# Patient Record
Sex: MALE | Race: WHITE | Employment: UNEMPLOYED | URBAN - METROPOLITAN AREA
[De-identification: names, ages, dates, MRNs, and addresses within clinical notes are randomized per-mention and may not be internally consistent; named-entity substitution may affect disease eponyms.]

---

## 2017-04-14 DIAGNOSIS — K21.9 GASTROESOPHAGEAL REFLUX DISEASE, ESOPHAGITIS PRESENCE NOT SPECIFIED: ICD-10-CM

## 2017-04-14 RX ORDER — OMEPRAZOLE 40 MG/1
CAPSULE, DELAYED RELEASE ORAL
Qty: 90 CAPSULE | Refills: 2 | Status: SHIPPED | OUTPATIENT
Start: 2017-04-14 | End: 2017-10-10

## 2017-04-14 RX ORDER — METOPROLOL SUCCINATE 25 MG/1
TABLET, EXTENDED RELEASE ORAL
Qty: 90 TABLET | Refills: 2 | Status: SHIPPED | OUTPATIENT
Start: 2017-04-14 | End: 2018-01-09 | Stop reason: SDUPTHER

## 2017-05-08 DIAGNOSIS — I25.10 CORONARY ARTERY DISEASE INVOLVING NATIVE HEART, ANGINA PRESENCE UNSPECIFIED, UNSPECIFIED VESSEL OR LESION TYPE: ICD-10-CM

## 2017-05-08 DIAGNOSIS — Z11.59 NEED FOR HEPATITIS C SCREENING TEST: ICD-10-CM

## 2017-05-08 DIAGNOSIS — K21.00 GASTROESOPHAGEAL REFLUX DISEASE WITH ESOPHAGITIS: ICD-10-CM

## 2017-05-08 DIAGNOSIS — E78.5 HYPERLIPIDEMIA, UNSPECIFIED HYPERLIPIDEMIA TYPE: ICD-10-CM

## 2017-05-08 LAB
ALBUMIN SERPL-MCNC: 4.1 G/DL (ref 3.9–4.9)
ALP BLD-CCNC: 43 U/L (ref 35–104)
ALT SERPL-CCNC: 17 U/L (ref 0–41)
ANION GAP SERPL CALCULATED.3IONS-SCNC: 11 MEQ/L (ref 7–13)
AST SERPL-CCNC: 24 U/L (ref 0–40)
BASOPHILS ABSOLUTE: 0 K/UL (ref 0–0.2)
BASOPHILS RELATIVE PERCENT: 0.9 %
BILIRUB SERPL-MCNC: 0.7 MG/DL (ref 0–1.2)
BUN BLDV-MCNC: 12 MG/DL (ref 8–23)
CALCIUM SERPL-MCNC: 8.7 MG/DL (ref 8.6–10.2)
CHLORIDE BLD-SCNC: 103 MEQ/L (ref 98–107)
CHOLESTEROL, TOTAL: 136 MG/DL (ref 0–199)
CO2: 26 MEQ/L (ref 22–29)
CREAT SERPL-MCNC: 0.91 MG/DL (ref 0.7–1.2)
EOSINOPHILS ABSOLUTE: 0.1 K/UL (ref 0–0.7)
EOSINOPHILS RELATIVE PERCENT: 2 %
FOLATE: >20 NG/ML (ref 7.3–26.1)
GFR AFRICAN AMERICAN: >60
GFR NON-AFRICAN AMERICAN: >60
GLOBULIN: 2.2 G/DL (ref 2.3–3.5)
GLUCOSE BLD-MCNC: 85 MG/DL (ref 74–109)
HCT VFR BLD CALC: 46.6 % (ref 42–52)
HDLC SERPL-MCNC: 57 MG/DL (ref 40–59)
HEMOGLOBIN: 15.9 G/DL (ref 14–18)
HEPATITIS C ANTIBODY INTERPRETATION: NORMAL
LDL CHOLESTEROL CALCULATED: 65 MG/DL (ref 0–129)
LYMPHOCYTES ABSOLUTE: 1.3 K/UL (ref 1–4.8)
LYMPHOCYTES RELATIVE PERCENT: 29 %
MAGNESIUM: 2.4 MG/DL (ref 1.7–2.3)
MCH RBC QN AUTO: 31.8 PG (ref 27–31.3)
MCHC RBC AUTO-ENTMCNC: 34 % (ref 33–37)
MCV RBC AUTO: 93.5 FL (ref 80–100)
MONOCYTES ABSOLUTE: 0.4 K/UL (ref 0.2–0.8)
MONOCYTES RELATIVE PERCENT: 7.8 %
NEUTROPHILS ABSOLUTE: 2.8 K/UL (ref 1.4–6.5)
NEUTROPHILS RELATIVE PERCENT: 60.3 %
PDW BLD-RTO: 13.4 % (ref 11.5–14.5)
PLATELET # BLD: 143 K/UL (ref 130–400)
POTASSIUM SERPL-SCNC: 4.5 MEQ/L (ref 3.5–5.1)
RBC # BLD: 4.99 M/UL (ref 4.7–6.1)
SODIUM BLD-SCNC: 140 MEQ/L (ref 132–144)
TOTAL PROTEIN: 6.3 G/DL (ref 6.4–8.1)
TRIGL SERPL-MCNC: 71 MG/DL (ref 0–200)
TSH REFLEX: 3.19 UIU/ML (ref 0.27–4.2)
VITAMIN B-12: 619 PG/ML (ref 211–946)
WBC # BLD: 4.6 K/UL (ref 4.8–10.8)

## 2017-05-09 ENCOUNTER — OFFICE VISIT (OUTPATIENT)
Dept: CARDIOLOGY | Age: 72
End: 2017-05-09

## 2017-05-09 VITALS
RESPIRATION RATE: 12 BRPM | BODY MASS INDEX: 26.63 KG/M2 | OXYGEN SATURATION: 97 % | HEART RATE: 56 BPM | SYSTOLIC BLOOD PRESSURE: 134 MMHG | WEIGHT: 186 LBS | DIASTOLIC BLOOD PRESSURE: 76 MMHG | HEIGHT: 70 IN

## 2017-05-09 DIAGNOSIS — R00.1 SINUS BRADYCARDIA ON ECG: ICD-10-CM

## 2017-05-09 DIAGNOSIS — I25.10 CORONARY ARTERY DISEASE INVOLVING NATIVE HEART, ANGINA PRESENCE UNSPECIFIED, UNSPECIFIED VESSEL OR LESION TYPE: Primary | ICD-10-CM

## 2017-05-09 PROCEDURE — G8427 DOCREV CUR MEDS BY ELIG CLIN: HCPCS | Performed by: INTERNAL MEDICINE

## 2017-05-09 PROCEDURE — G8598 ASA/ANTIPLAT THER USED: HCPCS | Performed by: INTERNAL MEDICINE

## 2017-05-09 PROCEDURE — 4040F PNEUMOC VAC/ADMIN/RCVD: CPT | Performed by: INTERNAL MEDICINE

## 2017-05-09 PROCEDURE — 1036F TOBACCO NON-USER: CPT | Performed by: INTERNAL MEDICINE

## 2017-05-09 PROCEDURE — G8420 CALC BMI NORM PARAMETERS: HCPCS | Performed by: INTERNAL MEDICINE

## 2017-05-09 PROCEDURE — 1123F ACP DISCUSS/DSCN MKR DOCD: CPT | Performed by: INTERNAL MEDICINE

## 2017-05-09 PROCEDURE — 99213 OFFICE O/P EST LOW 20 MIN: CPT | Performed by: INTERNAL MEDICINE

## 2017-05-09 PROCEDURE — 3017F COLORECTAL CA SCREEN DOC REV: CPT | Performed by: INTERNAL MEDICINE

## 2017-05-09 ASSESSMENT — ENCOUNTER SYMPTOMS
SHORTNESS OF BREATH: 0
COLOR CHANGE: 0
CHEST TIGHTNESS: 0
COUGH: 0
APNEA: 0

## 2017-05-13 DIAGNOSIS — E78.5 HYPERLIPIDEMIA, UNSPECIFIED HYPERLIPIDEMIA TYPE: ICD-10-CM

## 2017-05-13 DIAGNOSIS — I25.10 CORONARY ARTERY DISEASE INVOLVING NATIVE HEART, ANGINA PRESENCE UNSPECIFIED, UNSPECIFIED VESSEL OR LESION TYPE: ICD-10-CM

## 2017-05-15 ENCOUNTER — OFFICE VISIT (OUTPATIENT)
Dept: FAMILY MEDICINE CLINIC | Age: 72
End: 2017-05-15

## 2017-05-15 VITALS
WEIGHT: 185 LBS | HEIGHT: 70 IN | RESPIRATION RATE: 12 BRPM | OXYGEN SATURATION: 98 % | DIASTOLIC BLOOD PRESSURE: 80 MMHG | HEART RATE: 56 BPM | SYSTOLIC BLOOD PRESSURE: 130 MMHG | TEMPERATURE: 98.5 F | BODY MASS INDEX: 26.48 KG/M2

## 2017-05-15 DIAGNOSIS — K21.00 GASTROESOPHAGEAL REFLUX DISEASE WITH ESOPHAGITIS: ICD-10-CM

## 2017-05-15 DIAGNOSIS — I25.10 CORONARY ARTERY DISEASE INVOLVING NATIVE HEART, ANGINA PRESENCE UNSPECIFIED, UNSPECIFIED VESSEL OR LESION TYPE: Primary | ICD-10-CM

## 2017-05-15 DIAGNOSIS — Z12.11 COLON CANCER SCREENING: ICD-10-CM

## 2017-05-15 DIAGNOSIS — E78.5 HYPERLIPIDEMIA, UNSPECIFIED HYPERLIPIDEMIA TYPE: ICD-10-CM

## 2017-05-15 PROCEDURE — 1036F TOBACCO NON-USER: CPT | Performed by: FAMILY MEDICINE

## 2017-05-15 PROCEDURE — G8598 ASA/ANTIPLAT THER USED: HCPCS | Performed by: FAMILY MEDICINE

## 2017-05-15 PROCEDURE — 1123F ACP DISCUSS/DSCN MKR DOCD: CPT | Performed by: FAMILY MEDICINE

## 2017-05-15 PROCEDURE — G8427 DOCREV CUR MEDS BY ELIG CLIN: HCPCS | Performed by: FAMILY MEDICINE

## 2017-05-15 PROCEDURE — 99214 OFFICE O/P EST MOD 30 MIN: CPT | Performed by: FAMILY MEDICINE

## 2017-05-15 PROCEDURE — 3017F COLORECTAL CA SCREEN DOC REV: CPT | Performed by: FAMILY MEDICINE

## 2017-05-15 PROCEDURE — G8420 CALC BMI NORM PARAMETERS: HCPCS | Performed by: FAMILY MEDICINE

## 2017-05-15 PROCEDURE — 4040F PNEUMOC VAC/ADMIN/RCVD: CPT | Performed by: FAMILY MEDICINE

## 2017-05-15 RX ORDER — ROSUVASTATIN CALCIUM 10 MG/1
TABLET, COATED ORAL
Qty: 90 TABLET | Refills: 0 | Status: SHIPPED | OUTPATIENT
Start: 2017-05-15 | End: 2017-08-13 | Stop reason: SDUPTHER

## 2017-05-15 ASSESSMENT — ENCOUNTER SYMPTOMS
NAUSEA: 0
EYES NEGATIVE: 1
ALLERGIC/IMMUNOLOGIC NEGATIVE: 1
SHORTNESS OF BREATH: 0
RESPIRATORY NEGATIVE: 1
ABDOMINAL PAIN: 0
GASTROINTESTINAL NEGATIVE: 1
CHEST TIGHTNESS: 0
VOMITING: 0
BACK PAIN: 0
COUGH: 0

## 2017-08-13 DIAGNOSIS — E78.5 HYPERLIPIDEMIA, UNSPECIFIED HYPERLIPIDEMIA TYPE: ICD-10-CM

## 2017-08-13 DIAGNOSIS — I25.10 CORONARY ARTERY DISEASE INVOLVING NATIVE HEART, ANGINA PRESENCE UNSPECIFIED, UNSPECIFIED VESSEL OR LESION TYPE: ICD-10-CM

## 2017-08-14 RX ORDER — ROSUVASTATIN CALCIUM 10 MG/1
TABLET, COATED ORAL
Qty: 90 TABLET | Refills: 0 | Status: SHIPPED | OUTPATIENT
Start: 2017-08-14 | End: 2017-11-13 | Stop reason: SDUPTHER

## 2017-08-24 ENCOUNTER — OFFICE VISIT (OUTPATIENT)
Dept: UROLOGY | Age: 72
End: 2017-08-24

## 2017-08-24 VITALS
DIASTOLIC BLOOD PRESSURE: 84 MMHG | HEART RATE: 56 BPM | SYSTOLIC BLOOD PRESSURE: 130 MMHG | BODY MASS INDEX: 26.07 KG/M2 | HEIGHT: 69 IN | WEIGHT: 176 LBS

## 2017-08-24 DIAGNOSIS — N13.8 BPH WITH OBSTRUCTION/LOWER URINARY TRACT SYMPTOMS: Primary | ICD-10-CM

## 2017-08-24 DIAGNOSIS — N40.1 BPH WITH OBSTRUCTION/LOWER URINARY TRACT SYMPTOMS: Primary | ICD-10-CM

## 2017-08-24 PROCEDURE — 3017F COLORECTAL CA SCREEN DOC REV: CPT | Performed by: UROLOGY

## 2017-08-24 PROCEDURE — 51741 ELECTRO-UROFLOWMETRY FIRST: CPT | Performed by: UROLOGY

## 2017-08-24 PROCEDURE — G8598 ASA/ANTIPLAT THER USED: HCPCS | Performed by: UROLOGY

## 2017-08-24 PROCEDURE — 4040F PNEUMOC VAC/ADMIN/RCVD: CPT | Performed by: UROLOGY

## 2017-08-24 PROCEDURE — 1036F TOBACCO NON-USER: CPT | Performed by: UROLOGY

## 2017-08-24 PROCEDURE — 1123F ACP DISCUSS/DSCN MKR DOCD: CPT | Performed by: UROLOGY

## 2017-08-24 PROCEDURE — 51798 US URINE CAPACITY MEASURE: CPT | Performed by: UROLOGY

## 2017-08-24 PROCEDURE — G8419 CALC BMI OUT NRM PARAM NOF/U: HCPCS | Performed by: UROLOGY

## 2017-08-24 PROCEDURE — 99213 OFFICE O/P EST LOW 20 MIN: CPT | Performed by: UROLOGY

## 2017-08-24 PROCEDURE — G8427 DOCREV CUR MEDS BY ELIG CLIN: HCPCS | Performed by: UROLOGY

## 2017-08-24 ASSESSMENT — ENCOUNTER SYMPTOMS: SHORTNESS OF BREATH: 0

## 2017-09-15 ENCOUNTER — ANESTHESIA (OUTPATIENT)
Dept: ENDOSCOPY | Age: 72
End: 2017-09-15
Payer: MEDICARE

## 2017-09-15 ENCOUNTER — HOSPITAL ENCOUNTER (OUTPATIENT)
Age: 72
Setting detail: OUTPATIENT SURGERY
Discharge: HOME | End: 2017-09-15
Attending: SPECIALIST | Admitting: SPECIALIST
Payer: MEDICARE

## 2017-09-15 ENCOUNTER — ANESTHESIA EVENT (OUTPATIENT)
Dept: ENDOSCOPY | Age: 72
End: 2017-09-15
Payer: MEDICARE

## 2017-09-15 VITALS
BODY MASS INDEX: 26.07 KG/M2 | DIASTOLIC BLOOD PRESSURE: 74 MMHG | SYSTOLIC BLOOD PRESSURE: 120 MMHG | TEMPERATURE: 97.6 F | RESPIRATION RATE: 16 BRPM | WEIGHT: 176 LBS | HEART RATE: 50 BPM | OXYGEN SATURATION: 98 % | HEIGHT: 69 IN

## 2017-09-15 VITALS
OXYGEN SATURATION: 99 % | DIASTOLIC BLOOD PRESSURE: 51 MMHG | RESPIRATION RATE: 9 BRPM | SYSTOLIC BLOOD PRESSURE: 95 MMHG | TEMPERATURE: 97.7 F

## 2017-09-15 PROCEDURE — 2500000003 HC RX 250 WO HCPCS: Performed by: NURSE ANESTHETIST, CERTIFIED REGISTERED

## 2017-09-15 PROCEDURE — 3700000000 HC ANESTHESIA ATTENDED CARE: Performed by: SPECIALIST

## 2017-09-15 PROCEDURE — 7100000011 HC PHASE II RECOVERY - ADDTL 15 MIN: Performed by: SPECIALIST

## 2017-09-15 PROCEDURE — 7100000010 HC PHASE II RECOVERY - FIRST 15 MIN: Performed by: SPECIALIST

## 2017-09-15 PROCEDURE — 2580000003 HC RX 258: Performed by: SPECIALIST

## 2017-09-15 PROCEDURE — 2580000003 HC RX 258: Performed by: NURSE ANESTHETIST, CERTIFIED REGISTERED

## 2017-09-15 PROCEDURE — 6360000002 HC RX W HCPCS: Performed by: NURSE ANESTHETIST, CERTIFIED REGISTERED

## 2017-09-15 PROCEDURE — 88305 TISSUE EXAM BY PATHOLOGIST: CPT

## 2017-09-15 PROCEDURE — 3609027000 HC COLONOSCOPY: Performed by: SPECIALIST

## 2017-09-15 PROCEDURE — 3700000001 HC ADD 15 MINUTES (ANESTHESIA): Performed by: SPECIALIST

## 2017-09-15 RX ORDER — SODIUM CHLORIDE 0.9 % (FLUSH) 0.9 %
10 SYRINGE (ML) INJECTION EVERY 12 HOURS SCHEDULED
Status: DISCONTINUED | OUTPATIENT
Start: 2017-09-15 | End: 2017-09-15 | Stop reason: HOSPADM

## 2017-09-15 RX ORDER — SODIUM CHLORIDE 0.9 % (FLUSH) 0.9 %
10 SYRINGE (ML) INJECTION PRN
Status: DISCONTINUED | OUTPATIENT
Start: 2017-09-15 | End: 2017-09-15 | Stop reason: HOSPADM

## 2017-09-15 RX ORDER — SODIUM CHLORIDE 9 MG/ML
INJECTION, SOLUTION INTRAVENOUS CONTINUOUS PRN
Status: DISCONTINUED | OUTPATIENT
Start: 2017-09-15 | End: 2017-09-15 | Stop reason: SDUPTHER

## 2017-09-15 RX ORDER — SODIUM CHLORIDE 0.9 % (FLUSH) 0.9 %
10 SYRINGE (ML) INJECTION EVERY 12 HOURS SCHEDULED
Status: CANCELLED | OUTPATIENT
Start: 2017-09-15

## 2017-09-15 RX ORDER — SODIUM CHLORIDE 9 MG/ML
INJECTION, SOLUTION INTRAVENOUS CONTINUOUS
Status: CANCELLED | OUTPATIENT
Start: 2017-09-15

## 2017-09-15 RX ORDER — ONDANSETRON 2 MG/ML
4 INJECTION INTRAMUSCULAR; INTRAVENOUS
Status: DISCONTINUED | OUTPATIENT
Start: 2017-09-15 | End: 2017-09-15 | Stop reason: HOSPADM

## 2017-09-15 RX ORDER — SODIUM CHLORIDE 0.9 % (FLUSH) 0.9 %
10 SYRINGE (ML) INJECTION PRN
Status: CANCELLED | OUTPATIENT
Start: 2017-09-15

## 2017-09-15 RX ORDER — SODIUM CHLORIDE 9 MG/ML
INJECTION, SOLUTION INTRAVENOUS CONTINUOUS
Status: DISCONTINUED | OUTPATIENT
Start: 2017-09-15 | End: 2017-09-15 | Stop reason: HOSPADM

## 2017-09-15 RX ORDER — PROPOFOL 10 MG/ML
INJECTION, EMULSION INTRAVENOUS PRN
Status: DISCONTINUED | OUTPATIENT
Start: 2017-09-15 | End: 2017-09-15 | Stop reason: SDUPTHER

## 2017-09-15 RX ORDER — LIDOCAINE HYDROCHLORIDE 10 MG/ML
1 INJECTION, SOLUTION EPIDURAL; INFILTRATION; INTRACAUDAL; PERINEURAL
Status: DISCONTINUED | OUTPATIENT
Start: 2017-09-15 | End: 2017-09-15 | Stop reason: HOSPADM

## 2017-09-15 RX ORDER — LIDOCAINE HYDROCHLORIDE 10 MG/ML
INJECTION, SOLUTION INFILTRATION; PERINEURAL PRN
Status: DISCONTINUED | OUTPATIENT
Start: 2017-09-15 | End: 2017-09-15 | Stop reason: SDUPTHER

## 2017-09-15 RX ADMIN — SODIUM CHLORIDE: 9 INJECTION, SOLUTION INTRAVENOUS at 08:23

## 2017-09-15 RX ADMIN — SODIUM CHLORIDE: 900 INJECTION, SOLUTION INTRAVENOUS at 08:08

## 2017-09-15 RX ADMIN — PROPOFOL 60 MG: 10 INJECTION, EMULSION INTRAVENOUS at 08:46

## 2017-09-15 RX ADMIN — PROPOFOL 120 MG: 10 INJECTION, EMULSION INTRAVENOUS at 08:42

## 2017-09-15 RX ADMIN — LIDOCAINE HYDROCHLORIDE 30 MG: 10 INJECTION, SOLUTION INFILTRATION; PERINEURAL at 08:42

## 2017-09-15 RX ADMIN — PROPOFOL 40 MG: 10 INJECTION, EMULSION INTRAVENOUS at 08:55

## 2017-10-03 DIAGNOSIS — E78.5 HYPERLIPIDEMIA, UNSPECIFIED HYPERLIPIDEMIA TYPE: ICD-10-CM

## 2017-10-03 DIAGNOSIS — I25.10 CORONARY ARTERY DISEASE INVOLVING NATIVE HEART, ANGINA PRESENCE UNSPECIFIED, UNSPECIFIED VESSEL OR LESION TYPE: ICD-10-CM

## 2017-10-03 DIAGNOSIS — K21.00 GASTROESOPHAGEAL REFLUX DISEASE WITH ESOPHAGITIS: ICD-10-CM

## 2017-10-03 LAB
ALBUMIN SERPL-MCNC: 4.3 G/DL (ref 3.9–4.9)
ALP BLD-CCNC: 44 U/L (ref 35–104)
ALT SERPL-CCNC: 15 U/L (ref 0–41)
ANION GAP SERPL CALCULATED.3IONS-SCNC: 14 MEQ/L (ref 7–13)
AST SERPL-CCNC: 18 U/L (ref 0–40)
BASOPHILS ABSOLUTE: 0.1 K/UL (ref 0–0.2)
BASOPHILS RELATIVE PERCENT: 0.9 %
BILIRUB SERPL-MCNC: 0.7 MG/DL (ref 0–1.2)
BUN BLDV-MCNC: 13 MG/DL (ref 8–23)
CALCIUM SERPL-MCNC: 9.2 MG/DL (ref 8.6–10.2)
CHLORIDE BLD-SCNC: 105 MEQ/L (ref 98–107)
CHOLESTEROL, TOTAL: 134 MG/DL (ref 0–199)
CO2: 26 MEQ/L (ref 22–29)
CREAT SERPL-MCNC: 0.82 MG/DL (ref 0.7–1.2)
EOSINOPHILS ABSOLUTE: 0.1 K/UL (ref 0–0.7)
EOSINOPHILS RELATIVE PERCENT: 1.7 %
GFR AFRICAN AMERICAN: >60
GFR NON-AFRICAN AMERICAN: >60
GLOBULIN: 2.3 G/DL (ref 2.3–3.5)
GLUCOSE BLD-MCNC: 96 MG/DL (ref 74–109)
HCT VFR BLD CALC: 47 % (ref 42–52)
HDLC SERPL-MCNC: 61 MG/DL (ref 40–59)
HEMOGLOBIN: 15.8 G/DL (ref 14–18)
LDL CHOLESTEROL CALCULATED: 59 MG/DL (ref 0–129)
LYMPHOCYTES ABSOLUTE: 1.1 K/UL (ref 1–4.8)
LYMPHOCYTES RELATIVE PERCENT: 19.1 %
MCH RBC QN AUTO: 31.6 PG (ref 27–31.3)
MCHC RBC AUTO-ENTMCNC: 33.7 % (ref 33–37)
MCV RBC AUTO: 93.6 FL (ref 80–100)
MONOCYTES ABSOLUTE: 0.4 K/UL (ref 0.2–0.8)
MONOCYTES RELATIVE PERCENT: 6.8 %
NEUTROPHILS ABSOLUTE: 4.1 K/UL (ref 1.4–6.5)
NEUTROPHILS RELATIVE PERCENT: 71.5 %
PDW BLD-RTO: 13.2 % (ref 11.5–14.5)
PLATELET # BLD: 142 K/UL (ref 130–400)
POTASSIUM SERPL-SCNC: 4.8 MEQ/L (ref 3.5–5.1)
RBC # BLD: 5.02 M/UL (ref 4.7–6.1)
SODIUM BLD-SCNC: 145 MEQ/L (ref 132–144)
TOTAL PROTEIN: 6.6 G/DL (ref 6.4–8.1)
TRIGL SERPL-MCNC: 71 MG/DL (ref 0–200)
TSH REFLEX: 3.51 UIU/ML (ref 0.27–4.2)
WBC # BLD: 5.8 K/UL (ref 4.8–10.8)

## 2017-10-10 ENCOUNTER — OFFICE VISIT (OUTPATIENT)
Dept: FAMILY MEDICINE CLINIC | Age: 72
End: 2017-10-10

## 2017-10-10 VITALS
RESPIRATION RATE: 16 BRPM | OXYGEN SATURATION: 96 % | SYSTOLIC BLOOD PRESSURE: 136 MMHG | HEIGHT: 70 IN | TEMPERATURE: 97.2 F | HEART RATE: 60 BPM | BODY MASS INDEX: 26.05 KG/M2 | WEIGHT: 182 LBS | DIASTOLIC BLOOD PRESSURE: 80 MMHG

## 2017-10-10 DIAGNOSIS — L57.0 AK (ACTINIC KERATOSIS): ICD-10-CM

## 2017-10-10 DIAGNOSIS — K21.00 GASTROESOPHAGEAL REFLUX DISEASE WITH ESOPHAGITIS: ICD-10-CM

## 2017-10-10 DIAGNOSIS — K21.9 GASTROESOPHAGEAL REFLUX DISEASE, ESOPHAGITIS PRESENCE NOT SPECIFIED: ICD-10-CM

## 2017-10-10 DIAGNOSIS — I25.10 CORONARY ARTERY DISEASE INVOLVING NATIVE HEART, ANGINA PRESENCE UNSPECIFIED, UNSPECIFIED VESSEL OR LESION TYPE: Primary | ICD-10-CM

## 2017-10-10 DIAGNOSIS — E78.2 MIXED HYPERLIPIDEMIA: ICD-10-CM

## 2017-10-10 DIAGNOSIS — Z23 NEEDS FLU SHOT: ICD-10-CM

## 2017-10-10 PROCEDURE — 1123F ACP DISCUSS/DSCN MKR DOCD: CPT | Performed by: FAMILY MEDICINE

## 2017-10-10 PROCEDURE — 4040F PNEUMOC VAC/ADMIN/RCVD: CPT | Performed by: FAMILY MEDICINE

## 2017-10-10 PROCEDURE — 1036F TOBACCO NON-USER: CPT | Performed by: FAMILY MEDICINE

## 2017-10-10 PROCEDURE — G8417 CALC BMI ABV UP PARAM F/U: HCPCS | Performed by: FAMILY MEDICINE

## 2017-10-10 PROCEDURE — G8427 DOCREV CUR MEDS BY ELIG CLIN: HCPCS | Performed by: FAMILY MEDICINE

## 2017-10-10 PROCEDURE — G8484 FLU IMMUNIZE NO ADMIN: HCPCS | Performed by: FAMILY MEDICINE

## 2017-10-10 PROCEDURE — G0008 ADMIN INFLUENZA VIRUS VAC: HCPCS | Performed by: FAMILY MEDICINE

## 2017-10-10 PROCEDURE — 3017F COLORECTAL CA SCREEN DOC REV: CPT | Performed by: FAMILY MEDICINE

## 2017-10-10 PROCEDURE — 99214 OFFICE O/P EST MOD 30 MIN: CPT | Performed by: FAMILY MEDICINE

## 2017-10-10 PROCEDURE — 90662 IIV NO PRSV INCREASED AG IM: CPT | Performed by: FAMILY MEDICINE

## 2017-10-10 PROCEDURE — G8598 ASA/ANTIPLAT THER USED: HCPCS | Performed by: FAMILY MEDICINE

## 2017-10-10 RX ORDER — OMEPRAZOLE 20 MG/1
20 CAPSULE, DELAYED RELEASE ORAL DAILY
Qty: 90 CAPSULE | Refills: 3 | Status: SHIPPED | OUTPATIENT
Start: 2017-10-10 | End: 2018-09-17 | Stop reason: SDUPTHER

## 2017-10-10 ASSESSMENT — ENCOUNTER SYMPTOMS
CHEST TIGHTNESS: 0
VOMITING: 0
ALLERGIC/IMMUNOLOGIC NEGATIVE: 1
ABDOMINAL PAIN: 0
SHORTNESS OF BREATH: 0
BACK PAIN: 0
GASTROINTESTINAL NEGATIVE: 1
RESPIRATORY NEGATIVE: 1
EYES NEGATIVE: 1
NAUSEA: 0
COUGH: 0

## 2017-10-10 NOTE — PROGRESS NOTES
Subjective  Maria Doe, 80 y.o. male presents today with:  Chief Complaint   Patient presents with    6 Month Follow-Up    Hyperlipidemia    Coronary Artery Disease    Results     labs, colonoscopy        Patient comes into the office today for follow-up on hyperlipidemia, CAD, also had colonoscopy done. Overall feels well with no new complaints. Review of Systems   Constitutional: Negative. Negative for diaphoresis and fever. HENT: Negative. Eyes: Negative. Respiratory: Negative. Negative for cough, chest tightness and shortness of breath. Cardiovascular: Negative. Negative for chest pain, palpitations and leg swelling. Gastrointestinal: Negative. Negative for abdominal pain, nausea and vomiting. Endocrine: Negative. Genitourinary: Negative. Musculoskeletal: Negative. Negative for back pain. Skin: Positive for rash. Allergic/Immunologic: Negative. Neurological: Negative. Negative for dizziness, seizures, weakness, numbness and headaches. Hematological: Negative. Psychiatric/Behavioral: Negative. Past Medical History:   Diagnosis Date    Abnormal cardiovascular stress test 4/19/2016    Equivocal ST-T wave changes; Defect in the inferior wall consistent with prior infarction; LV EF 79%; TID ratio 1.1    BPH (benign prostatic hypertrophy)     CAD (coronary artery disease)     Cancer (HCC)     COLON    Chest pressure 5/3/2016    Cholelithiasis     Hyperlipidemia     Lumbar radiculopathy 10/26/2016    Osteoarthritis     Parathyroid tumor     Sinus bradycardia on ECG 4/19/2016    Done 4/5/16 with Dr. Brennon Butcher     Past Surgical History:   Procedure Laterality Date    BACK SURGERY  1993    COLECTOMY      COLONOSCOPY  8/18/10,09/11/2012    DR Reji Mares    COLONOSCOPY  09/29/14    DR. PIERRE    JOINT REPLACEMENT Left 5/28/13    total    PARATHYROIDECTOMY Bilateral 2011    2 of 4 glands removed    [place] COLON CA SCRN NOT HI RSK IND N/A 9/15/2017 Lesion (numerous SK's back and chest and a few AK's  scalp) noted. No rash noted. He is not diaphoretic. Psychiatric: He has a normal mood and affect. His speech is normal and behavior is normal. Judgment and thought content normal. Cognition and memory are normal.   Nursing note and vitals reviewed. Assessment & Plan   1. Coronary artery disease involving native heart, angina presence unspecified, unspecified vessel or lesion type  CBC Auto Differential    Comprehensive Metabolic Panel    Lipid Panel    TSH ULTRASENSITIVE, DIRECTED    Magnesium   2. Mixed hyperlipidemia  Comprehensive Metabolic Panel    Lipid Panel    TSH ULTRASENSITIVE, DIRECTED   3. Gastroesophageal reflux disease with esophagitis  CBC Auto Differential    Comprehensive Metabolic Panel    TSH ULTRASENSITIVE, DIRECTED    Magnesium   4. Needs flu shot  INFLUENZA, HIGH DOSE, 65 YRS +, IM, PF, PREFILL SYR, 0.5ML (FLUZONE HD)   5. Gastroesophageal reflux disease, esophagitis presence not specified  Comprehensive Metabolic Panel    Magnesium    omeprazole (PRILOSEC) 20 MG delayed release capsule   6.  AK (actinic keratosis)  Ambulatory referral to Dermatology     Orders Placed This Encounter   Procedures    INFLUENZA, HIGH DOSE, 65 YRS +, IM, PF, PREFILL SYR, 0.5ML (FLUZONE HD)    CBC Auto Differential     Standing Status:   Future     Standing Expiration Date:   10/10/2018    Comprehensive Metabolic Panel     Standing Status:   Future     Standing Expiration Date:   10/10/2018    Lipid Panel     Standing Status:   Future     Standing Expiration Date:   10/10/2018     Order Specific Question:   Is Patient Fasting?/# of Hours     Answer:   8    TSH ULTRASENSITIVE, DIRECTED     Standing Status:   Future     Standing Expiration Date:   10/10/2018    Magnesium     Standing Status:   Future     Standing Expiration Date:   10/10/2018    Ambulatory referral to Dermatology     Referral Priority:   Routine     Referral Type:   Consult for Advice and Opinion     Referral Reason:   Specialty Services Required     Referred to Provider:   Otoniel Daniels MD     Requested Specialty:   Family Medicine     Number of Visits Requested:   1     Orders Placed This Encounter   Medications    omeprazole (PRILOSEC) 20 MG delayed release capsule     Sig: Take 1 capsule by mouth daily     Dispense:  90 capsule     Refill:  3     Medications Discontinued During This Encounter   Medication Reason    omeprazole (PRILOSEC) 40 MG delayed release capsule     attempt to reduce omeprazole from 40 down to 20 mg daily to reduce risks of side effects. If patient is unable to tolerate 20, He can call and we can increase back up to 40 with the understanding of the increased risks    Counseling given: Yes      Return in about 6 months (around 4/10/2018).     Evan Disla, DO

## 2017-10-17 ENCOUNTER — OFFICE VISIT (OUTPATIENT)
Dept: FAMILY MEDICINE CLINIC | Age: 72
End: 2017-10-17

## 2017-10-17 VITALS
HEART RATE: 54 BPM | HEIGHT: 70 IN | OXYGEN SATURATION: 98 % | DIASTOLIC BLOOD PRESSURE: 70 MMHG | BODY MASS INDEX: 26.05 KG/M2 | SYSTOLIC BLOOD PRESSURE: 122 MMHG | WEIGHT: 182 LBS

## 2017-10-17 DIAGNOSIS — L82.0 INFLAMED SEBORRHEIC KERATOSIS: ICD-10-CM

## 2017-10-17 DIAGNOSIS — L57.0 ACTINIC KERATOSES: ICD-10-CM

## 2017-10-17 PROCEDURE — 17000 DESTRUCT PREMALG LESION: CPT | Performed by: FAMILY MEDICINE

## 2017-10-17 PROCEDURE — 17003 DESTRUCT PREMALG LES 2-14: CPT | Performed by: FAMILY MEDICINE

## 2017-10-17 PROCEDURE — 1036F TOBACCO NON-USER: CPT | Performed by: FAMILY MEDICINE

## 2017-10-17 PROCEDURE — 17110 DESTRUCTION B9 LES UP TO 14: CPT | Performed by: FAMILY MEDICINE

## 2017-10-17 ASSESSMENT — ENCOUNTER SYMPTOMS
ABDOMINAL PAIN: 0
SHORTNESS OF BREATH: 0

## 2017-10-17 NOTE — PROGRESS NOTES
Subjective  Frank Gómez, 80 y.o. male presents today with:  Chief Complaint   Patient presents with    Lesion(s)       HPI    Pt of Dr. Marquez Dietz here today for evaluation of skin. Denies personal h/o skin cancer. Review of Systems   Constitutional: Negative for fever. Respiratory: Negative for shortness of breath. Cardiovascular: Negative for chest pain. Gastrointestinal: Negative for abdominal pain. Skin: Negative for rash. Past Medical History:   Diagnosis Date    Abnormal cardiovascular stress test 4/19/2016    Equivocal ST-T wave changes; Defect in the inferior wall consistent with prior infarction; LV EF 79%; TID ratio 1.1    Actinic keratoses     BPH (benign prostatic hypertrophy)     CAD (coronary artery disease)     Cancer (HCC)     COLON    Chest pressure 5/3/2016    Cholelithiasis     Hyperlipidemia     Inflamed seborrheic keratosis     Lumbar radiculopathy 10/26/2016    Osteoarthritis     Parathyroid tumor     Sinus bradycardia on ECG 4/19/2016    Done 4/5/16 with Dr. Marquez Dietz     Past Surgical History:   Procedure Laterality Date    BACK SURGERY  1993    COLECTOMY      COLONOSCOPY  8/18/10,09/11/2012    DR Cher Carbone    COLONOSCOPY  09/29/14    DR. PIERRE    JOINT REPLACEMENT Left 5/28/13    total    PARATHYROIDECTOMY Bilateral 2011    2 of 4 glands removed    [place] COLON CA SCRN NOT HI RSK IND N/A 9/15/2017    COLONOSCOPY performed by Cheyanne Enriquez MD at [facility] ENDOSCOPY  8/11/09    DR POLK     Social History     Social History    Marital status:      Spouse name: N/A    Number of children: N/A    Years of education: N/A     Occupational History    Not on file.      Social History Main Topics    Smoking status: Never Smoker    Smokeless tobacco: Never Used    Alcohol use No    Drug use: No    Sexual activity: Not on file     Other Topics Concern    Not on file Inflamed seborrheic keratosis  69385 - [place] DESTRUCTION BENIGN LESIONS UP TO 14     LN2 times 3 seconds to affected areas times 4 lesion(s) for a total of AK's x 3 and SK x 1. Informed consent given was given. Risks including infection, bleeding, scarring discussed. No guarantee how scars will look. Post op instructions given. Best to leave blisters alone if they form. If blister(s) pop or patient pops with a sterilized needed, apply antibiotic ointment and a bandage to affected area(s). Orders Placed This Encounter   Procedures    74017 - [place] DESTRUCTION BENIGN LESIONS UP TO 14    83991 - [place] PREMALIGNANT,2-14 LESIONS     No orders of the defined types were placed in this encounter. There are no discontinued medications. Return in about 5 weeks (around 11/21/2017).     Greg Gonzalez MD

## 2017-11-13 DIAGNOSIS — I25.10 CORONARY ARTERY DISEASE INVOLVING NATIVE HEART, ANGINA PRESENCE UNSPECIFIED, UNSPECIFIED VESSEL OR LESION TYPE: ICD-10-CM

## 2017-11-13 DIAGNOSIS — E78.5 HYPERLIPIDEMIA, UNSPECIFIED HYPERLIPIDEMIA TYPE: ICD-10-CM

## 2017-11-13 RX ORDER — ROSUVASTATIN CALCIUM 10 MG/1
TABLET, COATED ORAL
Qty: 90 TABLET | Refills: 3 | Status: SHIPPED | OUTPATIENT
Start: 2017-11-13 | End: 2018-11-11 | Stop reason: SDUPTHER

## 2017-12-29 RX ORDER — TAMSULOSIN HYDROCHLORIDE 0.4 MG/1
0.4 CAPSULE ORAL DAILY
Qty: 90 CAPSULE | Refills: 3 | Status: SHIPPED | OUTPATIENT
Start: 2017-12-29 | End: 2018-12-24 | Stop reason: SDUPTHER

## 2017-12-29 RX ORDER — FINASTERIDE 5 MG/1
5 TABLET, FILM COATED ORAL DAILY
Qty: 90 TABLET | Refills: 3 | Status: SHIPPED | OUTPATIENT
Start: 2017-12-29 | End: 2018-12-24 | Stop reason: SDUPTHER

## 2018-01-09 RX ORDER — METOPROLOL SUCCINATE 25 MG/1
TABLET, EXTENDED RELEASE ORAL
Qty: 90 TABLET | Refills: 2 | Status: SHIPPED | OUTPATIENT
Start: 2018-01-09 | End: 2018-07-12 | Stop reason: SDUPTHER

## 2018-04-03 DIAGNOSIS — I25.10 CORONARY ARTERY DISEASE INVOLVING NATIVE HEART, ANGINA PRESENCE UNSPECIFIED, UNSPECIFIED VESSEL OR LESION TYPE: ICD-10-CM

## 2018-04-03 DIAGNOSIS — E78.2 MIXED HYPERLIPIDEMIA: ICD-10-CM

## 2018-04-03 DIAGNOSIS — K21.9 GASTROESOPHAGEAL REFLUX DISEASE, ESOPHAGITIS PRESENCE NOT SPECIFIED: ICD-10-CM

## 2018-04-03 DIAGNOSIS — K21.00 GASTROESOPHAGEAL REFLUX DISEASE WITH ESOPHAGITIS: ICD-10-CM

## 2018-04-03 LAB
ALBUMIN SERPL-MCNC: 4.4 G/DL (ref 3.9–4.9)
ALP BLD-CCNC: 43 U/L (ref 35–104)
ALT SERPL-CCNC: 17 U/L (ref 0–41)
ANION GAP SERPL CALCULATED.3IONS-SCNC: 14 MEQ/L (ref 7–13)
AST SERPL-CCNC: 22 U/L (ref 0–40)
BASOPHILS ABSOLUTE: 0 K/UL (ref 0–0.2)
BASOPHILS RELATIVE PERCENT: 0.7 %
BILIRUB SERPL-MCNC: 0.8 MG/DL (ref 0–1.2)
BUN BLDV-MCNC: 12 MG/DL (ref 8–23)
CALCIUM SERPL-MCNC: 9.1 MG/DL (ref 8.6–10.2)
CHLORIDE BLD-SCNC: 103 MEQ/L (ref 98–107)
CHOLESTEROL, TOTAL: 136 MG/DL (ref 0–199)
CO2: 27 MEQ/L (ref 22–29)
CREAT SERPL-MCNC: 0.86 MG/DL (ref 0.7–1.2)
EOSINOPHILS ABSOLUTE: 0.1 K/UL (ref 0–0.7)
EOSINOPHILS RELATIVE PERCENT: 1.7 %
GFR AFRICAN AMERICAN: >60
GFR NON-AFRICAN AMERICAN: >60
GLOBULIN: 2.2 G/DL (ref 2.3–3.5)
GLUCOSE BLD-MCNC: 91 MG/DL (ref 74–109)
HCT VFR BLD CALC: 48.8 % (ref 42–52)
HDLC SERPL-MCNC: 61 MG/DL (ref 40–59)
HEMOGLOBIN: 16.4 G/DL (ref 14–18)
LDL CHOLESTEROL CALCULATED: 60 MG/DL (ref 0–129)
LYMPHOCYTES ABSOLUTE: 1.3 K/UL (ref 1–4.8)
LYMPHOCYTES RELATIVE PERCENT: 24.8 %
MAGNESIUM: 2.3 MG/DL (ref 1.7–2.3)
MCH RBC QN AUTO: 32.2 PG (ref 27–31.3)
MCHC RBC AUTO-ENTMCNC: 33.6 % (ref 33–37)
MCV RBC AUTO: 95.9 FL (ref 80–100)
MONOCYTES ABSOLUTE: 0.3 K/UL (ref 0.2–0.8)
MONOCYTES RELATIVE PERCENT: 6.6 %
NEUTROPHILS ABSOLUTE: 3.4 K/UL (ref 1.4–6.5)
NEUTROPHILS RELATIVE PERCENT: 66.2 %
PDW BLD-RTO: 13.8 % (ref 11.5–14.5)
PLATELET # BLD: 134 K/UL (ref 130–400)
POTASSIUM SERPL-SCNC: 4.7 MEQ/L (ref 3.5–5.1)
RBC # BLD: 5.08 M/UL (ref 4.7–6.1)
SODIUM BLD-SCNC: 144 MEQ/L (ref 132–144)
TOTAL PROTEIN: 6.6 G/DL (ref 6.4–8.1)
TRIGL SERPL-MCNC: 75 MG/DL (ref 0–200)
TSH REFLEX: 3.42 UIU/ML (ref 0.27–4.2)
WBC # BLD: 5.2 K/UL (ref 4.8–10.8)

## 2018-04-10 ENCOUNTER — OFFICE VISIT (OUTPATIENT)
Dept: FAMILY MEDICINE CLINIC | Age: 73
End: 2018-04-10
Payer: MEDICARE

## 2018-04-10 VITALS
HEART RATE: 60 BPM | RESPIRATION RATE: 12 BRPM | HEIGHT: 70 IN | DIASTOLIC BLOOD PRESSURE: 80 MMHG | TEMPERATURE: 97.4 F | BODY MASS INDEX: 25.77 KG/M2 | WEIGHT: 180 LBS | OXYGEN SATURATION: 98 % | SYSTOLIC BLOOD PRESSURE: 130 MMHG

## 2018-04-10 DIAGNOSIS — I25.10 CORONARY ARTERY DISEASE INVOLVING NATIVE HEART WITHOUT ANGINA PECTORIS, UNSPECIFIED VESSEL OR LESION TYPE: ICD-10-CM

## 2018-04-10 DIAGNOSIS — R41.3 MEMORY LOSS: ICD-10-CM

## 2018-04-10 DIAGNOSIS — K21.00 GASTROESOPHAGEAL REFLUX DISEASE WITH ESOPHAGITIS: ICD-10-CM

## 2018-04-10 DIAGNOSIS — E78.2 MIXED HYPERLIPIDEMIA: Primary | ICD-10-CM

## 2018-04-10 DIAGNOSIS — R25.1 TREMOR: ICD-10-CM

## 2018-04-10 PROCEDURE — 99214 OFFICE O/P EST MOD 30 MIN: CPT | Performed by: FAMILY MEDICINE

## 2018-04-10 PROCEDURE — 1036F TOBACCO NON-USER: CPT | Performed by: FAMILY MEDICINE

## 2018-04-10 PROCEDURE — 1123F ACP DISCUSS/DSCN MKR DOCD: CPT | Performed by: FAMILY MEDICINE

## 2018-04-10 PROCEDURE — G8417 CALC BMI ABV UP PARAM F/U: HCPCS | Performed by: FAMILY MEDICINE

## 2018-04-10 PROCEDURE — G8598 ASA/ANTIPLAT THER USED: HCPCS | Performed by: FAMILY MEDICINE

## 2018-04-10 PROCEDURE — G8427 DOCREV CUR MEDS BY ELIG CLIN: HCPCS | Performed by: FAMILY MEDICINE

## 2018-04-10 PROCEDURE — 3017F COLORECTAL CA SCREEN DOC REV: CPT | Performed by: FAMILY MEDICINE

## 2018-04-10 PROCEDURE — 4040F PNEUMOC VAC/ADMIN/RCVD: CPT | Performed by: FAMILY MEDICINE

## 2018-04-10 ASSESSMENT — ENCOUNTER SYMPTOMS
ALLERGIC/IMMUNOLOGIC NEGATIVE: 1
EYES NEGATIVE: 1
GASTROINTESTINAL NEGATIVE: 1
RESPIRATORY NEGATIVE: 1
CHEST TIGHTNESS: 0
SHORTNESS OF BREATH: 0

## 2018-04-10 ASSESSMENT — PATIENT HEALTH QUESTIONNAIRE - PHQ9
1. LITTLE INTEREST OR PLEASURE IN DOING THINGS: 0
2. FEELING DOWN, DEPRESSED OR HOPELESS: 0
SUM OF ALL RESPONSES TO PHQ QUESTIONS 1-9: 0
SUM OF ALL RESPONSES TO PHQ9 QUESTIONS 1 & 2: 0

## 2018-04-20 ENCOUNTER — HOSPITAL ENCOUNTER (OUTPATIENT)
Dept: MRI IMAGING | Age: 73
Discharge: HOME | End: 2018-04-22
Payer: MEDICARE

## 2018-04-20 DIAGNOSIS — G46.4 CEREBELLAR STROKE SYNDROME: ICD-10-CM

## 2018-04-20 PROCEDURE — 70551 MRI BRAIN STEM W/O DYE: CPT

## 2018-04-26 ENCOUNTER — TELEPHONE (OUTPATIENT)
Dept: UROLOGY | Age: 73
End: 2018-04-26

## 2018-05-16 ENCOUNTER — OFFICE VISIT (OUTPATIENT)
Dept: CARDIOLOGY CLINIC | Age: 73
End: 2018-05-16
Payer: MEDICARE

## 2018-05-16 VITALS
DIASTOLIC BLOOD PRESSURE: 78 MMHG | HEART RATE: 62 BPM | OXYGEN SATURATION: 96 % | WEIGHT: 184.4 LBS | BODY MASS INDEX: 26.4 KG/M2 | HEIGHT: 70 IN | RESPIRATION RATE: 18 BRPM | SYSTOLIC BLOOD PRESSURE: 132 MMHG | TEMPERATURE: 96.7 F

## 2018-05-16 DIAGNOSIS — R00.1 SINUS BRADYCARDIA ON ECG: ICD-10-CM

## 2018-05-16 DIAGNOSIS — I25.10 CORONARY ARTERY DISEASE INVOLVING NATIVE HEART, ANGINA PRESENCE UNSPECIFIED, UNSPECIFIED VESSEL OR LESION TYPE: Primary | ICD-10-CM

## 2018-05-16 DIAGNOSIS — E78.2 MIXED HYPERLIPIDEMIA: ICD-10-CM

## 2018-05-16 PROCEDURE — G8427 DOCREV CUR MEDS BY ELIG CLIN: HCPCS | Performed by: INTERNAL MEDICINE

## 2018-05-16 PROCEDURE — 4040F PNEUMOC VAC/ADMIN/RCVD: CPT | Performed by: INTERNAL MEDICINE

## 2018-05-16 PROCEDURE — G8598 ASA/ANTIPLAT THER USED: HCPCS | Performed by: INTERNAL MEDICINE

## 2018-05-16 PROCEDURE — 1123F ACP DISCUSS/DSCN MKR DOCD: CPT | Performed by: INTERNAL MEDICINE

## 2018-05-16 PROCEDURE — 99213 OFFICE O/P EST LOW 20 MIN: CPT | Performed by: INTERNAL MEDICINE

## 2018-05-16 PROCEDURE — 1036F TOBACCO NON-USER: CPT | Performed by: INTERNAL MEDICINE

## 2018-05-16 PROCEDURE — 3017F COLORECTAL CA SCREEN DOC REV: CPT | Performed by: INTERNAL MEDICINE

## 2018-05-16 PROCEDURE — G8417 CALC BMI ABV UP PARAM F/U: HCPCS | Performed by: INTERNAL MEDICINE

## 2018-05-16 ASSESSMENT — ENCOUNTER SYMPTOMS
CHEST TIGHTNESS: 0
NAUSEA: 0
VOMITING: 0
APNEA: 0
SHORTNESS OF BREATH: 0

## 2018-06-19 ENCOUNTER — HOSPITAL ENCOUNTER (OUTPATIENT)
Dept: NEUROLOGY | Age: 73
Discharge: HOME | End: 2018-06-19
Payer: MEDICARE

## 2018-06-19 ENCOUNTER — HOSPITAL ENCOUNTER (OUTPATIENT)
Dept: ULTRASOUND IMAGING | Age: 73
Discharge: HOME | End: 2018-06-21
Payer: MEDICARE

## 2018-06-19 DIAGNOSIS — I65.23 BILATERAL CAROTID ARTERY STENOSIS: ICD-10-CM

## 2018-06-19 PROCEDURE — 93880 EXTRACRANIAL BILAT STUDY: CPT

## 2018-06-19 PROCEDURE — 95816 EEG AWAKE AND DROWSY: CPT

## 2018-07-12 ENCOUNTER — OFFICE VISIT (OUTPATIENT)
Dept: FAMILY MEDICINE CLINIC | Age: 73
End: 2018-07-12
Payer: MEDICARE

## 2018-07-12 VITALS
DIASTOLIC BLOOD PRESSURE: 82 MMHG | HEIGHT: 70 IN | TEMPERATURE: 96.7 F | SYSTOLIC BLOOD PRESSURE: 132 MMHG | HEART RATE: 59 BPM | BODY MASS INDEX: 26.05 KG/M2 | WEIGHT: 182 LBS | OXYGEN SATURATION: 99 % | RESPIRATION RATE: 12 BRPM

## 2018-07-12 DIAGNOSIS — K59.01 SLOW TRANSIT CONSTIPATION: ICD-10-CM

## 2018-07-12 DIAGNOSIS — I25.10 CORONARY ARTERY DISEASE INVOLVING NATIVE HEART WITHOUT ANGINA PECTORIS, UNSPECIFIED VESSEL OR LESION TYPE: ICD-10-CM

## 2018-07-12 DIAGNOSIS — B36.0 TINEA VERSICOLOR: Primary | ICD-10-CM

## 2018-07-12 PROCEDURE — 1123F ACP DISCUSS/DSCN MKR DOCD: CPT | Performed by: FAMILY MEDICINE

## 2018-07-12 PROCEDURE — G8427 DOCREV CUR MEDS BY ELIG CLIN: HCPCS | Performed by: FAMILY MEDICINE

## 2018-07-12 PROCEDURE — 1101F PT FALLS ASSESS-DOCD LE1/YR: CPT | Performed by: FAMILY MEDICINE

## 2018-07-12 PROCEDURE — 4040F PNEUMOC VAC/ADMIN/RCVD: CPT | Performed by: FAMILY MEDICINE

## 2018-07-12 PROCEDURE — 1036F TOBACCO NON-USER: CPT | Performed by: FAMILY MEDICINE

## 2018-07-12 PROCEDURE — G8417 CALC BMI ABV UP PARAM F/U: HCPCS | Performed by: FAMILY MEDICINE

## 2018-07-12 PROCEDURE — 99214 OFFICE O/P EST MOD 30 MIN: CPT | Performed by: FAMILY MEDICINE

## 2018-07-12 PROCEDURE — 3017F COLORECTAL CA SCREEN DOC REV: CPT | Performed by: FAMILY MEDICINE

## 2018-07-12 PROCEDURE — G8598 ASA/ANTIPLAT THER USED: HCPCS | Performed by: FAMILY MEDICINE

## 2018-07-12 RX ORDER — METOPROLOL SUCCINATE 25 MG/1
TABLET, EXTENDED RELEASE ORAL
Qty: 90 TABLET | Refills: 3 | Status: SHIPPED | OUTPATIENT
Start: 2018-07-12 | End: 2019-05-31 | Stop reason: SDUPTHER

## 2018-07-12 RX ORDER — FLUCONAZOLE 100 MG/1
100 TABLET ORAL DAILY
Qty: 7 TABLET | Refills: 0 | Status: SHIPPED | OUTPATIENT
Start: 2018-07-12 | End: 2018-07-19

## 2018-07-12 ASSESSMENT — ENCOUNTER SYMPTOMS
SHORTNESS OF BREATH: 0
DIARRHEA: 0
CONSTIPATION: 1
HEMATOCHEZIA: 0
COUGH: 0
RHINORRHEA: 0
NAUSEA: 0
BACK PAIN: 0
ALLERGIC/IMMUNOLOGIC NEGATIVE: 1
RESPIRATORY NEGATIVE: 1
SORE THROAT: 0
FLATUS: 0
EYE PAIN: 0
VOMITING: 0
RECTAL PAIN: 0
BLOATING: 0
ABDOMINAL PAIN: 0
NAIL CHANGES: 0

## 2018-07-12 NOTE — PROGRESS NOTES
Never Used    Alcohol use No    Drug use: No    Sexual activity: Not on file     Other Topics Concern    Not on file     Social History Narrative    No narrative on file     Family History   Problem Relation Age of Onset    Cancer Mother         OVARIAN    Heart Disease Father      Allergies   Allergen Reactions    Amoxicillin Other (See Comments)     stomatitis     Current Outpatient Prescriptions on File Prior to Visit   Medication Sig Dispense Refill    tamsulosin (FLOMAX) 0.4 MG capsule Take 1 capsule by mouth daily 90 capsule 3    finasteride (PROSCAR) 5 MG tablet Take 1 tablet by mouth daily 90 tablet 3    rosuvastatin (CRESTOR) 10 MG tablet TAKE 1 TABLET DAILY 90 tablet 3    omeprazole (PRILOSEC) 20 MG delayed release capsule Take 1 capsule by mouth daily 90 capsule 3    clindamycin (CLEOCIN) 150 MG capsule Take 4 caps 1 hour prior to appointment 12 capsule 0    Cholecalciferol (VITAMIN D3) 1000 UNITS TABS Take 1,900 Units by mouth daily       calcium carbonate 600 MG TABS tablet Take 1 tablet by mouth 2 times daily.  fish oil-omega-3 fatty acids 1000 MG capsule Take 2 g by mouth 2 times daily.  Multiple Vitamin (MULTI-VITAMIN PO) Take  by mouth daily.  Magnesium 250 MG TABS Take by mouth daily Every other day      Ascorbic Acid (VITAMIN C) 500 MG tablet Take 500 mg by mouth daily.  aspirin 81 MG tablet Take 81 mg by mouth daily. No current facility-administered medications on file prior to visit. Objective    Vitals:    07/12/18 0950   BP: 132/82   Pulse: 59   Resp: 12   Temp: 96.7 °F (35.9 °C)   TempSrc: Tympanic   SpO2: 99%   Weight: 182 lb (82.6 kg)   Height: 5' 10\" (1.778 m)     Physical Exam   Constitutional: Vital signs are normal. He appears well-developed and well-nourished. No distress. HENT:   Head: Normocephalic and atraumatic. Right Ear: Tympanic membrane, external ear and ear canal normal. Tympanic membrane is not injected.  No middle

## 2018-07-16 DIAGNOSIS — N40.1 BPH WITH OBSTRUCTION/LOWER URINARY TRACT SYMPTOMS: ICD-10-CM

## 2018-07-16 DIAGNOSIS — N13.8 BPH WITH OBSTRUCTION/LOWER URINARY TRACT SYMPTOMS: ICD-10-CM

## 2018-07-16 LAB — PROSTATE SPECIFIC ANTIGEN: 1.77 NG/ML (ref 0–6.22)

## 2018-07-24 ENCOUNTER — OFFICE VISIT (OUTPATIENT)
Dept: UROLOGY | Age: 73
End: 2018-07-24
Payer: MEDICARE

## 2018-07-24 VITALS
BODY MASS INDEX: 25.05 KG/M2 | HEART RATE: 57 BPM | WEIGHT: 175 LBS | HEIGHT: 70 IN | DIASTOLIC BLOOD PRESSURE: 76 MMHG | SYSTOLIC BLOOD PRESSURE: 136 MMHG

## 2018-07-24 DIAGNOSIS — N40.1 BPH WITH OBSTRUCTION/LOWER URINARY TRACT SYMPTOMS: Primary | ICD-10-CM

## 2018-07-24 DIAGNOSIS — N13.8 BPH WITH OBSTRUCTION/LOWER URINARY TRACT SYMPTOMS: Primary | ICD-10-CM

## 2018-07-24 PROCEDURE — 1036F TOBACCO NON-USER: CPT | Performed by: UROLOGY

## 2018-07-24 PROCEDURE — G8427 DOCREV CUR MEDS BY ELIG CLIN: HCPCS | Performed by: UROLOGY

## 2018-07-24 PROCEDURE — 4040F PNEUMOC VAC/ADMIN/RCVD: CPT | Performed by: UROLOGY

## 2018-07-24 PROCEDURE — G8598 ASA/ANTIPLAT THER USED: HCPCS | Performed by: UROLOGY

## 2018-07-24 PROCEDURE — 1123F ACP DISCUSS/DSCN MKR DOCD: CPT | Performed by: UROLOGY

## 2018-07-24 PROCEDURE — 51798 US URINE CAPACITY MEASURE: CPT | Performed by: UROLOGY

## 2018-07-24 PROCEDURE — G8417 CALC BMI ABV UP PARAM F/U: HCPCS | Performed by: UROLOGY

## 2018-07-24 PROCEDURE — 1101F PT FALLS ASSESS-DOCD LE1/YR: CPT | Performed by: UROLOGY

## 2018-07-24 PROCEDURE — 51741 ELECTRO-UROFLOWMETRY FIRST: CPT | Performed by: UROLOGY

## 2018-07-24 PROCEDURE — 3017F COLORECTAL CA SCREEN DOC REV: CPT | Performed by: UROLOGY

## 2018-07-24 PROCEDURE — 99213 OFFICE O/P EST LOW 20 MIN: CPT | Performed by: UROLOGY

## 2018-07-24 ASSESSMENT — ENCOUNTER SYMPTOMS
SHORTNESS OF BREATH: 0
ABDOMINAL DISTENTION: 0
ABDOMINAL PAIN: 0

## 2018-07-24 NOTE — PROGRESS NOTES
Social History    Marital status:      Spouse name: N/A    Number of children: N/A    Years of education: N/A     Social History Main Topics    Smoking status: Never Smoker    Smokeless tobacco: Never Used    Alcohol use No    Drug use: No    Sexual activity: Not Asked     Other Topics Concern    None     Social History Narrative    None     Family History   Problem Relation Age of Onset    Cancer Mother         OVARIAN    Heart Disease Father      Current Outpatient Prescriptions   Medication Sig Dispense Refill    metoprolol succinate (TOPROL XL) 25 MG extended release tablet TAKE 1 TABLET DAILY 90 tablet 3    psyllium (METAMUCIL SMOOTH TEXTURE) 28 % packet Take 1 packet by mouth nightly Take with full glass of h20 or juice 90 packet 3    tamsulosin (FLOMAX) 0.4 MG capsule Take 1 capsule by mouth daily 90 capsule 3    finasteride (PROSCAR) 5 MG tablet Take 1 tablet by mouth daily 90 tablet 3    rosuvastatin (CRESTOR) 10 MG tablet TAKE 1 TABLET DAILY 90 tablet 3    omeprazole (PRILOSEC) 20 MG delayed release capsule Take 1 capsule by mouth daily 90 capsule 3    clindamycin (CLEOCIN) 150 MG capsule Take 4 caps 1 hour prior to appointment 12 capsule 0    Cholecalciferol (VITAMIN D3) 1000 UNITS TABS Take 1,900 Units by mouth daily       calcium carbonate 600 MG TABS tablet Take 1 tablet by mouth 2 times daily.  fish oil-omega-3 fatty acids 1000 MG capsule Take 2 g by mouth 2 times daily.  Multiple Vitamin (MULTI-VITAMIN PO) Take  by mouth daily.  Magnesium 250 MG TABS Take by mouth daily Every other day      Ascorbic Acid (VITAMIN C) 500 MG tablet Take 500 mg by mouth daily.  aspirin 81 MG tablet Take 81 mg by mouth daily. No current facility-administered medications for this visit. Amoxicillin  reviewed      Review of Systems   Constitutional: Negative for fever and unexpected weight change. Respiratory: Negative for shortness of breath.

## 2018-09-17 DIAGNOSIS — K21.9 GASTROESOPHAGEAL REFLUX DISEASE, ESOPHAGITIS PRESENCE NOT SPECIFIED: ICD-10-CM

## 2018-09-17 RX ORDER — OMEPRAZOLE 20 MG/1
CAPSULE, DELAYED RELEASE ORAL
Qty: 90 CAPSULE | Refills: 1 | Status: SHIPPED | OUTPATIENT
Start: 2018-09-17 | End: 2019-03-16 | Stop reason: SDUPTHER

## 2018-10-09 DIAGNOSIS — K21.00 GASTROESOPHAGEAL REFLUX DISEASE WITH ESOPHAGITIS: ICD-10-CM

## 2018-10-09 DIAGNOSIS — I25.10 CORONARY ARTERY DISEASE INVOLVING NATIVE HEART WITHOUT ANGINA PECTORIS, UNSPECIFIED VESSEL OR LESION TYPE: ICD-10-CM

## 2018-10-09 DIAGNOSIS — E78.2 MIXED HYPERLIPIDEMIA: ICD-10-CM

## 2018-10-09 LAB
ALBUMIN SERPL-MCNC: 4.6 G/DL (ref 3.9–4.9)
ALP BLD-CCNC: 46 U/L (ref 35–104)
ALT SERPL-CCNC: 16 U/L (ref 0–41)
ANION GAP SERPL CALCULATED.3IONS-SCNC: 15 MEQ/L (ref 7–13)
AST SERPL-CCNC: 18 U/L (ref 0–40)
BASOPHILS ABSOLUTE: 0 K/UL (ref 0–0.2)
BASOPHILS RELATIVE PERCENT: 0.7 %
BILIRUB SERPL-MCNC: 0.7 MG/DL (ref 0–1.2)
BUN BLDV-MCNC: 12 MG/DL (ref 8–23)
CALCIUM SERPL-MCNC: 9 MG/DL (ref 8.6–10.2)
CHLORIDE BLD-SCNC: 103 MEQ/L (ref 98–107)
CHOLESTEROL, TOTAL: 134 MG/DL (ref 0–199)
CO2: 27 MEQ/L (ref 22–29)
CREAT SERPL-MCNC: 1.01 MG/DL (ref 0.7–1.2)
EOSINOPHILS ABSOLUTE: 0.1 K/UL (ref 0–0.7)
EOSINOPHILS RELATIVE PERCENT: 1.9 %
GFR AFRICAN AMERICAN: >60
GFR NON-AFRICAN AMERICAN: >60
GLOBULIN: 2.6 G/DL (ref 2.3–3.5)
GLUCOSE BLD-MCNC: 91 MG/DL (ref 74–109)
HCT VFR BLD CALC: 49.6 % (ref 42–52)
HDLC SERPL-MCNC: 64 MG/DL (ref 40–59)
HEMOGLOBIN: 16.8 G/DL (ref 14–18)
LDL CHOLESTEROL CALCULATED: 55 MG/DL (ref 0–129)
LYMPHOCYTES ABSOLUTE: 1.1 K/UL (ref 1–4.8)
LYMPHOCYTES RELATIVE PERCENT: 20.9 %
MCH RBC QN AUTO: 32.6 PG (ref 27–31.3)
MCHC RBC AUTO-ENTMCNC: 33.9 % (ref 33–37)
MCV RBC AUTO: 96.2 FL (ref 80–100)
MONOCYTES ABSOLUTE: 0.4 K/UL (ref 0.2–0.8)
MONOCYTES RELATIVE PERCENT: 7.6 %
NEUTROPHILS ABSOLUTE: 3.7 K/UL (ref 1.4–6.5)
NEUTROPHILS RELATIVE PERCENT: 68.9 %
PDW BLD-RTO: 13.4 % (ref 11.5–14.5)
PLATELET # BLD: 156 K/UL (ref 130–400)
POTASSIUM SERPL-SCNC: 4.3 MEQ/L (ref 3.5–5.1)
RBC # BLD: 5.16 M/UL (ref 4.7–6.1)
SODIUM BLD-SCNC: 145 MEQ/L (ref 132–144)
TOTAL PROTEIN: 7.2 G/DL (ref 6.4–8.1)
TRIGL SERPL-MCNC: 76 MG/DL (ref 0–200)
TSH REFLEX: 4.02 UIU/ML (ref 0.27–4.2)
WBC # BLD: 5.4 K/UL (ref 4.8–10.8)

## 2018-10-16 ENCOUNTER — OFFICE VISIT (OUTPATIENT)
Dept: FAMILY MEDICINE CLINIC | Age: 73
End: 2018-10-16
Payer: MEDICARE

## 2018-10-16 VITALS
DIASTOLIC BLOOD PRESSURE: 68 MMHG | WEIGHT: 176 LBS | OXYGEN SATURATION: 98 % | HEART RATE: 58 BPM | RESPIRATION RATE: 12 BRPM | SYSTOLIC BLOOD PRESSURE: 118 MMHG | TEMPERATURE: 97 F | HEIGHT: 70 IN | BODY MASS INDEX: 25.2 KG/M2

## 2018-10-16 DIAGNOSIS — I25.10 CORONARY ARTERY DISEASE INVOLVING NATIVE HEART WITHOUT ANGINA PECTORIS, UNSPECIFIED VESSEL OR LESION TYPE: ICD-10-CM

## 2018-10-16 DIAGNOSIS — Z23 NEED FOR INFLUENZA VACCINATION: ICD-10-CM

## 2018-10-16 DIAGNOSIS — E78.2 MIXED HYPERLIPIDEMIA: Primary | ICD-10-CM

## 2018-10-16 PROCEDURE — G8598 ASA/ANTIPLAT THER USED: HCPCS | Performed by: FAMILY MEDICINE

## 2018-10-16 PROCEDURE — 1036F TOBACCO NON-USER: CPT | Performed by: FAMILY MEDICINE

## 2018-10-16 PROCEDURE — G0008 ADMIN INFLUENZA VIRUS VAC: HCPCS | Performed by: FAMILY MEDICINE

## 2018-10-16 PROCEDURE — 1101F PT FALLS ASSESS-DOCD LE1/YR: CPT | Performed by: FAMILY MEDICINE

## 2018-10-16 PROCEDURE — 3017F COLORECTAL CA SCREEN DOC REV: CPT | Performed by: FAMILY MEDICINE

## 2018-10-16 PROCEDURE — 4040F PNEUMOC VAC/ADMIN/RCVD: CPT | Performed by: FAMILY MEDICINE

## 2018-10-16 PROCEDURE — 90662 IIV NO PRSV INCREASED AG IM: CPT | Performed by: FAMILY MEDICINE

## 2018-10-16 PROCEDURE — 1123F ACP DISCUSS/DSCN MKR DOCD: CPT | Performed by: FAMILY MEDICINE

## 2018-10-16 PROCEDURE — G8417 CALC BMI ABV UP PARAM F/U: HCPCS | Performed by: FAMILY MEDICINE

## 2018-10-16 PROCEDURE — 99214 OFFICE O/P EST MOD 30 MIN: CPT | Performed by: FAMILY MEDICINE

## 2018-10-16 PROCEDURE — G8482 FLU IMMUNIZE ORDER/ADMIN: HCPCS | Performed by: FAMILY MEDICINE

## 2018-10-16 PROCEDURE — G8427 DOCREV CUR MEDS BY ELIG CLIN: HCPCS | Performed by: FAMILY MEDICINE

## 2018-10-16 ASSESSMENT — ENCOUNTER SYMPTOMS
ALLERGIC/IMMUNOLOGIC NEGATIVE: 1
RESPIRATORY NEGATIVE: 1
GASTROINTESTINAL NEGATIVE: 1
EYES NEGATIVE: 1

## 2018-10-16 NOTE — PROGRESS NOTES
Cholecalciferol (VITAMIN D3) 1000 UNITS TABS Take 1,900 Units by mouth daily       calcium carbonate 600 MG TABS tablet Take 1 tablet by mouth 2 times daily.  fish oil-omega-3 fatty acids 1000 MG capsule Take 2 g by mouth 2 times daily.  Multiple Vitamin (MULTI-VITAMIN PO) Take  by mouth daily.  Magnesium 250 MG TABS Take by mouth daily Every other day      Ascorbic Acid (VITAMIN C) 500 MG tablet Take 500 mg by mouth daily.  aspirin 81 MG tablet Take 81 mg by mouth daily. No current facility-administered medications on file prior to visit. Objective    Vitals:    10/16/18 0841   BP: 118/68   Pulse: 58   Resp: 12   Temp: 97 °F (36.1 °C)   TempSrc: Tympanic   SpO2: 98%   Weight: 176 lb (79.8 kg)   Height: 5' 9.5\" (1.765 m)     Physical Exam   Constitutional: Vital signs are normal. He appears well-developed and well-nourished. No distress. HENT:   Head: Normocephalic and atraumatic. Right Ear: Tympanic membrane, external ear and ear canal normal. Tympanic membrane is not injected. No middle ear effusion. Left Ear: Tympanic membrane, external ear and ear canal normal. Tympanic membrane is not injected. No middle ear effusion. Nose: Nose normal. No mucosal edema or rhinorrhea. Right sinus exhibits no maxillary sinus tenderness and no frontal sinus tenderness. Left sinus exhibits no maxillary sinus tenderness and no frontal sinus tenderness. Eyes: Pupils are equal, round, and reactive to light. Conjunctivae, EOM and lids are normal.   Neck: Normal range of motion. Neck supple. No JVD present. No muscular tenderness present. No neck rigidity. No tracheal deviation present. No thyroid mass and no thyromegaly present. Cardiovascular: Normal rate, regular rhythm and intact distal pulses. Pulmonary/Chest: Effort normal. He has no decreased breath sounds. He has no wheezes. He has no rhonchi. He has no rales. He exhibits no tenderness and no deformity.    Abdominal:

## 2018-11-11 DIAGNOSIS — I25.10 CORONARY ARTERY DISEASE INVOLVING NATIVE HEART, ANGINA PRESENCE UNSPECIFIED, UNSPECIFIED VESSEL OR LESION TYPE: ICD-10-CM

## 2018-11-11 DIAGNOSIS — E78.5 HYPERLIPIDEMIA, UNSPECIFIED HYPERLIPIDEMIA TYPE: ICD-10-CM

## 2018-11-12 RX ORDER — ROSUVASTATIN CALCIUM 10 MG/1
TABLET, COATED ORAL
Qty: 90 TABLET | Refills: 1 | Status: SHIPPED | OUTPATIENT
Start: 2018-11-12 | End: 2019-05-31 | Stop reason: SDUPTHER

## 2018-11-13 ENCOUNTER — OFFICE VISIT (OUTPATIENT)
Dept: FAMILY MEDICINE CLINIC | Age: 73
End: 2018-11-13
Payer: MEDICARE

## 2018-11-13 VITALS
OXYGEN SATURATION: 98 % | HEART RATE: 56 BPM | WEIGHT: 182 LBS | SYSTOLIC BLOOD PRESSURE: 138 MMHG | BODY MASS INDEX: 26.05 KG/M2 | HEIGHT: 70 IN | DIASTOLIC BLOOD PRESSURE: 88 MMHG

## 2018-11-13 DIAGNOSIS — L57.0 ACTINIC KERATOSES: Primary | ICD-10-CM

## 2018-11-13 DIAGNOSIS — L82.0 INFLAMED SEBORRHEIC KERATOSIS: ICD-10-CM

## 2018-11-13 DIAGNOSIS — L29.9 PRURITUS OF SKIN: ICD-10-CM

## 2018-11-13 PROCEDURE — 17110 DESTRUCTION B9 LES UP TO 14: CPT | Performed by: FAMILY MEDICINE

## 2018-11-13 PROCEDURE — 17003 DESTRUCT PREMALG LES 2-14: CPT | Performed by: FAMILY MEDICINE

## 2018-11-13 PROCEDURE — 17000 DESTRUCT PREMALG LESION: CPT | Performed by: FAMILY MEDICINE

## 2018-11-13 ASSESSMENT — ENCOUNTER SYMPTOMS
ABDOMINAL PAIN: 0
SHORTNESS OF BREATH: 0

## 2018-11-13 NOTE — PROGRESS NOTES
forehead x 1, left lateral eyebrow x 1, left scalp x 1, left shoulder x 1. Assessment & Plan    Diagnosis Orders   1. Actinic keratoses  61416 - [place] DESTRUCTION BENIGN LESIONS UP TO 14    61915 - [place] PREMALIGNANT,2-14 LESIONS    Amb External Referral To Dermatology   2. Pruritus of skin  24840 - [place] DESTRUCTION BENIGN LESIONS UP TO 14    16952 - [place] PREMALIGNANT,2-14 LESIONS   3. Inflamed seborrheic keratosis       LN2 times 3 seconds to affected areas times 5 lesion(s) for a total of AK's x 4 and SK x 1. Informed consent given was given. Risks including infection, bleeding, scarring discussed. No guarantee how scars will look. Post op instructions given. Best to leave blisters alone if they form. If blister(s) pop or patient pops with a sterilized needed, apply antibiotic ointment and a bandage to affected area(s). Orders Placed This Encounter   Procedures    Amb External Referral To Dermatology     Referral Priority:   Routine     Referral Type:   Eval and Treat     Referral Reason:   Specialty Services Required     Referred to Provider:   Alyssa Bowen DO     Requested Specialty:   Dermatology     Number of Visits Requested:   1    30623 - [place] DESTRUCTION BENIGN LESIONS UP TO 14    94036 - [place] PREMALIGNANT,2-14 LESIONS     No orders of the defined types were placed in this encounter. There are no discontinued medications. Return if symptoms worsen or fail to improve.     Aneudy Lopez MD

## 2018-12-24 RX ORDER — FINASTERIDE 5 MG/1
TABLET, FILM COATED ORAL
Qty: 90 TABLET | Refills: 3 | Status: SHIPPED | OUTPATIENT
Start: 2018-12-24 | End: 2019-05-31 | Stop reason: SDUPTHER

## 2018-12-24 RX ORDER — TAMSULOSIN HYDROCHLORIDE 0.4 MG/1
CAPSULE ORAL
Qty: 90 CAPSULE | Refills: 3 | Status: SHIPPED | OUTPATIENT
Start: 2018-12-24 | End: 2019-05-31 | Stop reason: SDUPTHER

## 2019-01-04 ENCOUNTER — HOSPITAL ENCOUNTER (OUTPATIENT)
Dept: OCCUPATIONAL THERAPY | Age: 74
Setting detail: THERAPIES SERIES
Discharge: HOME | End: 2019-01-04
Payer: MEDICARE

## 2019-01-04 PROCEDURE — 97165 OT EVAL LOW COMPLEX 30 MIN: CPT

## 2019-01-04 PROCEDURE — G8988 SELF CARE GOAL STATUS: HCPCS

## 2019-01-04 PROCEDURE — G8987 SELF CARE CURRENT STATUS: HCPCS

## 2019-01-09 ENCOUNTER — HOSPITAL ENCOUNTER (OUTPATIENT)
Dept: OCCUPATIONAL THERAPY | Age: 74
Setting detail: THERAPIES SERIES
Discharge: HOME | End: 2019-01-09
Payer: MEDICARE

## 2019-01-09 PROCEDURE — 97530 THERAPEUTIC ACTIVITIES: CPT

## 2019-01-15 ENCOUNTER — HOSPITAL ENCOUNTER (OUTPATIENT)
Dept: OCCUPATIONAL THERAPY | Age: 74
Setting detail: THERAPIES SERIES
Discharge: HOME | End: 2019-01-15
Payer: MEDICARE

## 2019-01-15 PROCEDURE — 97530 THERAPEUTIC ACTIVITIES: CPT

## 2019-01-17 ENCOUNTER — HOSPITAL ENCOUNTER (OUTPATIENT)
Dept: OCCUPATIONAL THERAPY | Age: 74
Setting detail: THERAPIES SERIES
Discharge: HOME | End: 2019-01-17
Payer: MEDICARE

## 2019-01-17 PROCEDURE — 97112 NEUROMUSCULAR REEDUCATION: CPT

## 2019-01-17 PROCEDURE — 97530 THERAPEUTIC ACTIVITIES: CPT

## 2019-01-22 ENCOUNTER — HOSPITAL ENCOUNTER (OUTPATIENT)
Dept: OCCUPATIONAL THERAPY | Age: 74
Setting detail: THERAPIES SERIES
Discharge: HOME | End: 2019-01-22
Payer: MEDICARE

## 2019-01-22 PROCEDURE — 97530 THERAPEUTIC ACTIVITIES: CPT

## 2019-01-25 ENCOUNTER — HOSPITAL ENCOUNTER (OUTPATIENT)
Dept: OCCUPATIONAL THERAPY | Age: 74
Setting detail: THERAPIES SERIES
Discharge: HOME | End: 2019-01-25
Payer: MEDICARE

## 2019-01-25 PROCEDURE — 97110 THERAPEUTIC EXERCISES: CPT

## 2019-01-29 ENCOUNTER — HOSPITAL ENCOUNTER (OUTPATIENT)
Dept: OCCUPATIONAL THERAPY | Age: 74
Setting detail: THERAPIES SERIES
Discharge: HOME | End: 2019-01-29
Payer: MEDICARE

## 2019-01-29 PROCEDURE — 97112 NEUROMUSCULAR REEDUCATION: CPT

## 2019-01-29 PROCEDURE — 97110 THERAPEUTIC EXERCISES: CPT

## 2019-01-31 ENCOUNTER — HOSPITAL ENCOUNTER (OUTPATIENT)
Dept: OCCUPATIONAL THERAPY | Age: 74
Setting detail: THERAPIES SERIES
Discharge: HOME | End: 2019-01-31
Payer: MEDICARE

## 2019-01-31 PROCEDURE — 97110 THERAPEUTIC EXERCISES: CPT

## 2019-02-05 ENCOUNTER — HOSPITAL ENCOUNTER (OUTPATIENT)
Dept: OCCUPATIONAL THERAPY | Age: 74
Setting detail: THERAPIES SERIES
Discharge: HOME | End: 2019-02-05
Payer: MEDICARE

## 2019-02-05 PROCEDURE — G8989 SELF CARE D/C STATUS: HCPCS

## 2019-02-05 PROCEDURE — 97530 THERAPEUTIC ACTIVITIES: CPT

## 2019-02-05 PROCEDURE — G8987 SELF CARE CURRENT STATUS: HCPCS

## 2019-02-05 PROCEDURE — G8988 SELF CARE GOAL STATUS: HCPCS

## 2019-02-07 ENCOUNTER — APPOINTMENT (OUTPATIENT)
Dept: OCCUPATIONAL THERAPY | Age: 74
End: 2019-02-07
Payer: MEDICARE

## 2019-02-11 ENCOUNTER — TELEPHONE (OUTPATIENT)
Dept: FAMILY MEDICINE CLINIC | Age: 74
End: 2019-02-11

## 2019-03-16 DIAGNOSIS — K21.9 GASTROESOPHAGEAL REFLUX DISEASE, ESOPHAGITIS PRESENCE NOT SPECIFIED: ICD-10-CM

## 2019-03-18 RX ORDER — OMEPRAZOLE 20 MG/1
CAPSULE, DELAYED RELEASE ORAL
Qty: 90 CAPSULE | Refills: 0 | Status: SHIPPED | OUTPATIENT
Start: 2019-03-18 | End: 2019-05-31 | Stop reason: SDUPTHER

## 2019-04-29 ENCOUNTER — OFFICE VISIT (OUTPATIENT)
Dept: FAMILY MEDICINE CLINIC | Age: 74
End: 2019-04-29
Payer: MEDICARE

## 2019-04-29 VITALS
DIASTOLIC BLOOD PRESSURE: 86 MMHG | WEIGHT: 187.6 LBS | SYSTOLIC BLOOD PRESSURE: 152 MMHG | HEIGHT: 70 IN | BODY MASS INDEX: 26.86 KG/M2 | RESPIRATION RATE: 10 BRPM | TEMPERATURE: 96.9 F | HEART RATE: 54 BPM | OXYGEN SATURATION: 98 %

## 2019-04-29 DIAGNOSIS — K21.9 GASTROESOPHAGEAL REFLUX DISEASE, ESOPHAGITIS PRESENCE NOT SPECIFIED: ICD-10-CM

## 2019-04-29 DIAGNOSIS — E55.9 VITAMIN D DEFICIENCY: ICD-10-CM

## 2019-04-29 DIAGNOSIS — E78.5 HYPERLIPIDEMIA, UNSPECIFIED HYPERLIPIDEMIA TYPE: ICD-10-CM

## 2019-04-29 DIAGNOSIS — I10 ESSENTIAL HYPERTENSION: Primary | ICD-10-CM

## 2019-04-29 DIAGNOSIS — H91.93 DECREASED HEARING OF BOTH EARS: ICD-10-CM

## 2019-04-29 DIAGNOSIS — Z85.038 HISTORY OF COLON CANCER: ICD-10-CM

## 2019-04-29 PROCEDURE — 99214 OFFICE O/P EST MOD 30 MIN: CPT | Performed by: FAMILY MEDICINE

## 2019-04-29 PROCEDURE — G8598 ASA/ANTIPLAT THER USED: HCPCS | Performed by: FAMILY MEDICINE

## 2019-04-29 PROCEDURE — 4040F PNEUMOC VAC/ADMIN/RCVD: CPT | Performed by: FAMILY MEDICINE

## 2019-04-29 PROCEDURE — G8417 CALC BMI ABV UP PARAM F/U: HCPCS | Performed by: FAMILY MEDICINE

## 2019-04-29 PROCEDURE — 1123F ACP DISCUSS/DSCN MKR DOCD: CPT | Performed by: FAMILY MEDICINE

## 2019-04-29 PROCEDURE — 3017F COLORECTAL CA SCREEN DOC REV: CPT | Performed by: FAMILY MEDICINE

## 2019-04-29 PROCEDURE — G8427 DOCREV CUR MEDS BY ELIG CLIN: HCPCS | Performed by: FAMILY MEDICINE

## 2019-04-29 PROCEDURE — 1036F TOBACCO NON-USER: CPT | Performed by: FAMILY MEDICINE

## 2019-04-29 ASSESSMENT — ENCOUNTER SYMPTOMS
ABDOMINAL PAIN: 0
SHORTNESS OF BREATH: 0

## 2019-04-29 ASSESSMENT — PATIENT HEALTH QUESTIONNAIRE - PHQ9
SUM OF ALL RESPONSES TO PHQ QUESTIONS 1-9: 0
SUM OF ALL RESPONSES TO PHQ9 QUESTIONS 1 & 2: 0
1. LITTLE INTEREST OR PLEASURE IN DOING THINGS: 0
SUM OF ALL RESPONSES TO PHQ QUESTIONS 1-9: 0
2. FEELING DOWN, DEPRESSED OR HOPELESS: 0

## 2019-04-29 NOTE — PROGRESS NOTES
Substance and Sexual Activity    Alcohol use: No    Drug use: No    Sexual activity: None   Lifestyle    Physical activity:     Days per week: None     Minutes per session: None    Stress: None   Relationships    Social connections:     Talks on phone: None     Gets together: None     Attends Jain service: None     Active member of club or organization: None     Attends meetings of clubs or organizations: None     Relationship status: None    Intimate partner violence:     Fear of current or ex partner: None     Emotionally abused: None     Physically abused: None     Forced sexual activity: None   Other Topics Concern    None   Social History Narrative    None     Current Outpatient Medications   Medication Sig Dispense Refill    omeprazole (PRILOSEC) 20 MG delayed release capsule TAKE 1 CAPSULE DAILY 90 capsule 0    tamsulosin (FLOMAX) 0.4 MG capsule TAKE 1 CAPSULE DAILY 90 capsule 3    finasteride (PROSCAR) 5 MG tablet TAKE 1 TABLET DAILY 90 tablet 3    Psyllium (METAMUCIL FIBER PO) Take by mouth      rosuvastatin (CRESTOR) 10 MG tablet TAKE 1 TABLET DAILY 90 tablet 1    metoprolol succinate (TOPROL XL) 25 MG extended release tablet TAKE 1 TABLET DAILY 90 tablet 3    Cholecalciferol (VITAMIN D3) 1000 UNITS TABS Take 1,900 Units by mouth daily       calcium carbonate 600 MG TABS tablet Take 1 tablet by mouth daily       fish oil-omega-3 fatty acids 1000 MG capsule Take 2 g by mouth 2 times daily.  Multiple Vitamin (MULTI-VITAMIN PO) Take  by mouth daily.  Magnesium 250 MG TABS Take by mouth daily Every other day      Ascorbic Acid (VITAMIN C) 500 MG tablet Take 500 mg by mouth daily.  aspirin 81 MG tablet Take 81 mg by mouth daily. No current facility-administered medications for this visit.       Family History   Problem Relation Age of Onset    Cancer Mother         OVARIAN    Heart Disease Father      Past Medical History:   Diagnosis Date    Abnormal cardiovascular stress test 4/19/2016    Equivocal ST-T wave changes; Defect in the inferior wall consistent with prior infarction; LV EF 79%; TID ratio 1.1    Actinic keratoses     BPH (benign prostatic hypertrophy)     CAD (coronary artery disease)     Cancer (HCC)     COLON    Chest pressure 5/3/2016    Cholelithiasis     Hyperlipidemia     Inflamed seborrheic keratosis     Lumbar radiculopathy 10/26/2016    Osteoarthritis     Parathyroid tumor     Sinus bradycardia on ECG 4/19/2016    Done 4/5/16 with Dr. Land Distance     Objective:   BP (!) 152/82   Pulse 54   Temp 96.9 °F (36.1 °C)   Resp 10   Ht 5' 9.5\" (1.765 m)   Wt 187 lb 9.6 oz (85.1 kg)   SpO2 98%   BMI 27.31 kg/m²     Physical Exam  Neck:no carotid bruits. No masses. No adenopathy. No thyroid asymmetry. Lungs:clear and equal breath sounds. No wheezes or rales. Heart:rate reg. No murmur. No gallops   Pulses:Radials 2+ equal               Poster tib 1+ equal  Extremities:no edema in either leg  Gen: In no acute distress  Abdomen; B.S present. Soft  Non tender. No hepatosplenomegaly. No masses   Assessment:       Diagnosis Orders   1. Essential hypertension     2. Hyperlipidemia, unspecified hyperlipidemia type     3. Gastroesophageal reflux disease, esophagitis presence not specified     4. Vitamin D deficiency     5. Decreased hearing of both ears  NEENA - Libertad Goodwin MD, OtolaryngologyArpit   6.  History of colon cancer  Teresa Plunkett MD, [facility]         Plan:    increase metamucil to bid   Continue current meds for now   Orders Placed This Encounter   Procedures   Sheron Mckee MD, OtolaryngologyArpit     Referral Priority:   Routine     Referral Type:   Eval and Treat     Referral Reason:   Specialty Services Required     Referred to Provider:   Adelina Lazaro MD     Requested Specialty:   Otolaryngology     Number of Visits Requested:   Ermelinda Avery MD, Gastroenterology, [place]     Referral Priority:   Routine     Referral Type:   Eval and Treat     Referral Reason:   Specialty Services Required     Referred to Provider:   Griselda Sas, MD     Requested Specialty:   Gastroenterology     Number of Visits Requested:   1   fasting blood work soon and f/u after above-bp elevated   May need adjustment with medication if still elevated next time

## 2019-05-20 DIAGNOSIS — I10 ESSENTIAL HYPERTENSION: ICD-10-CM

## 2019-05-20 DIAGNOSIS — E78.5 HYPERLIPIDEMIA, UNSPECIFIED HYPERLIPIDEMIA TYPE: ICD-10-CM

## 2019-05-20 DIAGNOSIS — E55.9 VITAMIN D DEFICIENCY: ICD-10-CM

## 2019-05-20 LAB
ALBUMIN SERPL-MCNC: 4.1 G/DL (ref 3.5–4.6)
ALP BLD-CCNC: 44 U/L (ref 35–104)
ALT SERPL-CCNC: 15 U/L (ref 0–41)
ANION GAP SERPL CALCULATED.3IONS-SCNC: 13 MEQ/L (ref 9–15)
AST SERPL-CCNC: 19 U/L (ref 0–40)
BASOPHILS ABSOLUTE: 0 K/UL (ref 0–0.2)
BASOPHILS RELATIVE PERCENT: 0.7 %
BILIRUB SERPL-MCNC: 0.9 MG/DL (ref 0.2–0.7)
BUN BLDV-MCNC: 10 MG/DL (ref 8–23)
CALCIUM SERPL-MCNC: 8.9 MG/DL (ref 8.5–9.9)
CHLORIDE BLD-SCNC: 107 MEQ/L (ref 95–107)
CHOLESTEROL, TOTAL: 131 MG/DL (ref 0–199)
CO2: 26 MEQ/L (ref 20–31)
CREAT SERPL-MCNC: 0.92 MG/DL (ref 0.7–1.2)
EOSINOPHILS ABSOLUTE: 0.1 K/UL (ref 0–0.7)
EOSINOPHILS RELATIVE PERCENT: 1.6 %
GFR AFRICAN AMERICAN: >60
GFR NON-AFRICAN AMERICAN: >60
GLOBULIN: 2.5 G/DL (ref 2.3–3.5)
GLUCOSE BLD-MCNC: 89 MG/DL (ref 70–99)
HCT VFR BLD CALC: 48.7 % (ref 42–52)
HDLC SERPL-MCNC: 56 MG/DL (ref 40–59)
HEMOGLOBIN: 16.6 G/DL (ref 14–18)
LDL CHOLESTEROL CALCULATED: 59 MG/DL (ref 0–129)
LYMPHOCYTES ABSOLUTE: 1.4 K/UL (ref 1–4.8)
LYMPHOCYTES RELATIVE PERCENT: 26.2 %
MAGNESIUM: 2.4 MG/DL (ref 1.7–2.4)
MCH RBC QN AUTO: 32.6 PG (ref 27–31.3)
MCHC RBC AUTO-ENTMCNC: 34 % (ref 33–37)
MCV RBC AUTO: 95.8 FL (ref 80–100)
MONOCYTES ABSOLUTE: 0.4 K/UL (ref 0.2–0.8)
MONOCYTES RELATIVE PERCENT: 7.4 %
NEUTROPHILS ABSOLUTE: 3.3 K/UL (ref 1.4–6.5)
NEUTROPHILS RELATIVE PERCENT: 64.1 %
PDW BLD-RTO: 13.2 % (ref 11.5–14.5)
PLATELET # BLD: 148 K/UL (ref 130–400)
POTASSIUM SERPL-SCNC: 4.4 MEQ/L (ref 3.4–4.9)
RBC # BLD: 5.08 M/UL (ref 4.7–6.1)
SODIUM BLD-SCNC: 146 MEQ/L (ref 135–144)
TOTAL PROTEIN: 6.6 G/DL (ref 6.3–8)
TRIGL SERPL-MCNC: 79 MG/DL (ref 0–150)
VITAMIN D 25-HYDROXY: 44.1 NG/ML (ref 30–100)
WBC # BLD: 5.2 K/UL (ref 4.8–10.8)

## 2019-05-28 ENCOUNTER — OFFICE VISIT (OUTPATIENT)
Dept: CARDIOLOGY CLINIC | Age: 74
End: 2019-05-28
Payer: MEDICARE

## 2019-05-28 VITALS
WEIGHT: 182 LBS | RESPIRATION RATE: 18 BRPM | BODY MASS INDEX: 26.05 KG/M2 | DIASTOLIC BLOOD PRESSURE: 78 MMHG | HEIGHT: 70 IN | SYSTOLIC BLOOD PRESSURE: 120 MMHG | HEART RATE: 57 BPM | OXYGEN SATURATION: 96 %

## 2019-05-28 DIAGNOSIS — I25.10 CORONARY ARTERY DISEASE INVOLVING NATIVE HEART, ANGINA PRESENCE UNSPECIFIED, UNSPECIFIED VESSEL OR LESION TYPE: Primary | ICD-10-CM

## 2019-05-28 DIAGNOSIS — R00.1 SINUS BRADYCARDIA ON ECG: ICD-10-CM

## 2019-05-28 PROCEDURE — 4040F PNEUMOC VAC/ADMIN/RCVD: CPT | Performed by: INTERNAL MEDICINE

## 2019-05-28 PROCEDURE — G8598 ASA/ANTIPLAT THER USED: HCPCS | Performed by: INTERNAL MEDICINE

## 2019-05-28 PROCEDURE — 99214 OFFICE O/P EST MOD 30 MIN: CPT | Performed by: INTERNAL MEDICINE

## 2019-05-28 PROCEDURE — 1123F ACP DISCUSS/DSCN MKR DOCD: CPT | Performed by: INTERNAL MEDICINE

## 2019-05-28 PROCEDURE — 3017F COLORECTAL CA SCREEN DOC REV: CPT | Performed by: INTERNAL MEDICINE

## 2019-05-28 PROCEDURE — 1036F TOBACCO NON-USER: CPT | Performed by: INTERNAL MEDICINE

## 2019-05-28 PROCEDURE — G8417 CALC BMI ABV UP PARAM F/U: HCPCS | Performed by: INTERNAL MEDICINE

## 2019-05-28 PROCEDURE — G8428 CUR MEDS NOT DOCUMENT: HCPCS | Performed by: INTERNAL MEDICINE

## 2019-05-28 ASSESSMENT — ENCOUNTER SYMPTOMS
DIARRHEA: 0
NAUSEA: 0
APNEA: 0
BLOOD IN STOOL: 0
VOMITING: 0
CHEST TIGHTNESS: 0
COLOR CHANGE: 0
SHORTNESS OF BREATH: 0

## 2019-05-28 NOTE — PROGRESS NOTES
[facility] CARDIOLOGY OFFICE FOLLOW-UP      Patient: Piter Olson  YOB: 1945  MRN: [id number]    Chief Complaint:  Chief Complaint   Patient presents with    1 Year Follow Up    Bradycardia    Coronary Artery Disease         Subjective/HPI:  5/28/19: Patient presents today for follow-up of chest pain. Cardiac cath was negative. He was told by Dr. Ebenezer Mcdowell that MRI showed that he may have had a prior CVA. Congestive heart failure symptoms or syncope. See me in one year     5/16/18: Patient presents today for follow-up of chest pain. That has resolved. Cardiac cath was negative. His mild hypertension dyslipidemia that stable. Was diagnosed lately to have either Parkinson's disease. And supposed to have a follow-up visit with Dr. Shalonda Shipley in the near future. He'll see me in 6 months. 5/9/17: For follow-up of chest pain. Not anymore. Cath was negative. Had a false-positive nuclear scan. Lopressor 25 milligram a day and Crestor. He has noticed blood pressure running a bit high he walks regularly and bikes when the weather is right. He has dyslipidemia and BPH status stable. See me in one year.     5/3/16: For followup of CATH. No significant CAD. False positive nuke. Retired last week. Very active. Bikes. Non smoker. Stay on toprol 25 See the written copy of this report in the patient's paper medical record. These results did not interface directly into the electronic medical record and are summarized here. In 1 year. no restriction. Has HLP. On crestor.     4/19/2016:For abnormal stress test.Brandt Garcias. Was [facility] patient. C/O angina. Retrosternal.No NV. No radiation. No HA. Was on lopressor 25 for 10 years. Mainly for PVCs. Has HLN,BPH. Will need cath next tues. Bikes and walks regularly. CP aggravated with exercise. Metoprolol succinate 25.         Past Medical History:   Diagnosis Date    Abnormal cardiovascular stress test 4/19/2016    Equivocal ST-T wave changes;  Defect in the inferior wall consistent with prior infarction; LV EF 79%; TID ratio 1.1    Actinic keratoses     BPH (benign prostatic hypertrophy)     CAD (coronary artery disease)     Cancer (HCC)     COLON    Chest pressure 5/3/2016    Cholelithiasis     Hyperlipidemia     Inflamed seborrheic keratosis     Lumbar radiculopathy 10/26/2016    Osteoarthritis     Parathyroid tumor     Sinus bradycardia on ECG 4/19/2016    Done 4/5/16 with Dr. Kim Mosquera       Past Surgical History:   Procedure Laterality Date    BACK SURGERY  1993    COLECTOMY      COLONOSCOPY  8/18/10,09/11/2012    DR Suzette Márquez    COLONOSCOPY  09/29/14    DR. PIERRE    JOINT REPLACEMENT Left 5/28/13    total    PARATHYROIDECTOMY Bilateral 2011    2 of 4 glands removed    LA COLON CA SCRN NOT HI RSK IND N/A 9/15/2017    COLONOSCOPY performed by Nanette Meadows MD at [address] [place] [address],[address] ENDOSCOPY  8/11/09    DR POLK       Family History   Problem Relation Age of Onset    Cancer Mother         OVARIAN    Heart Disease Father        Social History     Socioeconomic History    Marital status:      Spouse name: None    Number of children: None    Years of education: None    Highest education level: None   Occupational History    None   Social Needs    Financial resource strain: None    Food insecurity:     Worry: None     Inability: None    Transportation needs:     Medical: None     Non-medical: None   Tobacco Use    Smoking status: Never Smoker    Smokeless tobacco: Never Used   Substance and Sexual Activity    Alcohol use: No    Drug use: No    Sexual activity: None   Lifestyle    Physical activity:     Days per week: None     Minutes per session: None    Stress: None   Relationships    Social connections:     Talks on phone: None     Gets together: None     Attends [employer] service: None     Active member of club or organization: None     Attends meetings of clubs or organizations: rash and wound. Neurological: Negative for dizziness, seizures, syncope, weakness, light-headedness, numbness and headaches. Hematological: Does not bruise/bleed easily. Psychiatric/Behavioral: Negative for agitation, behavioral problems and confusion. The patient is not nervous/anxious and is not hyperactive. All other systems reviewed and are negative. Review of System is negative except for as mentioned above. Physical Examination:    /78 (Site: Right Upper Arm, Position: Sitting, Cuff Size: Medium Adult)   Pulse 57   Resp 18   Ht 5' 10\" (1.778 m)   Wt 182 lb (82.6 kg)   SpO2 96%   BMI 26.11 kg/m²    Physical Exam   Constitutional: He appears healthy. HENT:   Nose: Nose normal.   Mouth/Throat: Dentition is normal. Oropharynx is clear. Eyes: Pupils are equal, round, and reactive to light. Neck: Normal range of motion. Cardiovascular: Normal rate, regular rhythm, S1 normal, S2 normal, normal heart sounds, intact distal pulses and normal pulses. No extrasystoles are present. Exam reveals no gallop. No murmur heard. Pulmonary/Chest: Effort normal and breath sounds normal. He has no wheezes. He has no rales. He exhibits no tenderness. Abdominal: Soft. Bowel sounds are normal. He exhibits no distension and no mass. There is no splenomegaly or hepatomegaly. There is no tenderness. Musculoskeletal: Normal range of motion. He exhibits no edema, tenderness or deformity. Neurological: He is alert and oriented to person, place, and time. He has normal motor skills and normal reflexes. Gait normal.   Skin: Skin is warm and dry.        LABS:  CBC:   Lab Results   Component Value Date    WBC 5.2 05/20/2019    RBC 5.08 05/20/2019    HGB 16.6 05/20/2019    HCT 48.7 05/20/2019    MCV 95.8 05/20/2019    MCH 32.6 05/20/2019    MCHC 34.0 05/20/2019    RDW 13.2 05/20/2019     05/20/2019    MPV 10.9 07/15/2015     Lipids:  Lab Results   Component Value Date    CHOL 131 05/20/2019 CHOL 134 10/09/2018    CHOL 136 04/03/2018     Lab Results   Component Value Date    TRIG 79 05/20/2019    TRIG 76 10/09/2018    TRIG 75 04/03/2018     Lab Results   Component Value Date    HDL 56 05/20/2019    HDL 64 (H) 10/09/2018    HDL 61 (H) 04/03/2018     Lab Results   Component Value Date    LDLCALC 59 05/20/2019    LDLCALC 55 10/09/2018    LDLCALC 60 04/03/2018     No results found for: LABVLDL, VLDL  Lab Results   Component Value Date    CHOLHDLRATIO 2.4 12/31/2012    CHOLHDLRATIO 2.3 02/29/2012     CMP:    Lab Results   Component Value Date     05/20/2019    K 4.4 05/20/2019     05/20/2019    CO2 26 05/20/2019    BUN 10 05/20/2019    CREATININE 0.92 05/20/2019    GFRAA >60.0 05/20/2019    LABGLOM >60.0 05/20/2019    GLUCOSE 89 05/20/2019    GLUCOSE 107 03/14/2012    PROT 6.6 05/20/2019    LABALBU 4.1 05/20/2019    LABALBU 4.1 03/14/2012    CALCIUM 8.9 05/20/2019    BILITOT 0.9 05/20/2019    ALKPHOS 44 05/20/2019    AST 19 05/20/2019    ALT 15 05/20/2019     BMP:    Lab Results   Component Value Date     05/20/2019    K 4.4 05/20/2019     05/20/2019    CO2 26 05/20/2019    BUN 10 05/20/2019    LABALBU 4.1 05/20/2019    LABALBU 4.1 03/14/2012    CREATININE 0.92 05/20/2019    CALCIUM 8.9 05/20/2019    GFRAA >60.0 05/20/2019    LABGLOM >60.0 05/20/2019    GLUCOSE 89 05/20/2019    GLUCOSE 107 03/14/2012     Magnesium:    Lab Results   Component Value Date    MG 2.4 05/20/2019     TSH:  Lab Results   Component Value Date    TSH 4.720 (H) 04/28/2016       Patient Active Problem List   Diagnosis    Hyperlipidemia    Osteopenia    CAD (coronary artery disease)    Benign prostatic hyperplasia    Cholelithiasis    Foot drop, left    Degenerative arthritis of lumbar spine    History of hip replacement, total    Seborrheic keratosis    History of colon cancer    Disturbance of skin sensation    Degenerative joint disease of pelvic region    History of repair of hip joint    Sinus bradycardia on ECG    Lumbar radiculopathy    Gastroesophageal reflux disease with esophagitis    Actinic keratoses    Inflamed seborrheic keratosis       There are no discontinued medications. Modified Medications    Modified Medication Previous Medication    FINASTERIDE (PROSCAR) 5 MG TABLET finasteride (PROSCAR) 5 MG tablet       TAKE 1 TABLET DAILY    TAKE 1 TABLET DAILY    METOPROLOL SUCCINATE (TOPROL XL) 25 MG EXTENDED RELEASE TABLET metoprolol succinate (TOPROL XL) 25 MG extended release tablet       TAKE 1 TABLET DAILY    TAKE 1 TABLET DAILY    OMEPRAZOLE (PRILOSEC) 20 MG DELAYED RELEASE CAPSULE omeprazole (PRILOSEC) 20 MG delayed release capsule       TAKE 1 CAPSULE DAILY    TAKE 1 CAPSULE DAILY    ROSUVASTATIN (CRESTOR) 10 MG TABLET rosuvastatin (CRESTOR) 10 MG tablet       TAKE 1 TABLET DAILY    TAKE 1 TABLET DAILY    TAMSULOSIN (FLOMAX) 0.4 MG CAPSULE tamsulosin (FLOMAX) 0.4 MG capsule       TAKE 1 CAPSULE DAILY    TAKE 1 CAPSULE DAILY       No orders of the defined types were placed in this encounter. Assessment:    1. Coronary artery disease involving native heart, angina presence unspecified, unspecified vessel or lesion type    2. Sinus bradycardia on ECG       Plan:   Stay on same medications. See me in 1 year. This note was partially generated using Dragon voice recognition system, and there may be some incorrect words, spellings, punctuation that were not noticed in checking the note before saving.         Electronically signed by Jorge Solis MD on 5/31/2019 at 9:30 AM

## 2019-05-29 ENCOUNTER — OFFICE VISIT (OUTPATIENT)
Dept: FAMILY MEDICINE CLINIC | Age: 74
End: 2019-05-29
Payer: MEDICARE

## 2019-05-29 VITALS
SYSTOLIC BLOOD PRESSURE: 130 MMHG | TEMPERATURE: 98.4 F | HEART RATE: 59 BPM | OXYGEN SATURATION: 99 % | WEIGHT: 184 LBS | DIASTOLIC BLOOD PRESSURE: 80 MMHG | HEIGHT: 70 IN | RESPIRATION RATE: 18 BRPM | BODY MASS INDEX: 26.34 KG/M2

## 2019-05-29 DIAGNOSIS — E78.5 HYPERLIPIDEMIA, UNSPECIFIED HYPERLIPIDEMIA TYPE: ICD-10-CM

## 2019-05-29 DIAGNOSIS — I25.10 CORONARY ARTERY DISEASE INVOLVING NATIVE HEART, ANGINA PRESENCE UNSPECIFIED, UNSPECIFIED VESSEL OR LESION TYPE: ICD-10-CM

## 2019-05-29 DIAGNOSIS — I10 ESSENTIAL HYPERTENSION: Primary | ICD-10-CM

## 2019-05-29 PROCEDURE — G8598 ASA/ANTIPLAT THER USED: HCPCS | Performed by: FAMILY MEDICINE

## 2019-05-29 PROCEDURE — G8417 CALC BMI ABV UP PARAM F/U: HCPCS | Performed by: FAMILY MEDICINE

## 2019-05-29 PROCEDURE — 4040F PNEUMOC VAC/ADMIN/RCVD: CPT | Performed by: FAMILY MEDICINE

## 2019-05-29 PROCEDURE — 1123F ACP DISCUSS/DSCN MKR DOCD: CPT | Performed by: FAMILY MEDICINE

## 2019-05-29 PROCEDURE — 1036F TOBACCO NON-USER: CPT | Performed by: FAMILY MEDICINE

## 2019-05-29 PROCEDURE — 99214 OFFICE O/P EST MOD 30 MIN: CPT | Performed by: FAMILY MEDICINE

## 2019-05-29 PROCEDURE — G8427 DOCREV CUR MEDS BY ELIG CLIN: HCPCS | Performed by: FAMILY MEDICINE

## 2019-05-29 PROCEDURE — 3017F COLORECTAL CA SCREEN DOC REV: CPT | Performed by: FAMILY MEDICINE

## 2019-05-29 ASSESSMENT — ENCOUNTER SYMPTOMS
GASTROINTESTINAL NEGATIVE: 1
ALLERGIC/IMMUNOLOGIC NEGATIVE: 1
SHORTNESS OF BREATH: 0
RESPIRATORY NEGATIVE: 1
EYES NEGATIVE: 1

## 2019-05-29 NOTE — PROGRESS NOTES
Patient is seen in follow up for   Chief Complaint   Patient presents with   Brennon Magaña Doctor     Patient here to establish care. Last PCP Dr. Brett Herman      Hypertension   This is a chronic problem. The current episode started more than 1 year ago. The problem is unchanged. The problem is controlled. Pertinent negatives include no chest pain, palpitations or shortness of breath. There are no associated agents to hypertension. Risk factors for coronary artery disease include male gender. Past treatments include beta blockers. here to establish feeling well has a history of cva with memory deffecit. Past Medical History:   Diagnosis Date    Abnormal cardiovascular stress test 4/19/2016    Equivocal ST-T wave changes;  Defect in the inferior wall consistent with prior infarction; LV EF 79%; TID ratio 1.1    Actinic keratoses     BPH (benign prostatic hypertrophy)     CAD (coronary artery disease)     Cancer (HCC)     COLON    Chest pressure 5/3/2016    Cholelithiasis     Hyperlipidemia     Inflamed seborrheic keratosis     Lumbar radiculopathy 10/26/2016    Osteoarthritis     Parathyroid tumor     Sinus bradycardia on ECG 4/19/2016    Done 4/5/16 with Dr. Brett Herman     Patient Active Problem List    Diagnosis Date Noted    Actinic keratoses     Inflamed seborrheic keratosis     Gastroesophageal reflux disease with esophagitis 11/15/2016    Lumbar radiculopathy 10/26/2016    Sinus bradycardia on ECG 04/19/2016    Disturbance of skin sensation 07/22/2015    Seborrheic keratosis 01/13/2014    History of colon cancer 01/13/2014    History of repair of hip joint 10/21/2013    History of hip replacement, total 07/02/2013    Degenerative joint disease of pelvic region 05/06/2013    Degenerative arthritis of lumbar spine 02/13/2013    Foot drop, left 01/14/2013    CAD (coronary artery disease)     Benign prostatic hyperplasia     Cholelithiasis     Hyperlipidemia 01/18/2012    Osteopenia 01/18/2012     Past Surgical History:   Procedure Laterality Date    BACK SURGERY  1993    COLECTOMY      COLONOSCOPY  8/18/10,09/11/2012    DR Britney James    COLONOSCOPY  09/29/14      [facility]    JOINT REPLACEMENT Left 5/28/13    total    PARATHYROIDECTOMY Bilateral 2011    2 of 4 glands removed    NE COLON CA SCRN NOT HI RSK IND N/A 9/15/2017    COLONOSCOPY performed by Aaron Monroy MD at [facility],[address] ENDOSCOPY  8/11/09    DR POLK     Family History   Problem Relation Age of Onset    Cancer Mother         OVARIAN    Heart Disease Father      Social History     Socioeconomic History    Marital status:      Spouse name: None    Number of children: None    Years of education: None    Highest education level: None   Occupational History    None   Social Needs    Financial resource strain: None    Food insecurity:     Worry: None     Inability: None    Transportation needs:     Medical: None     Non-medical: None   Tobacco Use    Smoking status: Never Smoker    Smokeless tobacco: Never Used   Substance and Sexual Activity    Alcohol use: No    Drug use: No    Sexual activity: None   Lifestyle    Physical activity:     Days per week: None     Minutes per session: None    Stress: None   Relationships    Social connections:     Talks on phone: None     Gets together: None     Attends [employer] service: None     Active member of club or organization: None     Attends meetings of clubs or organizations: None     Relationship status: None    Intimate partner violence:     Fear of current or ex partner: None     Emotionally abused: None     Physically abused: None     Forced sexual activity: None   Other Topics Concern    None   Social History Narrative    None     Current Outpatient Medications   Medication Sig Dispense Refill    omeprazole (PRILOSEC) 20 MG delayed release capsule TAKE 1 CAPSULE DAILY 90 capsule 0  tamsulosin (FLOMAX) 0.4 MG capsule TAKE 1 CAPSULE DAILY 90 capsule 3    finasteride (PROSCAR) 5 MG tablet TAKE 1 TABLET DAILY 90 tablet 3    Psyllium (METAMUCIL FIBER PO) Take by mouth      rosuvastatin (CRESTOR) 10 MG tablet TAKE 1 TABLET DAILY 90 tablet 1    metoprolol succinate (TOPROL XL) 25 MG extended release tablet TAKE 1 TABLET DAILY 90 tablet 3    Cholecalciferol (VITAMIN D3) 1000 UNITS TABS Take 1,900 Units by mouth daily       calcium carbonate 600 MG TABS tablet Take 1 tablet by mouth daily       fish oil-omega-3 fatty acids 1000 MG capsule Take 2 g by mouth 2 times daily.  Multiple Vitamin (MULTI-VITAMIN PO) Take  by mouth daily.  Magnesium 250 MG TABS Take by mouth daily Every other day      Ascorbic Acid (VITAMIN C) 500 MG tablet Take 500 mg by mouth daily.  aspirin 81 MG tablet Take 81 mg by mouth daily. No current facility-administered medications for this visit. Current Outpatient Medications on File Prior to Visit   Medication Sig Dispense Refill    omeprazole (PRILOSEC) 20 MG delayed release capsule TAKE 1 CAPSULE DAILY 90 capsule 0    tamsulosin (FLOMAX) 0.4 MG capsule TAKE 1 CAPSULE DAILY 90 capsule 3    finasteride (PROSCAR) 5 MG tablet TAKE 1 TABLET DAILY 90 tablet 3    Psyllium (METAMUCIL FIBER PO) Take by mouth      rosuvastatin (CRESTOR) 10 MG tablet TAKE 1 TABLET DAILY 90 tablet 1    metoprolol succinate (TOPROL XL) 25 MG extended release tablet TAKE 1 TABLET DAILY 90 tablet 3    Cholecalciferol (VITAMIN D3) 1000 UNITS TABS Take 1,900 Units by mouth daily       calcium carbonate 600 MG TABS tablet Take 1 tablet by mouth daily       fish oil-omega-3 fatty acids 1000 MG capsule Take 2 g by mouth 2 times daily.  Multiple Vitamin (MULTI-VITAMIN PO) Take  by mouth daily.  Magnesium 250 MG TABS Take by mouth daily Every other day      Ascorbic Acid (VITAMIN C) 500 MG tablet Take 500 mg by mouth daily.         aspirin 81 MG tablet Take 81 mg by mouth daily. No current facility-administered medications on file prior to visit. Allergies   Allergen Reactions    Amoxicillin Other (See Comments)     stomatitis     Health Maintenance   Topic Date Due    Shingles Vaccine (1 of 2) 11/27/1996    Colon cancer screen colonoscopy  09/15/2020    DTaP/Tdap/Td vaccine (2 - Td) 01/10/2024    Lipid screen  05/20/2024    Flu vaccine  Completed    Pneumococcal 65+ years Vaccine  Completed    Hepatitis C screen  Completed       Review of Systems     Review of Systems   Constitutional: Negative for activity change, appetite change, chills, fever and unexpected weight change. HENT: Negative. Eyes: Negative. Respiratory: Negative. Negative for shortness of breath. Cardiovascular: Negative. Negative for chest pain and palpitations. Gastrointestinal: Negative. Endocrine: Negative. Genitourinary: Negative. Musculoskeletal: Negative. Skin: Negative. Allergic/Immunologic: Negative. Neurological: Negative. Hematological: Negative. Psychiatric/Behavioral: Negative. Physical Exam  Vitals:    05/29/19 1604   BP: 130/80   Site: Left Upper Arm   Position: Sitting   Cuff Size: Large Adult   Pulse: 59   Resp: 18   Temp: 98.4 °F (36.9 °C)   TempSrc: Oral   SpO2: 99%   Weight: 184 lb (83.5 kg)   Height: 5' 9.5\" (1.765 m)       Physical Exam   Constitutional: He is oriented to person, place, and time. He appears well-developed and well-nourished. HENT:   Right Ear: External ear normal.   Left Ear: External ear normal.   Eyes: Pupils are equal, round, and reactive to light. Conjunctivae and EOM are normal.   Neck: Normal range of motion. Neck supple. No thyromegaly present. Cardiovascular: Normal rate, regular rhythm, normal heart sounds and intact distal pulses. Exam reveals no gallop and no friction rub. No murmur heard.   Pulmonary/Chest: Effort normal and breath sounds normal. No respiratory distress. He has no wheezes. Abdominal: Soft. Bowel sounds are normal. He exhibits no distension and no mass. There is no tenderness. There is no rebound and no guarding. No hernia. Genitourinary: Penis normal.   Musculoskeletal: Normal range of motion. He exhibits no edema or tenderness. Lymphadenopathy:     He has no cervical adenopathy. Neurological: He is alert and oriented to person, place, and time. No cranial nerve deficit. Coordination normal.   Skin: Skin is warm and dry. Psychiatric: He has a normal mood and affect. Assessment   Diagnosis Orders   1. Essential hypertension     2. Hyperlipidemia, unspecified hyperlipidemia type     3. Coronary artery disease involving native heart, angina presence unspecified, unspecified vessel or lesion type       Problem List     Hyperlipidemia    Relevant Medications    aspirin 81 MG tablet    metoprolol succinate (TOPROL XL) 25 MG extended release tablet    rosuvastatin (CRESTOR) 10 MG tablet    CAD (coronary artery disease)    Relevant Medications    aspirin 81 MG tablet    metoprolol succinate (TOPROL XL) 25 MG extended release tablet    rosuvastatin (CRESTOR) 10 MG tablet          Plan  No orders of the defined types were placed in this encounter. No orders of the defined types were placed in this encounter. No follow-ups on file.   Heather Heredia MD

## 2019-05-30 ENCOUNTER — PATIENT MESSAGE (OUTPATIENT)
Dept: FAMILY MEDICINE CLINIC | Age: 74
End: 2019-05-30

## 2019-05-30 DIAGNOSIS — I25.10 CORONARY ARTERY DISEASE INVOLVING NATIVE HEART, ANGINA PRESENCE UNSPECIFIED, UNSPECIFIED VESSEL OR LESION TYPE: ICD-10-CM

## 2019-05-30 DIAGNOSIS — E78.5 HYPERLIPIDEMIA, UNSPECIFIED HYPERLIPIDEMIA TYPE: ICD-10-CM

## 2019-05-30 DIAGNOSIS — I25.10 CORONARY ARTERY DISEASE INVOLVING NATIVE HEART WITHOUT ANGINA PECTORIS, UNSPECIFIED VESSEL OR LESION TYPE: ICD-10-CM

## 2019-05-30 DIAGNOSIS — K21.9 GASTROESOPHAGEAL REFLUX DISEASE, ESOPHAGITIS PRESENCE NOT SPECIFIED: ICD-10-CM

## 2019-05-31 RX ORDER — METOPROLOL SUCCINATE 25 MG/1
TABLET, EXTENDED RELEASE ORAL
Qty: 90 TABLET | Refills: 3 | Status: SHIPPED | OUTPATIENT
Start: 2019-05-31 | End: 2021-01-01

## 2019-05-31 RX ORDER — FINASTERIDE 5 MG/1
TABLET, FILM COATED ORAL
Qty: 90 TABLET | Refills: 3 | Status: ON HOLD | OUTPATIENT
Start: 2019-05-31 | End: 2020-05-26

## 2019-05-31 RX ORDER — TAMSULOSIN HYDROCHLORIDE 0.4 MG/1
CAPSULE ORAL
Qty: 90 CAPSULE | Refills: 3 | Status: ON HOLD | OUTPATIENT
Start: 2019-05-31 | End: 2020-05-26

## 2019-05-31 RX ORDER — OMEPRAZOLE 20 MG/1
CAPSULE, DELAYED RELEASE ORAL
Qty: 90 CAPSULE | Refills: 3 | Status: ON HOLD | OUTPATIENT
Start: 2019-05-31 | End: 2020-05-26

## 2019-05-31 RX ORDER — ROSUVASTATIN CALCIUM 10 MG/1
TABLET, COATED ORAL
Qty: 90 TABLET | Refills: 3 | Status: SHIPPED | OUTPATIENT
Start: 2019-05-31 | End: 2020-07-08

## 2019-06-10 ENCOUNTER — OFFICE VISIT (OUTPATIENT)
Dept: GASTROENTEROLOGY | Age: 74
End: 2019-06-10
Payer: MEDICARE

## 2019-06-10 VITALS
BODY MASS INDEX: 26.69 KG/M2 | WEIGHT: 186.4 LBS | OXYGEN SATURATION: 97 % | HEIGHT: 70 IN | RESPIRATION RATE: 20 BRPM | HEART RATE: 56 BPM

## 2019-06-10 DIAGNOSIS — Z85.038 H/O COLON CANCER, STAGE I: Primary | ICD-10-CM

## 2019-06-10 PROCEDURE — 4040F PNEUMOC VAC/ADMIN/RCVD: CPT | Performed by: SPECIALIST

## 2019-06-10 PROCEDURE — G8417 CALC BMI ABV UP PARAM F/U: HCPCS | Performed by: SPECIALIST

## 2019-06-10 PROCEDURE — 1036F TOBACCO NON-USER: CPT | Performed by: SPECIALIST

## 2019-06-10 PROCEDURE — G8598 ASA/ANTIPLAT THER USED: HCPCS | Performed by: SPECIALIST

## 2019-06-10 PROCEDURE — 3017F COLORECTAL CA SCREEN DOC REV: CPT | Performed by: SPECIALIST

## 2019-06-10 PROCEDURE — 99202 OFFICE O/P NEW SF 15 MIN: CPT | Performed by: SPECIALIST

## 2019-06-10 PROCEDURE — G8427 DOCREV CUR MEDS BY ELIG CLIN: HCPCS | Performed by: SPECIALIST

## 2019-06-10 PROCEDURE — 1123F ACP DISCUSS/DSCN MKR DOCD: CPT | Performed by: SPECIALIST

## 2019-06-10 ASSESSMENT — ENCOUNTER SYMPTOMS: CONSTIPATION: 1

## 2019-06-10 NOTE — PROGRESS NOTES
Physically abused: None     Forced sexual activity: None   Other Topics Concern    None   Social History Narrative    None       Pulse 56, resp. rate 20, height 5' 9.5\" (1.765 m), weight 186 lb 6.4 oz (84.6 kg), SpO2 97 %. Physical Exam   Constitutional: He appears well-developed and well-nourished. HENT:   Head: Normocephalic. Mouth/Throat: Oropharynx is clear and moist.   Eyes: Pupils are equal, round, and reactive to light. Cardiovascular: Normal rate, regular rhythm, normal heart sounds and intact distal pulses. Pulmonary/Chest: Effort normal and breath sounds normal.   Abdominal: Soft. Bowel sounds are normal.   Neurological: He is alert. Skin: Skin is warm and dry. Psychiatric: He has a normal mood and affect. Laboratory, Pathology, Radiology reviewed indetail with relevant important investigations summarized below:  Lab Results   Component Value Date    WBC 5.2 05/20/2019    HGB 16.6 05/20/2019    HCT 48.7 05/20/2019    MCV 95.8 05/20/2019     05/20/2019     Lab Results   Component Value Date    ALT 15 05/20/2019    AST 19 05/20/2019    ALKPHOS 44 05/20/2019    BILITOT 0.9 (H) 05/20/2019       No results found. Endoscopic investigations:     Assessmentand Plan:  68 y.o. male with h/smooth colon cancer status post resection. Last colonoscopy was in 2017, colon cancer was diagnosed in 2009. Probably patient is not due for colonoscopy until 2020, they will check with Dr. Chang Barrett and would get back to us. Diagnosis Orders   1. H/O colon cancer, stage I       Return if symptoms worsen or fail to improve. Zunilda Funez MD   Staff Gastroenterologist  [facility]    Please note this report has been partially produced using speech recognition software and may cause contain errors related to thatsystem including grammar, punctuation and spelling as well as words and phrases that may seem inappropriate.  If there are questions or concerns please feel free to contact me to clarify.

## 2019-06-18 ENCOUNTER — HOSPITAL ENCOUNTER (OUTPATIENT)
Dept: SLEEP CENTER | Age: 74
Discharge: HOME | End: 2019-06-20
Payer: MEDICARE

## 2019-06-18 PROCEDURE — 95810 POLYSOM 6/> YRS 4/> PARAM: CPT

## 2019-06-18 PROCEDURE — 95810 POLYSOM 6/> YRS 4/> PARAM: CPT | Performed by: INTERNAL MEDICINE

## 2019-07-02 ENCOUNTER — TELEPHONE (OUTPATIENT)
Dept: FAMILY MEDICINE CLINIC | Age: 74
End: 2019-07-02

## 2019-07-17 DIAGNOSIS — N13.8 BPH WITH OBSTRUCTION/LOWER URINARY TRACT SYMPTOMS: ICD-10-CM

## 2019-07-17 DIAGNOSIS — N40.1 BPH WITH OBSTRUCTION/LOWER URINARY TRACT SYMPTOMS: ICD-10-CM

## 2019-07-17 LAB — PROSTATE SPECIFIC ANTIGEN: 1.72 NG/ML (ref 0–6.22)

## 2019-07-24 ENCOUNTER — OFFICE VISIT (OUTPATIENT)
Dept: UROLOGY | Age: 74
End: 2019-07-24
Payer: MEDICARE

## 2019-07-24 VITALS
WEIGHT: 180 LBS | HEIGHT: 69 IN | HEART RATE: 55 BPM | SYSTOLIC BLOOD PRESSURE: 138 MMHG | DIASTOLIC BLOOD PRESSURE: 82 MMHG | BODY MASS INDEX: 26.66 KG/M2

## 2019-07-24 DIAGNOSIS — N13.8 BPH WITH OBSTRUCTION/LOWER URINARY TRACT SYMPTOMS: ICD-10-CM

## 2019-07-24 DIAGNOSIS — Z87.898 HISTORY OF ELEVATED PSA: Primary | ICD-10-CM

## 2019-07-24 DIAGNOSIS — N40.1 BPH WITH OBSTRUCTION/LOWER URINARY TRACT SYMPTOMS: ICD-10-CM

## 2019-07-24 PROCEDURE — 1123F ACP DISCUSS/DSCN MKR DOCD: CPT | Performed by: UROLOGY

## 2019-07-24 PROCEDURE — G8427 DOCREV CUR MEDS BY ELIG CLIN: HCPCS | Performed by: UROLOGY

## 2019-07-24 PROCEDURE — G8417 CALC BMI ABV UP PARAM F/U: HCPCS | Performed by: UROLOGY

## 2019-07-24 PROCEDURE — 4040F PNEUMOC VAC/ADMIN/RCVD: CPT | Performed by: UROLOGY

## 2019-07-24 PROCEDURE — G8598 ASA/ANTIPLAT THER USED: HCPCS | Performed by: UROLOGY

## 2019-07-24 PROCEDURE — 51741 ELECTRO-UROFLOWMETRY FIRST: CPT | Performed by: UROLOGY

## 2019-07-24 PROCEDURE — 51798 US URINE CAPACITY MEASURE: CPT | Performed by: UROLOGY

## 2019-07-24 PROCEDURE — 3017F COLORECTAL CA SCREEN DOC REV: CPT | Performed by: UROLOGY

## 2019-07-24 PROCEDURE — 99213 OFFICE O/P EST LOW 20 MIN: CPT | Performed by: UROLOGY

## 2019-07-24 PROCEDURE — 1036F TOBACCO NON-USER: CPT | Performed by: UROLOGY

## 2019-07-24 ASSESSMENT — ENCOUNTER SYMPTOMS: ABDOMINAL PAIN: 0

## 2019-07-24 NOTE — PROGRESS NOTES
Subjective:      Patient ID: Hodges Lesch is a 68 y.o. male. HPI  this is a 71 yo white male with h/o CAD, OA, HTN, Colon cancer, BPH with LUTs on Flomax and Proscar and elevated PSA's back in follow-up. Since last seen on 7/24/18, he has been diagnosed with CVA x 2 and has memory loss. He also gets Parkinson's like symptoms from this CVA. He reports a good fos and no urgency or UI. However, he has had intermittent episodes of frequency that resolve and uses Flomax BID prn for this problem. He has no hematuria or dysuria or pain. He has no abd or flank pain. He has a prior h/o post-op retention. He has no new surgical problems.  I reviewed his interval PSA result today. He has no SE from his BPH medications reported.       Prostate biopsies in 4/2008 and 11/2010: neg, PV 57.1cc  PCA3 1/2013: neg (2)      Past Medical History:   Diagnosis Date    Abnormal cardiovascular stress test 4/19/2016    Equivocal ST-T wave changes; Defect in the inferior wall consistent with prior infarction; LV EF 79%; TID ratio 1.1    Actinic keratoses     BPH (benign prostatic hypertrophy)     CAD (coronary artery disease)     Cancer (HCC)     COLON    Chest pressure 5/3/2016    Cholelithiasis     Hyperlipidemia     Inflamed seborrheic keratosis     Lumbar radiculopathy 10/26/2016    Osteoarthritis     Parathyroid tumor     Sinus bradycardia on ECG 4/19/2016    Done 4/5/16 with Dr. Christopher Wisdom     Past Surgical History:   Procedure Laterality Date    BACK SURGERY  1993    COLECTOMY      COLONOSCOPY  8/18/10,09/11/2012    DR Laura Savage    COLONOSCOPY  09/29/14    DR. PIERRE    JOINT REPLACEMENT Left 5/28/13    total    PARATHYROIDECTOMY Bilateral 2011    2 of 4 glands removed    FL COLON CA SCRN NOT HI RSK IND N/A 9/15/2017    COLONOSCOPY performed by Silvio Baugh MD at [address] [facility],[address] ENDOSCOPY  8/11/09    DR Laura Savage     Social History

## 2019-07-29 ENCOUNTER — TELEPHONE (OUTPATIENT)
Dept: FAMILY MEDICINE CLINIC | Age: 74
End: 2019-07-29

## 2019-07-29 DIAGNOSIS — H91.90 HEARING LOSS, UNSPECIFIED HEARING LOSS TYPE, UNSPECIFIED LATERALITY: Primary | ICD-10-CM

## 2019-08-01 ENCOUNTER — APPOINTMENT (OUTPATIENT)
Dept: CT IMAGING | Age: 74
End: 2019-08-01
Payer: MEDICARE

## 2019-08-01 ENCOUNTER — APPOINTMENT (OUTPATIENT)
Dept: ULTRASOUND IMAGING | Age: 74
End: 2019-08-01
Payer: MEDICARE

## 2019-08-01 ENCOUNTER — APPOINTMENT (OUTPATIENT)
Dept: MRI IMAGING | Age: 74
End: 2019-08-01
Payer: MEDICARE

## 2019-08-01 ENCOUNTER — APPOINTMENT (OUTPATIENT)
Dept: GENERAL RADIOLOGY | Age: 74
End: 2019-08-01
Payer: MEDICARE

## 2019-08-01 ENCOUNTER — HOSPITAL ENCOUNTER (OUTPATIENT)
Age: 74
Setting detail: OBSERVATION
Discharge: HOME | End: 2019-08-02
Attending: EMERGENCY MEDICINE | Admitting: INTERNAL MEDICINE
Payer: MEDICARE

## 2019-08-01 DIAGNOSIS — G45.9 TIA (TRANSIENT ISCHEMIC ATTACK): Primary | ICD-10-CM

## 2019-08-01 LAB
ALBUMIN SERPL-MCNC: 4.4 G/DL (ref 3.5–4.6)
ALP BLD-CCNC: 52 U/L (ref 35–104)
ALT SERPL-CCNC: 21 U/L (ref 0–41)
ANION GAP SERPL CALCULATED.3IONS-SCNC: 12 MEQ/L (ref 9–15)
AST SERPL-CCNC: 25 U/L (ref 0–40)
BASOPHILS ABSOLUTE: 0 K/UL (ref 0–0.2)
BASOPHILS RELATIVE PERCENT: 0.5 %
BILIRUB SERPL-MCNC: 0.8 MG/DL (ref 0.2–0.7)
BUN BLDV-MCNC: 12 MG/DL (ref 8–23)
CALCIUM SERPL-MCNC: 9.3 MG/DL (ref 8.5–9.9)
CHLORIDE BLD-SCNC: 106 MEQ/L (ref 95–107)
CHP ED QC CHECK: YES
CO2: 25 MEQ/L (ref 20–31)
CREAT SERPL-MCNC: 0.82 MG/DL (ref 0.7–1.2)
EKG ATRIAL RATE: 56 BPM
EKG P AXIS: -12 DEGREES
EKG P-R INTERVAL: 120 MS
EKG Q-T INTERVAL: 460 MS
EKG QRS DURATION: 98 MS
EKG QTC CALCULATION (BAZETT): 443 MS
EKG R AXIS: 6 DEGREES
EKG T AXIS: -12 DEGREES
EKG VENTRICULAR RATE: 56 BPM
EOSINOPHILS ABSOLUTE: 0.1 K/UL (ref 0–0.7)
EOSINOPHILS RELATIVE PERCENT: 0.8 %
GFR AFRICAN AMERICAN: >60
GFR NON-AFRICAN AMERICAN: >60
GLOBULIN: 2.8 G/DL (ref 2.3–3.5)
GLUCOSE BLD-MCNC: 101 MG/DL (ref 70–99)
GLUCOSE BLD-MCNC: 104 MG/DL
HCT VFR BLD CALC: 48.3 % (ref 42–52)
HEMOGLOBIN: 17 G/DL (ref 14–18)
LV EF: 60 %
LVEF MODALITY: NORMAL
LYMPHOCYTES ABSOLUTE: 1.1 K/UL (ref 1–4.8)
LYMPHOCYTES RELATIVE PERCENT: 16.6 %
MAGNESIUM: 2.2 MG/DL (ref 1.7–2.4)
MCH RBC QN AUTO: 32.5 PG (ref 27–31.3)
MCHC RBC AUTO-ENTMCNC: 35.1 % (ref 33–37)
MCV RBC AUTO: 92.5 FL (ref 80–100)
MONOCYTES ABSOLUTE: 0.4 K/UL (ref 0.2–0.8)
MONOCYTES RELATIVE PERCENT: 6.1 %
NEUTROPHILS ABSOLUTE: 5.2 K/UL (ref 1.4–6.5)
NEUTROPHILS RELATIVE PERCENT: 76 %
PDW BLD-RTO: 13.2 % (ref 11.5–14.5)
PLATELET # BLD: 137 K/UL (ref 130–400)
POTASSIUM SERPL-SCNC: 4 MEQ/L (ref 3.4–4.9)
RBC # BLD: 5.22 M/UL (ref 4.7–6.1)
SODIUM BLD-SCNC: 143 MEQ/L (ref 135–144)
TOTAL PROTEIN: 7.2 G/DL (ref 6.3–8)
TROPONIN: <0.01 NG/ML (ref 0–0.01)
WBC # BLD: 6.8 K/UL (ref 4.8–10.8)

## 2019-08-01 PROCEDURE — G0378 HOSPITAL OBSERVATION PER HR: HCPCS

## 2019-08-01 PROCEDURE — 2580000003 HC RX 258: Performed by: PHYSICIAN ASSISTANT

## 2019-08-01 PROCEDURE — 6370000000 HC RX 637 (ALT 250 FOR IP): Performed by: INTERNAL MEDICINE

## 2019-08-01 PROCEDURE — 93005 ELECTROCARDIOGRAM TRACING: CPT | Performed by: EMERGENCY MEDICINE

## 2019-08-01 PROCEDURE — 83735 ASSAY OF MAGNESIUM: CPT

## 2019-08-01 PROCEDURE — 6360000002 HC RX W HCPCS: Performed by: EMERGENCY MEDICINE

## 2019-08-01 PROCEDURE — 2580000003 HC RX 258: Performed by: EMERGENCY MEDICINE

## 2019-08-01 PROCEDURE — 6360000002 HC RX W HCPCS: Performed by: PHYSICIAN ASSISTANT

## 2019-08-01 PROCEDURE — A9579 GAD-BASE MR CONTRAST NOS,1ML: HCPCS | Performed by: PHYSICIAN ASSISTANT

## 2019-08-01 PROCEDURE — 96375 TX/PRO/DX INJ NEW DRUG ADDON: CPT

## 2019-08-01 PROCEDURE — 70544 MR ANGIOGRAPHY HEAD W/O DYE: CPT

## 2019-08-01 PROCEDURE — 6360000004 HC RX CONTRAST MEDICATION: Performed by: PHYSICIAN ASSISTANT

## 2019-08-01 PROCEDURE — 70553 MRI BRAIN STEM W/O & W/DYE: CPT

## 2019-08-01 PROCEDURE — 71046 X-RAY EXAM CHEST 2 VIEWS: CPT

## 2019-08-01 PROCEDURE — 6370000000 HC RX 637 (ALT 250 FOR IP): Performed by: EMERGENCY MEDICINE

## 2019-08-01 PROCEDURE — 85025 COMPLETE CBC W/AUTO DIFF WBC: CPT

## 2019-08-01 PROCEDURE — 93306 TTE W/DOPPLER COMPLETE: CPT

## 2019-08-01 PROCEDURE — 84484 ASSAY OF TROPONIN QUANT: CPT

## 2019-08-01 PROCEDURE — 70450 CT HEAD/BRAIN W/O DYE: CPT

## 2019-08-01 PROCEDURE — 80053 COMPREHEN METABOLIC PANEL: CPT

## 2019-08-01 PROCEDURE — 93880 EXTRACRANIAL BILAT STUDY: CPT

## 2019-08-01 PROCEDURE — 99285 EMERGENCY DEPT VISIT HI MDM: CPT

## 2019-08-01 PROCEDURE — 96372 THER/PROPH/DIAG INJ SC/IM: CPT

## 2019-08-01 PROCEDURE — 96374 THER/PROPH/DIAG INJ IV PUSH: CPT

## 2019-08-01 RX ORDER — TAMSULOSIN HYDROCHLORIDE 0.4 MG/1
0.4 CAPSULE ORAL DAILY
Status: DISCONTINUED | OUTPATIENT
Start: 2019-08-01 | End: 2019-08-02 | Stop reason: HOSPADM

## 2019-08-01 RX ORDER — METOPROLOL SUCCINATE 25 MG/1
25 TABLET, EXTENDED RELEASE ORAL DAILY
Status: DISCONTINUED | OUTPATIENT
Start: 2019-08-01 | End: 2019-08-02 | Stop reason: HOSPADM

## 2019-08-01 RX ORDER — CHLORAL HYDRATE 500 MG
2000 CAPSULE ORAL 2 TIMES DAILY
Status: DISCONTINUED | OUTPATIENT
Start: 2019-08-01 | End: 2019-08-02 | Stop reason: HOSPADM

## 2019-08-01 RX ORDER — CLOPIDOGREL BISULFATE 75 MG/1
150 TABLET ORAL ONCE
Status: COMPLETED | OUTPATIENT
Start: 2019-08-01 | End: 2019-08-01

## 2019-08-01 RX ORDER — ROSUVASTATIN CALCIUM 10 MG/1
10 TABLET, COATED ORAL NIGHTLY
Status: DISCONTINUED | OUTPATIENT
Start: 2019-08-01 | End: 2019-08-02 | Stop reason: HOSPADM

## 2019-08-01 RX ORDER — FINASTERIDE 5 MG/1
5 TABLET, FILM COATED ORAL DAILY
Status: DISCONTINUED | OUTPATIENT
Start: 2019-08-01 | End: 2019-08-02 | Stop reason: HOSPADM

## 2019-08-01 RX ORDER — ONDANSETRON 2 MG/ML
4 INJECTION INTRAMUSCULAR; INTRAVENOUS EVERY 6 HOURS PRN
Status: DISCONTINUED | OUTPATIENT
Start: 2019-08-01 | End: 2019-08-02 | Stop reason: HOSPADM

## 2019-08-01 RX ORDER — METOCLOPRAMIDE HYDROCHLORIDE 5 MG/ML
10 INJECTION INTRAMUSCULAR; INTRAVENOUS ONCE
Status: COMPLETED | OUTPATIENT
Start: 2019-08-01 | End: 2019-08-01

## 2019-08-01 RX ORDER — ASCORBIC ACID 500 MG
500 TABLET ORAL DAILY
Status: DISCONTINUED | OUTPATIENT
Start: 2019-08-01 | End: 2019-08-02 | Stop reason: HOSPADM

## 2019-08-01 RX ORDER — SODIUM CHLORIDE 9 MG/ML
INJECTION, SOLUTION INTRAVENOUS CONTINUOUS
Status: DISCONTINUED | OUTPATIENT
Start: 2019-08-01 | End: 2019-08-02

## 2019-08-01 RX ORDER — SODIUM CHLORIDE 0.9 % (FLUSH) 0.9 %
10 SYRINGE (ML) INJECTION 2 TIMES DAILY
Status: DISCONTINUED | OUTPATIENT
Start: 2019-08-01 | End: 2019-08-02 | Stop reason: HOSPADM

## 2019-08-01 RX ORDER — ASPIRIN 81 MG/1
324 TABLET, CHEWABLE ORAL ONCE
Status: COMPLETED | OUTPATIENT
Start: 2019-08-01 | End: 2019-08-01

## 2019-08-01 RX ORDER — PANTOPRAZOLE SODIUM 40 MG/1
40 TABLET, DELAYED RELEASE ORAL
Status: DISCONTINUED | OUTPATIENT
Start: 2019-08-02 | End: 2019-08-02 | Stop reason: HOSPADM

## 2019-08-01 RX ORDER — SODIUM CHLORIDE 0.9 % (FLUSH) 0.9 %
10 SYRINGE (ML) INJECTION EVERY 12 HOURS SCHEDULED
Status: DISCONTINUED | OUTPATIENT
Start: 2019-08-01 | End: 2019-08-02 | Stop reason: HOSPADM

## 2019-08-01 RX ORDER — CLOPIDOGREL BISULFATE 75 MG/1
75 TABLET ORAL DAILY
Status: DISCONTINUED | OUTPATIENT
Start: 2019-08-01 | End: 2019-08-02 | Stop reason: HOSPADM

## 2019-08-01 RX ORDER — SODIUM CHLORIDE 0.9 % (FLUSH) 0.9 %
10 SYRINGE (ML) INJECTION PRN
Status: DISCONTINUED | OUTPATIENT
Start: 2019-08-01 | End: 2019-08-02 | Stop reason: HOSPADM

## 2019-08-01 RX ORDER — DIPHENHYDRAMINE HYDROCHLORIDE 50 MG/ML
25 INJECTION INTRAMUSCULAR; INTRAVENOUS ONCE
Status: COMPLETED | OUTPATIENT
Start: 2019-08-01 | End: 2019-08-01

## 2019-08-01 RX ORDER — ACETAMINOPHEN 325 MG/1
650 TABLET ORAL EVERY 4 HOURS PRN
Status: DISCONTINUED | OUTPATIENT
Start: 2019-08-01 | End: 2019-08-02 | Stop reason: HOSPADM

## 2019-08-01 RX ORDER — 0.9 % SODIUM CHLORIDE 0.9 %
1000 INTRAVENOUS SOLUTION INTRAVENOUS ONCE
Status: COMPLETED | OUTPATIENT
Start: 2019-08-01 | End: 2019-08-01

## 2019-08-01 RX ADMIN — ENOXAPARIN SODIUM 40 MG: 40 INJECTION SUBCUTANEOUS at 13:01

## 2019-08-01 RX ADMIN — ACETAMINOPHEN 650 MG: 325 TABLET ORAL at 13:01

## 2019-08-01 RX ADMIN — GADOTERIDOL 15 ML: 279.3 INJECTION, SOLUTION INTRAVENOUS at 14:48

## 2019-08-01 RX ADMIN — SODIUM CHLORIDE 1000 ML: 9 INJECTION, SOLUTION INTRAVENOUS at 10:02

## 2019-08-01 RX ADMIN — DIPHENHYDRAMINE HYDROCHLORIDE 25 MG: 50 INJECTION, SOLUTION INTRAMUSCULAR; INTRAVENOUS at 10:02

## 2019-08-01 RX ADMIN — METOCLOPRAMIDE 10 MG: 5 INJECTION, SOLUTION INTRAMUSCULAR; INTRAVENOUS at 10:02

## 2019-08-01 RX ADMIN — CLOPIDOGREL BISULFATE 150 MG: 75 TABLET ORAL at 11:10

## 2019-08-01 RX ADMIN — SODIUM CHLORIDE: 9 INJECTION, SOLUTION INTRAVENOUS at 13:01

## 2019-08-01 RX ADMIN — ASPIRIN 81 MG 324 MG: 81 TABLET ORAL at 11:11

## 2019-08-01 RX ADMIN — Medication 10 ML: at 13:01

## 2019-08-01 ASSESSMENT — ENCOUNTER SYMPTOMS
DIARRHEA: 0
SORE THROAT: 0
BACK PAIN: 0
VOMITING: 1
COUGH: 0
ABDOMINAL PAIN: 0
NAUSEA: 1
SHORTNESS OF BREATH: 1

## 2019-08-01 ASSESSMENT — PAIN DESCRIPTION - DESCRIPTORS: DESCRIPTORS: PRESSURE

## 2019-08-01 ASSESSMENT — PAIN SCALES - GENERAL
PAINLEVEL_OUTOF10: 8
PAINLEVEL_OUTOF10: 3

## 2019-08-01 ASSESSMENT — PAIN DESCRIPTION - LOCATION: LOCATION: EYE

## 2019-08-01 ASSESSMENT — PAIN DESCRIPTION - ORIENTATION: ORIENTATION: RIGHT;LEFT

## 2019-08-01 ASSESSMENT — PAIN DESCRIPTION - FREQUENCY: FREQUENCY: CONTINUOUS

## 2019-08-01 NOTE — PROGRESS NOTES
[facility] Occupational Therapy      Date: 2019  Patient Name: Ulices Hernandez        MRN: [id number]  Account: [de-identified]   : 1945  (68 y.o.)  Room: [place] [address]    Chart reviewed, attempted OT at [phone] for evaluation. Patient not seen 2° to:    [] Hold per nsg request    [] Pt declined     [x] Pt. off floor for test/procedure. Spoke to Maryjane Dior RN aware. Will attempt again when able.     Electronically signed by LB Pulliam on 1666 at 3:47 PM

## 2019-08-01 NOTE — ED PROVIDER NOTES
[facility] ED  eMERGENCYdEPARTMENT eNCOUnter      Pt Name: Hodges Lesch  MRN: [id number]  [place] 1945  Date of evaluation: 8/1/2019  Santhosh Burns MD    CHIEF COMPLAINT           HPI  Hodges Lesch is a 68 y.o. male per chart review has a h/o CAD, colon cancer s/p resection, and HPL presents for headache and dysarthria. He states he woke up at 5am with intermittent pressure behind his eyes that was 8/10. He notes associated shaking, spasms, diaphoresis, nausea, and SOB. His wife noted at 7am he woke up again and was saying words that didn't make sense together. Pt then went back to bed and woke up around 8:30 with resolution of dysarthria. Pt notes continued headache. Of note, on MRI 2 years ago for dementia workup an incidental stroke was seen. Denies chest pain. ROS  Review of Systems   Constitutional: Negative for activity change, chills and fever. HENT: Negative for ear pain and sore throat. Eyes: Negative for visual disturbance. Respiratory: Positive for shortness of breath. Negative for cough. Cardiovascular: Negative for chest pain, palpitations and leg swelling. Gastrointestinal: Positive for nausea and vomiting. Negative for abdominal pain and diarrhea. Genitourinary: Negative for dysuria. Musculoskeletal: Negative for back pain. Skin: Negative for rash. Neurological: Positive for speech difficulty. Negative for dizziness and weakness. Except as noted above the remainder of the review of systems was reviewed and negative. PAST MEDICAL HISTORY     Past Medical History:   Diagnosis Date    Abnormal cardiovascular stress test 4/19/2016    Equivocal ST-T wave changes;  Defect in the inferior wall consistent with prior infarction; LV EF 79%; TID ratio 1.1    Actinic keratoses     BPH (benign prostatic hypertrophy)     CAD (coronary artery disease)     Cancer (HCC)     COLON    Chest pressure 5/3/2016    Cholelithiasis    

## 2019-08-02 VITALS
RESPIRATION RATE: 18 BRPM | TEMPERATURE: 98.2 F | HEART RATE: 61 BPM | BODY MASS INDEX: 26.84 KG/M2 | WEIGHT: 181.2 LBS | OXYGEN SATURATION: 97 % | DIASTOLIC BLOOD PRESSURE: 72 MMHG | HEIGHT: 69 IN | SYSTOLIC BLOOD PRESSURE: 168 MMHG

## 2019-08-02 LAB
ANION GAP SERPL CALCULATED.3IONS-SCNC: 11 MEQ/L (ref 9–15)
BASOPHILS ABSOLUTE: 0 K/UL (ref 0–0.2)
BASOPHILS RELATIVE PERCENT: 0.7 %
BUN BLDV-MCNC: 9 MG/DL (ref 8–23)
CALCIUM SERPL-MCNC: 8.2 MG/DL (ref 8.5–9.9)
CHLORIDE BLD-SCNC: 109 MEQ/L (ref 95–107)
CO2: 23 MEQ/L (ref 20–31)
CREAT SERPL-MCNC: 0.79 MG/DL (ref 0.7–1.2)
EOSINOPHILS ABSOLUTE: 0.1 K/UL (ref 0–0.7)
EOSINOPHILS RELATIVE PERCENT: 1.6 %
GFR AFRICAN AMERICAN: >60
GFR NON-AFRICAN AMERICAN: >60
GLUCOSE BLD-MCNC: 94 MG/DL (ref 70–99)
HCT VFR BLD CALC: 43.4 % (ref 42–52)
HEMOGLOBIN: 15.2 G/DL (ref 14–18)
LYMPHOCYTES ABSOLUTE: 1.5 K/UL (ref 1–4.8)
LYMPHOCYTES RELATIVE PERCENT: 30.8 %
MCH RBC QN AUTO: 32.7 PG (ref 27–31.3)
MCHC RBC AUTO-ENTMCNC: 34.9 % (ref 33–37)
MCV RBC AUTO: 93.6 FL (ref 80–100)
MONOCYTES ABSOLUTE: 0.4 K/UL (ref 0.2–0.8)
MONOCYTES RELATIVE PERCENT: 7.9 %
NEUTROPHILS ABSOLUTE: 2.9 K/UL (ref 1.4–6.5)
NEUTROPHILS RELATIVE PERCENT: 59 %
PDW BLD-RTO: 13.3 % (ref 11.5–14.5)
PLATELET # BLD: 128 K/UL (ref 130–400)
POTASSIUM REFLEX MAGNESIUM: 3.9 MEQ/L (ref 3.4–4.9)
RBC # BLD: 4.64 M/UL (ref 4.7–6.1)
SODIUM BLD-SCNC: 143 MEQ/L (ref 135–144)
WBC # BLD: 4.8 K/UL (ref 4.8–10.8)

## 2019-08-02 PROCEDURE — 85025 COMPLETE CBC W/AUTO DIFF WBC: CPT

## 2019-08-02 PROCEDURE — 93010 ELECTROCARDIOGRAM REPORT: CPT | Performed by: INTERNAL MEDICINE

## 2019-08-02 PROCEDURE — G0378 HOSPITAL OBSERVATION PER HR: HCPCS

## 2019-08-02 PROCEDURE — 36415 COLL VENOUS BLD VENIPUNCTURE: CPT

## 2019-08-02 PROCEDURE — 97162 PT EVAL MOD COMPLEX 30 MIN: CPT

## 2019-08-02 PROCEDURE — 2580000003 HC RX 258: Performed by: PHYSICIAN ASSISTANT

## 2019-08-02 PROCEDURE — 6360000002 HC RX W HCPCS: Performed by: PHYSICIAN ASSISTANT

## 2019-08-02 PROCEDURE — 6370000000 HC RX 637 (ALT 250 FOR IP): Performed by: INTERNAL MEDICINE

## 2019-08-02 PROCEDURE — 80048 BASIC METABOLIC PNL TOTAL CA: CPT

## 2019-08-02 PROCEDURE — 96372 THER/PROPH/DIAG INJ SC/IM: CPT

## 2019-08-02 PROCEDURE — 97165 OT EVAL LOW COMPLEX 30 MIN: CPT

## 2019-08-02 RX ORDER — CLOPIDOGREL BISULFATE 75 MG/1
75 TABLET ORAL DAILY
Qty: 30 TABLET | Refills: 3 | Status: SHIPPED | OUTPATIENT
Start: 2019-08-03 | End: 2020-08-12 | Stop reason: SDUPTHER

## 2019-08-02 RX ADMIN — SODIUM CHLORIDE: 9 INJECTION, SOLUTION INTRAVENOUS at 03:42

## 2019-08-02 RX ADMIN — CLOPIDOGREL BISULFATE 75 MG: 75 TABLET ORAL at 09:40

## 2019-08-02 RX ADMIN — ENOXAPARIN SODIUM 40 MG: 40 INJECTION SUBCUTANEOUS at 09:41

## 2019-08-02 RX ADMIN — Medication 10 ML: at 11:28

## 2019-08-02 RX ADMIN — Medication 10 ML: at 11:30

## 2019-08-02 ASSESSMENT — ENCOUNTER SYMPTOMS
SHORTNESS OF BREATH: 0
NAUSEA: 0
ABDOMINAL DISTENTION: 0
CONSTIPATION: 0
DIARRHEA: 0
WHEEZING: 0
COUGH: 0
COLOR CHANGE: 0
VOMITING: 0
CHEST TIGHTNESS: 0
TROUBLE SWALLOWING: 0
ABDOMINAL PAIN: 0

## 2019-08-02 ASSESSMENT — PAIN SCALES - GENERAL
PAINLEVEL_OUTOF10: 0
PAINLEVEL_OUTOF10: 0

## 2019-08-02 NOTE — DISCHARGE SUMMARY
velocity within the right internal and mid common carotid arteries are 65 and 113 cm/s, with an ICA/CCA ratio of approximately 0.6 , which indicates less than 50% by velocity criteria. Maximum systolic velocity within the left internal and mid common carotid arteries are 69 and 124 cm/s, with an ICA/CCA ratio of approximately 0.5, which indicates less than 50% by velocity criteria. Maximum velocities within the right and left external carotid arteries are 91 and 97 cm/sec respectively. There is antegrade flow in both vertebral arteries. MILD ATHEROSCLEROTIC PLAQUING OF THE CAROTID BULBS WITHOUT EVIDENCE OF A FLOW-LIMITING STENOSIS. Discharge Medications:       Yuliana Gould \"Reverend\"   Home Medication Instructions DHA:[id number]    Printed on:08/02/19 1210   Medication Information                      Ascorbic Acid (VITAMIN C) 500 MG tablet  Take 500 mg by mouth daily. aspirin 81 MG tablet  Take 81 mg by mouth daily. calcium carbonate 600 MG TABS tablet  Take 1 tablet by mouth daily              Cholecalciferol (VITAMIN D3) 1000 UNITS TABS  Take 1,900 Units by mouth daily              clopidogrel (PLAVIX) 75 MG tablet  Take 1 tablet by mouth daily             finasteride (PROSCAR) 5 MG tablet  TAKE 1 TABLET DAILY             fish oil-omega-3 fatty acids 1000 MG capsule  Take 2 g by mouth 2 times daily. Magnesium 250 MG TABS  Take by mouth daily Every other day             metoprolol succinate (TOPROL XL) 25 MG extended release tablet  TAKE 1 TABLET DAILY             Multiple Vitamin (MULTI-VITAMIN PO)  Take  by mouth daily. omeprazole (PRILOSEC) 20 MG delayed release capsule  TAKE 1 CAPSULE DAILY             Psyllium (METAMUCIL FIBER PO)  Take by mouth             rosuvastatin (CRESTOR) 10 MG tablet  TAKE 1 TABLET DAILY             tamsulosin (FLOMAX) 0.4 MG capsule  TAKE 1 CAPSULE DAILY                 Disposition:   Discharged to Home.

## 2019-08-02 NOTE — PROGRESS NOTES
109*   CO2 25 23   BUN 12 9   CREATININE 0.82 0.79   CALCIUM 9.3 8.2*     Recent Labs     08/01/19  1010   AST 25   ALT 21   BILITOT 0.8*   ALKPHOS 52     No results for input(s): INR in the last 72 hours. Recent Labs     08/01/19  1010   TROPONINI <0.010       Urinalysis:      Lab Results   Component Value Date    NITRU Negative 05/31/2013    WBCUA 3-5 05/31/2013    BACTERIA Moderate 05/31/2013    RBCUA None seen 05/31/2013    BLOODU Negative 05/31/2013    SPECGRAV 1.031 05/31/2013    GLUCOSEU Negative 05/31/2013       Radiology:  US CAROTID ARTERY BILATERAL   Final Result      MILD ATHEROSCLEROTIC PLAQUING OF THE CAROTID BULBS WITHOUT EVIDENCE OF A FLOW-LIMITING STENOSIS. MRA HEAD WO CONTRAST   Final Result   FINAL IMPRESSION:      NO ACUTE INTRACRANIAL PROCESS IDENTIFIED. SMALL OLD INFARCTS OF THE RIGHT CEREBELLAR HEMISPHERE AND LEFT OCCIPITAL LOBE, UNCHANGED. NEGATIVE HEAD MRA. MRI brain with and without contrast   Final Result   FINAL IMPRESSION:      NO ACUTE INTRACRANIAL PROCESS IDENTIFIED. SMALL OLD INFARCTS OF THE RIGHT CEREBELLAR HEMISPHERE AND LEFT OCCIPITAL LOBE, UNCHANGED. NEGATIVE HEAD MRA. XR CHEST STANDARD (2 VW)   Final Result   NO ACUTE ACTIVE CARDIOPULMONARY PROCESS      CT Head WO Contrast   Final Result      No acute intracranial process. All CT scans at this facility use dose modulation, iterative reconstruction, and/or weight based dosing when appropriate to reduce radiation dose to as low as reasonably achievable.               Assessment/Plan:    TIA  - on Plavix and statin therapy  - MRI was negative for acute findings  - discharge home if OK with neurology     MCI  - follows with neurology as outpatient    HTN  - continue home meds     Colon cancer s/p resection  - follows with GI    BPH  - continue home regimen        Electronically signed by Garfield Llanes MD on 8/2/2019 at 11:55 AM

## 2019-08-02 NOTE — PROGRESS NOTES
equal, round, and reactive to light. EOM are normal.   Neck: Neck supple. No JVD present. Cardiovascular: Normal rate and regular rhythm. Pulmonary/Chest: Effort normal and breath sounds normal. No respiratory distress. Abdominal: Soft. Bowel sounds are normal. He exhibits no distension. There is no tenderness. Musculoskeletal: He exhibits no edema. Neurological: He is alert and oriented to person, place, and time. See neuro exam   Skin: Skin is warm and dry. He is not diaphoretic. Psychiatric: He has a normal mood and affect. Nursing note and vitals reviewed.     Mental Status Exam:             Level of Alertness:   awake            Orientation:   person, place, time            Memory:   normal            Attention/Concentration:  normal            Language:  normal    Cranial Nerves         Cranial nerve III           Pupils:  equal, round, reactive to light      Cranial nerves III, IV, VI           Extraocular Movements: intact      Cranial nerve V           Facial sensation:  intact      Cranial nerve VII           Facial strength: intact      Cranial nerve VIII           Hearing:  intact      Cranial nerve IX           Palate:  intact      Cranial nerve XI         Shoulder shrug:  intact      Cranial nerve XII          Tongue movement:  normal    Motor:    Drift:  absent  Motor exam is symmetrical 5 out of 5 all extremities bilaterally  Tone:  normal  Abnormal Movements:  absent                      Medications:  Reviewed    Infusion Medications:   Scheduled Medications:    sodium chloride flush  10 mL Intravenous 2 times per day    enoxaparin  40 mg Subcutaneous Daily    clopidogrel  75 mg Oral Daily    sodium chloride flush  10 mL Intravenous BID    vitamin C  500 mg Oral Daily    vitamin D  2,000 Units Oral Daily    finasteride  5 mg Oral Daily    fish oil  2,000 mg Oral BID    magnesium oxide  200 mg Oral Daily    metoprolol succinate  25 mg Oral Daily    pantoprazole  40 mg Oral hydrocephalus, or abnormal enhancement. Mild generalized cerebral volume loss and mild supratentorial white matter changes appear unchanged. HEAD MRA FINDINGS:    There is no significant stenosis, branch occlusion, aneurysm, or developmental vascular variations of concern identified. 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 SMALL OLD INFARCTS OF THE RIGHT CEREBELLAR HEMISPHERE AND LEFT OCCIPITAL LOBE, UNCHANGED. NEGATIVE HEAD MRA. Results for orders placed during the hospital encounter of 04/20/18   MRI BRAIN WO CONTRAST    Narrative EXAMINATION:  MRI BRAIN WITHOUT IV CONTRAST    CLINICAL HISTORY:  COGNITIVE IMPAIRMENT WITH LOSS OF MEMORY AND TREMORS X1 YEAR, POSSIBLE PARKINSON'S DISEASE OR ALZHEIMER'S DISEASE, HISTORY OF COLON CANCER, POSSIBLE CVA    COMPARISON:  None available    TECHNIQUE:  Multisequence and multiplane MRI brain is obtained without IV gadolinium. FINDINGS:  There is a mild degree of cerebral atrophy which is appropriate for the patient's age. There are a few prominent \"perivascular spaces\" and a small septum pellucidum which are of no clinical significance. There are several punctate and patchy   white matter T2 hyperintense foci which are most likely a sequela of small vessel disease. Small focus of encephalomalacia in the right cerebellum is most likely a remote cerebellar infarction. There is no restricted diffusion or MRI evidence of an acute   or subacute cerebral infarction. There is no intracranial mass, hemorrhage, \"mass effect\" or midline shift. Paranasal sinuses and mastoids appear clear. Impression 1. MILD DEGREE OF CEREBRAL ATROPHY WHICH IS APPROPRIATE FOR THIS PATIENT'S AGE. 2. SEVERAL WHITE MATTER T2 HYPERINTENSE FOCI ARE LIKELY A SEQUELA OF SMALL VESSEL DISEASE.   3. SMALL FOCUS OF ENCEPHALOMALACIA IN RT identified. Maximum systolic velocity within the right internal and mid common carotid arteries are 65 and 113 cm/s, with an ICA/CCA ratio of approximately 0.6 , which indicates less than 50% by velocity criteria. Maximum systolic velocity within the left internal and mid common carotid arteries are 69 and 124 cm/s, with an ICA/CCA ratio of approximately 0.5, which indicates less than 50% by velocity criteria. Maximum velocities within the right and left external carotid arteries are 91 and 97 cm/sec respectively. There is antegrade flow in both vertebral arteries. Impression MILD ATHEROSCLEROTIC PLAQUING OF THE CAROTID BULBS WITHOUT EVIDENCE OF A FLOW-LIMITING STENOSIS. Echo No results found for this or any previous visit. Assessment/Plan:  cerebral TIA  Or complicated migraine (no known hist )  Mild cognitive impairment  Aphasia, resolved  MRI brain Old cerebellar CVA  Carotid <50% bilat  MRA head neg  DAPT, statin  Ok to DC from neuro standpoint  Follow up 4-6 weeks    Mika Meadows MD, Radha Hu, American Board of Psychiatry & Neurology  Board Certified in Vascular Neurology  Board Certified in Neuromuscular Medicine  Certified in Neurorehabilitation         Collaborating physicians: Dr Jany Meadows, Dr KERRIE Lala, Dr Tonny Crandall    Electronically signed by Thedora Bloch, APRN - CNP on 8/2/2019 at 3:24 PM

## 2019-08-02 NOTE — FLOWSHEET NOTE
Discharge instructions given to the patient and his wife. All questions answered. Patient left via wheelchair for discharge home with his wife.

## 2019-08-02 NOTE — PROGRESS NOTES
Walk-in shower  Bathroom Equipment: Grab bars in shower, Shower chair  Home Equipment: [employer]  ADL Assistance: Independent  Homemaking Assistance: (participates in homemaking tasks)  Ambulation Assistance: Independent  Transfer Assistance: Independent  Active : No    OBJECTIVE:     Orientation Status:  Orientation  Overall Orientation Status: Within Functional Limits    Observation:  Observation/Palpation  Posture: Good  Observation: Pt with small base of support noted when standing    Cognition Status:  Cognition  Cognition Comment: follows one step command consistently. Pt exhibits word finding difficulty and decreased memory. Pt requires increased processing time. Perception Status:  Perception  Overall Perceptual Status: WFL    Sensation Status:  Sensation  Overall Sensation Status: WFL    Vision and Hearing Status:  Vision  Vision: Impaired  Vision Exceptions: Wears glasses at all times  Hearing  Hearing: Within functional limits     ROM:   LUE AROM (degrees)  LUE AROM : WFL  Left Hand AROM (degrees)  Left Hand AROM: WFL  RUE AROM (degrees)  RUE AROM : WFL  Right Hand AROM (degrees)  Right Hand AROM: WFL    Strength:  LUE Strength  Gross LUE Strength: WFL  L Hand General: 4+/5  RUE Strength  Gross RUE Strength: WFL  R Hand General: 4+/5    Coordination, Tone, Quality of Movement:    Tone RUE  RUE Tone: Normotonic  Tone LUE  LUE Tone: Normotonic  Coordination  Movements Are Fluid And Coordinated: No  Coordination and Movement description: Decreased speed, Left UE, Right UE    Hand Dominance:  Hand Dominance  Hand Dominance: Right    ADL Status:  ADL  Feeding: Setup  Grooming: Setup  UE Bathing: Setup  LE Bathing: Setup  UE Dressing: Setup  LE Dressing: Setup  Toileting: Supervision          Functional Mobility:Supervision          Bed Mobility  Bed mobility  Supine to Sit: Supervision    Seated and Standing Balance:  Balance  Sitting Balance: Independent  Standing Balance: Supervision    Functional Endurance:  Activity Tolerance  Activity Tolerance: Patient Tolerated treatment well  Activity Tolerance: fair    D/C Recommendations:Home    Equipment Recommendations:      OT Follow Up:  OT D/C RECOMMENDATIONS  REQUIRES OT FOLLOW UP: No       Assessment/Discharge Disposition:     Prognosis: Good  Discharge Recommendations: Continue to assess pending progress  Decision Making: Low Complexity  History: 8 deficits  Exam: Pt exhibitng baseline functional performance  Assistance / Modification: supervision/set up    Six Click Score    How much help for putting on and taking off regular lower body clothing?: None  How much help for Bathing?: None  How much help for Toileting?: None  How much help for putting on and taking off regular upper body clothing?: None  How much help for taking care of personal grooming?: None  How much help for eating meals?: None  AM-[facility] Inpatient Daily Activity Raw Score: 24  AM-[facility] Inpatient ADL T-Scale Score : 57.54  ADL Inpatient CMS 0-100% Score: 0    Plan:  Plan  Times per week: N/A    Goals:   Patient will:      Patient Goal: Patient goals : To return to home with spouse.       Discussed and agreed upon: Yes Comments:     Therapy Time:   OT Individual Minutes  Time In: 0840  Time Out: 2491  Minutes: 15    Electronically signed by:    LB Miller  5/1/1193, 9:29 AM Electronically signed by LB Miller on 5/0/20 at 9:29 AM

## 2019-08-02 NOTE — CONSULTS
Plavix. PHYSICAL EXAMINATION:  GENERAL:  He is in no acute distress. General examination reveals no  skin lesions or skin marks. There is no pedal edema. There is no  cyanosis or clubbing. NECK:  Supple. He is somewhat shaky and some occasional word finding  difficulty. CARDIOVASCULAR SYSTEM:  S1 and S2 are normal.  No murmurs appreciated. No carotid bruits. RESPIRATORY SYSTEM:  Clear to auscultation. CNS EXAMINATION:  He is awake, alert, and oriented to self, place,  month, and year. Pupils are equal and reactive. Eye movements are  conjugate. Speech is normal.  Tongue is midline. Palate moves  symmetrically. EXTREMITIES:  Examination of extremities reveals no pronator drift. Fine finger movements are symmetrical.  Strength is 5/5. Reflexes are  2+. Gait and stance are normal.    LABORATORY EXAMINATION:  WBC count of 6.8, hemoglobin 17.0, hematocrit  48.3, and platelets 372,773. Sodium 143, potassium 4.0, BUN of 12, and  creatinine 0.83. MRI of the brain shows old cerebellar infarcts which are remote, no new  diffusion-weighted images are notable. His carotid ultrasounds are  normal.  There is no hemodynamic instability. MRA does not reveal  aneurysm. IMPRESSION:  Cerebellar TIA or a complicated migraine, this cannot be  ruled out. The patient has risk of cerebrovascular disease and  therefore we have started him on Plavix in combination with aspirin for  now. We will continue with his medication. He is still somewhat shaky  and having some word finding difficulty. An underlying inflammatory  response such as temporal arteritis must be ruled out. Recommended CRP  and sed rate. We will watch him overnight due to his symptoms which are ongoing. We  will follow him with you. Thank you, Dr. Dayanna Hawkins for asking us to assist in the care of the  patient.         Bernadette Judd MD    D: 08/01/2019 17:04:34       T: 08/01/2019 21:45:44     [email or web address]  Job#: [id number]     Doc#:

## 2019-08-02 NOTE — PROGRESS NOTES
Physical Therapy Med Surg Initial Assessment  Facility/Department: [facility] [place] [facility]  Room: O476/D099-94       NAME: Shirley Leroy  : 1945 (89 y.o.)  MRN: [id number]  CODE STATUS: Full Code    Date of Service: 2019    Patient Diagnosis(es): TIA (transient ischemic attack) [G45.9]   Chief Complaint   Patient presents with    Aphasia     Patient Active Problem List    Diagnosis Date Noted    TIA (transient ischemic attack) 2019    PETRA (obstructive sleep apnea)     Actinic keratoses     Inflamed seborrheic keratosis     Gastroesophageal reflux disease with esophagitis 11/15/2016    Lumbar radiculopathy 10/26/2016    Sinus bradycardia on ECG 2016    Disturbance of skin sensation 2015    Seborrheic keratosis 2014    History of colon cancer 2014    History of repair of hip joint 10/21/2013    History of hip replacement, total 2013    Degenerative joint disease of pelvic region 2013    Degenerative arthritis of lumbar spine 2013    Foot drop, left 2013    CAD (coronary artery disease)     Benign prostatic hyperplasia     Cholelithiasis     Hyperlipidemia 2012    Osteopenia 2012        Past Medical History:   Diagnosis Date    Abnormal cardiovascular stress test 2016    Equivocal ST-T wave changes;  Defect in the inferior wall consistent with prior infarction; LV EF 79%; TID ratio 1.1    Actinic keratoses     BPH (benign prostatic hypertrophy)     CAD (coronary artery disease)     Cancer (HCC)     COLON    Chest pressure 5/3/2016    Cholelithiasis     Hyperlipidemia     Inflamed seborrheic keratosis     Lumbar radiculopathy 10/26/2016    Osteoarthritis     Parathyroid tumor     Sinus bradycardia on ECG 2016    Done 16 with Dr. Skyler Scott     Past Surgical History:   Procedure Laterality Date    BACK SURGERY  1993    COLECTOMY      COLONOSCOPY  8/18/10,2012    DR Luisa Cushing    COLONOSCOPY

## 2019-08-03 ENCOUNTER — CARE COORDINATION (OUTPATIENT)
Dept: CASE MANAGEMENT | Age: 74
End: 2019-08-03

## 2019-08-03 NOTE — CARE COORDINATION
[facility] Transitions Initial Weekend Follow Up Call    Call within 2 business days of discharge: Yes    Patient: Gabriela Oates Patient : 1945   MRN: [id number]  Reason for Admission: -2019 [id number] Obs TIA  Discharge Date: 19 RARS: No data recorded  [facility]  PCP: Jian Aguilar MD    Last Discharge [facility]       Complaint Diagnosis Description Type Department Provider    19 Aphasia TIA (transient ischemic attack) ED to Hosp-Admission (Discharged) (ADMITTED) MARK Tripathi MD; LEE Oliveira. CTN left M to Kimi Shah, reason for call, my contact info, and appt reminders. TCM 19 9:15, Need 3 wk neurology FU w/ Dr Alma Rosa Read 19 11:30. START taking:  clopidogrel (PLAVIX)  Start taking on: 8/3/2019    Non-face-to-face services provided:  Care transitions has made intial call attempt for DC review.     Follow Up  Future Appointments   Date Time Provider Tamia [place]spana   2019  9:15 AM Giovana Bañuelos MD [facility] [place]   2019 11:30 AM Santosh Hickman MD  [address]   2019  8:30 AM SCHEDULE, LAB [facility] [place] PCP [facility] [place]   2019  4:15 PM Giovana Bañuelos MD [facility]   2020  9:30 AM Rubio Willams MD [address]   2020  9:00 AM Antonio Boyle  [facility], [address] [place] [address]

## 2019-08-04 ENCOUNTER — CARE COORDINATION (OUTPATIENT)
Dept: CASE MANAGEMENT | Age: 74
End: 2019-08-04

## 2019-08-11 ENCOUNTER — HOSPITAL ENCOUNTER (EMERGENCY)
Age: 74
Discharge: HOME | End: 2019-08-11
Payer: MEDICARE

## 2019-08-11 ENCOUNTER — APPOINTMENT (OUTPATIENT)
Dept: GENERAL RADIOLOGY | Age: 74
End: 2019-08-11
Payer: MEDICARE

## 2019-08-11 ENCOUNTER — APPOINTMENT (OUTPATIENT)
Dept: CT IMAGING | Age: 74
End: 2019-08-11
Payer: MEDICARE

## 2019-08-11 VITALS
SYSTOLIC BLOOD PRESSURE: 151 MMHG | DIASTOLIC BLOOD PRESSURE: 79 MMHG | HEART RATE: 61 BPM | RESPIRATION RATE: 18 BRPM | HEIGHT: 70 IN | WEIGHT: 180 LBS | TEMPERATURE: 98.4 F | OXYGEN SATURATION: 98 % | BODY MASS INDEX: 25.77 KG/M2

## 2019-08-11 DIAGNOSIS — R51.9 ACUTE NONINTRACTABLE HEADACHE, UNSPECIFIED HEADACHE TYPE: Primary | ICD-10-CM

## 2019-08-11 LAB
ABO/RH: NORMAL
ALBUMIN SERPL-MCNC: 4.6 G/DL (ref 3.5–4.6)
ALP BLD-CCNC: 50 U/L (ref 35–104)
ALT SERPL-CCNC: 18 U/L (ref 0–41)
ANION GAP SERPL CALCULATED.3IONS-SCNC: 10 MEQ/L (ref 9–15)
ANTIBODY SCREEN: NORMAL
APTT: 28 SEC (ref 24.4–36.8)
AST SERPL-CCNC: 18 U/L (ref 0–40)
BASOPHILS ABSOLUTE: 0 K/UL (ref 0–0.2)
BASOPHILS RELATIVE PERCENT: 0.7 %
BILIRUB SERPL-MCNC: 0.4 MG/DL (ref 0.2–0.7)
BILIRUBIN URINE: NEGATIVE
BLOOD, URINE: NEGATIVE
BUN BLDV-MCNC: 11 MG/DL (ref 8–23)
C-REACTIVE PROTEIN: 0.7 MG/L (ref 0–5)
CALCIUM SERPL-MCNC: 9.6 MG/DL (ref 8.5–9.9)
CHLORIDE BLD-SCNC: 101 MEQ/L (ref 95–107)
CHP ED QC CHECK: YES
CLARITY: CLEAR
CO2: 29 MEQ/L (ref 20–31)
COLOR: YELLOW
CREAT SERPL-MCNC: 0.86 MG/DL (ref 0.7–1.2)
EKG ATRIAL RATE: 56 BPM
EKG P AXIS: 67 DEGREES
EKG P-R INTERVAL: 126 MS
EKG Q-T INTERVAL: 438 MS
EKG QRS DURATION: 84 MS
EKG QTC CALCULATION (BAZETT): 422 MS
EKG R AXIS: 41 DEGREES
EKG T AXIS: 55 DEGREES
EKG VENTRICULAR RATE: 56 BPM
EOSINOPHILS ABSOLUTE: 0.1 K/UL (ref 0–0.7)
EOSINOPHILS RELATIVE PERCENT: 1.6 %
GFR AFRICAN AMERICAN: >60
GFR NON-AFRICAN AMERICAN: >60
GLOBULIN: 2.7 G/DL (ref 2.3–3.5)
GLUCOSE BLD-MCNC: 88 MG/DL (ref 70–99)
GLUCOSE BLD-MCNC: 93 MG/DL
GLUCOSE BLD-MCNC: 93 MG/DL (ref 60–115)
GLUCOSE URINE: NEGATIVE MG/DL
HCT VFR BLD CALC: 49 % (ref 42–52)
HEMOGLOBIN: 17 G/DL (ref 14–18)
INR BLD: 1
KETONES, URINE: NEGATIVE MG/DL
LEUKOCYTE ESTERASE, URINE: NEGATIVE
LYMPHOCYTES ABSOLUTE: 1.3 K/UL (ref 1–4.8)
LYMPHOCYTES RELATIVE PERCENT: 23 %
MAGNESIUM: 2.3 MG/DL (ref 1.7–2.4)
MCH RBC QN AUTO: 33.1 PG (ref 27–31.3)
MCHC RBC AUTO-ENTMCNC: 34.8 % (ref 33–37)
MCV RBC AUTO: 95.1 FL (ref 80–100)
MONOCYTES ABSOLUTE: 0.5 K/UL (ref 0.2–0.8)
MONOCYTES RELATIVE PERCENT: 9.1 %
NEUTROPHILS ABSOLUTE: 3.7 K/UL (ref 1.4–6.5)
NEUTROPHILS RELATIVE PERCENT: 65.6 %
NITRITE, URINE: NEGATIVE
PDW BLD-RTO: 13.3 % (ref 11.5–14.5)
PERFORMED ON: NORMAL
PH UA: 7 (ref 5–9)
PLATELET # BLD: 164 K/UL (ref 130–400)
POTASSIUM SERPL-SCNC: 4.3 MEQ/L (ref 3.4–4.9)
PRO-BNP: 78 PG/ML
PROTEIN UA: NEGATIVE MG/DL
PROTHROMBIN TIME: 13.2 SEC (ref 12.3–14.9)
RBC # BLD: 5.16 M/UL (ref 4.7–6.1)
SODIUM BLD-SCNC: 140 MEQ/L (ref 135–144)
SPECIFIC GRAVITY UA: 1 (ref 1–1.03)
TOTAL CK: 117 U/L (ref 0–190)
TOTAL PROTEIN: 7.3 G/DL (ref 6.3–8)
TROPONIN: <0.01 NG/ML (ref 0–0.01)
URINE REFLEX TO CULTURE: NORMAL
UROBILINOGEN, URINE: 0.2 E.U./DL
WBC # BLD: 5.7 K/UL (ref 4.8–10.8)

## 2019-08-11 PROCEDURE — 86850 RBC ANTIBODY SCREEN: CPT

## 2019-08-11 PROCEDURE — 36415 COLL VENOUS BLD VENIPUNCTURE: CPT

## 2019-08-11 PROCEDURE — 99285 EMERGENCY DEPT VISIT HI MDM: CPT

## 2019-08-11 PROCEDURE — 71045 X-RAY EXAM CHEST 1 VIEW: CPT

## 2019-08-11 PROCEDURE — 82550 ASSAY OF CK (CPK): CPT

## 2019-08-11 PROCEDURE — 96374 THER/PROPH/DIAG INJ IV PUSH: CPT

## 2019-08-11 PROCEDURE — 93005 ELECTROCARDIOGRAM TRACING: CPT | Performed by: NURSE PRACTITIONER

## 2019-08-11 PROCEDURE — 86900 BLOOD TYPING SEROLOGIC ABO: CPT

## 2019-08-11 PROCEDURE — 83735 ASSAY OF MAGNESIUM: CPT

## 2019-08-11 PROCEDURE — 86140 C-REACTIVE PROTEIN: CPT

## 2019-08-11 PROCEDURE — 80053 COMPREHEN METABOLIC PANEL: CPT

## 2019-08-11 PROCEDURE — 6370000000 HC RX 637 (ALT 250 FOR IP): Performed by: NURSE PRACTITIONER

## 2019-08-11 PROCEDURE — 86901 BLOOD TYPING SEROLOGIC RH(D): CPT

## 2019-08-11 PROCEDURE — 83880 ASSAY OF NATRIURETIC PEPTIDE: CPT

## 2019-08-11 PROCEDURE — 85610 PROTHROMBIN TIME: CPT

## 2019-08-11 PROCEDURE — 84484 ASSAY OF TROPONIN QUANT: CPT

## 2019-08-11 PROCEDURE — 85730 THROMBOPLASTIN TIME PARTIAL: CPT

## 2019-08-11 PROCEDURE — 6360000002 HC RX W HCPCS: Performed by: NURSE PRACTITIONER

## 2019-08-11 PROCEDURE — 81003 URINALYSIS AUTO W/O SCOPE: CPT

## 2019-08-11 PROCEDURE — 70450 CT HEAD/BRAIN W/O DYE: CPT

## 2019-08-11 PROCEDURE — 85025 COMPLETE CBC W/AUTO DIFF WBC: CPT

## 2019-08-11 RX ORDER — ONDANSETRON 2 MG/ML
4 INJECTION INTRAMUSCULAR; INTRAVENOUS ONCE
Status: COMPLETED | OUTPATIENT
Start: 2019-08-11 | End: 2019-08-11

## 2019-08-11 RX ORDER — ACETAMINOPHEN 325 MG/1
650 TABLET ORAL ONCE
Status: COMPLETED | OUTPATIENT
Start: 2019-08-11 | End: 2019-08-11

## 2019-08-11 RX ADMIN — ONDANSETRON 4 MG: 2 INJECTION INTRAMUSCULAR; INTRAVENOUS at 19:14

## 2019-08-11 RX ADMIN — ACETAMINOPHEN 650 MG: 325 TABLET ORAL at 21:03

## 2019-08-11 ASSESSMENT — ENCOUNTER SYMPTOMS
COUGH: 0
TROUBLE SWALLOWING: 0
VOMITING: 0
BACK PAIN: 0
ABDOMINAL PAIN: 0
DIARRHEA: 0
SHORTNESS OF BREATH: 0
VOICE CHANGE: 0
NAUSEA: 1
CONSTIPATION: 0
COLOR CHANGE: 0
SORE THROAT: 0

## 2019-08-11 ASSESSMENT — PAIN DESCRIPTION - PAIN TYPE: TYPE: ACUTE PAIN

## 2019-08-11 ASSESSMENT — PAIN SCALES - GENERAL: PAINLEVEL_OUTOF10: 8

## 2019-08-11 ASSESSMENT — PAIN DESCRIPTION - LOCATION: LOCATION: HEAD

## 2019-08-11 NOTE — ED NOTES
OT [address] [facility] Steffen Alatorre RN  08/11/19 [id number]

## 2019-08-12 NOTE — ED PROVIDER NOTES
capsule TAKE 1 CAPSULE DAILY, Disp-90 capsule, R-3Normal      tamsulosin (FLOMAX) 0.4 MG capsule TAKE 1 CAPSULE DAILY, Disp-90 capsule, R-3Normal      finasteride (PROSCAR) 5 MG tablet TAKE 1 TABLET DAILY, Disp-90 tablet, R-3Normal      rosuvastatin (CRESTOR) 10 MG tablet TAKE 1 TABLET DAILY, Disp-90 tablet, R-3Normal      metoprolol succinate (TOPROL XL) 25 MG extended release tablet TAKE 1 TABLET DAILY, Disp-90 tablet, R-3Normal      Psyllium (METAMUCIL FIBER PO) Take by mouthHistorical Med      Cholecalciferol (VITAMIN D3) 1000 UNITS TABS Take 1,900 Units by mouth daily Historical Med      calcium carbonate 600 MG TABS tablet Take 1 tablet by mouth daily Historical Med      fish oil-omega-3 fatty acids 1000 MG capsule Take 2 g by mouth 2 times daily. Multiple Vitamin (MULTI-VITAMIN PO) Take  by mouth daily. Magnesium 250 MG TABS Take by mouth daily Every other dayHistorical Med      Ascorbic Acid (VITAMIN C) 500 MG tablet Take 500 mg by mouth daily. aspirin 81 MG tablet Take 81 mg by mouth daily.                ALLERGIES     Amoxicillin    FAMILY HISTORY       Family History   Problem Relation Age of Onset    Cancer Mother         OVARIAN    Heart Disease Father           SOCIAL HISTORY       Social History     Socioeconomic History    Marital status:      Spouse name: None    Number of children: None    Years of education: None    Highest education level: None   Occupational History    None   Social Needs    Financial resource strain: None    Food insecurity:     Worry: None     Inability: None    Transportation needs:     Medical: None     Non-medical: None   Tobacco Use    Smoking status: Never Smoker    Smokeless tobacco: Never Used   Substance and Sexual Activity    Alcohol use: No    Drug use: No    Sexual activity: None   Lifestyle    Physical activity:     Days per week: None     Minutes per session: None    Stress: None   Relationships    Social connections:

## 2019-08-13 PROCEDURE — 93010 ELECTROCARDIOGRAM REPORT: CPT | Performed by: INTERNAL MEDICINE

## 2019-09-12 ENCOUNTER — OFFICE VISIT (OUTPATIENT)
Dept: PULMONOLOGY | Age: 74
End: 2019-09-12
Payer: MEDICARE

## 2019-09-12 VITALS
RESPIRATION RATE: 16 BRPM | BODY MASS INDEX: 26.84 KG/M2 | HEIGHT: 69 IN | HEART RATE: 51 BPM | OXYGEN SATURATION: 98 % | DIASTOLIC BLOOD PRESSURE: 70 MMHG | SYSTOLIC BLOOD PRESSURE: 112 MMHG | WEIGHT: 181.2 LBS | TEMPERATURE: 97.8 F

## 2019-09-12 DIAGNOSIS — G47.33 OBSTRUCTIVE SLEEP APNEA: Primary | ICD-10-CM

## 2019-09-12 PROCEDURE — 4040F PNEUMOC VAC/ADMIN/RCVD: CPT | Performed by: INTERNAL MEDICINE

## 2019-09-12 PROCEDURE — 1036F TOBACCO NON-USER: CPT | Performed by: INTERNAL MEDICINE

## 2019-09-12 PROCEDURE — 99203 OFFICE O/P NEW LOW 30 MIN: CPT | Performed by: INTERNAL MEDICINE

## 2019-09-12 PROCEDURE — 3017F COLORECTAL CA SCREEN DOC REV: CPT | Performed by: INTERNAL MEDICINE

## 2019-09-12 PROCEDURE — 1123F ACP DISCUSS/DSCN MKR DOCD: CPT | Performed by: INTERNAL MEDICINE

## 2019-09-12 PROCEDURE — G8598 ASA/ANTIPLAT THER USED: HCPCS | Performed by: INTERNAL MEDICINE

## 2019-09-12 PROCEDURE — G8417 CALC BMI ABV UP PARAM F/U: HCPCS | Performed by: INTERNAL MEDICINE

## 2019-09-12 PROCEDURE — G8427 DOCREV CUR MEDS BY ELIG CLIN: HCPCS | Performed by: INTERNAL MEDICINE

## 2019-09-12 RX ORDER — SUMATRIPTAN 100 MG/1
TABLET, FILM COATED ORAL
Status: ON HOLD | COMMUNITY
Start: 2019-08-12 | End: 2021-01-01 | Stop reason: ALTCHOICE

## 2019-09-12 ASSESSMENT — ENCOUNTER SYMPTOMS
RHINORRHEA: 0
CHEST TIGHTNESS: 0
COUGH: 0
ABDOMINAL PAIN: 0
NAUSEA: 0
SHORTNESS OF BREATH: 0
SINUS PRESSURE: 0
VOMITING: 0
WHEEZING: 0
DIARRHEA: 0
SORE THROAT: 0

## 2019-10-02 ENCOUNTER — TELEPHONE (OUTPATIENT)
Dept: PULMONOLOGY | Age: 74
End: 2019-10-02

## 2019-10-04 ENCOUNTER — OFFICE VISIT (OUTPATIENT)
Dept: FAMILY MEDICINE CLINIC | Age: 74
End: 2019-10-04
Payer: MEDICARE

## 2019-10-04 VITALS
BODY MASS INDEX: 25.77 KG/M2 | SYSTOLIC BLOOD PRESSURE: 128 MMHG | HEIGHT: 70 IN | DIASTOLIC BLOOD PRESSURE: 80 MMHG | WEIGHT: 180 LBS | HEART RATE: 60 BPM | RESPIRATION RATE: 18 BRPM | OXYGEN SATURATION: 98 % | TEMPERATURE: 97.7 F

## 2019-10-04 DIAGNOSIS — I25.10 CORONARY ARTERY DISEASE INVOLVING NATIVE HEART WITHOUT ANGINA PECTORIS, UNSPECIFIED VESSEL OR LESION TYPE: ICD-10-CM

## 2019-10-04 DIAGNOSIS — E78.2 MIXED HYPERLIPIDEMIA: ICD-10-CM

## 2019-10-04 DIAGNOSIS — G45.9 TIA (TRANSIENT ISCHEMIC ATTACK): Primary | ICD-10-CM

## 2019-10-04 PROCEDURE — G8427 DOCREV CUR MEDS BY ELIG CLIN: HCPCS | Performed by: FAMILY MEDICINE

## 2019-10-04 PROCEDURE — G8484 FLU IMMUNIZE NO ADMIN: HCPCS | Performed by: FAMILY MEDICINE

## 2019-10-04 PROCEDURE — 99213 OFFICE O/P EST LOW 20 MIN: CPT | Performed by: FAMILY MEDICINE

## 2019-10-04 PROCEDURE — 1123F ACP DISCUSS/DSCN MKR DOCD: CPT | Performed by: FAMILY MEDICINE

## 2019-10-04 PROCEDURE — 1036F TOBACCO NON-USER: CPT | Performed by: FAMILY MEDICINE

## 2019-10-04 PROCEDURE — 4040F PNEUMOC VAC/ADMIN/RCVD: CPT | Performed by: FAMILY MEDICINE

## 2019-10-04 PROCEDURE — G8417 CALC BMI ABV UP PARAM F/U: HCPCS | Performed by: FAMILY MEDICINE

## 2019-10-04 PROCEDURE — 3017F COLORECTAL CA SCREEN DOC REV: CPT | Performed by: FAMILY MEDICINE

## 2019-10-04 PROCEDURE — G8598 ASA/ANTIPLAT THER USED: HCPCS | Performed by: FAMILY MEDICINE

## 2019-10-04 ASSESSMENT — ENCOUNTER SYMPTOMS
ALLERGIC/IMMUNOLOGIC NEGATIVE: 1
EYES NEGATIVE: 1
GASTROINTESTINAL NEGATIVE: 1
SHORTNESS OF BREATH: 0
RESPIRATORY NEGATIVE: 1

## 2019-11-18 ENCOUNTER — TELEPHONE (OUTPATIENT)
Dept: UROLOGY | Age: 74
End: 2019-11-18

## 2019-11-18 ENCOUNTER — TELEPHONE (OUTPATIENT)
Dept: FAMILY MEDICINE CLINIC | Age: 74
End: 2019-11-18

## 2019-11-18 DIAGNOSIS — Z85.46 H/O PROSTATE CANCER: Primary | ICD-10-CM

## 2019-11-18 DIAGNOSIS — Z87.898 HISTORY OF ELEVATED PSA: ICD-10-CM

## 2019-11-26 ENCOUNTER — OFFICE VISIT (OUTPATIENT)
Dept: FAMILY MEDICINE CLINIC | Age: 74
End: 2019-11-26
Payer: MEDICARE

## 2019-11-26 VITALS
DIASTOLIC BLOOD PRESSURE: 76 MMHG | HEART RATE: 58 BPM | WEIGHT: 182 LBS | RESPIRATION RATE: 18 BRPM | SYSTOLIC BLOOD PRESSURE: 130 MMHG | BODY MASS INDEX: 26.05 KG/M2 | OXYGEN SATURATION: 98 % | TEMPERATURE: 97.8 F | HEIGHT: 70 IN

## 2019-11-26 DIAGNOSIS — E78.5 HYPERLIPIDEMIA, UNSPECIFIED HYPERLIPIDEMIA TYPE: ICD-10-CM

## 2019-11-26 DIAGNOSIS — I10 ESSENTIAL HYPERTENSION: ICD-10-CM

## 2019-11-26 DIAGNOSIS — G45.9 TIA (TRANSIENT ISCHEMIC ATTACK): Primary | ICD-10-CM

## 2019-11-26 DIAGNOSIS — I25.10 CORONARY ARTERY DISEASE INVOLVING NATIVE HEART WITHOUT ANGINA PECTORIS, UNSPECIFIED VESSEL OR LESION TYPE: ICD-10-CM

## 2019-11-26 PROCEDURE — 4040F PNEUMOC VAC/ADMIN/RCVD: CPT | Performed by: FAMILY MEDICINE

## 2019-11-26 PROCEDURE — G8417 CALC BMI ABV UP PARAM F/U: HCPCS | Performed by: FAMILY MEDICINE

## 2019-11-26 PROCEDURE — 1111F DSCHRG MED/CURRENT MED MERGE: CPT | Performed by: FAMILY MEDICINE

## 2019-11-26 PROCEDURE — G8427 DOCREV CUR MEDS BY ELIG CLIN: HCPCS | Performed by: FAMILY MEDICINE

## 2019-11-26 PROCEDURE — 99213 OFFICE O/P EST LOW 20 MIN: CPT | Performed by: FAMILY MEDICINE

## 2019-11-26 PROCEDURE — 1036F TOBACCO NON-USER: CPT | Performed by: FAMILY MEDICINE

## 2019-11-26 PROCEDURE — 3017F COLORECTAL CA SCREEN DOC REV: CPT | Performed by: FAMILY MEDICINE

## 2019-11-26 PROCEDURE — G0008 ADMIN INFLUENZA VIRUS VAC: HCPCS | Performed by: FAMILY MEDICINE

## 2019-11-26 PROCEDURE — 1123F ACP DISCUSS/DSCN MKR DOCD: CPT | Performed by: FAMILY MEDICINE

## 2019-11-26 PROCEDURE — 90653 IIV ADJUVANT VACCINE IM: CPT | Performed by: FAMILY MEDICINE

## 2019-11-26 PROCEDURE — G8482 FLU IMMUNIZE ORDER/ADMIN: HCPCS | Performed by: FAMILY MEDICINE

## 2019-11-26 PROCEDURE — G8598 ASA/ANTIPLAT THER USED: HCPCS | Performed by: FAMILY MEDICINE

## 2019-11-26 ASSESSMENT — ENCOUNTER SYMPTOMS
SHORTNESS OF BREATH: 0
RESPIRATORY NEGATIVE: 1
ALLERGIC/IMMUNOLOGIC NEGATIVE: 1
EYES NEGATIVE: 1
GASTROINTESTINAL NEGATIVE: 1

## 2019-12-16 ENCOUNTER — OFFICE VISIT (OUTPATIENT)
Dept: NEUROLOGY | Age: 74
End: 2019-12-16
Payer: MEDICARE

## 2019-12-16 VITALS
DIASTOLIC BLOOD PRESSURE: 77 MMHG | HEIGHT: 70 IN | BODY MASS INDEX: 26.05 KG/M2 | WEIGHT: 182 LBS | SYSTOLIC BLOOD PRESSURE: 143 MMHG | HEART RATE: 57 BPM

## 2019-12-16 DIAGNOSIS — R55 SYNCOPE AND COLLAPSE: ICD-10-CM

## 2019-12-16 DIAGNOSIS — G31.84 MILD COGNITIVE IMPAIRMENT: ICD-10-CM

## 2019-12-16 DIAGNOSIS — R41.2: ICD-10-CM

## 2019-12-16 DIAGNOSIS — G20 PARKINSON DISEASE (HCC): ICD-10-CM

## 2019-12-16 PROCEDURE — 99214 OFFICE O/P EST MOD 30 MIN: CPT | Performed by: PSYCHIATRY & NEUROLOGY

## 2019-12-16 RX ORDER — MEMANTINE HYDROCHLORIDE 5 MG/1
5 TABLET ORAL 2 TIMES DAILY
Qty: 180 TABLET | Refills: 1 | Status: SHIPPED | OUTPATIENT
Start: 2019-12-16 | End: 2019-12-16 | Stop reason: SDUPTHER

## 2019-12-16 RX ORDER — MEMANTINE HYDROCHLORIDE 5 MG/1
5 TABLET ORAL 2 TIMES DAILY
Qty: 20 TABLET | Refills: 0 | Status: SHIPPED | OUTPATIENT
Start: 2019-12-16 | End: 2020-03-10

## 2019-12-16 RX ORDER — MEMANTINE HYDROCHLORIDE 5 MG/1
5 TABLET ORAL 2 TIMES DAILY
Qty: 180 TABLET | Refills: 1 | Status: ON HOLD | OUTPATIENT
Start: 2019-12-16 | End: 2020-05-01

## 2019-12-16 ASSESSMENT — ENCOUNTER SYMPTOMS
SHORTNESS OF BREATH: 0
TROUBLE SWALLOWING: 0
CHOKING: 0
VOMITING: 0
NAUSEA: 0
BACK PAIN: 0
PHOTOPHOBIA: 0

## 2020-01-01 ENCOUNTER — OFFICE VISIT (OUTPATIENT)
Dept: NEUROLOGY | Age: 75
End: 2020-01-01
Payer: MEDICARE

## 2020-01-01 ENCOUNTER — NURSE ONLY (OUTPATIENT)
Dept: FAMILY MEDICINE CLINIC | Age: 75
End: 2020-01-01
Payer: MEDICARE

## 2020-01-01 ENCOUNTER — APPOINTMENT (OUTPATIENT)
Dept: CT IMAGING | Age: 75
End: 2020-01-01
Payer: MEDICARE

## 2020-01-01 ENCOUNTER — APPOINTMENT (OUTPATIENT)
Dept: GENERAL RADIOLOGY | Age: 75
End: 2020-01-01
Payer: MEDICARE

## 2020-01-01 ENCOUNTER — OFFICE VISIT (OUTPATIENT)
Dept: FAMILY MEDICINE CLINIC | Age: 75
End: 2020-01-01
Payer: MEDICARE

## 2020-01-01 ENCOUNTER — OFFICE VISIT (OUTPATIENT)
Dept: CARDIOLOGY CLINIC | Age: 75
End: 2020-01-01
Payer: MEDICARE

## 2020-01-01 ENCOUNTER — HOSPITAL ENCOUNTER (EMERGENCY)
Age: 75
Discharge: HOME | End: 2020-11-03
Attending: EMERGENCY MEDICINE
Payer: MEDICARE

## 2020-01-01 VITALS
OXYGEN SATURATION: 98 % | HEART RATE: 65 BPM | WEIGHT: 178 LBS | HEIGHT: 70 IN | DIASTOLIC BLOOD PRESSURE: 78 MMHG | BODY MASS INDEX: 25.48 KG/M2 | SYSTOLIC BLOOD PRESSURE: 128 MMHG | TEMPERATURE: 97.8 F

## 2020-01-01 VITALS
RESPIRATION RATE: 14 BRPM | WEIGHT: 175 LBS | SYSTOLIC BLOOD PRESSURE: 120 MMHG | BODY MASS INDEX: 24.5 KG/M2 | TEMPERATURE: 97.5 F | OXYGEN SATURATION: 99 % | HEIGHT: 71 IN | DIASTOLIC BLOOD PRESSURE: 62 MMHG | HEART RATE: 63 BPM

## 2020-01-01 VITALS
HEART RATE: 70 BPM | HEIGHT: 71 IN | BODY MASS INDEX: 24.78 KG/M2 | DIASTOLIC BLOOD PRESSURE: 72 MMHG | TEMPERATURE: 97 F | WEIGHT: 177 LBS | OXYGEN SATURATION: 98 % | SYSTOLIC BLOOD PRESSURE: 120 MMHG

## 2020-01-01 VITALS
HEIGHT: 70 IN | BODY MASS INDEX: 25.77 KG/M2 | OXYGEN SATURATION: 99 % | HEART RATE: 62 BPM | DIASTOLIC BLOOD PRESSURE: 82 MMHG | SYSTOLIC BLOOD PRESSURE: 124 MMHG | RESPIRATION RATE: 22 BRPM | WEIGHT: 180 LBS

## 2020-01-01 VITALS
WEIGHT: 180 LBS | OXYGEN SATURATION: 98 % | HEIGHT: 70 IN | BODY MASS INDEX: 25.77 KG/M2 | TEMPERATURE: 98 F | DIASTOLIC BLOOD PRESSURE: 74 MMHG | HEART RATE: 57 BPM | RESPIRATION RATE: 18 BRPM | SYSTOLIC BLOOD PRESSURE: 127 MMHG

## 2020-01-01 VITALS
SYSTOLIC BLOOD PRESSURE: 134 MMHG | DIASTOLIC BLOOD PRESSURE: 73 MMHG | BODY MASS INDEX: 25.24 KG/M2 | HEART RATE: 66 BPM | WEIGHT: 175.9 LBS

## 2020-01-01 LAB
ALBUMIN SERPL-MCNC: 4.5 G/DL (ref 3.5–4.6)
ALP BLD-CCNC: 45 U/L (ref 35–104)
ALT SERPL-CCNC: 16 U/L (ref 0–41)
AMPHETAMINE SCREEN, URINE: NORMAL
ANION GAP SERPL CALCULATED.3IONS-SCNC: 11 MEQ/L (ref 9–15)
AST SERPL-CCNC: 44 U/L (ref 0–40)
BARBITURATE SCREEN URINE: NORMAL
BASOPHILS ABSOLUTE: 0 K/UL (ref 0–0.2)
BASOPHILS RELATIVE PERCENT: 0.9 %
BENZODIAZEPINE SCREEN, URINE: NORMAL
BILIRUB SERPL-MCNC: 0.3 MG/DL (ref 0.2–0.7)
BILIRUBIN URINE: NEGATIVE
BLOOD, URINE: NEGATIVE
BUN BLDV-MCNC: 14 MG/DL (ref 8–23)
CALCIUM SERPL-MCNC: 8.9 MG/DL (ref 8.5–9.9)
CANNABINOID SCREEN URINE: NORMAL
CHLORIDE BLD-SCNC: 104 MEQ/L (ref 95–107)
CLARITY: CLEAR
CO2: 25 MEQ/L (ref 20–31)
COCAINE METABOLITE SCREEN URINE: NORMAL
COLOR: ABNORMAL
CREAT SERPL-MCNC: 0.92 MG/DL (ref 0.7–1.2)
EKG ATRIAL RATE: 66 BPM
EKG P AXIS: 71 DEGREES
EKG P-R INTERVAL: 120 MS
EKG Q-T INTERVAL: 418 MS
EKG QRS DURATION: 76 MS
EKG QTC CALCULATION (BAZETT): 438 MS
EKG R AXIS: 60 DEGREES
EKG T AXIS: 65 DEGREES
EKG VENTRICULAR RATE: 66 BPM
EOSINOPHILS ABSOLUTE: 0.1 K/UL (ref 0–0.7)
EOSINOPHILS RELATIVE PERCENT: 1.2 %
GFR AFRICAN AMERICAN: >60
GFR NON-AFRICAN AMERICAN: >60
GLOBULIN: 2.9 G/DL (ref 2.3–3.5)
GLUCOSE BLD-MCNC: 89 MG/DL (ref 70–99)
GLUCOSE URINE: NEGATIVE MG/DL
HCT VFR BLD CALC: 41.6 % (ref 42–52)
HEMOGLOBIN: 13.5 G/DL (ref 14–18)
KETONES, URINE: ABNORMAL MG/DL
LEUKOCYTE ESTERASE, URINE: NEGATIVE
LYMPHOCYTES ABSOLUTE: 1.1 K/UL (ref 1–4.8)
LYMPHOCYTES RELATIVE PERCENT: 23.5 %
Lab: NORMAL
MAGNESIUM: 2.3 MG/DL (ref 1.7–2.4)
MCH RBC QN AUTO: 26.8 PG (ref 27–31.3)
MCHC RBC AUTO-ENTMCNC: 32.3 % (ref 33–37)
MCV RBC AUTO: 82.9 FL (ref 80–100)
METHADONE SCREEN, URINE: NORMAL
MONOCYTES ABSOLUTE: 0.6 K/UL (ref 0.2–0.8)
MONOCYTES RELATIVE PERCENT: 11.8 %
NEUTROPHILS ABSOLUTE: 3.1 K/UL (ref 1.4–6.5)
NEUTROPHILS RELATIVE PERCENT: 62.6 %
NITRITE, URINE: NEGATIVE
OPIATE SCREEN URINE: NORMAL
OXYCODONE URINE: NORMAL
PDW BLD-RTO: 15.7 % (ref 11.5–14.5)
PH UA: 5 (ref 5–9)
PHENCYCLIDINE SCREEN URINE: NORMAL
PLATELET # BLD: 180 K/UL (ref 130–400)
POTASSIUM SERPL-SCNC: 4.8 MEQ/L (ref 3.4–4.9)
PROPOXYPHENE SCREEN: NORMAL
PROTEIN UA: NEGATIVE MG/DL
RBC # BLD: 5.02 M/UL (ref 4.7–6.1)
SODIUM BLD-SCNC: 140 MEQ/L (ref 135–144)
SPECIFIC GRAVITY UA: 1.02 (ref 1–1.03)
TOTAL PROTEIN: 7.4 G/DL (ref 6.3–8)
TROPONIN: <0.01 NG/ML (ref 0–0.01)
URINE REFLEX TO CULTURE: ABNORMAL
UROBILINOGEN, URINE: 0.2 E.U./DL
WBC # BLD: 4.9 K/UL (ref 4.8–10.8)

## 2020-01-01 PROCEDURE — 99214 OFFICE O/P EST MOD 30 MIN: CPT | Performed by: INTERNAL MEDICINE

## 2020-01-01 PROCEDURE — 36415 COLL VENOUS BLD VENIPUNCTURE: CPT

## 2020-01-01 PROCEDURE — 4040F PNEUMOC VAC/ADMIN/RCVD: CPT | Performed by: INTERNAL MEDICINE

## 2020-01-01 PROCEDURE — 1036F TOBACCO NON-USER: CPT | Performed by: PSYCHIATRY & NEUROLOGY

## 2020-01-01 PROCEDURE — 1123F ACP DISCUSS/DSCN MKR DOCD: CPT | Performed by: FAMILY MEDICINE

## 2020-01-01 PROCEDURE — 70450 CT HEAD/BRAIN W/O DYE: CPT

## 2020-01-01 PROCEDURE — 1123F ACP DISCUSS/DSCN MKR DOCD: CPT | Performed by: INTERNAL MEDICINE

## 2020-01-01 PROCEDURE — 93005 ELECTROCARDIOGRAM TRACING: CPT | Performed by: EMERGENCY MEDICINE

## 2020-01-01 PROCEDURE — 99213 OFFICE O/P EST LOW 20 MIN: CPT | Performed by: FAMILY MEDICINE

## 2020-01-01 PROCEDURE — 4040F PNEUMOC VAC/ADMIN/RCVD: CPT | Performed by: FAMILY MEDICINE

## 2020-01-01 PROCEDURE — 80307 DRUG TEST PRSMV CHEM ANLYZR: CPT

## 2020-01-01 PROCEDURE — 93010 ELECTROCARDIOGRAM REPORT: CPT | Performed by: INTERNAL MEDICINE

## 2020-01-01 PROCEDURE — 71045 X-RAY EXAM CHEST 1 VIEW: CPT

## 2020-01-01 PROCEDURE — 99214 OFFICE O/P EST MOD 30 MIN: CPT | Performed by: PSYCHIATRY & NEUROLOGY

## 2020-01-01 PROCEDURE — G8420 CALC BMI NORM PARAMETERS: HCPCS | Performed by: INTERNAL MEDICINE

## 2020-01-01 PROCEDURE — 83735 ASSAY OF MAGNESIUM: CPT

## 2020-01-01 PROCEDURE — G8417 CALC BMI ABV UP PARAM F/U: HCPCS | Performed by: FAMILY MEDICINE

## 2020-01-01 PROCEDURE — 90694 VACC AIIV4 NO PRSRV 0.5ML IM: CPT | Performed by: FAMILY MEDICINE

## 2020-01-01 PROCEDURE — G8484 FLU IMMUNIZE NO ADMIN: HCPCS | Performed by: FAMILY MEDICINE

## 2020-01-01 PROCEDURE — 1036F TOBACCO NON-USER: CPT | Performed by: INTERNAL MEDICINE

## 2020-01-01 PROCEDURE — 1036F TOBACCO NON-USER: CPT | Performed by: FAMILY MEDICINE

## 2020-01-01 PROCEDURE — 99213 OFFICE O/P EST LOW 20 MIN: CPT | Performed by: INTERNAL MEDICINE

## 2020-01-01 PROCEDURE — 3017F COLORECTAL CA SCREEN DOC REV: CPT | Performed by: PSYCHIATRY & NEUROLOGY

## 2020-01-01 PROCEDURE — 3017F COLORECTAL CA SCREEN DOC REV: CPT | Performed by: INTERNAL MEDICINE

## 2020-01-01 PROCEDURE — 81003 URINALYSIS AUTO W/O SCOPE: CPT

## 2020-01-01 PROCEDURE — 84484 ASSAY OF TROPONIN QUANT: CPT

## 2020-01-01 PROCEDURE — 85025 COMPLETE CBC W/AUTO DIFF WBC: CPT

## 2020-01-01 PROCEDURE — G8417 CALC BMI ABV UP PARAM F/U: HCPCS | Performed by: PSYCHIATRY & NEUROLOGY

## 2020-01-01 PROCEDURE — 80053 COMPREHEN METABOLIC PANEL: CPT

## 2020-01-01 PROCEDURE — G8427 DOCREV CUR MEDS BY ELIG CLIN: HCPCS | Performed by: INTERNAL MEDICINE

## 2020-01-01 PROCEDURE — G0008 ADMIN INFLUENZA VIRUS VAC: HCPCS | Performed by: FAMILY MEDICINE

## 2020-01-01 PROCEDURE — G8427 DOCREV CUR MEDS BY ELIG CLIN: HCPCS | Performed by: FAMILY MEDICINE

## 2020-01-01 PROCEDURE — 4040F PNEUMOC VAC/ADMIN/RCVD: CPT | Performed by: PSYCHIATRY & NEUROLOGY

## 2020-01-01 PROCEDURE — 3017F COLORECTAL CA SCREEN DOC REV: CPT | Performed by: FAMILY MEDICINE

## 2020-01-01 PROCEDURE — 99284 EMERGENCY DEPT VISIT MOD MDM: CPT

## 2020-01-01 PROCEDURE — G8427 DOCREV CUR MEDS BY ELIG CLIN: HCPCS | Performed by: PSYCHIATRY & NEUROLOGY

## 2020-01-01 PROCEDURE — 1123F ACP DISCUSS/DSCN MKR DOCD: CPT | Performed by: PSYCHIATRY & NEUROLOGY

## 2020-01-01 PROCEDURE — G8417 CALC BMI ABV UP PARAM F/U: HCPCS | Performed by: INTERNAL MEDICINE

## 2020-01-01 PROCEDURE — G8484 FLU IMMUNIZE NO ADMIN: HCPCS | Performed by: INTERNAL MEDICINE

## 2020-01-01 RX ORDER — MEMANTINE HYDROCHLORIDE 10 MG/1
10 TABLET ORAL 2 TIMES DAILY
Qty: 180 TABLET | Refills: 3 | Status: ON HOLD | OUTPATIENT
Start: 2020-01-01 | End: 2021-01-01

## 2020-01-01 RX ORDER — DIPYRIDAMOLE 75 MG
75 TABLET ORAL 3 TIMES DAILY
Qty: 270 TABLET | Refills: 3 | Status: SHIPPED | OUTPATIENT
Start: 2020-01-01 | End: 2021-01-01

## 2020-01-01 ASSESSMENT — ENCOUNTER SYMPTOMS
COLOR CHANGE: 0
ABDOMINAL DISTENTION: 0
BLOOD IN STOOL: 0
NAUSEA: 0
RESPIRATORY NEGATIVE: 1
TROUBLE SWALLOWING: 0
RESPIRATORY NEGATIVE: 1
EYES NEGATIVE: 1
APNEA: 0
GASTROINTESTINAL NEGATIVE: 1
COLOR CHANGE: 0
SHORTNESS OF BREATH: 0
EYES NEGATIVE: 1
VOMITING: 0
ALLERGIC/IMMUNOLOGIC NEGATIVE: 1
SHORTNESS OF BREATH: 0
SHORTNESS OF BREATH: 0
APNEA: 0
BACK PAIN: 0
VOMITING: 0
DIARRHEA: 0
SHORTNESS OF BREATH: 0
NAUSEA: 0
CHEST TIGHTNESS: 0
CHEST TIGHTNESS: 0
COLOR CHANGE: 0
SHORTNESS OF BREATH: 0
VOMITING: 0
PHOTOPHOBIA: 0
GASTROINTESTINAL NEGATIVE: 1
DIARRHEA: 0
NAUSEA: 0
ALLERGIC/IMMUNOLOGIC NEGATIVE: 1
CHOKING: 0
BLOOD IN STOOL: 0

## 2020-01-09 ENCOUNTER — OFFICE VISIT (OUTPATIENT)
Dept: FAMILY MEDICINE CLINIC | Age: 75
End: 2020-01-09
Payer: MEDICARE

## 2020-01-09 VITALS
DIASTOLIC BLOOD PRESSURE: 62 MMHG | SYSTOLIC BLOOD PRESSURE: 108 MMHG | WEIGHT: 181.2 LBS | HEIGHT: 70 IN | TEMPERATURE: 97 F | OXYGEN SATURATION: 97 % | RESPIRATION RATE: 16 BRPM | HEART RATE: 71 BPM | BODY MASS INDEX: 25.94 KG/M2

## 2020-01-09 PROCEDURE — 4040F PNEUMOC VAC/ADMIN/RCVD: CPT | Performed by: PHYSICIAN ASSISTANT

## 2020-01-09 PROCEDURE — G8427 DOCREV CUR MEDS BY ELIG CLIN: HCPCS | Performed by: PHYSICIAN ASSISTANT

## 2020-01-09 PROCEDURE — 1123F ACP DISCUSS/DSCN MKR DOCD: CPT | Performed by: PHYSICIAN ASSISTANT

## 2020-01-09 PROCEDURE — G8482 FLU IMMUNIZE ORDER/ADMIN: HCPCS | Performed by: PHYSICIAN ASSISTANT

## 2020-01-09 PROCEDURE — 3017F COLORECTAL CA SCREEN DOC REV: CPT | Performed by: PHYSICIAN ASSISTANT

## 2020-01-09 PROCEDURE — G8417 CALC BMI ABV UP PARAM F/U: HCPCS | Performed by: PHYSICIAN ASSISTANT

## 2020-01-09 PROCEDURE — 1036F TOBACCO NON-USER: CPT | Performed by: PHYSICIAN ASSISTANT

## 2020-01-09 PROCEDURE — 99213 OFFICE O/P EST LOW 20 MIN: CPT | Performed by: PHYSICIAN ASSISTANT

## 2020-01-09 RX ORDER — GREEN TEA/HOODIA GORDONII 315-12.5MG
1 CAPSULE ORAL 2 TIMES DAILY
Qty: 60 TABLET | Refills: 0 | Status: SHIPPED | OUTPATIENT
Start: 2020-01-09 | End: 2020-02-08

## 2020-01-09 RX ORDER — ONDANSETRON 4 MG/1
4 TABLET, ORALLY DISINTEGRATING ORAL 3 TIMES DAILY PRN
Qty: 21 TABLET | Refills: 0 | Status: ON HOLD | OUTPATIENT
Start: 2020-01-09 | End: 2021-01-01 | Stop reason: ALTCHOICE

## 2020-01-09 ASSESSMENT — PATIENT HEALTH QUESTIONNAIRE - PHQ9
2. FEELING DOWN, DEPRESSED OR HOPELESS: 0
SUM OF ALL RESPONSES TO PHQ9 QUESTIONS 1 & 2: 0
SUM OF ALL RESPONSES TO PHQ QUESTIONS 1-9: 0
SUM OF ALL RESPONSES TO PHQ QUESTIONS 1-9: 0
1. LITTLE INTEREST OR PLEASURE IN DOING THINGS: 0

## 2020-01-09 ASSESSMENT — ENCOUNTER SYMPTOMS
RESPIRATORY NEGATIVE: 1
DIARRHEA: 1
NAUSEA: 1
EYES NEGATIVE: 1
ABDOMINAL PAIN: 1
ALLERGIC/IMMUNOLOGIC NEGATIVE: 1

## 2020-01-09 NOTE — PROGRESS NOTES
Subjective  Theone Kin, 69 y.o. male presents today with:  Chief Complaint   Patient presents with    Other     stomach bug x 4 days     Diarrhea     x 3 days     Nausea & Vomiting     having the felling        HPI  Is here being seen for diarrhea for the past 2 days- sxs began after eating out having associated nausea no vomiting stools today have been soft denies rectal bleeding abdominal pain fever. Review of Systems   Constitutional: Positive for fatigue. HENT: Negative. Eyes: Negative. Respiratory: Negative. Cardiovascular: Negative. Gastrointestinal: Positive for abdominal pain, diarrhea and nausea. Endocrine: Negative. Genitourinary: Negative. Musculoskeletal: Negative. Skin: Negative. Allergic/Immunologic: Negative. Neurological: Positive for headaches. Hematological: Negative. Psychiatric/Behavioral: Negative. Past Medical History:   Diagnosis Date    Abnormal cardiovascular stress test 4/19/2016    Equivocal ST-T wave changes; Defect in the inferior wall consistent with prior infarction; LV EF 79%; TID ratio 1.1    Actinic keratoses     BPH (benign prostatic hypertrophy)     CAD (coronary artery disease)     Cancer (HCC)     COLON    Chest pressure 5/3/2016    Cholelithiasis     Hyperlipidemia     Inflamed seborrheic keratosis     Lumbar radiculopathy 10/26/2016    Mild cognitive impairment     Osteoarthritis     Parathyroid tumor     Parkinson disease ([facility] [address]     RA (retrograde amnesia)     Sinus bradycardia on ECG 4/19/2016    Done 4/5/16 with Dr. Tyler Wellington Syncope and collapse      Past Surgical History:   Procedure Laterality Date    BACK SURGERY  1993    COLECTOMY      COLONOSCOPY  8/18/10,09/11/2012    DR Marino Howe    COLONOSCOPY  09/29/14    DR. PIERRE    JOINT REPLACEMENT Left 5/28/13    total    PARATHYROIDECTOMY Bilateral 2011    2 of 4 glands removed    NM COLON CA SCRN NOT HI RSK IND N/A 9/15/2017    COLONOSCOPY performed by Delgado Michaels MD at [facility],[address] ENDOSCOPY  8/11/09    DR POLK     Social History     Socioeconomic History    Marital status:      Spouse name: Not on file    Number of children: Not on file    Years of education: Not on file    Highest education level: Not on file   Occupational History    Not on file   Social Needs    Financial resource strain: Not on file    Food insecurity:     Worry: Not on file     Inability: Not on file    Transportation needs:     Medical: Not on file     Non-medical: Not on file   Tobacco Use    Smoking status: Never Smoker    Smokeless tobacco: Never Used   Substance and Sexual Activity    Alcohol use: No    Drug use: No    Sexual activity: Not on file   Lifestyle    Physical activity:     Days per week: Not on file     Minutes per session: Not on file    Stress: Not on file   Relationships    Social connections:     Talks on phone: Not on file     Gets together: Not on file     Attends [employer] service: Not on file     Active member of club or organization: Not on file     Attends meetings of clubs or organizations: Not on file     Relationship status: Not on file    Intimate partner violence:     Fear of current or ex partner: Not on file     Emotionally abused: Not on file     Physically abused: Not on file     Forced sexual activity: Not on file   Other Topics Concern    Not on file   Social History Narrative    Not on file     Family History   Problem Relation Age of Onset    Cancer Mother         OVARIAN    Heart Disease Father         Allergies   Allergen Reactions    Amoxicillin Other (See Comments)     stomatitis     Current Outpatient Medications   Medication Sig Dispense Refill    Lactobacillus (PROBIOTIC ACIDOPHILUS) TABS Take 1 tablet by mouth 2 times daily 60 tablet 0    ondansetron (ZOFRAN-ODT) 4 MG disintegrating tablet Take 1 tablet by mouth 3 times daily as needed for Nausea or Vomiting 21 tablet 0    memantine (NAMENDA) 5 MG tablet Take 1 tablet by mouth 2 times daily 180 tablet 1    SUMAtriptan (IMITREX) 100 MG tablet       clopidogrel (PLAVIX) 75 MG tablet Take 1 tablet by mouth daily 30 tablet 3    omeprazole (PRILOSEC) 20 MG delayed release capsule TAKE 1 CAPSULE DAILY 90 capsule 3    tamsulosin (FLOMAX) 0.4 MG capsule TAKE 1 CAPSULE DAILY 90 capsule 3    finasteride (PROSCAR) 5 MG tablet TAKE 1 TABLET DAILY 90 tablet 3    rosuvastatin (CRESTOR) 10 MG tablet TAKE 1 TABLET DAILY 90 tablet 3    metoprolol succinate (TOPROL XL) 25 MG extended release tablet TAKE 1 TABLET DAILY 90 tablet 3    Psyllium (METAMUCIL FIBER PO) Take by mouth      Cholecalciferol (VITAMIN D3) 1000 UNITS TABS Take 1,900 Units by mouth daily       calcium carbonate 600 MG TABS tablet Take 1 tablet by mouth daily       fish oil-omega-3 fatty acids 1000 MG capsule Take 2 g by mouth 2 times daily.  Multiple Vitamin (MULTI-VITAMIN PO) Take  by mouth daily.  Magnesium 250 MG TABS Take by mouth daily Every other day      Ascorbic Acid (VITAMIN C) 500 MG tablet Take 500 mg by mouth daily.  aspirin 81 MG tablet Take 81 mg by mouth daily.  memantine (NAMENDA) 5 MG tablet Take 1 tablet by mouth 2 times daily for 10 days 20 tablet 0    CPAP Machine MISC by Does not apply route Start auto CPAP with minimum of 5, maximum of 12, with low-profile mask and supplies (Patient not taking: Reported on 1/9/2020) 1 each 0     No current facility-administered medications for this visit. Objective    Vitals:    01/09/20 1058   BP: 108/62   Site: Right Upper Arm   Position: Sitting   Cuff Size: Medium Adult   Pulse: 71   Resp: 16   Temp: 97 °F (36.1 °C)   TempSrc: Temporal   SpO2: 97%   Weight: 181 lb 3.2 oz (82.2 kg)   Height: 5' 10\" (1.778 m)     Physical Exam  Constitutional:       Appearance: He is not ill-appearing.    HENT:      Head: Normocephalic and atraumatic. Eyes:      Extraocular Movements: Extraocular movements intact. Conjunctiva/sclera: Conjunctivae normal.      Pupils: Pupils are equal, round, and reactive to light. Neck:      Musculoskeletal: Normal range of motion and neck supple. Thyroid: No thyromegaly. Cardiovascular:      Rate and Rhythm: Normal rate and regular rhythm. Heart sounds: Normal heart sounds. No murmur. Pulmonary:      Effort: Pulmonary effort is normal. No respiratory distress. Breath sounds: Normal breath sounds. No wheezing or rhonchi. Abdominal:      General: Bowel sounds are normal. There is no distension. Palpations: Abdomen is soft. There is no mass. Tenderness: There is no tenderness. There is no guarding. Musculoskeletal: Normal range of motion. Skin:     General: Skin is warm and dry. Coloration: Skin is not pale. Neurological:      Mental Status: He is alert and oriented to person, place, and time. Gait: Gait abnormal.   Psychiatric:         Attention and Perception: Attention normal.         Mood and Affect: Mood normal.         Speech: Speech is delayed. Behavior: Behavior is slowed. Cognition and Memory: He exhibits impaired recent memory. Assessment & Plan    Diagnosis Orders   1. Diarrhea, unspecified type  Lactobacillus (PROBIOTIC ACIDOPHILUS) TABS    brat diet - advance as tolerater  increase fluids   2. Nausea  Lactobacillus (PROBIOTIC ACIDOPHILUS) TABS         No orders of the defined types were placed in this encounter. Orders Placed This Encounter   Medications    Lactobacillus (PROBIOTIC ACIDOPHILUS) TABS     Sig: Take 1 tablet by mouth 2 times daily     Dispense:  60 tablet     Refill:  0    ondansetron (ZOFRAN-ODT) 4 MG disintegrating tablet     Sig: Take 1 tablet by mouth 3 times daily as needed for Nausea or Vomiting     Dispense:  21 tablet     Refill:  0     There are no discontinued medications.   Return if symptoms

## 2020-03-05 ENCOUNTER — OFFICE VISIT (OUTPATIENT)
Dept: FAMILY MEDICINE CLINIC | Age: 75
End: 2020-03-05
Payer: MEDICARE

## 2020-03-05 VITALS
SYSTOLIC BLOOD PRESSURE: 110 MMHG | RESPIRATION RATE: 14 BRPM | HEART RATE: 54 BPM | BODY MASS INDEX: 25.48 KG/M2 | OXYGEN SATURATION: 98 % | DIASTOLIC BLOOD PRESSURE: 60 MMHG | WEIGHT: 178 LBS | TEMPERATURE: 98 F | HEIGHT: 70 IN

## 2020-03-05 LAB
BILIRUBIN, POC: NORMAL
BLOOD URINE, POC: NORMAL
CLARITY, POC: CLEAR
COLOR, POC: NORMAL
GLUCOSE URINE, POC: NORMAL
KETONES, POC: 0.5
LEUKOCYTE EST, POC: NORMAL
NITRITE, POC: NORMAL
PH, POC: 5.5
PROTEIN, POC: 0.15
SPECIFIC GRAVITY, POC: 1.03
UROBILINOGEN, POC: 3.5

## 2020-03-05 PROCEDURE — 1123F ACP DISCUSS/DSCN MKR DOCD: CPT | Performed by: PHYSICIAN ASSISTANT

## 2020-03-05 PROCEDURE — 1036F TOBACCO NON-USER: CPT | Performed by: PHYSICIAN ASSISTANT

## 2020-03-05 PROCEDURE — 81003 URINALYSIS AUTO W/O SCOPE: CPT | Performed by: PHYSICIAN ASSISTANT

## 2020-03-05 PROCEDURE — 4040F PNEUMOC VAC/ADMIN/RCVD: CPT | Performed by: PHYSICIAN ASSISTANT

## 2020-03-05 PROCEDURE — G8417 CALC BMI ABV UP PARAM F/U: HCPCS | Performed by: PHYSICIAN ASSISTANT

## 2020-03-05 PROCEDURE — 99214 OFFICE O/P EST MOD 30 MIN: CPT | Performed by: PHYSICIAN ASSISTANT

## 2020-03-05 PROCEDURE — G8427 DOCREV CUR MEDS BY ELIG CLIN: HCPCS | Performed by: PHYSICIAN ASSISTANT

## 2020-03-05 PROCEDURE — 3017F COLORECTAL CA SCREEN DOC REV: CPT | Performed by: PHYSICIAN ASSISTANT

## 2020-03-05 PROCEDURE — G8482 FLU IMMUNIZE ORDER/ADMIN: HCPCS | Performed by: PHYSICIAN ASSISTANT

## 2020-03-05 ASSESSMENT — ENCOUNTER SYMPTOMS
RESPIRATORY NEGATIVE: 1
EYES NEGATIVE: 1
ALLERGIC/IMMUNOLOGIC NEGATIVE: 1
GASTROINTESTINAL NEGATIVE: 1

## 2020-03-05 NOTE — PROGRESS NOTES
Subjective  Asad Ballard, 69 y.o. male presents today with:  Chief Complaint   Patient presents with    Extremity Weakness     pt states he woke up last tuesday and his legs went really weak, he also states that more recently he is having more and more issues with not being able to think or respond to questions he is asked.  Tremors     patient states he is having stronger spasms and tremors.  Altered Mental Status     pt states he feels confused        HPI  Patient is here along with his wife being seen acutely for confusion,difficulty finding his words, le weakness  and tremor which has occurred twice over the past week  Patient states he has increased difficulty finding his words when these events occur. He has history of TIA and has had recurrent symptoms over the past several months - advised by PCP to go to the ER if sxs should recur however patient did go to the ER because symptoms lasted for only a few hours then resolved spontaneously. Questionable diagnosis of parkinsonism and dementia. Patient was unable to tolerate levodopa carbidopa - med dc'd and placed on Namenda by neurology  Review of Systems   Constitutional: Negative. HENT: Negative. Eyes: Negative. Respiratory: Negative. Cardiovascular: Negative. Gastrointestinal: Negative. Endocrine: Negative. Genitourinary: Negative. Musculoskeletal: Negative. Skin: Negative. Allergic/Immunologic: Negative. Neurological: Positive for tremors and weakness. Hematological: Negative. Psychiatric/Behavioral: Positive for confusion. Past Medical History:   Diagnosis Date    Abnormal cardiovascular stress test 4/19/2016    Equivocal ST-T wave changes;  Defect in the inferior wall consistent with prior infarction; LV EF 79%; TID ratio 1.1    Actinic keratoses     BPH (benign prostatic hypertrophy)     CAD (coronary artery disease)     Cancer (HCC)     COLON    Chest pressure 5/3/2016    Cholelithiasis     Hyperlipidemia     Inflamed seborrheic keratosis     Lumbar radiculopathy 10/26/2016    Mild cognitive impairment     Osteoarthritis     Parathyroid tumor     Parkinson disease ([address]     RA (retrograde amnesia)     Sinus bradycardia on ECG 4/19/2016    Done 4/5/16 with Dr. Claudis Lombard Syncope and collapse      Past Surgical History:   Procedure Laterality Date    BACK SURGERY  1993    COLECTOMY      COLONOSCOPY  8/18/10,09/11/2012    DR Mulu Feldman    COLONOSCOPY  09/29/14    DR. PIERRE    JOINT REPLACEMENT Left 5/28/13    total    PARATHYROIDECTOMY Bilateral 2011    2 of 4 glands removed    HI COLON CA SCRN NOT HI RSK IND N/A 9/15/2017    COLONOSCOPY performed by Fadumo Cruz MD at [address] [place] [address],[address] ENDOSCOPY  8/11/09    DR POLK     Social History     Socioeconomic History    Marital status:      Spouse name: Not on file    Number of children: Not on file    Years of education: Not on file    Highest education level: Not on file   Occupational History    Not on file   Social Needs    Financial resource strain: Not on file    Food insecurity:     Worry: Not on file     Inability: Not on file    Transportation needs:     Medical: Not on file     Non-medical: Not on file   Tobacco Use    Smoking status: Never Smoker    Smokeless tobacco: Never Used   Substance and Sexual Activity    Alcohol use: No    Drug use: No    Sexual activity: Not on file   Lifestyle    Physical activity:     Days per week: Not on file     Minutes per session: Not on file    Stress: Not on file   Relationships    Social connections:     Talks on phone: Not on file     Gets together: Not on file     Attends [employer] service: Not on file     Active member of club or organization: Not on file     Attends meetings of clubs or organizations: Not on file     Relationship status: Not on file    Intimate partner violence: Fear of current or ex partner: Not on file     Emotionally abused: Not on file     Physically abused: Not on file     Forced sexual activity: Not on file   Other Topics Concern    Not on file   Social History Narrative    Not on file     Family History   Problem Relation Age of Onset    Cancer Mother         OVARIAN    Heart Disease Father         Allergies   Allergen Reactions    Amoxicillin Other (See Comments)     stomatitis     Current Outpatient Medications   Medication Sig Dispense Refill    ondansetron (ZOFRAN-ODT) 4 MG disintegrating tablet Take 1 tablet by mouth 3 times daily as needed for Nausea or Vomiting 21 tablet 0    memantine (NAMENDA) 5 MG tablet Take 1 tablet by mouth 2 times daily 180 tablet 1    SUMAtriptan (IMITREX) 100 MG tablet       CPAP Machine MISC by Does not apply route Start auto CPAP with minimum of 5, maximum of 12, with low-profile mask and supplies 1 each 0    clopidogrel (PLAVIX) 75 MG tablet Take 1 tablet by mouth daily 30 tablet 3    omeprazole (PRILOSEC) 20 MG delayed release capsule TAKE 1 CAPSULE DAILY 90 capsule 3    tamsulosin (FLOMAX) 0.4 MG capsule TAKE 1 CAPSULE DAILY 90 capsule 3    finasteride (PROSCAR) 5 MG tablet TAKE 1 TABLET DAILY 90 tablet 3    rosuvastatin (CRESTOR) 10 MG tablet TAKE 1 TABLET DAILY 90 tablet 3    metoprolol succinate (TOPROL XL) 25 MG extended release tablet TAKE 1 TABLET DAILY 90 tablet 3    Psyllium (METAMUCIL FIBER PO) Take by mouth      Cholecalciferol (VITAMIN D3) 1000 UNITS TABS Take 1,900 Units by mouth daily       calcium carbonate 600 MG TABS tablet Take 1 tablet by mouth daily       fish oil-omega-3 fatty acids 1000 MG capsule Take 2 g by mouth 2 times daily.  Multiple Vitamin (MULTI-VITAMIN PO) Take  by mouth daily.  Magnesium 250 MG TABS Take by mouth daily Every other day      Ascorbic Acid (VITAMIN C) 500 MG tablet Take 500 mg by mouth daily.  aspirin 81 MG tablet Take 81 mg by mouth daily.  memantine (NAMENDA) 5 MG tablet Take 1 tablet by mouth 2 times daily for 10 days 20 tablet 0     No current facility-administered medications for this visit. Objective    Vitals:    03/05/20 1344   BP: 110/60   Pulse: 54   Resp: 14   Temp: 98 °F (36.7 °C)   SpO2: 98%   Weight: 178 lb (80.7 kg)   Height: 5' 9.5\" (1.765 m)     Physical Exam  HENT:      Head: Normocephalic and atraumatic. Eyes:      Extraocular Movements: Extraocular movements intact. Conjunctiva/sclera: Conjunctivae normal.      Pupils: Pupils are equal, round, and reactive to light. Neck:      Musculoskeletal: Normal range of motion and neck supple. Thyroid: No thyromegaly. Cardiovascular:      Rate and Rhythm: Normal rate. Heart sounds: Normal heart sounds. No murmur. Comments: occ ectopic beat  Pulmonary:      Effort: Pulmonary effort is normal. No respiratory distress. Breath sounds: Normal breath sounds. No wheezing, rhonchi or rales. Abdominal:      General: Bowel sounds are normal.      Palpations: Abdomen is soft. Musculoskeletal: Normal range of motion. Lymphadenopathy:      Cervical: No cervical adenopathy. Skin:     General: Skin is warm and dry. Neurological:      Mental Status: He is alert and oriented to person, place, and time. Motor: Weakness present. Comments: Weakness les L>r  Expressive aphasia   Psychiatric:         Mood and Affect: Mood normal.              Assessment & Plan    Diagnosis Orders   1. Confusion  POCT Urinalysis No Micro (Auto)    MRI BRAIN WO CONTRAST   2. H/O TIA (transient ischemic attack) and stroke  MRI BRAIN WO CONTRAST   3. Weakness of both lower extremities  MRI BRAIN WO CONTRAST   4. Tremor  MRI BRAIN WO CONTRAST   5.  Dementia associated with other underlying disease without behavioral disturbance ([facility] [address]           Orders Placed This Encounter   Procedures    MRI BRAIN WO CONTRAST     Standing Status:   Future     Standing Expiration Date: 3/5/2021    POCT Urinalysis No Micro (Auto)     No orders of the defined types were placed in this encounter. There are no discontinued medications. Return if symptoms worsen or fail to improve, for follow up with your PCP as directed. Cris Griffith PA-C           Assessment & Plan    Diagnosis Orders   1. Confusion  POCT Urinalysis No Micro (Auto)    MRI BRAIN WO CONTRAST   2. H/O TIA (transient ischemic attack) and stroke  MRI BRAIN WO CONTRAST   3. Weakness of both lower extremities  MRI BRAIN WO CONTRAST   4. Tremor  MRI BRAIN WO CONTRAST   5. Dementia associated with other underlying disease without behavioral disturbance ([facility] [address]           Orders Placed This Encounter   Procedures    MRI BRAIN WO CONTRAST     Standing Status:   Future     Standing Expiration Date:   3/5/2021    POCT Urinalysis No Micro (Auto)     No orders of the defined types were placed in this encounter. There are no discontinued medications. Return if symptoms worsen or fail to improve, for follow up with your PCP as directed.     Cris Griffith PA-C

## 2020-03-10 ENCOUNTER — OFFICE VISIT (OUTPATIENT)
Dept: FAMILY MEDICINE CLINIC | Age: 75
End: 2020-03-10
Payer: MEDICARE

## 2020-03-10 VITALS
DIASTOLIC BLOOD PRESSURE: 58 MMHG | SYSTOLIC BLOOD PRESSURE: 108 MMHG | WEIGHT: 182 LBS | OXYGEN SATURATION: 99 % | HEART RATE: 53 BPM | HEIGHT: 69 IN | TEMPERATURE: 97.6 F | BODY MASS INDEX: 26.96 KG/M2

## 2020-03-10 PROCEDURE — 1123F ACP DISCUSS/DSCN MKR DOCD: CPT | Performed by: FAMILY MEDICINE

## 2020-03-10 PROCEDURE — 1036F TOBACCO NON-USER: CPT | Performed by: FAMILY MEDICINE

## 2020-03-10 PROCEDURE — 4040F PNEUMOC VAC/ADMIN/RCVD: CPT | Performed by: FAMILY MEDICINE

## 2020-03-10 PROCEDURE — 99213 OFFICE O/P EST LOW 20 MIN: CPT | Performed by: FAMILY MEDICINE

## 2020-03-10 PROCEDURE — 3017F COLORECTAL CA SCREEN DOC REV: CPT | Performed by: FAMILY MEDICINE

## 2020-03-10 PROCEDURE — G8417 CALC BMI ABV UP PARAM F/U: HCPCS | Performed by: FAMILY MEDICINE

## 2020-03-10 PROCEDURE — G8482 FLU IMMUNIZE ORDER/ADMIN: HCPCS | Performed by: FAMILY MEDICINE

## 2020-03-10 PROCEDURE — G8427 DOCREV CUR MEDS BY ELIG CLIN: HCPCS | Performed by: FAMILY MEDICINE

## 2020-03-10 ASSESSMENT — ENCOUNTER SYMPTOMS
GASTROINTESTINAL NEGATIVE: 1
RESPIRATORY NEGATIVE: 1
ALLERGIC/IMMUNOLOGIC NEGATIVE: 1
EYES NEGATIVE: 1
SHORTNESS OF BREATH: 0

## 2020-03-10 NOTE — PROGRESS NOTES
JAM    COLONOSCOPY  09/29/14      [address]    JOINT REPLACEMENT Left 5/28/13    total    PARATHYROIDECTOMY Bilateral 2011    2 of 4 glands removed    VT COLON CA SCRN NOT HI RSK IND N/A 9/15/2017    COLONOSCOPY performed by Hernandez Kingston MD at [address] [facility] ENDOSCOPY  8/11/09    DR POLK     Family History   Problem Relation Age of Onset    Cancer Mother         OVARIAN    Heart Disease Father      Social History     Socioeconomic History    Marital status:      Spouse name: None    Number of children: None    Years of education: None    Highest education level: None   Occupational History    None   Social Needs    Financial resource strain: None    Food insecurity     Worry: None     Inability: None    Transportation needs     Medical: None     Non-medical: None   Tobacco Use    Smoking status: Never Smoker    Smokeless tobacco: Never Used   Substance and Sexual Activity    Alcohol use: No    Drug use: No    Sexual activity: None   Lifestyle    Physical activity     Days per week: None     Minutes per session: None    Stress: None   Relationships    Social connections     Talks on phone: None     Gets together: None     Attends Christianity service: None     Active member of club or organization: None     Attends meetings of clubs or organizations: None     Relationship status: None    Intimate partner violence     Fear of current or ex partner: None     Emotionally abused: None     Physically abused: None     Forced sexual activity: None   Other Topics Concern    None   Social History Narrative    None     Current Outpatient Medications   Medication Sig Dispense Refill    ondansetron (ZOFRAN-ODT) 4 MG disintegrating tablet Take 1 tablet by mouth 3 times daily as needed for Nausea or Vomiting 21 tablet 0    memantine (NAMENDA) 5 MG tablet Take 1 tablet by mouth 2 times daily 180 tablet 1    SUMAtriptan (IMITREX) 100 MG tablet       CPAP Machine MISC by Does not apply route Start auto CPAP with minimum of 5, maximum of 12, with low-profile mask and supplies 1 each 0    clopidogrel (PLAVIX) 75 MG tablet Take 1 tablet by mouth daily 30 tablet 3    omeprazole (PRILOSEC) 20 MG delayed release capsule TAKE 1 CAPSULE DAILY 90 capsule 3    tamsulosin (FLOMAX) 0.4 MG capsule TAKE 1 CAPSULE DAILY 90 capsule 3    finasteride (PROSCAR) 5 MG tablet TAKE 1 TABLET DAILY 90 tablet 3    rosuvastatin (CRESTOR) 10 MG tablet TAKE 1 TABLET DAILY 90 tablet 3    metoprolol succinate (TOPROL XL) 25 MG extended release tablet TAKE 1 TABLET DAILY 90 tablet 3    Psyllium (METAMUCIL FIBER PO) Take by mouth      Cholecalciferol (VITAMIN D3) 1000 UNITS TABS Take 1,900 Units by mouth daily       calcium carbonate 600 MG TABS tablet Take 1 tablet by mouth daily       fish oil-omega-3 fatty acids 1000 MG capsule Take 2 g by mouth 2 times daily.  Multiple Vitamin (MULTI-VITAMIN PO) Take  by mouth daily.  Magnesium 250 MG TABS Take by mouth daily Every other day      Ascorbic Acid (VITAMIN C) 500 MG tablet Take 500 mg by mouth daily.  aspirin 81 MG tablet Take 81 mg by mouth daily. No current facility-administered medications for this visit.       Current Outpatient Medications on File Prior to Visit   Medication Sig Dispense Refill    ondansetron (ZOFRAN-ODT) 4 MG disintegrating tablet Take 1 tablet by mouth 3 times daily as needed for Nausea or Vomiting 21 tablet 0    memantine (NAMENDA) 5 MG tablet Take 1 tablet by mouth 2 times daily 180 tablet 1    SUMAtriptan (IMITREX) 100 MG tablet       CPAP Machine MISC by Does not apply route Start auto CPAP with minimum of 5, maximum of 12, with low-profile mask and supplies 1 each 0    clopidogrel (PLAVIX) 75 MG tablet Take 1 tablet by mouth daily 30 tablet 3    omeprazole (PRILOSEC) 20 MG delayed release capsule TAKE 1 CAPSULE DAILY 90 capsule 3    tamsulosin (FLOMAX) 0.4 MG capsule TAKE 1 CAPSULE DAILY 90 capsule 3    finasteride (PROSCAR) 5 MG tablet TAKE 1 TABLET DAILY 90 tablet 3    rosuvastatin (CRESTOR) 10 MG tablet TAKE 1 TABLET DAILY 90 tablet 3    metoprolol succinate (TOPROL XL) 25 MG extended release tablet TAKE 1 TABLET DAILY 90 tablet 3    Psyllium (METAMUCIL FIBER PO) Take by mouth      Cholecalciferol (VITAMIN D3) 1000 UNITS TABS Take 1,900 Units by mouth daily       calcium carbonate 600 MG TABS tablet Take 1 tablet by mouth daily       fish oil-omega-3 fatty acids 1000 MG capsule Take 2 g by mouth 2 times daily.  Multiple Vitamin (MULTI-VITAMIN PO) Take  by mouth daily.  Magnesium 250 MG TABS Take by mouth daily Every other day      Ascorbic Acid (VITAMIN C) 500 MG tablet Take 500 mg by mouth daily.  aspirin 81 MG tablet Take 81 mg by mouth daily. No current facility-administered medications on file prior to visit. Allergies   Allergen Reactions    Amoxicillin Other (See Comments)     stomatitis     Health Maintenance   Topic Date Due    Shingles Vaccine (1 of 2) 11/27/1996    Annual Wellness Visit (AWV)  05/29/2019    Lipid screen  05/20/2020    Colon cancer screen colonoscopy  09/15/2020    DTaP/Tdap/Td vaccine (2 - Td) 01/10/2024    Flu vaccine  Completed    Pneumococcal 65+ years Vaccine  Completed    Hepatitis C screen  Completed    Hepatitis A vaccine  Aged Out    Hepatitis B vaccine  Aged Out    Hib vaccine  Aged Out    Meningococcal (ACWY) vaccine  Aged Out       Review of Systems     Review of Systems   Constitutional: Negative for activity change, appetite change, chills, fever and unexpected weight change. HENT: Negative. Eyes: Negative. Respiratory: Negative. Negative for shortness of breath. Cardiovascular: Negative. Negative for chest pain and palpitations. Gastrointestinal: Negative. Endocrine: Negative. Genitourinary: Negative.     Musculoskeletal: Negative. Skin: Negative. Allergic/Immunologic: Negative. Neurological: Negative. Hematological: Negative. Psychiatric/Behavioral: Negative. Physical Exam  Vitals:    03/10/20 1459 03/10/20 1503   BP: (!) 104/56 (!) 108/58   Site: Left Upper Arm Right Upper Arm   Position: Sitting Sitting   Cuff Size: Large Adult Large Adult   Pulse: 53    Temp: 97.6 °F (36.4 °C)    SpO2: 99%    Weight: 182 lb (82.6 kg)    Height: 5' 9\" (1.753 m)        Physical Exam  Constitutional:       Appearance: He is well-developed. HENT:      Right Ear: External ear normal.      Left Ear: External ear normal.   Eyes:      Conjunctiva/sclera: Conjunctivae normal.      Pupils: Pupils are equal, round, and reactive to light. Neck:      Musculoskeletal: Normal range of motion and neck supple. Thyroid: No thyromegaly. Cardiovascular:      Rate and Rhythm: Normal rate and regular rhythm. Heart sounds: Normal heart sounds. No murmur. No friction rub. No gallop. Pulmonary:      Effort: Pulmonary effort is normal. No respiratory distress. Breath sounds: Normal breath sounds. No wheezing. Abdominal:      General: Bowel sounds are normal. There is no distension. Palpations: Abdomen is soft. There is no mass. Tenderness: There is no abdominal tenderness. There is no guarding or rebound. Hernia: No hernia is present. Genitourinary:     Penis: Normal.    Musculoskeletal: Normal range of motion. General: No tenderness. Lymphadenopathy:      Cervical: No cervical adenopathy. Skin:     General: Skin is warm and dry. Neurological:      Mental Status: He is alert and oriented to person, place, and time. Cranial Nerves: No cranial nerve deficit. Coordination: Coordination normal.         Assessment   Diagnosis Orders   1. H/O TIA (transient ischemic attack) and stroke     2. Dementia associated with other underlying disease without behavioral disturbance ([facility] [address]     3. Essential hypertension     4. Coronary artery disease involving native heart, angina presence unspecified, unspecified vessel or lesion type       Problem List     CAD (coronary artery disease)    Relevant Medications    aspirin 81 MG tablet    rosuvastatin (CRESTOR) 10 MG tablet    metoprolol succinate (TOPROL XL) 25 MG extended release tablet          Plan  Doing well follow up in three months sees Dr. Foreign Gomez in two weeks. No orders of the defined types were placed in this encounter. No follow-ups on file.   Loren Chan MD

## 2020-03-11 ENCOUNTER — HOSPITAL ENCOUNTER (OUTPATIENT)
Dept: MRI IMAGING | Age: 75
Discharge: HOME | End: 2020-03-13
Payer: MEDICARE

## 2020-03-11 ENCOUNTER — TELEPHONE (OUTPATIENT)
Dept: FAMILY MEDICINE CLINIC | Age: 75
End: 2020-03-11

## 2020-03-11 PROCEDURE — 70551 MRI BRAIN STEM W/O DYE: CPT

## 2020-03-11 NOTE — TELEPHONE ENCOUNTER
Dr. Janie Zuniga (Radiologist with [address]) called and wanted to speak with you about an MRI the pt recently had.      Please give Dr. Janie Zuniga a call back at [phone]    TY

## 2020-03-17 ENCOUNTER — TELEPHONE (OUTPATIENT)
Dept: FAMILY MEDICINE CLINIC | Age: 75
End: 2020-03-17

## 2020-03-19 ENCOUNTER — TELEPHONE (OUTPATIENT)
Dept: NEUROLOGY | Age: 75
End: 2020-03-19

## 2020-03-19 NOTE — TELEPHONE ENCOUNTER
Patients wife called, anting results of MRI Brain. I advised they would have to call Dr. Solorio Art office as they were the ordering provider to get the results. She stated that she did call that office and was told to call here to get results.   Please advise  Thanks  Bro Aguirre

## 2020-03-22 NOTE — TELEPHONE ENCOUNTER
Please find out from mom her exact does of tegretol (carbamezeoine ) I have different doses mentioned

## 2020-03-24 NOTE — TELEPHONE ENCOUNTER
Called and left message on VM letting patient/wife no that MRI was normal showed old vessel blockage and Dr. Jules Lacey felt no changes where necassary due to current medications. Stated if there where any further questions to give office a called at [phone]  Was able to leave on VM due to HIPPA form.

## 2020-04-01 ENCOUNTER — VIRTUAL VISIT (OUTPATIENT)
Dept: NEUROLOGY | Age: 75
End: 2020-04-01
Payer: MEDICARE

## 2020-04-01 PROCEDURE — 99443 PR PHYS/QHP TELEPHONE EVALUATION 21-30 MIN: CPT | Performed by: PSYCHIATRY & NEUROLOGY

## 2020-04-01 RX ORDER — MEMANTINE HYDROCHLORIDE 10 MG/1
10 TABLET ORAL 2 TIMES DAILY
Qty: 60 TABLET | Refills: 3 | Status: SHIPPED | OUTPATIENT
Start: 2020-04-01 | End: 2020-01-01 | Stop reason: SDUPTHER

## 2020-04-01 ASSESSMENT — ENCOUNTER SYMPTOMS
SHORTNESS OF BREATH: 0
PHOTOPHOBIA: 0
CHOKING: 0
NAUSEA: 0
VOMITING: 0
BACK PAIN: 0
TROUBLE SWALLOWING: 0

## 2020-04-01 NOTE — PROGRESS NOTES
Subjective:      Patient ID: Jennifer Garcia is a 69 y.o. male who presents today for:  Chief Complaint   Patient presents with    Follow-up     Patient had MRI in march and would like to go over result. Kaylee was working when he started it and now they are not seeing much of an improvement. He has also started to have vivid dreams. He is starting to have more confusion going on now with everything going on in the world as well. HPI Due to COVID 23 outbreak, patient's office visit was converted to a virtual visit. Patient was contacted and agreed to proceed with a virtual visit via Telephone Visit  The risks and benefits of converting to a virtual visit were discussed in light of the current infectious disease epidemic. Patient also understood that insurance coverage and co-pays are up to their individual insurance plans. 20-year-old right-handed gentleman with a very complicated history of neurological dysfunction. Patient has some parkinsonian features and has now developed some memory issues as well. When last seen he was further referred to Dr. Екатерина Espinoza for neuropsychometric testing and I reviewed this and he does have session of cognitive impairment. This may suggest either a vascular dementia which may be early. He had some spells in the interim and was seen by primary care and MRI was ordered. This is the third MRI in the year and this shows occipital small vessel ischemic changes. Patient does have some minor risk factors for the same. He has good days and bad days. He also has sleep apnea and does not use the CPAP machine as he cannot tolerate this and this may be affecting his daytime drowsiness and lethargy as well he is also has low blood pressures on metoprolol      Past Medical History:   Diagnosis Date    Abnormal cardiovascular stress test 4/19/2016    Equivocal ST-T wave changes;  Defect in the inferior wall consistent with prior infarction; LV EF 79%; TID ratio 1.1 prominent posterior periventricular and adjacent occipital and posterior parietal juxtacortical white matter hyperintensities likely representing chronic small vessel ischemic versus degenerative or demyelinating disease. Tiny patchy areas of white matter and cortical hyperintensities are present at the occipital lobes bilaterally. This is also seen on the previous study but appears to be slightly more pronounced now. That could relate to technique. On the diffusion-weighted images of the current exam there are subtle small areas of bilateral occipital patchy increased signal intensity which were not present on the previous MRI studies raising the suspicion of of restricted diffusion. Some of this may be artifactual. On the sagittal FLAIR sequence there is focal increased signal in the midline of the upper mid cerebellar cortex. This is not confirmed on any other sequences. There is no restricted diffusion in this location. Prior studies do not include the same sequence and plane combination for comparison. There is some motion artifact and the finding is likely artifactual. Mild diffuse atrophy. No significant ventricular dilatation. Normal variant asymmetry of the lateral ventricular size with the right frontal horn smaller than the left, unchanged. No mass effect or extra-axial collections. No Chiari malformation. Orbits are grossly unremarkable for the noncontrast technique. Mild partial empty sella without sellar expansion unchanged and not unusual in the normal population. Minimal bilateral ethmoid sinus mucosal thickening. Bilateral predominantly posterior periventricular and juxtacortical hyperintensities most likely chronic small vessel ischemic versus degenerative or demyelinating disease. Subtle areas of apparently more pronounced hyperintensity in the occipital white matter and cortex compared to the previous study with equivocal restricted diffusion suggesting ischemia.  Short-term follow-up is

## 2020-04-03 ENCOUNTER — PATIENT MESSAGE (OUTPATIENT)
Dept: FAMILY MEDICINE CLINIC | Age: 75
End: 2020-04-03

## 2020-04-23 RX ORDER — METOPROLOL SUCCINATE 25 MG/1
TABLET, EXTENDED RELEASE ORAL
Qty: 90 TABLET | Refills: 3 | Status: ON HOLD | OUTPATIENT
Start: 2020-04-23 | End: 2020-05-01

## 2020-04-23 NOTE — TELEPHONE ENCOUNTER
From: Jung Lawson  To: Rula Lindo MD  Sent: 4/3/2020 7:06 AM EDT  Subject: Prescription Question    Good Morning:  During [facility] appointment on May 1st with Dr. Chris Andujar, his being so tired all the time was discussed. Dr. Chris Andujar questioned why Maribel Ortiz was taking Toprol XL. Neither of us can remember why it was prescribed and realized he has been taking it for a very long time. Maribel Ortiz does not have high blood pressure so Dr. Chris Andujar requested we check with our primary care physician and see if it could be discontinued. So that is our question. Can Maribel Ortiz stop taking the Toprol Xl? Thank you. And especially thank you for your time during this difficult time we all are experiencing.     Drew Herbert
Please advise
Father  Still living? Yes, Estimated age: Age Unknown  Family history of diabetes mellitus, Age at diagnosis: Age Unknown

## 2020-05-01 ENCOUNTER — APPOINTMENT (OUTPATIENT)
Dept: GENERAL RADIOLOGY | Age: 75
End: 2020-05-01
Payer: MEDICARE

## 2020-05-01 ENCOUNTER — HOSPITAL ENCOUNTER (OUTPATIENT)
Age: 75
Setting detail: OBSERVATION
Discharge: HOME | End: 2020-05-02
Attending: EMERGENCY MEDICINE | Admitting: INTERNAL MEDICINE
Payer: MEDICARE

## 2020-05-01 PROBLEM — R07.9 CHEST PAIN: Status: ACTIVE | Noted: 2020-05-01

## 2020-05-01 LAB
ANION GAP SERPL CALCULATED.3IONS-SCNC: 10 MEQ/L (ref 9–15)
BASOPHILS ABSOLUTE: 0 K/UL (ref 0–0.2)
BASOPHILS RELATIVE PERCENT: 0.6 %
BUN BLDV-MCNC: 11 MG/DL (ref 8–23)
CALCIUM SERPL-MCNC: 9.1 MG/DL (ref 8.5–9.9)
CHLORIDE BLD-SCNC: 101 MEQ/L (ref 95–107)
CO2: 28 MEQ/L (ref 20–31)
CREAT SERPL-MCNC: 0.98 MG/DL (ref 0.7–1.2)
EOSINOPHILS ABSOLUTE: 0.1 K/UL (ref 0–0.7)
EOSINOPHILS RELATIVE PERCENT: 1.1 %
GFR AFRICAN AMERICAN: >60
GFR NON-AFRICAN AMERICAN: >60
GLUCOSE BLD-MCNC: 102 MG/DL (ref 70–99)
HCT VFR BLD CALC: 50.5 % (ref 42–52)
HEMOGLOBIN: 16.7 G/DL (ref 14–18)
LYMPHOCYTES ABSOLUTE: 1.3 K/UL (ref 1–4.8)
LYMPHOCYTES RELATIVE PERCENT: 19 %
MCH RBC QN AUTO: 30.8 PG (ref 27–31.3)
MCHC RBC AUTO-ENTMCNC: 33.1 % (ref 33–37)
MCV RBC AUTO: 93 FL (ref 80–100)
MONOCYTES ABSOLUTE: 0.6 K/UL (ref 0.2–0.8)
MONOCYTES RELATIVE PERCENT: 8 %
NEUTROPHILS ABSOLUTE: 5 K/UL (ref 1.4–6.5)
NEUTROPHILS RELATIVE PERCENT: 71.3 %
PDW BLD-RTO: 13.9 % (ref 11.5–14.5)
PLATELET # BLD: 153 K/UL (ref 130–400)
POTASSIUM SERPL-SCNC: 4.4 MEQ/L (ref 3.4–4.9)
PRO-BNP: 99 PG/ML
RBC # BLD: 5.44 M/UL (ref 4.7–6.1)
SODIUM BLD-SCNC: 139 MEQ/L (ref 135–144)
TROPONIN: <0.01 NG/ML (ref 0–0.01)
WBC # BLD: 6.9 K/UL (ref 4.8–10.8)

## 2020-05-01 PROCEDURE — 93005 ELECTROCARDIOGRAM TRACING: CPT | Performed by: NURSE PRACTITIONER

## 2020-05-01 PROCEDURE — 6370000000 HC RX 637 (ALT 250 FOR IP): Performed by: NURSE PRACTITIONER

## 2020-05-01 PROCEDURE — G0378 HOSPITAL OBSERVATION PER HR: HCPCS

## 2020-05-01 PROCEDURE — 84484 ASSAY OF TROPONIN QUANT: CPT

## 2020-05-01 PROCEDURE — 6360000002 HC RX W HCPCS: Performed by: INTERNAL MEDICINE

## 2020-05-01 PROCEDURE — 71045 X-RAY EXAM CHEST 1 VIEW: CPT

## 2020-05-01 PROCEDURE — 99285 EMERGENCY DEPT VISIT HI MDM: CPT

## 2020-05-01 PROCEDURE — 83880 ASSAY OF NATRIURETIC PEPTIDE: CPT

## 2020-05-01 PROCEDURE — 2580000003 HC RX 258: Performed by: INTERNAL MEDICINE

## 2020-05-01 PROCEDURE — 36415 COLL VENOUS BLD VENIPUNCTURE: CPT

## 2020-05-01 PROCEDURE — 6370000000 HC RX 637 (ALT 250 FOR IP): Performed by: INTERNAL MEDICINE

## 2020-05-01 PROCEDURE — APPNB45 APP NON BILLABLE 31-45 MINUTES: Performed by: PHYSICIAN ASSISTANT

## 2020-05-01 PROCEDURE — 85025 COMPLETE CBC W/AUTO DIFF WBC: CPT

## 2020-05-01 PROCEDURE — 80048 BASIC METABOLIC PNL TOTAL CA: CPT

## 2020-05-01 PROCEDURE — 96372 THER/PROPH/DIAG INJ SC/IM: CPT

## 2020-05-01 PROCEDURE — 6370000000 HC RX 637 (ALT 250 FOR IP): Performed by: PHYSICIAN ASSISTANT

## 2020-05-01 RX ORDER — ASPIRIN 81 MG/1
324 TABLET, CHEWABLE ORAL ONCE
Status: COMPLETED | OUTPATIENT
Start: 2020-05-01 | End: 2020-05-01

## 2020-05-01 RX ORDER — PROMETHAZINE HYDROCHLORIDE 12.5 MG/1
12.5 TABLET ORAL EVERY 6 HOURS PRN
Status: DISCONTINUED | OUTPATIENT
Start: 2020-05-01 | End: 2020-05-02 | Stop reason: HOSPADM

## 2020-05-01 RX ORDER — ASCORBIC ACID 500 MG
500 TABLET ORAL DAILY
Status: DISCONTINUED | OUTPATIENT
Start: 2020-05-01 | End: 2020-05-02 | Stop reason: HOSPADM

## 2020-05-01 RX ORDER — ROSUVASTATIN CALCIUM 10 MG/1
10 TABLET, COATED ORAL NIGHTLY
Status: DISCONTINUED | OUTPATIENT
Start: 2020-05-01 | End: 2020-05-02 | Stop reason: HOSPADM

## 2020-05-01 RX ORDER — MEMANTINE HYDROCHLORIDE 10 MG/1
10 TABLET ORAL 2 TIMES DAILY
Status: DISCONTINUED | OUTPATIENT
Start: 2020-05-01 | End: 2020-05-02 | Stop reason: HOSPADM

## 2020-05-01 RX ORDER — PANTOPRAZOLE SODIUM 40 MG/1
40 TABLET, DELAYED RELEASE ORAL
Status: DISCONTINUED | OUTPATIENT
Start: 2020-05-02 | End: 2020-05-02 | Stop reason: HOSPADM

## 2020-05-01 RX ORDER — ASPIRIN 81 MG/1
81 TABLET, CHEWABLE ORAL DAILY
Status: DISCONTINUED | OUTPATIENT
Start: 2020-05-02 | End: 2020-05-01

## 2020-05-01 RX ORDER — ASPIRIN 81 MG/1
81 TABLET ORAL DAILY
Status: DISCONTINUED | OUTPATIENT
Start: 2020-05-02 | End: 2020-05-02 | Stop reason: HOSPADM

## 2020-05-01 RX ORDER — TAMSULOSIN HYDROCHLORIDE 0.4 MG/1
0.4 CAPSULE ORAL DAILY
Status: DISCONTINUED | OUTPATIENT
Start: 2020-05-02 | End: 2020-05-02 | Stop reason: HOSPADM

## 2020-05-01 RX ORDER — SODIUM CHLORIDE 0.9 % (FLUSH) 0.9 %
10 SYRINGE (ML) INJECTION EVERY 12 HOURS SCHEDULED
Status: DISCONTINUED | OUTPATIENT
Start: 2020-05-01 | End: 2020-05-02 | Stop reason: HOSPADM

## 2020-05-01 RX ORDER — CHLORAL HYDRATE 500 MG
2000 CAPSULE ORAL 2 TIMES DAILY
Status: DISCONTINUED | OUTPATIENT
Start: 2020-05-01 | End: 2020-05-02 | Stop reason: HOSPADM

## 2020-05-01 RX ORDER — CLOPIDOGREL BISULFATE 75 MG/1
75 TABLET ORAL NIGHTLY
Status: DISCONTINUED | OUTPATIENT
Start: 2020-05-01 | End: 2020-05-02 | Stop reason: HOSPADM

## 2020-05-01 RX ORDER — FINASTERIDE 5 MG/1
5 TABLET, FILM COATED ORAL DAILY
Status: DISCONTINUED | OUTPATIENT
Start: 2020-05-01 | End: 2020-05-02 | Stop reason: HOSPADM

## 2020-05-01 RX ORDER — CALCIUM CARBONATE 200(500)MG
500 TABLET,CHEWABLE ORAL NIGHTLY
Status: DISCONTINUED | OUTPATIENT
Start: 2020-05-01 | End: 2020-05-02 | Stop reason: HOSPADM

## 2020-05-01 RX ORDER — ACETAMINOPHEN 325 MG/1
650 TABLET ORAL EVERY 6 HOURS PRN
Status: DISCONTINUED | OUTPATIENT
Start: 2020-05-01 | End: 2020-05-02 | Stop reason: HOSPADM

## 2020-05-01 RX ORDER — ONDANSETRON 2 MG/ML
4 INJECTION INTRAMUSCULAR; INTRAVENOUS EVERY 6 HOURS PRN
Status: DISCONTINUED | OUTPATIENT
Start: 2020-05-01 | End: 2020-05-02 | Stop reason: HOSPADM

## 2020-05-01 RX ORDER — NITROGLYCERIN 0.4 MG/1
0.4 TABLET SUBLINGUAL EVERY 5 MIN PRN
Status: DISCONTINUED | OUTPATIENT
Start: 2020-05-01 | End: 2020-05-01 | Stop reason: SDUPTHER

## 2020-05-01 RX ORDER — ATORVASTATIN CALCIUM 20 MG/1
20 TABLET, FILM COATED ORAL NIGHTLY
Status: DISCONTINUED | OUTPATIENT
Start: 2020-05-01 | End: 2020-05-01

## 2020-05-01 RX ORDER — VITAMIN B COMPLEX
2000 TABLET ORAL DAILY
Status: DISCONTINUED | OUTPATIENT
Start: 2020-05-01 | End: 2020-05-02 | Stop reason: HOSPADM

## 2020-05-01 RX ORDER — METOPROLOL SUCCINATE 25 MG/1
25 TABLET, EXTENDED RELEASE ORAL DAILY
Status: DISCONTINUED | OUTPATIENT
Start: 2020-05-01 | End: 2020-05-02 | Stop reason: HOSPADM

## 2020-05-01 RX ORDER — NITROGLYCERIN 0.4 MG/1
0.4 TABLET SUBLINGUAL EVERY 5 MIN PRN
Status: DISCONTINUED | OUTPATIENT
Start: 2020-05-01 | End: 2020-05-02 | Stop reason: HOSPADM

## 2020-05-01 RX ORDER — LANOLIN ALCOHOL/MO/W.PET/CERES
200 CREAM (GRAM) TOPICAL EVERY OTHER DAY
Status: DISCONTINUED | OUTPATIENT
Start: 2020-05-01 | End: 2020-05-02 | Stop reason: HOSPADM

## 2020-05-01 RX ORDER — SODIUM CHLORIDE 0.9 % (FLUSH) 0.9 %
10 SYRINGE (ML) INJECTION PRN
Status: DISCONTINUED | OUTPATIENT
Start: 2020-05-01 | End: 2020-05-02 | Stop reason: HOSPADM

## 2020-05-01 RX ORDER — ACETAMINOPHEN 650 MG/1
650 SUPPOSITORY RECTAL EVERY 6 HOURS PRN
Status: DISCONTINUED | OUTPATIENT
Start: 2020-05-01 | End: 2020-05-02 | Stop reason: HOSPADM

## 2020-05-01 RX ADMIN — ROSUVASTATIN CALCIUM 10 MG: 10 TABLET, FILM COATED ORAL at 20:00

## 2020-05-01 RX ADMIN — Medication 2000 MG: at 20:00

## 2020-05-01 RX ADMIN — PROMETHAZINE HYDROCHLORIDE 12.5 MG: 12.5 TABLET ORAL at 20:00

## 2020-05-01 RX ADMIN — MEMANTINE HYDROCHLORIDE 10 MG: 10 TABLET ORAL at 20:00

## 2020-05-01 RX ADMIN — ENOXAPARIN SODIUM 40 MG: 40 INJECTION SUBCUTANEOUS at 16:03

## 2020-05-01 RX ADMIN — Medication 10 ML: at 19:05

## 2020-05-01 RX ADMIN — ANTACID TABLETS 500 MG: 500 TABLET, CHEWABLE ORAL at 20:00

## 2020-05-01 RX ADMIN — NITROGLYCERIN 0.5 INCH: 20 OINTMENT TOPICAL at 12:37

## 2020-05-01 RX ADMIN — ACETAMINOPHEN 650 MG: 325 TABLET ORAL at 16:29

## 2020-05-01 RX ADMIN — CLOPIDOGREL BISULFATE 75 MG: 75 TABLET ORAL at 20:00

## 2020-05-01 RX ADMIN — Medication 10 ML: at 20:00

## 2020-05-01 RX ADMIN — ASPIRIN 81 MG 324 MG: 81 TABLET ORAL at 12:29

## 2020-05-01 RX ADMIN — NITROGLYCERIN 0.4 MG: 0.4 TABLET, ORALLY DISINTEGRATING SUBLINGUAL at 12:30

## 2020-05-01 RX ADMIN — OXYCODONE HYDROCHLORIDE AND ACETAMINOPHEN 500 MG: 500 TABLET ORAL at 20:00

## 2020-05-01 ASSESSMENT — ENCOUNTER SYMPTOMS
EYE PAIN: 0
DIARRHEA: 0
BACK PAIN: 0
SHORTNESS OF BREATH: 0
RHINORRHEA: 0
BLOOD IN STOOL: 0
ABDOMINAL PAIN: 0
CONSTIPATION: 1
NAUSEA: 0
NAUSEA: 1
VOMITING: 0
COUGH: 0
PHOTOPHOBIA: 0
COLOR CHANGE: 0
SHORTNESS OF BREATH: 1
CHEST TIGHTNESS: 1
SORE THROAT: 0

## 2020-05-01 ASSESSMENT — PAIN DESCRIPTION - DESCRIPTORS
DESCRIPTORS: PRESSURE
DESCRIPTORS: PRESSURE

## 2020-05-01 ASSESSMENT — PAIN DESCRIPTION - LOCATION
LOCATION: CHEST
LOCATION: CHEST
LOCATION: HEAD

## 2020-05-01 ASSESSMENT — PAIN SCALES - GENERAL
PAINLEVEL_OUTOF10: 3
PAINLEVEL_OUTOF10: 4
PAINLEVEL_OUTOF10: 0
PAINLEVEL_OUTOF10: 5
PAINLEVEL_OUTOF10: 6
PAINLEVEL_OUTOF10: 0

## 2020-05-01 ASSESSMENT — PAIN DESCRIPTION - PAIN TYPE
TYPE: ACUTE PAIN

## 2020-05-01 ASSESSMENT — PAIN DESCRIPTION - FREQUENCY: FREQUENCY: CONTINUOUS

## 2020-05-01 NOTE — FLOWSHEET NOTE
Pt on unit. Admission complete. Set up with fall precautions. Met with clinician., who updated wife. Pt now resting in bed, watching tv. Call light within reach.  Electronically signed by Britney Cruz RN on 5/1/2020 at 3:18 PM

## 2020-05-01 NOTE — H&P
temperature of 97.8 °F.  Sodium 139, potassium 4.4, chloride 101, total CO2 28, BUN 11, creatinine 0.98, GFR greater than 60, glucose 102. Troponin negative less than 0.010. WBC 6.9, hemoglobin 16.7, hematocrit 50.5, platelets 206. X-ray revealed no acute cardiopulmonary disease. EKG showed sinus rhythm with ventricular rate of 65 bpm, PACs, subtle T wave changes/T wave inversion in lead aVL and QTC of 411 ms. He was treated with nitroglycerin in the ER and eventually experienced resolution of his chest pressure. Was admitted for further evaluation. At time of evaluation today, patient is seen on 1 W. resting comfortably and in no acute distress. His chest pain has resolved. Patient had a cardiac catheterization back in 2016 which revealed mild nonobstructive CAD including 30% mid LAD stenosis and 40% diagonal stenosis. He has had no coronary evaluation since that time. Most recently had an echocardiogram completed on 8 1 of 2019 which revealed normal LV systolic function with ejection fraction of 60% and no significant valvular abnormalities.       Allergies   Allergen Reactions    Amoxicillin Other (See Comments)     stomatitis       Current Facility-Administered Medications   Medication Dose Route Frequency Provider Last Rate Last Dose    nitroGLYCERIN (NITROSTAT) SL tablet 0.4 mg  0.4 mg Sublingual Q5 Min PRN ELLIOT Arias - CNP   0.4 mg at 05/01/20 1230     Current Outpatient Medications   Medication Sig Dispense Refill    metoprolol succinate (TOPROL XL) 25 MG extended release tablet TAKE 1 TABLET DAILY 90 tablet 3    memantine (NAMENDA) 10 MG tablet Take 1 tablet by mouth 2 times daily 60 tablet 3    ondansetron (ZOFRAN-ODT) 4 MG disintegrating tablet Take 1 tablet by mouth 3 times daily as needed for Nausea or Vomiting 21 tablet 0    memantine (NAMENDA) 5 MG tablet Take 1 tablet by mouth 2 times daily 180 tablet 1    SUMAtriptan (IMITREX) 100 MG tablet       CPAP Machine MISC by Does not apply route Start auto CPAP with minimum of 5, maximum of 12, with low-profile mask and supplies 1 each 0    clopidogrel (PLAVIX) 75 MG tablet Take 1 tablet by mouth daily 30 tablet 3    omeprazole (PRILOSEC) 20 MG delayed release capsule TAKE 1 CAPSULE DAILY 90 capsule 3    tamsulosin (FLOMAX) 0.4 MG capsule TAKE 1 CAPSULE DAILY 90 capsule 3    finasteride (PROSCAR) 5 MG tablet TAKE 1 TABLET DAILY 90 tablet 3    rosuvastatin (CRESTOR) 10 MG tablet TAKE 1 TABLET DAILY 90 tablet 3    metoprolol succinate (TOPROL XL) 25 MG extended release tablet TAKE 1 TABLET DAILY 90 tablet 3    Psyllium (METAMUCIL FIBER PO) Take by mouth      Cholecalciferol (VITAMIN D3) 1000 UNITS TABS Take 1,900 Units by mouth daily       calcium carbonate 600 MG TABS tablet Take 1 tablet by mouth daily       fish oil-omega-3 fatty acids 1000 MG capsule Take 2 g by mouth 2 times daily.  Multiple Vitamin (MULTI-VITAMIN PO) Take  by mouth daily.  Magnesium 250 MG TABS Take by mouth daily Every other day      Ascorbic Acid (VITAMIN C) 500 MG tablet Take 500 mg by mouth daily.  aspirin 81 MG tablet Take 81 mg by mouth daily. PMHx:  Past Medical History:   Diagnosis Date    Abnormal cardiovascular stress test 4/19/2016    Equivocal ST-T wave changes;  Defect in the inferior wall consistent with prior infarction; LV EF 79%; TID ratio 1.1    Actinic keratoses     BPH (benign prostatic hypertrophy)     CAD (coronary artery disease)     Cancer (HCC)     COLON    Chest pressure 5/3/2016    Cholelithiasis     Hyperlipidemia     Inflamed seborrheic keratosis     Lumbar radiculopathy 10/26/2016    Mild cognitive impairment     Osteoarthritis     Parathyroid tumor     Parkinson disease ([address]     RA (retrograde amnesia)     Sinus bradycardia on ECG 4/19/2016    Done 4/5/16 with Dr. Fatoumata Augustin Syncope and collapse        PSHx:  Past Surgical History:   Procedure Laterality Date    BACK SURGERY 1993    COLECTOMY      COLONOSCOPY  8/18/10,09/11/2012    DR POLK    COLONOSCOPY  09/29/14     [address] [place]    JOINT REPLACEMENT Left 5/28/13    total    PARATHYROIDECTOMY Bilateral 2011    2 of 4 glands removed    NM COLON CA SCRN NOT HI RSK IND N/A 9/15/2017    COLONOSCOPY performed by Jaspreet Greenwood MD at [facility] ENDOSCOPY  8/11/09    DR POLK       Social Hx:  Social History     Socioeconomic History    Marital status:      Spouse name: None    Number of children: None    Years of education: None    Highest education level: None   Occupational History    None   Social Needs    Financial resource strain: None    Food insecurity     Worry: None     Inability: None    Transportation needs     Medical: None     Non-medical: None   Tobacco Use    Smoking status: Never Smoker    Smokeless tobacco: Never Used   Substance and Sexual Activity    Alcohol use: No    Drug use: No    Sexual activity: None   Lifestyle    Physical activity     Days per week: None     Minutes per session: None    Stress: None   Relationships    Social connections     Talks on phone: None     Gets together: None     Attends Faith service: None     Active member of club or organization: None     Attends meetings of clubs or organizations: None     Relationship status: None    Intimate partner violence     Fear of current or ex partner: None     Emotionally abused: None     Physically abused: None     Forced sexual activity: None   Other Topics Concern    None   Social History Narrative    None       Family Hx:  Family History   Problem Relation Age of Onset    Cancer Mother         OVARIAN    Heart Disease Father        Review ofSystems:   Review of Systems   Constitutional: Negative for activity change and fever. HENT: Negative for congestion. Respiratory: Positive for chest tightness and shortness of breath.  Negative for 05/01/2020    MCV 93.0 05/01/2020    MCH 30.8 05/01/2020    MCHC 33.1 05/01/2020    RDW 13.9 05/01/2020     05/01/2020    MPV 10.9 07/15/2015     CBC with Differential:   Lab Results   Component Value Date    WBC 6.9 05/01/2020    RBC 5.44 05/01/2020    HGB 16.7 05/01/2020    HCT 50.5 05/01/2020     05/01/2020    MCV 93.0 05/01/2020    MCH 30.8 05/01/2020    MCHC 33.1 05/01/2020    RDW 13.9 05/01/2020    LYMPHOPCT 19.0 05/01/2020    MONOPCT 8.0 05/01/2020    BASOPCT 0.6 05/01/2020    MONOSABS 0.6 05/01/2020    LYMPHSABS 1.3 05/01/2020    EOSABS 0.1 05/01/2020    BASOSABS 0.0 05/01/2020     CMP:    Lab Results   Component Value Date     05/01/2020    K 4.4 05/01/2020    K 3.9 08/02/2019     05/01/2020    CO2 28 05/01/2020    BUN 11 05/01/2020    CREATININE 0.98 05/01/2020    GFRAA >60.0 05/01/2020    LABGLOM >60.0 05/01/2020    GLUCOSE 102 05/01/2020    GLUCOSE 107 03/14/2012    PROT 7.3 08/11/2019    LABALBU 4.6 08/11/2019    LABALBU 4.1 03/14/2012    CALCIUM 9.1 05/01/2020    BILITOT 0.4 08/11/2019    ALKPHOS 50 08/11/2019    AST 18 08/11/2019    ALT 18 08/11/2019     BMP:    Lab Results   Component Value Date     05/01/2020    K 4.4 05/01/2020    K 3.9 08/02/2019     05/01/2020    CO2 28 05/01/2020    BUN 11 05/01/2020    LABALBU 4.6 08/11/2019    LABALBU 4.1 03/14/2012    CREATININE 0.98 05/01/2020    CALCIUM 9.1 05/01/2020    GFRAA >60.0 05/01/2020    LABGLOM >60.0 05/01/2020    GLUCOSE 102 05/01/2020    GLUCOSE 107 03/14/2012     Magnesium:    Lab Results   Component Value Date    MG 2.3 08/11/2019     Troponin:    Lab Results   Component Value Date    TROPONINI <0.010 05/01/2020     No results for input(s): PROBNP in the last 72 hours. No results for input(s): INR in the last 72 hours.     RADIOLOGY:    XR Chest portable 5/1/20:     Narrative   EXAMINATION: XR CHEST PORTABLE       CLINICAL HISTORY: CHEST PRESSURE AND NAUSEA       COMPARISONS: CHEST RADIOGRAPH, AUGUST 11,

## 2020-05-01 NOTE — ED PROVIDER NOTES
[facility] ED  EMERGENCY DEPARTMENT ENCOUNTER      Pt Name: Katy Mcgarry  MRN: [id number]  [place] 1945  Date of evaluation: 5/1/2020  Provider: Isa Melton       Chief Complaint   Patient presents with    Chest Pain     increased confusion chest pain since wednesday         HISTORY OF PRESENT ILLNESS   (Location/Symptom, Timing/Onset,Context/Setting, Quality, Duration, Modifying Factors, Severity)  Note limiting factors. Katy Mcgarry is a 69 y.o. male who presents to the emergency department with complaints of chest pain. Chest pain has been present for the last 2 days and worsening on exertion. Chest pain is described as a heaviness or pressure. Patient has a longstanding history of vascular dementia with confusion. Wife reports she brought him to the ER after he has had the same, consistent complaints of chest pain for the last 2 days. She does report that he does not typically complain of chest pain and that has been at least 5 or 6 years she believes since his last heart cath. He is followed by Dr. Leti Ingram. Location/Symptom -chest pain  Onset -2 days  Context/Setting - as above  Quality -pressure, heavy  Duration -episodic  Modifying Factors -worse on exertion  Severity -moderate to severe        Nursing Notes were reviewed. REVIEW OF SYSTEMS    (2-9 systems for level 4, 10 or more for level 5)     Review of Systems   Constitutional: Negative for chills, diaphoresis, fatigue and fever. HENT: Negative for congestion, rhinorrhea and sore throat. Eyes: Negative for photophobia and pain. Respiratory: Negative for cough and shortness of breath. Cardiovascular: Positive for chest pain. Negative for palpitations. Gastrointestinal: Negative for abdominal pain, diarrhea, nausea and vomiting. Genitourinary: Negative for dysuria and flank pain. Musculoskeletal: Negative for back pain. Skin: Negative for rash.

## 2020-05-02 VITALS
OXYGEN SATURATION: 98 % | TEMPERATURE: 97.5 F | DIASTOLIC BLOOD PRESSURE: 71 MMHG | RESPIRATION RATE: 20 BRPM | BODY MASS INDEX: 24.5 KG/M2 | WEIGHT: 175 LBS | SYSTOLIC BLOOD PRESSURE: 173 MMHG | HEIGHT: 71 IN | HEART RATE: 59 BPM

## 2020-05-02 LAB
ANION GAP SERPL CALCULATED.3IONS-SCNC: 14 MEQ/L (ref 9–15)
BUN BLDV-MCNC: 11 MG/DL (ref 8–23)
CALCIUM SERPL-MCNC: 9 MG/DL (ref 8.5–9.9)
CHLORIDE BLD-SCNC: 105 MEQ/L (ref 95–107)
CHOLESTEROL, TOTAL: 108 MG/DL (ref 0–199)
CO2: 24 MEQ/L (ref 20–31)
CREAT SERPL-MCNC: 0.83 MG/DL (ref 0.7–1.2)
EKG ATRIAL RATE: 49 BPM
EKG ATRIAL RATE: 65 BPM
EKG P AXIS: 70 DEGREES
EKG P AXIS: 80 DEGREES
EKG P-R INTERVAL: 130 MS
EKG P-R INTERVAL: 142 MS
EKG Q-T INTERVAL: 396 MS
EKG Q-T INTERVAL: 454 MS
EKG QRS DURATION: 82 MS
EKG QRS DURATION: 84 MS
EKG QTC CALCULATION (BAZETT): 410 MS
EKG QTC CALCULATION (BAZETT): 411 MS
EKG R AXIS: 52 DEGREES
EKG R AXIS: 57 DEGREES
EKG T AXIS: 67 DEGREES
EKG T AXIS: 72 DEGREES
EKG VENTRICULAR RATE: 49 BPM
EKG VENTRICULAR RATE: 65 BPM
GFR AFRICAN AMERICAN: >60
GFR NON-AFRICAN AMERICAN: >60
GLUCOSE BLD-MCNC: 93 MG/DL (ref 70–99)
HCT VFR BLD CALC: 46.1 % (ref 42–52)
HDLC SERPL-MCNC: 55 MG/DL (ref 40–59)
HEMOGLOBIN: 15.5 G/DL (ref 14–18)
LDL CHOLESTEROL CALCULATED: 41 MG/DL (ref 0–129)
MAGNESIUM: 2.2 MG/DL (ref 1.7–2.4)
MCH RBC QN AUTO: 31.1 PG (ref 27–31.3)
MCHC RBC AUTO-ENTMCNC: 33.6 % (ref 33–37)
MCV RBC AUTO: 92.4 FL (ref 80–100)
PDW BLD-RTO: 13.7 % (ref 11.5–14.5)
PLATELET # BLD: 135 K/UL (ref 130–400)
POTASSIUM SERPL-SCNC: 3.8 MEQ/L (ref 3.4–4.9)
RBC # BLD: 5 M/UL (ref 4.7–6.1)
SODIUM BLD-SCNC: 143 MEQ/L (ref 135–144)
TRIGL SERPL-MCNC: 61 MG/DL (ref 0–150)
WBC # BLD: 5 K/UL (ref 4.8–10.8)

## 2020-05-02 PROCEDURE — 6370000000 HC RX 637 (ALT 250 FOR IP): Performed by: PHYSICIAN ASSISTANT

## 2020-05-02 PROCEDURE — 99217 PR OBSERVATION CARE DISCHARGE MANAGEMENT: CPT | Performed by: INTERNAL MEDICINE

## 2020-05-02 PROCEDURE — 36415 COLL VENOUS BLD VENIPUNCTURE: CPT

## 2020-05-02 PROCEDURE — 6370000000 HC RX 637 (ALT 250 FOR IP): Performed by: INTERNAL MEDICINE

## 2020-05-02 PROCEDURE — 6360000002 HC RX W HCPCS: Performed by: INTERNAL MEDICINE

## 2020-05-02 PROCEDURE — G0378 HOSPITAL OBSERVATION PER HR: HCPCS

## 2020-05-02 PROCEDURE — 80048 BASIC METABOLIC PNL TOTAL CA: CPT

## 2020-05-02 PROCEDURE — 2580000003 HC RX 258: Performed by: INTERNAL MEDICINE

## 2020-05-02 PROCEDURE — 83735 ASSAY OF MAGNESIUM: CPT

## 2020-05-02 PROCEDURE — 93005 ELECTROCARDIOGRAM TRACING: CPT | Performed by: INTERNAL MEDICINE

## 2020-05-02 PROCEDURE — 85027 COMPLETE CBC AUTOMATED: CPT

## 2020-05-02 PROCEDURE — 96372 THER/PROPH/DIAG INJ SC/IM: CPT

## 2020-05-02 PROCEDURE — 80061 LIPID PANEL: CPT

## 2020-05-02 RX ORDER — NITROGLYCERIN 0.4 MG/1
TABLET SUBLINGUAL
Qty: 25 TABLET | Refills: 3 | Status: SHIPPED | OUTPATIENT
Start: 2020-05-02

## 2020-05-02 RX ADMIN — MEMANTINE HYDROCHLORIDE 10 MG: 10 TABLET ORAL at 09:31

## 2020-05-02 RX ADMIN — FINASTERIDE 5 MG: 5 TABLET, FILM COATED ORAL at 09:31

## 2020-05-02 RX ADMIN — METOPROLOL SUCCINATE 25 MG: 25 TABLET, EXTENDED RELEASE ORAL at 09:31

## 2020-05-02 RX ADMIN — VITAMIN D, TAB 1000IU (100/BT) 2000 UNITS: 25 TAB at 09:31

## 2020-05-02 RX ADMIN — PANTOPRAZOLE SODIUM 40 MG: 40 TABLET, DELAYED RELEASE ORAL at 05:29

## 2020-05-02 RX ADMIN — ASPIRIN 81 MG: 81 TABLET, COATED ORAL at 09:31

## 2020-05-02 RX ADMIN — OXYCODONE HYDROCHLORIDE AND ACETAMINOPHEN 500 MG: 500 TABLET ORAL at 09:31

## 2020-05-02 RX ADMIN — TAMSULOSIN HYDROCHLORIDE 0.4 MG: 0.4 CAPSULE ORAL at 09:31

## 2020-05-02 RX ADMIN — ACETAMINOPHEN 650 MG: 325 TABLET ORAL at 05:29

## 2020-05-02 RX ADMIN — ENOXAPARIN SODIUM 40 MG: 40 INJECTION SUBCUTANEOUS at 09:31

## 2020-05-02 RX ADMIN — Medication 10 ML: at 09:31

## 2020-05-02 RX ADMIN — Medication 2000 MG: at 09:31

## 2020-05-02 ASSESSMENT — PAIN SCALES - GENERAL
PAINLEVEL_OUTOF10: 4
PAINLEVEL_OUTOF10: 4

## 2020-05-02 NOTE — DISCHARGE SUMMARY
Cardiology Discharge Summary      Patient Identification:  Abby Pal  : 1945  MRN: [id number]   Account: [de-identified]     Admit date: 2020  Discharge date: 20   Attending provider: Jose Miguel Huggins DO        Primary care provider: Anastacio Bucio MD     Admission Diagnoses:  <principal problem not specified>         Discharge Diagnoses: Active Hospital Problems    Diagnosis Date Noted    Chest pain [R07.9] 2020     Priority: High     HPI:      History of Present Illness:     This is a very pleasant 72-year-old  male with past medical history significant for history of nonobstructive coronary artery disease per cardiac catheterization in 2016, Parkinson's disease, history of mild cognitive impairment with suspicion for vascular dementia followed by Dr. Cristian Paris, dyslipidemia, history of colon cancer status post resection approximately 11 years ago, and remote tobacco abuse who presented to the emergency room today with complaints of chest pain. Given patient's mild cognitive impairment, history was obtained from both patient and wife via telephone. Patient reportedly has been complaining of episodes of chest pain over the past week which usually begin upon waking in the morning. Patient describes his chest pain as a midsternal to diffuse chest pressure which is exacerbated with activity and alleviated with rest.  He will experience associated nausea but no vomiting and also experiences associated shortness of breath. He denies any radiation of his chest pain. Additionally, he complains of increasing confusion and forgetfulness which is been an ongoing issue for him. He also complains of disequilibrium and feeling unsteady on his feet. He denies any dizziness, lightheadedness, diaphoresis, palpitations, paroxysmal nocturnal dyspnea, orthopnea, syncope, fever or upper respiratory symptoms.   Per patient's wife, patient had not wanted to come to the ER 2 days ago but with his episode of chest pressure today he requested that she bring him into the emergency room.        On presentation to the emergency room, blood pressure elevated at 159/86, heart rate 63, respiratory rate 18, pulse ox of 97%, temperature of 97.8 °F.  Sodium 139, potassium 4.4, chloride 101, total CO2 28, BUN 11, creatinine 0.98, GFR greater than 60, glucose 102. Troponin negative less than 0.010. WBC 6.9, hemoglobin 16.7, hematocrit 50.5, platelets 709. X-ray revealed no acute cardiopulmonary disease. EKG showed sinus rhythm with ventricular rate of 65 bpm, PACs, subtle T wave changes/T wave inversion in lead aVL and QTC of 411 ms. He was treated with nitroglycerin in the ER and eventually experienced resolution of his chest pressure. Was admitted for further evaluation.     At time of evaluation today, patient is seen on 1 W. resting comfortably and in no acute distress. His chest pain has resolved. Patient had a cardiac catheterization back in 2016 which revealed mild nonobstructive CAD including 30% mid LAD stenosis and 40% diagonal stenosis. He has had no coronary evaluation since that time. Most recently had an echocardiogram completed on 8 1 of 2019 which revealed normal LV systolic function with ejection fraction of 60% and no significant valvular abnormalities. Hospital Course:   trops were negative. Patient was CP free. No alrams on tele. He was discharged home in stable condition. Procedures:   None. Consults:   none    Examination:  BP (!) 173/71   Pulse 59   Temp 97.5 °F (36.4 °C) (Oral)   Resp 20   Ht 5' 10.5\" (1.791 m)   Wt 175 lb (79.4 kg)   SpO2 98%   BMI 24.75 kg/m²    Physical Exam  Constitutional:       General: He is not in acute distress. Appearance: He is well-developed. He is not ill-appearing, toxic-appearing or diaphoretic. HENT:      Head: Normocephalic and atraumatic.    Eyes: Conjunctiva/sclera: Conjunctivae normal.      Pupils: Pupils are equal, round, and reactive to light. Neck:      Musculoskeletal: Normal range of motion. Thyroid: No thyromegaly. Vascular: Normal carotid pulses. No carotid bruit, hepatojugular reflux or JVD. Trachea: Trachea normal.   Cardiovascular:      Rate and Rhythm: Normal rate and regular rhythm. Chest Wall: PMI is not displaced. Pulses:           Carotid pulses are 3+ on the right side and 3+ on the left side. Radial pulses are 3+ on the right side and 3+ on the left side. Femoral pulses are 3+ on the right side and 3+ on the left side. Popliteal pulses are 3+ on the right side and 3+ on the left side. Dorsalis pedis pulses are 3+ on the right side and 3+ on the left side. Posterior tibial pulses are 3+ on the right side and 3+ on the left side. Heart sounds: Normal heart sounds, S1 normal and S2 normal. Heart sounds not distant. No murmur. No friction rub. No gallop. No S3 or S4 sounds. Pulmonary:      Effort: Pulmonary effort is normal. No tachypnea or respiratory distress. Breath sounds: Normal breath sounds. No wheezing or rales. Chest:      Chest wall: No tenderness. Abdominal:      General: Bowel sounds are normal. There is no distension. Palpations: Abdomen is soft. Abdomen is not rigid. There is no pulsatile mass. Tenderness: There is no abdominal tenderness. There is no guarding or rebound. Musculoskeletal: Normal range of motion. General: No tenderness. Skin:     General: Skin is warm. Findings: No erythema. Neurological:      Mental Status: He is alert and oriented to person, place, and time. Cranial Nerves: No cranial nerve deficit. Psychiatric:         Speech: Speech normal.         Behavior: Behavior normal.         Thought Content:  Thought content normal.         Judgment: Judgment normal.         Medications:  Current Discharge Medication List      START taking these medications    Details   nitroGLYCERIN (NITROSTAT) 0.4 MG SL tablet up to max of 3 total doses. If no relief after 1 dose, call 911.   Qty: 25 tablet, Refills: 3         CONTINUE these medications which have NOT CHANGED    Details   memantine (NAMENDA) 10 MG tablet Take 1 tablet by mouth 2 times daily  Qty: 60 tablet, Refills: 3      ondansetron (ZOFRAN-ODT) 4 MG disintegrating tablet Take 1 tablet by mouth 3 times daily as needed for Nausea or Vomiting  Qty: 21 tablet, Refills: 0      CPAP Machine MISC by Does not apply route Start auto CPAP with minimum of 5, maximum of 12, with low-profile mask and supplies  Qty: 1 each, Refills: 0      clopidogrel (PLAVIX) 75 MG tablet Take 1 tablet by mouth daily  Qty: 30 tablet, Refills: 3      omeprazole (PRILOSEC) 20 MG delayed release capsule TAKE 1 CAPSULE DAILY  Qty: 90 capsule, Refills: 3    Associated Diagnoses: Gastroesophageal reflux disease, esophagitis presence not specified      tamsulosin (FLOMAX) 0.4 MG capsule TAKE 1 CAPSULE DAILY  Qty: 90 capsule, Refills: 3      finasteride (PROSCAR) 5 MG tablet TAKE 1 TABLET DAILY  Qty: 90 tablet, Refills: 3      rosuvastatin (CRESTOR) 10 MG tablet TAKE 1 TABLET DAILY  Qty: 90 tablet, Refills: 3    Associated Diagnoses: Hyperlipidemia, unspecified hyperlipidemia type; Coronary artery disease involving native heart, angina presence unspecified, unspecified vessel or lesion type      metoprolol succinate (TOPROL XL) 25 MG extended release tablet TAKE 1 TABLET DAILY  Qty: 90 tablet, Refills: 3    Associated Diagnoses: Coronary artery disease involving native heart without angina pectoris, unspecified vessel or lesion type      Psyllium (METAMUCIL FIBER PO) Take by mouth      Cholecalciferol (VITAMIN D3) 1000 UNITS TABS Take 1,900 Units by mouth daily       calcium carbonate 600 MG TABS tablet Take 1 tablet by mouth nightly       fish oil-omega-3 fatty acids 1000 MG capsule Take 2 g by mouth

## 2020-05-04 PROCEDURE — 93010 ELECTROCARDIOGRAM REPORT: CPT | Performed by: INTERNAL MEDICINE

## 2020-05-05 ENCOUNTER — TELEPHONE (OUTPATIENT)
Dept: FAMILY MEDICINE CLINIC | Age: 75
End: 2020-05-05

## 2020-05-08 ENCOUNTER — OFFICE VISIT (OUTPATIENT)
Dept: FAMILY MEDICINE CLINIC | Age: 75
End: 2020-05-08
Payer: MEDICARE

## 2020-05-08 VITALS
OXYGEN SATURATION: 98 % | TEMPERATURE: 97.9 F | HEART RATE: 66 BPM | DIASTOLIC BLOOD PRESSURE: 70 MMHG | HEIGHT: 71 IN | WEIGHT: 173 LBS | RESPIRATION RATE: 18 BRPM | BODY MASS INDEX: 24.22 KG/M2 | SYSTOLIC BLOOD PRESSURE: 106 MMHG

## 2020-05-08 PROCEDURE — 99495 TRANSJ CARE MGMT MOD F2F 14D: CPT | Performed by: FAMILY MEDICINE

## 2020-05-08 PROCEDURE — 1111F DSCHRG MED/CURRENT MED MERGE: CPT | Performed by: FAMILY MEDICINE

## 2020-05-08 ASSESSMENT — ENCOUNTER SYMPTOMS
ABDOMINAL PAIN: 1
SHORTNESS OF BREATH: 0
ALLERGIC/IMMUNOLOGIC NEGATIVE: 1
EYES NEGATIVE: 1
NAUSEA: 1
RESPIRATORY NEGATIVE: 1

## 2020-05-13 ENCOUNTER — HOSPITAL ENCOUNTER (OUTPATIENT)
Dept: NUCLEAR MEDICINE | Age: 75
Discharge: HOME | End: 2020-05-15
Payer: MEDICARE

## 2020-05-13 PROCEDURE — 93017 CV STRESS TEST TRACING ONLY: CPT

## 2020-05-13 PROCEDURE — 3430000000 HC RX DIAGNOSTIC RADIOPHARMACEUTICAL: Performed by: INTERNAL MEDICINE

## 2020-05-13 PROCEDURE — 2580000003 HC RX 258: Performed by: INTERNAL MEDICINE

## 2020-05-13 PROCEDURE — 78452 HT MUSCLE IMAGE SPECT MULT: CPT

## 2020-05-13 PROCEDURE — 93018 CV STRESS TEST I&R ONLY: CPT | Performed by: INTERNAL MEDICINE

## 2020-05-13 PROCEDURE — A9502 TC99M TETROFOSMIN: HCPCS | Performed by: INTERNAL MEDICINE

## 2020-05-13 PROCEDURE — 6360000002 HC RX W HCPCS: Performed by: INTERNAL MEDICINE

## 2020-05-13 RX ORDER — SODIUM CHLORIDE 0.9 % (FLUSH) 0.9 %
10 SYRINGE (ML) INJECTION PRN
Status: COMPLETED | OUTPATIENT
Start: 2020-05-13 | End: 2020-05-13

## 2020-05-13 RX ADMIN — Medication 10 ML: at 08:52

## 2020-05-13 RX ADMIN — Medication 10 ML: at 10:23

## 2020-05-13 RX ADMIN — TETROFOSMIN 11.8 MILLICURIE: 1.38 INJECTION, POWDER, LYOPHILIZED, FOR SOLUTION INTRAVENOUS at 08:52

## 2020-05-13 RX ADMIN — TETROFOSMIN 30.4 MILLICURIE: 1.38 INJECTION, POWDER, LYOPHILIZED, FOR SOLUTION INTRAVENOUS at 10:26

## 2020-05-13 RX ADMIN — Medication 10 ML: at 10:26

## 2020-05-13 RX ADMIN — REGADENOSON 0.4 MG: 0.08 INJECTION, SOLUTION INTRAVENOUS at 10:26

## 2020-05-13 NOTE — PROCEDURES
[address]                      [address], [address] [facility]                              CARDIAC STRESS TEST    PATIENT NAME: Luna Washington              :        1945  MED REC NO:   [id number]                            ROOM:  ACCOUNT NO:   [de-identified]                           ADMIT DATE: 2020  PROVIDER:     Aurora Frey MD    CARDIOVASCULAR DIAGNOSTIC DEPARTMENT    DATE OF STUDY:  2020    Sharon Plate PROVIDER:  Dr. Reji Bravo:  At rest, the patient was injected with 11.8 mCi of Myoview. Resting images were obtained. The patient was then given 0.4 mg of  Lexiscan followed by administration of 30.4 mCi of Myoview. Stress  tomographic images were then obtained. Left ventricular ejection  fraction and gated wall motion were acquired. RESULTS:  Resting heart rate was 58 beats per minute. Maximum heart  rate achieved was 80 beats per minute. There were occasional PVCs  noted. No diagnostic ST-segment changes were seen. IMAGING RESULTS:  Review of rest and stress tomographic images revealed  diaphragmatic attenuation artifact. Wall motion is normal.  There is no  evidence of prior myocardial infarction or ischemia. Left ventricular  ejection fraction is 79%. TID ratio is 0.9, which is within normal  limits. IMPRESSION:  1. Homogenous myocardial perfusion with no evidence of prior myocardial  infarction or ischemia. 2.  Diaphragmatic attenuation artifact. 3.  Normal left ventricular ejection fraction of 79%. 4.  Normal TID ratio of 0.9, which is within normal limits. 5.  Occasional PVCs.         Todd Awad MD    D: 2020 #15:30:52       T: 2020 16:39:04     [email or web address]  Job#: [id number]     Doc#: [id number]    CC:

## 2020-05-13 NOTE — PROGRESS NOTES
Reviewed history, allergies, and medications. Consent confirmed. Lexiscan exam explained. Placed patient on monitor. [address] Nuclear Medicine tech here to inject Meri Loth. SOB noted during recovery phase. Denied chest pain. No ectopy noted during the testing. Patient off monitor and instructed to eat, will have last part of exam in 1 hour. Noted irregularity to the EKG in the pre testing phase. Patient tolerated testing well with no concern.

## 2020-05-19 ENCOUNTER — VIRTUAL VISIT (OUTPATIENT)
Dept: CARDIOLOGY CLINIC | Age: 75
End: 2020-05-19
Payer: MEDICARE

## 2020-05-19 PROCEDURE — 99213 OFFICE O/P EST LOW 20 MIN: CPT | Performed by: INTERNAL MEDICINE

## 2020-05-19 PROCEDURE — 1111F DSCHRG MED/CURRENT MED MERGE: CPT | Performed by: INTERNAL MEDICINE

## 2020-05-19 ASSESSMENT — ENCOUNTER SYMPTOMS
CHEST TIGHTNESS: 0
NAUSEA: 0
SHORTNESS OF BREATH: 1
APNEA: 0
VOMITING: 0
BLOOD IN STOOL: 0
DIARRHEA: 0

## 2020-05-19 NOTE — PROGRESS NOTES
2020    TELEHEALTH EVALUATION -- Audio/Visual (During [id number]-[id number] public health emergency)    HPI:    Sil Rodriguez (:  1945) has requested an audio/video evaluation for the following concern(s):    Marcus Batres was seen in the hospital for chest pain. Had a stress test that was negative. Supposed to have ultrasound the gallbladder tomorrow. In the past had a cardiac catheterization which revealed mild disease LV ejection fraction normal at 79%. Last catheterization was . Also has a history of vascular dementia followed by Dr. Marleen Reed. He will see me in 3 months    Review of Systems   Constitutional: Positive for fatigue. Negative for diaphoresis. HENT: Negative for nosebleeds. Respiratory: Positive for shortness of breath. Negative for apnea and chest tightness. Cardiovascular: Positive for chest pain. Negative for palpitations and leg swelling. Gastrointestinal: Negative for blood in stool, diarrhea, nausea and vomiting. Musculoskeletal: Negative for myalgias, neck pain and neck stiffness. Neurological: Negative for dizziness, seizures, syncope, weakness, light-headedness, numbness and headaches. Hematological: Does not bruise/bleed easily. Psychiatric/Behavioral: Negative for confusion. The patient is not nervous/anxious. All other systems reviewed and are negative. Prior to Visit Medications    Medication Sig Taking? Authorizing Provider   nitroGLYCERIN (NITROSTAT) 0.4 MG SL tablet up to max of 3 total doses. If no relief after 1 dose, call 911.  Yes Med Puente, DO   memantine (NAMENDA) 10 MG tablet Take 1 tablet by mouth 2 times daily Yes Rosetta Benedict MD   ondansetron (ZOFRAN-ODT) 4 MG disintegrating tablet Take 1 tablet by mouth 3 times daily as needed for Nausea or Vomiting Yes Cris Griffith PA-C   SUMAtriptan (IMITREX) 100 MG tablet  Yes Historical Provider, MD   CPAP Machine MISC by Does not apply route Start auto CPAP with minimum of 5, Vitals/Constitutional/EENT/Resp/CV/GI//MS/Neuro/Skin/Heme-Lymph-Imm. Pursuant to the emergency declaration under the [facility], [address] [place] [address] and the [employer] Act, this Virtual Visit was conducted with patient's (and/or legal guardian's) consent, to reduce the patient's risk of exposure to COVID-19 and provide necessary medical care. The patient (and/or legal guardian) has also been advised to contact this office for worsening conditions or problems, and seek emergency medical treatment and/or call 911 if deemed necessary. Patient identification was verified at the start of the visit: Yes    Total time spent on this encounter: 21-30 minutes    Services were provided through a video synchronous discussion virtually to substitute for in-person clinic visit. Patient and provider were located at their individual homes. --Aurora Frey MD on 5/19/2020 at 4:16 PM    An electronic signature was used to authenticate this note. [facility] CARDIOLOGY OFFICE FOLLOW-UP      Patient: Demarcus March  YOB: 1945  MRN: [id number]    Chief Complaint:  Chief Complaint   Patient presents with    [facility]    Chest Pain     C/O CP still     Results     Stress test done    Shortness of Breath         Subjective/HPI:  5/28/19: Patient presents today for follow-up of chest pain. Cardiac cath was negative. He was told by Dr. Nikki Clements that MRI showed that he may have had a prior CVA. Congestive heart failure symptoms or syncope. See me in one year     5/16/18: Patient presents today for follow-up of chest pain. That has resolved. Cardiac cath was negative. His mild hypertension dyslipidemia that stable. Was diagnosed lately to have either Parkinson's disease. And supposed to have a follow-up visit with Dr. Meme Villanueva in the near future.  He'll see me in 6 replacement, total    Seborrheic keratosis    History of colon cancer    Disturbance of skin sensation    Degenerative joint disease of pelvic region    History of repair of hip joint    Sinus bradycardia on ECG    Lumbar radiculopathy    Gastroesophageal reflux disease with esophagitis    Actinic keratoses    Inflamed seborrheic keratosis    PETRA (obstructive sleep apnea)    TIA (transient ischemic attack)    Parkinson disease (HCC)    RA (retrograde amnesia)    Syncope and collapse    Mild cognitive impairment    Chest pain       There are no discontinued medications. Modified Medications    No medications on file       No orders of the defined types were placed in this encounter. Assessment:    1. Coronary artery disease involving native heart, angina presence unspecified, unspecified vessel or lesion type    2. Sinus bradycardia on ECG       Plan:   Stay on same medications. See me in 1 year. This note was partially generated using Dragon voice recognition system, and there may be some incorrect words, spellings, punctuation that were not noticed in checking the note before saving.         Electronically signed by Glenny Tanner MD on 5/19/2020 at 10:41 AM

## 2020-05-20 ENCOUNTER — HOSPITAL ENCOUNTER (OUTPATIENT)
Dept: ULTRASOUND IMAGING | Age: 75
Discharge: HOME | End: 2020-05-22
Payer: MEDICARE

## 2020-05-20 PROCEDURE — 76705 ECHO EXAM OF ABDOMEN: CPT

## 2020-05-25 ENCOUNTER — HOSPITAL ENCOUNTER (INPATIENT)
Age: 75
LOS: 1 days | Discharge: HOME | DRG: 419 | End: 2020-05-28
Attending: EMERGENCY MEDICINE | Admitting: INTERNAL MEDICINE
Payer: MEDICARE

## 2020-05-25 ENCOUNTER — APPOINTMENT (OUTPATIENT)
Dept: GENERAL RADIOLOGY | Age: 75
DRG: 419 | End: 2020-05-25
Payer: MEDICARE

## 2020-05-25 LAB
ALBUMIN SERPL-MCNC: 4.1 G/DL (ref 3.5–4.6)
ALP BLD-CCNC: 44 U/L (ref 35–104)
ALT SERPL-CCNC: 26 U/L (ref 0–41)
ANION GAP SERPL CALCULATED.3IONS-SCNC: 9 MEQ/L (ref 9–15)
APTT: 28.7 SEC (ref 24.4–36.8)
AST SERPL-CCNC: 22 U/L (ref 0–40)
BASOPHILS ABSOLUTE: 0 K/UL (ref 0–0.2)
BASOPHILS RELATIVE PERCENT: 0.5 %
BILIRUB SERPL-MCNC: 0.5 MG/DL (ref 0.2–0.7)
BUN BLDV-MCNC: 12 MG/DL (ref 8–23)
CALCIUM SERPL-MCNC: 9.4 MG/DL (ref 8.5–9.9)
CHLORIDE BLD-SCNC: 107 MEQ/L (ref 95–107)
CO2: 28 MEQ/L (ref 20–31)
CREAT SERPL-MCNC: 0.86 MG/DL (ref 0.7–1.2)
D DIMER: 0.41 MG/L FEU (ref 0–0.5)
EOSINOPHILS ABSOLUTE: 0 K/UL (ref 0–0.7)
EOSINOPHILS RELATIVE PERCENT: 0.5 %
GFR AFRICAN AMERICAN: >60
GFR NON-AFRICAN AMERICAN: >60
GLOBULIN: 2.4 G/DL (ref 2.3–3.5)
GLUCOSE BLD-MCNC: 99 MG/DL (ref 70–99)
HCT VFR BLD CALC: 47.4 % (ref 42–52)
HEMOGLOBIN: 16 G/DL (ref 14–18)
INR BLD: 1
LIPASE: 35 U/L (ref 12–95)
LYMPHOCYTES ABSOLUTE: 0.9 K/UL (ref 1–4.8)
LYMPHOCYTES RELATIVE PERCENT: 12.1 %
MCH RBC QN AUTO: 31.4 PG (ref 27–31.3)
MCHC RBC AUTO-ENTMCNC: 33.8 % (ref 33–37)
MCV RBC AUTO: 92.9 FL (ref 80–100)
MONOCYTES ABSOLUTE: 0.4 K/UL (ref 0.2–0.8)
MONOCYTES RELATIVE PERCENT: 5.7 %
NEUTROPHILS ABSOLUTE: 6 K/UL (ref 1.4–6.5)
NEUTROPHILS RELATIVE PERCENT: 81.2 %
PDW BLD-RTO: 13.2 % (ref 11.5–14.5)
PLATELET # BLD: 140 K/UL (ref 130–400)
POTASSIUM REFLEX MAGNESIUM: 4.2 MEQ/L (ref 3.4–4.9)
PROTHROMBIN TIME: 13 SEC (ref 12.3–14.9)
RBC # BLD: 5.1 M/UL (ref 4.7–6.1)
SODIUM BLD-SCNC: 144 MEQ/L (ref 135–144)
TOTAL PROTEIN: 6.5 G/DL (ref 6.3–8)
TROPONIN: <0.01 NG/ML (ref 0–0.01)
WBC # BLD: 7.4 K/UL (ref 4.8–10.8)

## 2020-05-25 PROCEDURE — 93005 ELECTROCARDIOGRAM TRACING: CPT | Performed by: EMERGENCY MEDICINE

## 2020-05-25 PROCEDURE — G0378 HOSPITAL OBSERVATION PER HR: HCPCS

## 2020-05-25 PROCEDURE — 80053 COMPREHEN METABOLIC PANEL: CPT

## 2020-05-25 PROCEDURE — 85379 FIBRIN DEGRADATION QUANT: CPT

## 2020-05-25 PROCEDURE — 99223 1ST HOSP IP/OBS HIGH 75: CPT | Performed by: INTERNAL MEDICINE

## 2020-05-25 PROCEDURE — 2580000003 HC RX 258: Performed by: INTERNAL MEDICINE

## 2020-05-25 PROCEDURE — 84484 ASSAY OF TROPONIN QUANT: CPT

## 2020-05-25 PROCEDURE — 6360000002 HC RX W HCPCS: Performed by: INTERNAL MEDICINE

## 2020-05-25 PROCEDURE — 83690 ASSAY OF LIPASE: CPT

## 2020-05-25 PROCEDURE — 85025 COMPLETE CBC W/AUTO DIFF WBC: CPT

## 2020-05-25 PROCEDURE — 96372 THER/PROPH/DIAG INJ SC/IM: CPT

## 2020-05-25 PROCEDURE — 85610 PROTHROMBIN TIME: CPT

## 2020-05-25 PROCEDURE — 99285 EMERGENCY DEPT VISIT HI MDM: CPT

## 2020-05-25 PROCEDURE — 71045 X-RAY EXAM CHEST 1 VIEW: CPT

## 2020-05-25 PROCEDURE — 6370000000 HC RX 637 (ALT 250 FOR IP): Performed by: INTERNAL MEDICINE

## 2020-05-25 PROCEDURE — 6370000000 HC RX 637 (ALT 250 FOR IP): Performed by: EMERGENCY MEDICINE

## 2020-05-25 PROCEDURE — 36415 COLL VENOUS BLD VENIPUNCTURE: CPT

## 2020-05-25 PROCEDURE — 85730 THROMBOPLASTIN TIME PARTIAL: CPT

## 2020-05-25 RX ORDER — PANTOPRAZOLE SODIUM 40 MG/1
40 TABLET, DELAYED RELEASE ORAL
Status: DISCONTINUED | OUTPATIENT
Start: 2020-05-26 | End: 2020-05-28 | Stop reason: HOSPADM

## 2020-05-25 RX ORDER — POLYETHYLENE GLYCOL 3350 17 G/17G
17 POWDER, FOR SOLUTION ORAL DAILY PRN
Status: DISCONTINUED | OUTPATIENT
Start: 2020-05-25 | End: 2020-05-28 | Stop reason: HOSPADM

## 2020-05-25 RX ORDER — ACETAMINOPHEN 650 MG/1
650 SUPPOSITORY RECTAL EVERY 6 HOURS PRN
Status: DISCONTINUED | OUTPATIENT
Start: 2020-05-25 | End: 2020-05-28 | Stop reason: HOSPADM

## 2020-05-25 RX ORDER — SODIUM CHLORIDE 0.9 % (FLUSH) 0.9 %
10 SYRINGE (ML) INJECTION PRN
Status: DISCONTINUED | OUTPATIENT
Start: 2020-05-25 | End: 2020-05-28 | Stop reason: HOSPADM

## 2020-05-25 RX ORDER — ACETAMINOPHEN 325 MG/1
650 TABLET ORAL EVERY 6 HOURS PRN
Status: DISCONTINUED | OUTPATIENT
Start: 2020-05-25 | End: 2020-05-28 | Stop reason: HOSPADM

## 2020-05-25 RX ORDER — ATORVASTATIN CALCIUM 40 MG/1
40 TABLET, FILM COATED ORAL NIGHTLY
Status: DISCONTINUED | OUTPATIENT
Start: 2020-05-25 | End: 2020-05-25

## 2020-05-25 RX ORDER — CLOPIDOGREL BISULFATE 75 MG/1
75 TABLET ORAL DAILY
Status: DISCONTINUED | OUTPATIENT
Start: 2020-05-25 | End: 2020-05-28 | Stop reason: HOSPADM

## 2020-05-25 RX ORDER — ROSUVASTATIN CALCIUM 10 MG/1
10 TABLET, COATED ORAL DAILY
Status: DISCONTINUED | OUTPATIENT
Start: 2020-05-25 | End: 2020-05-28 | Stop reason: HOSPADM

## 2020-05-25 RX ORDER — ASPIRIN 81 MG/1
324 TABLET, CHEWABLE ORAL ONCE
Status: COMPLETED | OUTPATIENT
Start: 2020-05-25 | End: 2020-05-25

## 2020-05-25 RX ORDER — MEMANTINE HYDROCHLORIDE 10 MG/1
10 TABLET ORAL 2 TIMES DAILY
Status: DISCONTINUED | OUTPATIENT
Start: 2020-05-25 | End: 2020-05-28 | Stop reason: HOSPADM

## 2020-05-25 RX ORDER — PROMETHAZINE HYDROCHLORIDE 12.5 MG/1
12.5 TABLET ORAL EVERY 6 HOURS PRN
Status: DISCONTINUED | OUTPATIENT
Start: 2020-05-25 | End: 2020-05-28 | Stop reason: HOSPADM

## 2020-05-25 RX ORDER — FINASTERIDE 5 MG/1
5 TABLET, FILM COATED ORAL DAILY
Status: DISCONTINUED | OUTPATIENT
Start: 2020-05-25 | End: 2020-05-28 | Stop reason: HOSPADM

## 2020-05-25 RX ORDER — ASPIRIN 81 MG/1
81 TABLET, CHEWABLE ORAL DAILY
Status: DISCONTINUED | OUTPATIENT
Start: 2020-05-26 | End: 2020-05-28 | Stop reason: HOSPADM

## 2020-05-25 RX ORDER — METOPROLOL SUCCINATE 25 MG/1
25 TABLET, EXTENDED RELEASE ORAL DAILY
Status: DISCONTINUED | OUTPATIENT
Start: 2020-05-25 | End: 2020-05-28 | Stop reason: HOSPADM

## 2020-05-25 RX ORDER — ONDANSETRON 2 MG/ML
4 INJECTION INTRAMUSCULAR; INTRAVENOUS EVERY 6 HOURS PRN
Status: DISCONTINUED | OUTPATIENT
Start: 2020-05-25 | End: 2020-05-28 | Stop reason: HOSPADM

## 2020-05-25 RX ORDER — ONDANSETRON 4 MG/1
4 TABLET, ORALLY DISINTEGRATING ORAL 3 TIMES DAILY PRN
Status: DISCONTINUED | OUTPATIENT
Start: 2020-05-25 | End: 2020-05-28 | Stop reason: HOSPADM

## 2020-05-25 RX ORDER — SODIUM CHLORIDE 0.9 % (FLUSH) 0.9 %
10 SYRINGE (ML) INJECTION EVERY 12 HOURS SCHEDULED
Status: DISCONTINUED | OUTPATIENT
Start: 2020-05-25 | End: 2020-05-28 | Stop reason: HOSPADM

## 2020-05-25 RX ORDER — TAMSULOSIN HYDROCHLORIDE 0.4 MG/1
0.4 CAPSULE ORAL DAILY
Status: DISCONTINUED | OUTPATIENT
Start: 2020-05-25 | End: 2020-05-28 | Stop reason: HOSPADM

## 2020-05-25 RX ADMIN — ROSUVASTATIN CALCIUM 10 MG: 10 TABLET, FILM COATED ORAL at 21:22

## 2020-05-25 RX ADMIN — CLOPIDOGREL BISULFATE 75 MG: 75 TABLET ORAL at 17:35

## 2020-05-25 RX ADMIN — METOPROLOL SUCCINATE 25 MG: 25 TABLET, EXTENDED RELEASE ORAL at 17:35

## 2020-05-25 RX ADMIN — MEMANTINE HYDROCHLORIDE 10 MG: 10 TABLET ORAL at 21:22

## 2020-05-25 RX ADMIN — ENOXAPARIN SODIUM 40 MG: 40 INJECTION SUBCUTANEOUS at 17:35

## 2020-05-25 RX ADMIN — Medication 10 ML: at 21:22

## 2020-05-25 RX ADMIN — TAMSULOSIN HYDROCHLORIDE 0.4 MG: 0.4 CAPSULE ORAL at 17:35

## 2020-05-25 RX ADMIN — ASPIRIN 81 MG 324 MG: 81 TABLET ORAL at 11:17

## 2020-05-25 RX ADMIN — FINASTERIDE 5 MG: 5 TABLET, FILM COATED ORAL at 17:35

## 2020-05-25 ASSESSMENT — PAIN DESCRIPTION - LOCATION
LOCATION: CHEST
LOCATION: CHEST

## 2020-05-25 ASSESSMENT — PAIN DESCRIPTION - ORIENTATION: ORIENTATION: LEFT;MID

## 2020-05-25 ASSESSMENT — PAIN SCALES - GENERAL
PAINLEVEL_OUTOF10: 2
PAINLEVEL_OUTOF10: 0

## 2020-05-25 NOTE — ED TRIAGE NOTES
Patient arrived to ED via triage with C/O left sided chest pain with radiation to left shoulder. Patient took 3 nitro PTA with moderate relief. Patient has dementia, and wife states he has recently had a full cardiac workup and has been told everything was clear. Patient was told he has gallstones, and wife believes that is the source of his pain.

## 2020-05-25 NOTE — H&P
skin clear.         Medications:  Current Facility-Administered Medications   Medication Dose Route Frequency Provider Last Rate Last Dose    sodium chloride flush 0.9 % injection 10 mL  10 mL Intravenous 2 times per day Chevy Minaya MD        sodium chloride flush 0.9 % injection 10 mL  10 mL Intravenous PRN Chevy Minaya MD        acetaminophen (TYLENOL) tablet 650 mg  650 mg Oral Q6H PRN Chevy Minaya MD        Or    acetaminophen (TYLENOL) suppository 650 mg  650 mg Rectal Q6H PRN Chevy Minaya MD        polyethylene glycol [facility]) packet 17 g  17 g Oral Daily PRN Chevy Minaya MD        promethazine (PHENERGAN) tablet 12.5 mg  12.5 mg Oral Q6H PRN Chevy Minaya MD        Or    ondansetron [facility]) injection 4 mg  4 mg Intravenous Q6H PRN Chevy Minaya MD        atorvastatin (LIPITOR) tablet 40 mg  40 mg Oral Nightly MD Erica De La Cruz ON 5/26/2020] aspirin chewable tablet 81 mg  81 mg Oral Daily Chevy Minaya MD        enoxaparin (LOVENOX) injection 40 mg  40 mg Subcutaneous Daily Chevy Minaya MD        clopidogrel (PLAVIX) tablet 75 mg  75 mg Oral Daily Morgan Torres MD        finasteride (PROSCAR) tablet 5 mg  5 mg Oral Daily Morgan Torres MD        [facility]) tablet 10 mg  10 mg Oral BID Morgan Torres MD        metoprolol succinate (TOPROL XL) extended release tablet 25 mg  25 mg Oral Daily Morgan Torres MD       [facility] Blanca Castro ON 5/26/2020] pantoprazole (PROTONIX) tablet 40 mg  40 mg Oral QAM AC Morgan Torres MD        ondansetron (ZOFRAN-ODT) disintegrating tablet 4 mg  4 mg Oral TID PRN Morgan Torres MD        rosuvastatin (CRESTOR) tablet 10 mg  10 mg Oral Daily Morgan Torres MD        tamsulosin (FLOMAX) capsule 0.4 mg  0.4 mg Oral Daily Morgan Torres MD           Physical Examination:    BP (!) 171/83   Pulse 63   Temp 97.9 °F (36.6 °C) (Oral)   Resp 17   Ht 5' 10\" (1.778 m)   Wt

## 2020-05-25 NOTE — ED NOTES
Report called to [facility]. Tele pack applied. Transportation notified. Patient and family updated.      Gabino Figueroa RN  05/25/20 [id number]

## 2020-05-25 NOTE — ED PROVIDER NOTES
HPI:  5/25/20, Time: 11:14 AM EDT         Robi Sullivan is a 69 y.o. male presenting to the ED for chest pain, beginning several hours ago. The complaint has been intermittent, moderate in severity, and worsened by nothing. Patient states that he was walking across the room when he developed substernal chest pain. He describes it as pressure and tightness it radiated to the left shoulder. There was nausea shortness of breath and diaphoresis. No pleuritic pain. No vomiting. No fever no chills. Patient recently had a stress test performed by . He took 3 nitroglycerin with complete resolution of his symptoms    ROS:   Pertinent positives and negatives are stated within HPI, all other systems reviewed and are negative.  --------------------------------------------- PAST HISTORY ---------------------------------------------  Past Medical History:  has a past medical history of Abnormal cardiovascular stress test, Actinic keratoses, BPH (benign prostatic hypertrophy), CAD (coronary artery disease), Cancer ([facility] [address] Chest pressure, Cholelithiasis, History of colon cancer, Hyperlipidemia, Inflamed seborrheic keratosis, Lumbar radiculopathy, Mild cognitive impairment, Osteoarthritis, Parathyroid tumor, Parkinson disease ([facility] [address] RA (retrograde amnesia), Sinus bradycardia on ECG, and Syncope and collapse. Past Surgical History:  has a past surgical history that includes Upper gastrointestinal endoscopy (8/11/09); Colonoscopy (8/18/10,09/11/2012); Tonsillectomy and adenoidectomy; back surgery (1993); colectomy; joint replacement (Left, 5/28/13); Colonoscopy (09/29/14); parathyroidectomy (Bilateral, 2011); and pr colon ca scrn not hi rsk ind (N/A, 9/15/2017). Social History:  reports that he has quit smoking. He has never used smokeless tobacco. He reports that he does not drink alcohol or use drugs. Family History: family history includes Cancer in his mother; Heart Disease in his father. 6.5 K/uL    Lymphocytes Absolute 0.9 (L) 1.0 - 4.8 K/uL    Monocytes Absolute 0.4 0.2 - 0.8 K/uL    Eosinophils Absolute 0.0 0.0 - 0.7 K/uL    Basophils Absolute 0.0 0.0 - 0.2 K/uL   Comprehensive Metabolic Panel w/ Reflex to MG   Result Value Ref Range    Sodium 144 135 - 144 mEq/L    Potassium reflex Magnesium 4.2 3.4 - 4.9 mEq/L    Chloride 107 95 - 107 mEq/L    CO2 28 20 - 31 mEq/L    Anion Gap 9 9 - 15 mEq/L    Glucose 99 70 - 99 mg/dL    BUN 12 8 - 23 mg/dL    CREATININE 0.86 0.70 - 1.20 mg/dL    GFR Non-African American >60.0 >60    GFR  >60.0 >60    Calcium 9.4 8.5 - 9.9 mg/dL    Total Protein 6.5 6.3 - 8.0 g/dL    Alb 4.1 3.5 - 4.6 g/dL    Total Bilirubin 0.5 0.2 - 0.7 mg/dL    Alkaline Phosphatase 44 35 - 104 U/L    ALT 26 0 - 41 U/L    AST 22 0 - 40 U/L    Globulin 2.4 2.3 - 3.5 g/dL   Troponin   Result Value Ref Range    Troponin <0.010 0.000 - 0.010 ng/mL   APTT   Result Value Ref Range    aPTT 28.7 24.4 - 36.8 sec   Protime-INR   Result Value Ref Range    Protime 13.0 12.3 - 14.9 sec    INR 1.0    Lipase   Result Value Ref Range    Lipase 35 12 - 95 U/L   D-Dimer, Quantitative   Result Value Ref Range    D-Dimer, Quant 0.41 0.00 - 0.50 mg/L FEU       RADIOLOGY:  Interpreted by Radiologist.  XR CHEST PORTABLE    (Results Pending)         EKG Interpretation  Interpreted by emergency department physician    Rhythm: normal sinus   Rate: normal  Axis: normal  Conduction: normal  ST Segments: no acute change  T Waves: no acute change    Clinical Impression: no acute changes  Comparison to prior EKG: stable as compared to patient's most recent EKG      ------------------------- NURSING NOTES AND VITALS REVIEWED ---------------------------   The nursing notes within the ED encounter and vital signs as below have been reviewed by myself.   /64   Pulse 54   Temp 97.7 °F (36.5 °C) (Temporal)   Resp 16   Ht 5' 10\" (1.778 m)   Wt 175 lb (79.4 kg)   SpO2 97%   BMI 25.11 kg/m²   Oxygen

## 2020-05-25 NOTE — FLOWSHEET NOTE
Received pt to [facility] via cart. Transferred to bed with standby assist. Alert and oriented to self, place and time, but forgetful when questioned. Pt states \"please call my wife with questions and to review meds\" Raza Sayres called and she is on her way up to bring home meds. Admission questions answered. Pt just recently discharged on 5/2/20 - discharge summary by Dr. Dicie Sacks. Wife reports meds are the same. Noted that patient was admitted to Dr. Yadiel Douglas service and he is a Dr. Linette Ramachandran patient. Verified with both pt and his wife Raza Sayres - they want to stay with their cardiologist. I called and spoke with Dr. Francoise Dowling who states transfer service to [facility] cardiology. Dr. Cortés Daughters contacted and he is aware of pt and has accepted as admitting. Notified that meds are reconciled and ready for him to review / order. Joyce Spotted for pt to eat dinner.  Electronically signed by Lamont Rodney RN on 5/25/2020 at 4:45 PM

## 2020-05-26 LAB
EKG ATRIAL RATE: 53 BPM
EKG ATRIAL RATE: 60 BPM
EKG P AXIS: 64 DEGREES
EKG P AXIS: 80 DEGREES
EKG P-R INTERVAL: 126 MS
EKG P-R INTERVAL: 138 MS
EKG Q-T INTERVAL: 414 MS
EKG Q-T INTERVAL: 462 MS
EKG QRS DURATION: 78 MS
EKG QRS DURATION: 86 MS
EKG QTC CALCULATION (BAZETT): 414 MS
EKG QTC CALCULATION (BAZETT): 433 MS
EKG R AXIS: 43 DEGREES
EKG R AXIS: 57 DEGREES
EKG T AXIS: 63 DEGREES
EKG T AXIS: 68 DEGREES
EKG VENTRICULAR RATE: 53 BPM
EKG VENTRICULAR RATE: 60 BPM
HCT VFR BLD CALC: 44.8 % (ref 42–52)
HEMOGLOBIN: 15.2 G/DL (ref 14–18)
MCH RBC QN AUTO: 31.4 PG (ref 27–31.3)
MCHC RBC AUTO-ENTMCNC: 33.8 % (ref 33–37)
MCV RBC AUTO: 92.9 FL (ref 80–100)
PDW BLD-RTO: 13.2 % (ref 11.5–14.5)
PLATELET # BLD: 139 K/UL (ref 130–400)
RBC # BLD: 4.82 M/UL (ref 4.7–6.1)
WBC # BLD: 4.8 K/UL (ref 4.8–10.8)

## 2020-05-26 PROCEDURE — 93010 ELECTROCARDIOGRAM REPORT: CPT | Performed by: INTERNAL MEDICINE

## 2020-05-26 PROCEDURE — 36415 COLL VENOUS BLD VENIPUNCTURE: CPT

## 2020-05-26 PROCEDURE — 6370000000 HC RX 637 (ALT 250 FOR IP): Performed by: INTERNAL MEDICINE

## 2020-05-26 PROCEDURE — 99218 PR INITIAL OBSERVATION CARE/DAY 30 MINUTES: CPT | Performed by: COLON & RECTAL SURGERY

## 2020-05-26 PROCEDURE — 85027 COMPLETE CBC AUTOMATED: CPT

## 2020-05-26 PROCEDURE — 6360000002 HC RX W HCPCS: Performed by: INTERNAL MEDICINE

## 2020-05-26 PROCEDURE — 93005 ELECTROCARDIOGRAM TRACING: CPT | Performed by: INTERNAL MEDICINE

## 2020-05-26 PROCEDURE — 2580000003 HC RX 258: Performed by: INTERNAL MEDICINE

## 2020-05-26 PROCEDURE — G0378 HOSPITAL OBSERVATION PER HR: HCPCS

## 2020-05-26 PROCEDURE — 99233 SBSQ HOSP IP/OBS HIGH 50: CPT | Performed by: INTERNAL MEDICINE

## 2020-05-26 PROCEDURE — 96372 THER/PROPH/DIAG INJ SC/IM: CPT

## 2020-05-26 RX ORDER — OMEPRAZOLE 20 MG/1
CAPSULE, DELAYED RELEASE ORAL
Qty: 90 CAPSULE | Refills: 3 | Status: SHIPPED | OUTPATIENT
Start: 2020-05-26 | End: 2021-01-01

## 2020-05-26 RX ORDER — FINASTERIDE 5 MG/1
TABLET, FILM COATED ORAL
Qty: 90 TABLET | Refills: 3 | Status: SHIPPED | OUTPATIENT
Start: 2020-05-26 | End: 2021-01-01

## 2020-05-26 RX ORDER — TAMSULOSIN HYDROCHLORIDE 0.4 MG/1
CAPSULE ORAL
Qty: 90 CAPSULE | Refills: 3 | Status: SHIPPED | OUTPATIENT
Start: 2020-05-26 | End: 2021-01-01

## 2020-05-26 RX ADMIN — Medication 10 ML: at 08:21

## 2020-05-26 RX ADMIN — TAMSULOSIN HYDROCHLORIDE 0.4 MG: 0.4 CAPSULE ORAL at 08:10

## 2020-05-26 RX ADMIN — ENOXAPARIN SODIUM 40 MG: 40 INJECTION SUBCUTANEOUS at 17:05

## 2020-05-26 RX ADMIN — ASPIRIN 81 MG 81 MG: 81 TABLET ORAL at 08:21

## 2020-05-26 RX ADMIN — METOPROLOL SUCCINATE 25 MG: 25 TABLET, EXTENDED RELEASE ORAL at 08:10

## 2020-05-26 RX ADMIN — CLOPIDOGREL BISULFATE 75 MG: 75 TABLET ORAL at 08:10

## 2020-05-26 RX ADMIN — MEMANTINE HYDROCHLORIDE 10 MG: 10 TABLET ORAL at 20:07

## 2020-05-26 RX ADMIN — PANTOPRAZOLE SODIUM 40 MG: 40 TABLET, DELAYED RELEASE ORAL at 08:21

## 2020-05-26 RX ADMIN — Medication 10 ML: at 20:08

## 2020-05-26 RX ADMIN — ROSUVASTATIN CALCIUM 10 MG: 10 TABLET, FILM COATED ORAL at 20:06

## 2020-05-26 RX ADMIN — FINASTERIDE 5 MG: 5 TABLET, FILM COATED ORAL at 08:10

## 2020-05-26 RX ADMIN — MEMANTINE HYDROCHLORIDE 10 MG: 10 TABLET ORAL at 08:10

## 2020-05-26 ASSESSMENT — PAIN SCALES - GENERAL: PAINLEVEL_OUTOF10: 0

## 2020-05-26 NOTE — CONSULTS
[address] [facility] N/A 9/15/2017    COLONOSCOPY performed by Samson Linton MD at [address] [facility] ENDOSCOPY  8/11/09    DR Tona Ramirez     Current Medications:   Current Facility-Administered Medications: sodium chloride flush 0.9 % injection 10 mL, 10 mL, Intravenous, 2 times per day  sodium chloride flush 0.9 % injection 10 mL, 10 mL, Intravenous, PRN  acetaminophen (TYLENOL) tablet 650 mg, 650 mg, Oral, Q6H PRN **OR** acetaminophen (TYLENOL) suppository 650 mg, 650 mg, Rectal, Q6H PRN  polyethylene glycol (GLYCOLAX) packet 17 g, 17 g, Oral, Daily PRN  promethazine (PHENERGAN) tablet 12.5 mg, 12.5 mg, Oral, Q6H PRN **OR** ondansetron (ZOFRAN) injection 4 mg, 4 mg, Intravenous, Q6H PRN  aspirin chewable tablet 81 mg, 81 mg, Oral, Daily  enoxaparin (LOVENOX) injection 40 mg, 40 mg, Subcutaneous, Daily  clopidogrel (PLAVIX) tablet 75 mg, 75 mg, Oral, Daily  finasteride (PROSCAR) tablet 5 mg, 5 mg, Oral, Daily  memantine (NAMENDA) tablet 10 mg, 10 mg, Oral, BID  metoprolol succinate (TOPROL XL) extended release tablet 25 mg, 25 mg, Oral, Daily  pantoprazole (PROTONIX) tablet 40 mg, 40 mg, Oral, QAM AC  ondansetron (ZOFRAN-ODT) disintegrating tablet 4 mg, 4 mg, Oral, TID PRN  rosuvastatin (CRESTOR) tablet 10 mg, 10 mg, Oral, Daily  tamsulosin (FLOMAX) capsule 0.4 mg, 0.4 mg, Oral, Daily  Allergies:  Amoxicillin    Social History:   TOBACCO:   reports that he has quit smoking. He has never used smokeless tobacco.  ETOH:   reports no history of alcohol use. DRUGS:   reports no history of drug use.   Family History:       Problem Relation Age of Onset    Cancer Mother         OVARIAN    Heart Disease Father        REVIEW OF SYSTEMS:    CONSTITUTIONAL:  negative  EYES:  negative  HEENT:  negative  RESPIRATORY:  negative  CARDIOVASCULAR:  negative  GASTROINTESTINAL:  positive for nausea and abdominal pain  MUSCULOSKELETAL:  negative  NEUROLOGICAL: Issues relating to no

## 2020-05-27 ENCOUNTER — ANESTHESIA (OUTPATIENT)
Dept: OPERATING ROOM | Age: 75
DRG: 419 | End: 2020-05-27
Payer: MEDICARE

## 2020-05-27 ENCOUNTER — ANESTHESIA EVENT (OUTPATIENT)
Dept: OPERATING ROOM | Age: 75
DRG: 419 | End: 2020-05-27
Payer: MEDICARE

## 2020-05-27 ENCOUNTER — APPOINTMENT (OUTPATIENT)
Dept: GENERAL RADIOLOGY | Age: 75
DRG: 419 | End: 2020-05-27
Payer: MEDICARE

## 2020-05-27 VITALS — TEMPERATURE: 97.2 F | SYSTOLIC BLOOD PRESSURE: 182 MMHG | OXYGEN SATURATION: 100 % | DIASTOLIC BLOOD PRESSURE: 75 MMHG

## 2020-05-27 PROCEDURE — 3700000000 HC ANESTHESIA ATTENDED CARE: Performed by: COLON & RECTAL SURGERY

## 2020-05-27 PROCEDURE — 6370000000 HC RX 637 (ALT 250 FOR IP): Performed by: COLON & RECTAL SURGERY

## 2020-05-27 PROCEDURE — 47563 LAPARO CHOLECYSTECTOMY/GRAPH: CPT | Performed by: COLON & RECTAL SURGERY

## 2020-05-27 PROCEDURE — 88304 TISSUE EXAM BY PATHOLOGIST: CPT

## 2020-05-27 PROCEDURE — 6360000004 HC RX CONTRAST MEDICATION: Performed by: COLON & RECTAL SURGERY

## 2020-05-27 PROCEDURE — C1758 CATHETER, URETERAL: HCPCS | Performed by: COLON & RECTAL SURGERY

## 2020-05-27 PROCEDURE — 3700000001 HC ADD 15 MINUTES (ANESTHESIA): Performed by: COLON & RECTAL SURGERY

## 2020-05-27 PROCEDURE — 99232 SBSQ HOSP IP/OBS MODERATE 35: CPT | Performed by: INTERNAL MEDICINE

## 2020-05-27 PROCEDURE — 76942 ECHO GUIDE FOR BIOPSY: CPT | Performed by: NURSE ANESTHETIST, CERTIFIED REGISTERED

## 2020-05-27 PROCEDURE — 2500000003 HC RX 250 WO HCPCS: Performed by: NURSE ANESTHETIST, CERTIFIED REGISTERED

## 2020-05-27 PROCEDURE — 2060000000 HC ICU INTERMEDIATE R&B

## 2020-05-27 PROCEDURE — 3600000004 HC SURGERY LEVEL 4 BASE: Performed by: COLON & RECTAL SURGERY

## 2020-05-27 PROCEDURE — 0FT44ZZ RESECTION OF GALLBLADDER, PERCUTANEOUS ENDOSCOPIC APPROACH: ICD-10-PCS | Performed by: COLON & RECTAL SURGERY

## 2020-05-27 PROCEDURE — 2580000003 HC RX 258: Performed by: INTERNAL MEDICINE

## 2020-05-27 PROCEDURE — 74300 X-RAY BILE DUCTS/PANCREAS: CPT

## 2020-05-27 PROCEDURE — 6360000002 HC RX W HCPCS: Performed by: NURSE ANESTHETIST, CERTIFIED REGISTERED

## 2020-05-27 PROCEDURE — 7100000000 HC PACU RECOVERY - FIRST 15 MIN: Performed by: COLON & RECTAL SURGERY

## 2020-05-27 PROCEDURE — 2500000003 HC RX 250 WO HCPCS: Performed by: ANESTHESIOLOGY

## 2020-05-27 PROCEDURE — 6360000002 HC RX W HCPCS: Performed by: ANESTHESIOLOGY

## 2020-05-27 PROCEDURE — BF131ZZ FLUOROSCOPY OF GALLBLADDER AND BILE DUCTS USING LOW OSMOLAR CONTRAST: ICD-10-PCS | Performed by: COLON & RECTAL SURGERY

## 2020-05-27 PROCEDURE — 2580000003 HC RX 258: Performed by: NURSE ANESTHETIST, CERTIFIED REGISTERED

## 2020-05-27 PROCEDURE — 6360000002 HC RX W HCPCS: Performed by: COLON & RECTAL SURGERY

## 2020-05-27 PROCEDURE — 3600000014 HC SURGERY LEVEL 4 ADDTL 15MIN: Performed by: COLON & RECTAL SURGERY

## 2020-05-27 PROCEDURE — 6370000000 HC RX 637 (ALT 250 FOR IP)

## 2020-05-27 PROCEDURE — 2580000003 HC RX 258: Performed by: COLON & RECTAL SURGERY

## 2020-05-27 PROCEDURE — 2709999900 HC NON-CHARGEABLE SUPPLY: Performed by: COLON & RECTAL SURGERY

## 2020-05-27 PROCEDURE — 3E0T3BZ INTRODUCTION OF ANESTHETIC AGENT INTO PERIPHERAL NERVES AND PLEXI, PERCUTANEOUS APPROACH: ICD-10-PCS | Performed by: ANESTHESIOLOGY

## 2020-05-27 PROCEDURE — 7100000001 HC PACU RECOVERY - ADDTL 15 MIN: Performed by: COLON & RECTAL SURGERY

## 2020-05-27 RX ORDER — PROPOFOL 10 MG/ML
INJECTION, EMULSION INTRAVENOUS PRN
Status: DISCONTINUED | OUTPATIENT
Start: 2020-05-27 | End: 2020-05-27 | Stop reason: SDUPTHER

## 2020-05-27 RX ORDER — OXYCODONE HYDROCHLORIDE AND ACETAMINOPHEN 5; 325 MG/1; MG/1
2 TABLET ORAL EVERY 4 HOURS PRN
Status: DISCONTINUED | OUTPATIENT
Start: 2020-05-27 | End: 2020-05-28 | Stop reason: HOSPADM

## 2020-05-27 RX ORDER — HYDROCODONE BITARTRATE AND ACETAMINOPHEN 5; 325 MG/1; MG/1
2 TABLET ORAL PRN
Status: DISCONTINUED | OUTPATIENT
Start: 2020-05-27 | End: 2020-05-27 | Stop reason: HOSPADM

## 2020-05-27 RX ORDER — MAGNESIUM HYDROXIDE 1200 MG/15ML
LIQUID ORAL CONTINUOUS PRN
Status: COMPLETED | OUTPATIENT
Start: 2020-05-27 | End: 2020-05-27

## 2020-05-27 RX ORDER — ROCURONIUM BROMIDE 10 MG/ML
INJECTION, SOLUTION INTRAVENOUS PRN
Status: DISCONTINUED | OUTPATIENT
Start: 2020-05-27 | End: 2020-05-27 | Stop reason: SDUPTHER

## 2020-05-27 RX ORDER — SODIUM CHLORIDE, SODIUM LACTATE, POTASSIUM CHLORIDE, CALCIUM CHLORIDE 600; 310; 30; 20 MG/100ML; MG/100ML; MG/100ML; MG/100ML
INJECTION, SOLUTION INTRAVENOUS CONTINUOUS PRN
Status: DISCONTINUED | OUTPATIENT
Start: 2020-05-27 | End: 2020-05-27 | Stop reason: SDUPTHER

## 2020-05-27 RX ORDER — DEXAMETHASONE SODIUM PHOSPHATE 4 MG/ML
INJECTION, SOLUTION INTRA-ARTICULAR; INTRALESIONAL; INTRAMUSCULAR; INTRAVENOUS; SOFT TISSUE PRN
Status: DISCONTINUED | OUTPATIENT
Start: 2020-05-27 | End: 2020-05-27 | Stop reason: SDUPTHER

## 2020-05-27 RX ORDER — FENTANYL CITRATE 50 UG/ML
25 INJECTION, SOLUTION INTRAMUSCULAR; INTRAVENOUS EVERY 10 MIN PRN
Status: DISCONTINUED | OUTPATIENT
Start: 2020-05-27 | End: 2020-05-27 | Stop reason: HOSPADM

## 2020-05-27 RX ORDER — OXYCODONE HYDROCHLORIDE AND ACETAMINOPHEN 5; 325 MG/1; MG/1
1 TABLET ORAL EVERY 4 HOURS PRN
Status: DISCONTINUED | OUTPATIENT
Start: 2020-05-27 | End: 2020-05-28 | Stop reason: HOSPADM

## 2020-05-27 RX ORDER — FENTANYL CITRATE 50 UG/ML
INJECTION, SOLUTION INTRAMUSCULAR; INTRAVENOUS PRN
Status: DISCONTINUED | OUTPATIENT
Start: 2020-05-27 | End: 2020-05-27 | Stop reason: SDUPTHER

## 2020-05-27 RX ORDER — LIDOCAINE HYDROCHLORIDE 20 MG/ML
INJECTION, SOLUTION INTRAVENOUS PRN
Status: DISCONTINUED | OUTPATIENT
Start: 2020-05-27 | End: 2020-05-27 | Stop reason: SDUPTHER

## 2020-05-27 RX ORDER — METOCLOPRAMIDE HYDROCHLORIDE 5 MG/ML
10 INJECTION INTRAMUSCULAR; INTRAVENOUS
Status: DISCONTINUED | OUTPATIENT
Start: 2020-05-27 | End: 2020-05-27 | Stop reason: HOSPADM

## 2020-05-27 RX ORDER — CEFAZOLIN SODIUM 2 G/50ML
2 SOLUTION INTRAVENOUS
Status: COMPLETED | OUTPATIENT
Start: 2020-05-27 | End: 2020-05-27

## 2020-05-27 RX ORDER — ROPIVACAINE HYDROCHLORIDE 5 MG/ML
INJECTION, SOLUTION EPIDURAL; INFILTRATION; PERINEURAL PRN
Status: DISCONTINUED | OUTPATIENT
Start: 2020-05-27 | End: 2020-05-27 | Stop reason: SDUPTHER

## 2020-05-27 RX ORDER — LIDOCAINE HYDROCHLORIDE AND EPINEPHRINE 10; 10 MG/ML; UG/ML
INJECTION, SOLUTION INFILTRATION; PERINEURAL PRN
Status: DISCONTINUED | OUTPATIENT
Start: 2020-05-27 | End: 2020-05-27 | Stop reason: SDUPTHER

## 2020-05-27 RX ORDER — SODIUM CHLORIDE, SODIUM LACTATE, POTASSIUM CHLORIDE, CALCIUM CHLORIDE 600; 310; 30; 20 MG/100ML; MG/100ML; MG/100ML; MG/100ML
INJECTION, SOLUTION INTRAVENOUS
Status: COMPLETED
Start: 2020-05-27 | End: 2020-05-27

## 2020-05-27 RX ORDER — DIPHENHYDRAMINE HYDROCHLORIDE 50 MG/ML
12.5 INJECTION INTRAMUSCULAR; INTRAVENOUS
Status: DISCONTINUED | OUTPATIENT
Start: 2020-05-27 | End: 2020-05-27 | Stop reason: HOSPADM

## 2020-05-27 RX ORDER — HYDROCODONE BITARTRATE AND ACETAMINOPHEN 5; 325 MG/1; MG/1
1 TABLET ORAL PRN
Status: DISCONTINUED | OUTPATIENT
Start: 2020-05-27 | End: 2020-05-27 | Stop reason: HOSPADM

## 2020-05-27 RX ORDER — ONDANSETRON 2 MG/ML
INJECTION INTRAMUSCULAR; INTRAVENOUS PRN
Status: DISCONTINUED | OUTPATIENT
Start: 2020-05-27 | End: 2020-05-27 | Stop reason: SDUPTHER

## 2020-05-27 RX ORDER — ONDANSETRON 2 MG/ML
4 INJECTION INTRAMUSCULAR; INTRAVENOUS
Status: DISCONTINUED | OUTPATIENT
Start: 2020-05-27 | End: 2020-05-27 | Stop reason: HOSPADM

## 2020-05-27 RX ORDER — MEPERIDINE HYDROCHLORIDE 25 MG/ML
12.5 INJECTION INTRAMUSCULAR; INTRAVENOUS; SUBCUTANEOUS EVERY 5 MIN PRN
Status: DISCONTINUED | OUTPATIENT
Start: 2020-05-27 | End: 2020-05-27 | Stop reason: HOSPADM

## 2020-05-27 RX ADMIN — CEFAZOLIN SODIUM 2 G: 2 SOLUTION INTRAVENOUS at 13:20

## 2020-05-27 RX ADMIN — LIDOCAINE HYDROCHLORIDE 50 MG: 20 INJECTION, SOLUTION INTRAVENOUS at 13:13

## 2020-05-27 RX ADMIN — METOPROLOL SUCCINATE 25 MG: 25 TABLET, EXTENDED RELEASE ORAL at 08:14

## 2020-05-27 RX ADMIN — ROCURONIUM BROMIDE 50 MG: 10 INJECTION INTRAVENOUS at 13:13

## 2020-05-27 RX ADMIN — PROPOFOL 160 MG: 10 INJECTION, EMULSION INTRAVENOUS at 13:13

## 2020-05-27 RX ADMIN — ONDANSETRON 4 MG: 2 INJECTION INTRAMUSCULAR; INTRAVENOUS at 13:44

## 2020-05-27 RX ADMIN — Medication 10 ML: at 08:18

## 2020-05-27 RX ADMIN — ROSUVASTATIN CALCIUM 10 MG: 10 TABLET, FILM COATED ORAL at 20:24

## 2020-05-27 RX ADMIN — DEXAMETHASONE SODIUM PHOSPHATE 4 MG: 4 INJECTION, SOLUTION INTRA-ARTICULAR; INTRALESIONAL; INTRAMUSCULAR; INTRAVENOUS; SOFT TISSUE at 12:55

## 2020-05-27 RX ADMIN — FENTANYL CITRATE 25 MCG: 50 INJECTION, SOLUTION INTRAMUSCULAR; INTRAVENOUS at 15:25

## 2020-05-27 RX ADMIN — SUGAMMADEX 200 MG: 100 INJECTION, SOLUTION INTRAVENOUS at 14:05

## 2020-05-27 RX ADMIN — ROPIVACAINE HYDROCHLORIDE 25 ML: 5 INJECTION, SOLUTION EPIDURAL; INFILTRATION; PERINEURAL at 12:55

## 2020-05-27 RX ADMIN — FENTANYL CITRATE 100 MCG: 50 INJECTION, SOLUTION INTRAMUSCULAR; INTRAVENOUS at 13:13

## 2020-05-27 RX ADMIN — SODIUM CHLORIDE, POTASSIUM CHLORIDE, SODIUM LACTATE AND CALCIUM CHLORIDE: 600; 310; 30; 20 INJECTION, SOLUTION INTRAVENOUS at 14:05

## 2020-05-27 RX ADMIN — DEXAMETHASONE SODIUM PHOSPHATE 4 MG: 4 INJECTION, SOLUTION INTRA-ARTICULAR; INTRALESIONAL; INTRAMUSCULAR; INTRAVENOUS; SOFT TISSUE at 13:29

## 2020-05-27 RX ADMIN — PHENYLEPHRINE HYDROCHLORIDE 100 MCG: 10 INJECTION INTRAVENOUS at 13:26

## 2020-05-27 RX ADMIN — SODIUM CHLORIDE, POTASSIUM CHLORIDE, SODIUM LACTATE AND CALCIUM CHLORIDE: 600; 310; 30; 20 INJECTION, SOLUTION INTRAVENOUS at 12:30

## 2020-05-27 RX ADMIN — MEMANTINE HYDROCHLORIDE 10 MG: 10 TABLET ORAL at 20:24

## 2020-05-27 RX ADMIN — LIDOCAINE HYDROCHLORIDE AND EPINEPHRINE 25 ML: 10; 10 INJECTION, SOLUTION INFILTRATION; PERINEURAL at 12:55

## 2020-05-27 ASSESSMENT — PULMONARY FUNCTION TESTS
PIF_VALUE: 10
PIF_VALUE: 15
PIF_VALUE: 14
PIF_VALUE: 14
PIF_VALUE: 13
PIF_VALUE: 15
PIF_VALUE: 16
PIF_VALUE: 17
PIF_VALUE: 13
PIF_VALUE: 15
PIF_VALUE: 10
PIF_VALUE: 0
PIF_VALUE: 12
PIF_VALUE: 19
PIF_VALUE: 0
PIF_VALUE: 10
PIF_VALUE: 16
PIF_VALUE: 1
PIF_VALUE: 17
PIF_VALUE: 0
PIF_VALUE: 10
PIF_VALUE: 0
PIF_VALUE: 16
PIF_VALUE: 11
PIF_VALUE: 13
PIF_VALUE: 10
PIF_VALUE: 11
PIF_VALUE: 1
PIF_VALUE: 10
PIF_VALUE: 17
PIF_VALUE: 9
PIF_VALUE: 16
PIF_VALUE: 16
PIF_VALUE: 10
PIF_VALUE: 6
PIF_VALUE: 17
PIF_VALUE: 17
PIF_VALUE: 15
PIF_VALUE: 17
PIF_VALUE: 11
PIF_VALUE: 5
PIF_VALUE: 10
PIF_VALUE: 0
PIF_VALUE: 17
PIF_VALUE: 13
PIF_VALUE: 17
PIF_VALUE: 1
PIF_VALUE: 17
PIF_VALUE: 15
PIF_VALUE: 1
PIF_VALUE: 10
PIF_VALUE: 10
PIF_VALUE: 13
PIF_VALUE: 5
PIF_VALUE: 0
PIF_VALUE: 10
PIF_VALUE: 13
PIF_VALUE: 11
PIF_VALUE: 11
PIF_VALUE: 17
PIF_VALUE: 17
PIF_VALUE: 16
PIF_VALUE: 12
PIF_VALUE: 2
PIF_VALUE: 16
PIF_VALUE: 11
PIF_VALUE: 13
PIF_VALUE: 17
PIF_VALUE: 10
PIF_VALUE: 17
PIF_VALUE: 9
PIF_VALUE: 9
PIF_VALUE: 17
PIF_VALUE: 0
PIF_VALUE: 16

## 2020-05-27 ASSESSMENT — PAIN SCALES - GENERAL
PAINLEVEL_OUTOF10: 0
PAINLEVEL_OUTOF10: 2

## 2020-05-27 ASSESSMENT — PAIN DESCRIPTION - LOCATION: LOCATION: ABDOMEN

## 2020-05-27 ASSESSMENT — PAIN DESCRIPTION - PAIN TYPE: TYPE: ACUTE PAIN

## 2020-05-27 ASSESSMENT — PAIN DESCRIPTION - ORIENTATION: ORIENTATION: LOWER

## 2020-05-27 NOTE — ANESTHESIA PRE PROCEDURE
Department of Anesthesiology  Preprocedure Note       Name:  Juan C Pino   Age:  69 y.o.  :  1945                                          MRN:  [id number]         Date:  2020      Surgeon: Darling Mitchell):  Peyton Hawthorne MD    Procedure: Procedure(s):  LAP BRAIN/ CHOLANGIOGRAMS ROOM 190    Medications prior to admission:   Prior to Admission medications    Medication Sig Start Date End Date Taking? Authorizing Provider   nitroGLYCERIN (NITROSTAT) 0.4 MG SL tablet up to max of 3 total doses. If no relief after 1 dose, call 911. 20  Yes Med Puente,    memantine (NAMENDA) 10 MG tablet Take 1 tablet by mouth 2 times daily 20  Yes Mika Sommers MD   clopidogrel (PLAVIX) 75 MG tablet Take 1 tablet by mouth daily 8/3/19  Yes Joyce Su MD   rosuvastatin (CRESTOR) 10 MG tablet TAKE 1 TABLET DAILY 19  Yes ELLIOT Ye CNP   metoprolol succinate (TOPROL XL) 25 MG extended release tablet TAKE 1 TABLET DAILY 19  Yes ELLIOT Ye CNP   Cholecalciferol (VITAMIN D3) 1000 UNITS TABS Take 1,900 Units by mouth daily    Yes Historical Provider, MD   calcium carbonate 600 MG TABS tablet Take 1 tablet by mouth nightly    Yes Historical Provider, MD   fish oil-omega-3 fatty acids 1000 MG capsule Take 2 g by mouth 2 times daily. Yes Historical Provider, MD   Multiple Vitamin (MULTI-VITAMIN PO) Take 1 tablet by mouth nightly    Yes Historical Provider, MD   Magnesium 250 MG TABS Take by mouth daily Every other day   Yes Historical Provider, MD   Ascorbic Acid (VITAMIN C) 500 MG tablet Take 500 mg by mouth daily. Yes Historical Provider, MD   aspirin 81 MG tablet Take 81 mg by mouth daily.      Yes Historical Provider, MD   tamsulosin (FLOMAX) 0.4 MG capsule TAKE 1 CAPSULE DAILY 20   ELLIOT Ye CNP   omeprazole (PRILOSEC) 20 MG delayed release capsule TAKE 1 CAPSULE DAILY 20   ELLIOT Ye CNP   finasteride (PROSCAR) 5 MG tablet TAKE 1 TABLET DAILY 5/26/20   Renaee Olszewski, APRN - CNP   ondansetron (ZOFRAN-ODT) 4 MG disintegrating tablet Take 1 tablet by mouth 3 times daily as needed for Nausea or Vomiting 1/9/20   Cris Griffith PA-C   SUMAtriptan (IMITREX) 100 MG tablet  8/12/19   Historical Provider, MD   CPAP Machine MISC by Does not apply route Start auto CPAP with minimum of 5, maximum of 12, with low-profile mask and supplies 9/12/19   Alla Pendleton MD   Psyllium (METAMUCIL FIBER PO) Take by mouth    Historical Provider, MD       Current medications:    Current Facility-Administered Medications   Medication Dose Route Frequency Provider Last Rate Last Dose    lactated ringers infusion             sodium chloride flush 0.9 % injection 10 mL  10 mL Intravenous 2 times per day Yuni Gilbert MD   10 mL at 05/27/20 0818    sodium chloride flush 0.9 % injection 10 mL  10 mL Intravenous PRN Yuni Gilbert MD        acetaminophen (TYLENOL) tablet 650 mg  650 mg Oral Q6H PRN Yuni Gilbert MD        Or    acetaminophen (TYLENOL) suppository 650 mg  650 mg Rectal Q6H PRN Yuni Gilbert MD        polyethylene glycol [facility]) packet 17 g  17 g Oral Daily PRN Yuni Gilbert MD        promethazine (PHENERGAN) tablet 12.5 mg  12.5 mg Oral Q6H PRN Yuni Gilbert MD        Or    [facility]ansetron [facility]) injection 4 mg  4 mg Intravenous Q6H PRN Yuni Gilbert MD        aspirin chewable tablet 81 mg  81 mg Oral Daily Yuni Gilbert MD   Stopped at 05/27/20 0721    enoxaparin (LOVENOX) injection 40 mg  40 mg Subcutaneous Daily Yuni Gilbert MD   Stopped at 05/27/20 0720    [Held by provider] clopidogrel (PLAVIX) tablet 75 mg  75 mg Oral Daily Juanita Avila MD   75 mg at 05/26/20 0810    finasteride (PROSCAR) tablet 5 mg  5 mg Oral Daily Juanita Avila MD   Stopped at 05/27/20 0817    memantine (NAMENDA) tablet 10 mg  10 mg Oral BID Juantia Avila MD   Stopped at 05/27/20 0817    metoprolol succinate ([place] [facility]

## 2020-05-28 VITALS
WEIGHT: 174.2 LBS | SYSTOLIC BLOOD PRESSURE: 151 MMHG | HEIGHT: 70 IN | OXYGEN SATURATION: 97 % | DIASTOLIC BLOOD PRESSURE: 69 MMHG | RESPIRATION RATE: 14 BRPM | HEART RATE: 70 BPM | TEMPERATURE: 97.5 F | BODY MASS INDEX: 24.94 KG/M2

## 2020-05-28 PROCEDURE — 99024 POSTOP FOLLOW-UP VISIT: CPT | Performed by: COLON & RECTAL SURGERY

## 2020-05-28 PROCEDURE — 6370000000 HC RX 637 (ALT 250 FOR IP): Performed by: COLON & RECTAL SURGERY

## 2020-05-28 PROCEDURE — 2580000003 HC RX 258: Performed by: COLON & RECTAL SURGERY

## 2020-05-28 PROCEDURE — 99238 HOSP IP/OBS DSCHRG MGMT 30/<: CPT | Performed by: INTERNAL MEDICINE

## 2020-05-28 PROCEDURE — 6360000002 HC RX W HCPCS: Performed by: COLON & RECTAL SURGERY

## 2020-05-28 PROCEDURE — 96372 THER/PROPH/DIAG INJ SC/IM: CPT

## 2020-05-28 RX ORDER — OXYCODONE HYDROCHLORIDE AND ACETAMINOPHEN 5; 325 MG/1; MG/1
1 TABLET ORAL EVERY 6 HOURS PRN
Qty: 12 TABLET | Refills: 0 | Status: SHIPPED | OUTPATIENT
Start: 2020-05-28 | End: 2020-05-31

## 2020-05-28 RX ADMIN — FINASTERIDE 5 MG: 5 TABLET, FILM COATED ORAL at 07:57

## 2020-05-28 RX ADMIN — ASPIRIN 81 MG 81 MG: 81 TABLET ORAL at 07:57

## 2020-05-28 RX ADMIN — ROSUVASTATIN CALCIUM 10 MG: 10 TABLET, FILM COATED ORAL at 07:57

## 2020-05-28 RX ADMIN — TAMSULOSIN HYDROCHLORIDE 0.4 MG: 0.4 CAPSULE ORAL at 07:56

## 2020-05-28 RX ADMIN — PANTOPRAZOLE SODIUM 40 MG: 40 TABLET, DELAYED RELEASE ORAL at 08:04

## 2020-05-28 RX ADMIN — MEMANTINE HYDROCHLORIDE 10 MG: 10 TABLET ORAL at 07:56

## 2020-05-28 RX ADMIN — ENOXAPARIN SODIUM 40 MG: 40 INJECTION SUBCUTANEOUS at 07:58

## 2020-05-28 RX ADMIN — METOPROLOL SUCCINATE 25 MG: 25 TABLET, EXTENDED RELEASE ORAL at 07:56

## 2020-05-28 RX ADMIN — Medication 10 ML: at 07:57

## 2020-05-28 RX ADMIN — ACETAMINOPHEN 650 MG: 325 TABLET ORAL at 08:04

## 2020-05-28 RX ADMIN — ACETAMINOPHEN 650 MG: 325 TABLET ORAL at 15:26

## 2020-05-28 ASSESSMENT — PAIN DESCRIPTION - PAIN TYPE
TYPE: ACUTE PAIN
TYPE: ACUTE PAIN;CHRONIC PAIN

## 2020-05-28 ASSESSMENT — PAIN DESCRIPTION - LOCATION
LOCATION: ABDOMEN

## 2020-05-28 ASSESSMENT — PAIN DESCRIPTION - FREQUENCY
FREQUENCY: INTERMITTENT

## 2020-05-28 ASSESSMENT — PAIN DESCRIPTION - ORIENTATION
ORIENTATION: LOWER;MID

## 2020-05-28 ASSESSMENT — PAIN SCALES - GENERAL
PAINLEVEL_OUTOF10: 6
PAINLEVEL_OUTOF10: 6
PAINLEVEL_OUTOF10: 0
PAINLEVEL_OUTOF10: 3
PAINLEVEL_OUTOF10: 4
PAINLEVEL_OUTOF10: 5
PAINLEVEL_OUTOF10: 6

## 2020-05-28 ASSESSMENT — PAIN DESCRIPTION - DESCRIPTORS
DESCRIPTORS: DISCOMFORT
DESCRIPTORS: TENDER

## 2020-05-28 NOTE — PROGRESS NOTES
14:25 Admitted to PACU from OR for recovery, monitor leads applied and pt assessed. Report recieved from [address] [place] [address] staff. 14:40 Dr. Tony Cerda at bedside to assess pt, notified of drainage on dressing. Dressing changed. 15:00 C/O of pain unable to give number but says it's mrdium to high, anesthesia notified with orders rec'd. 15:25 Medicated with fentanyl as ordered. 15:40 Resting calmly VSS.
Differential:    Lab Results   Component Value Date    WBC 4.8 05/26/2020    RBC 4.82 05/26/2020    HGB 15.2 05/26/2020    HCT 44.8 05/26/2020     05/26/2020    MCV 92.9 05/26/2020    MCH 31.4 05/26/2020    MCHC 33.8 05/26/2020    RDW 13.2 05/26/2020    LYMPHOPCT 12.1 05/25/2020    MONOPCT 5.7 05/25/2020    BASOPCT 0.5 05/25/2020    MONOSABS 0.4 05/25/2020    LYMPHSABS 0.9 05/25/2020    EOSABS 0.0 05/25/2020    BASOSABS 0.0 05/25/2020     CMP:    Lab Results   Component Value Date     05/25/2020    K 4.2 05/25/2020     05/25/2020    CO2 28 05/25/2020    BUN 12 05/25/2020    CREATININE 0.86 05/25/2020    GFRAA >60.0 05/25/2020    LABGLOM >60.0 05/25/2020    GLUCOSE 99 05/25/2020    GLUCOSE 107 03/14/2012    PROT 6.5 05/25/2020    LABALBU 4.1 05/25/2020    LABALBU 4.1 03/14/2012    CALCIUM 9.4 05/25/2020    BILITOT 0.5 05/25/2020    ALKPHOS 44 05/25/2020    AST 22 05/25/2020    ALT 26 05/25/2020     BMP:    Lab Results   Component Value Date     05/25/2020    K 4.2 05/25/2020     05/25/2020    CO2 28 05/25/2020    BUN 12 05/25/2020    LABALBU 4.1 05/25/2020    LABALBU 4.1 03/14/2012    CREATININE 0.86 05/25/2020    CALCIUM 9.4 05/25/2020    GFRAA >60.0 05/25/2020    LABGLOM >60.0 05/25/2020    GLUCOSE 99 05/25/2020    GLUCOSE 107 03/14/2012     Magnesium:    Lab Results   Component Value Date    MG 2.2 05/02/2020     Troponin:    Lab Results   Component Value Date    TROPONINI <0.010 05/25/2020         Assessment/Plan:    Active Hospital Problems    Diagnosis Date Noted    Biliary colic [D13.57]     Chest pain [R07.9] 05/01/2020           Cardiac supportive care. Expected degree of tenderness for immediate post-op period. Electronically signed by Faina Chavez.  Monique Gupta MD [facility] Director of Cardiology Services and Cardiac Catheterization Laboratory  [facility], [place]   on 5/27/2020 at 11:20 PM

## 2020-05-28 NOTE — OP NOTE
neck.  The cystic  duct was identified and circumferentiated. A distal clip was placed. A  hole was made in the cystic duct using laparoscopic scissors. The  cholangiogram catheter was inserted and secured. Under real time  fluoroscopy, an intraoperative cholangiogram showed no filling defects  and free flow of contrast into the duodenum. The cholangiogram catheter  was removed, two secure proximal clips were placed on the cystic duct  and it was cut with scissors. The cystic artery was clipped and cut in  a similar fashion. The gallbladder was removed from the liver bed using electrocautery  without problems or difficulty. It was placed in an EndoCatch bag and  brought out the subxiphoid port site and sent to Pathology in formalin  for permanent section. Excellent hemostasis was achieved and assured in the gallbladder bed  using electrocautery as well as Surgicel. The remainder of the  laparoscopic exam was unremarkable. Prior to and after closure, the lap, needle, and instrument counts were  all correct. The pneumoperitoneum was released and the ports were all removed. The  fascia at the subxiphoid port site was closed with an interrupted 0  Vicryl suture. The skin incisions were closed with staples. Dressings  were applied with Band-Aids. After closure, lap, needle, and instrument counts were all correct. The patient was extubated and taken to recovery for postop monitoring.         Judi Carolina MD    D: 05/27/2020 14:33:38       T: 05/27/2020 14:40:24     [email or web address]  Job#: [id number]     Doc#: [id number]    CC:

## 2020-05-28 NOTE — DISCHARGE SUMMARY
tablet by mouth daily  Qty: 30 tablet, Refills: 3      rosuvastatin (CRESTOR) 10 MG tablet TAKE 1 TABLET DAILY  Qty: 90 tablet, Refills: 3    Associated Diagnoses: Hyperlipidemia, unspecified hyperlipidemia type; Coronary artery disease involving native heart, angina presence unspecified, unspecified vessel or lesion type      metoprolol succinate (TOPROL XL) 25 MG extended release tablet TAKE 1 TABLET DAILY  Qty: 90 tablet, Refills: 3    Associated Diagnoses: Coronary artery disease involving native heart without angina pectoris, unspecified vessel or lesion type      Cholecalciferol (VITAMIN D3) 1000 UNITS TABS Take 1,900 Units by mouth daily       calcium carbonate 600 MG TABS tablet Take 1 tablet by mouth nightly       fish oil-omega-3 fatty acids 1000 MG capsule Take 2 g by mouth 2 times daily. Multiple Vitamin (MULTI-VITAMIN PO) Take 1 tablet by mouth nightly       Magnesium 250 MG TABS Take by mouth daily Every other day      Ascorbic Acid (VITAMIN C) 500 MG tablet Take 500 mg by mouth daily. aspirin 81 MG tablet Take 81 mg by mouth daily.         tamsulosin (FLOMAX) 0.4 MG capsule TAKE 1 CAPSULE DAILY  Qty: 90 capsule, Refills: 3      omeprazole (PRILOSEC) 20 MG delayed release capsule TAKE 1 CAPSULE DAILY  Qty: 90 capsule, Refills: 3    Associated Diagnoses: Gastroesophageal reflux disease, esophagitis presence not specified      finasteride (PROSCAR) 5 MG tablet TAKE 1 TABLET DAILY  Qty: 90 tablet, Refills: 3      ondansetron (ZOFRAN-ODT) 4 MG disintegrating tablet Take 1 tablet by mouth 3 times daily as needed for Nausea or Vomiting  Qty: 21 tablet, Refills: 0      SUMAtriptan (IMITREX) 100 MG tablet       CPAP Machine MISC by Does not apply route Start auto CPAP with minimum of 5, maximum of 12, with low-profile mask and supplies  Qty: 1 each, Refills: 0      Psyllium (METAMUCIL FIBER PO) Take by mouth             Significant Diagnostics:   Radiology: Xr Chest Portable    Result Date:

## 2020-05-29 ENCOUNTER — TELEPHONE (OUTPATIENT)
Dept: FAMILY MEDICINE CLINIC | Age: 75
End: 2020-05-29

## 2020-05-29 NOTE — DISCHARGE SUMMARY
Physician Discharge Summary     Patient ID:  Demarcus March  [id number]  63 y.o.  1945    Admit date: 5/25/2020    Discharge date and time: 5/28/2020  5:18 PM     Admitting Physician: Joseph Cabezas MD     Discharge Physician: Edith Winchester    Admission Diagnoses: Chest pain [R07.9]  Chest pain [R07.9]    Discharge Diagnoses: Same, chronic cholecystitis with gallstones    Admission Condition: fair    Discharged Condition: good    Indication for Admission: Chest pain and epigastric pain    Hospital Course: Patient was admitted to the hospital on 5/25/2020 for concerns regarding chest pain and underlying cardiac disease. Patient was brought in by the cardiology service and work-up was unremarkable for any underlying heart issues. I was called to see him after there was concern regarding gallstones seen on imaging as well as potential clinical scenario of acute on chronic cholecystitis. The details of my consult can be found in the chart. Patient was taken to the operating room on 5/27/2020 for a laparoscopic cholecystectomy and cholangiogram, the details of which can be found in the operative report. Postoperatively he did well. Pain control was excellent. He was started on a diet which she advanced without problems. His Plavix was resumed prior to discharge. With his wife present given his underlying dementia, I explained instructions regarding activity, medication, follow-up, and wound care. I will see him in the office next week for staple removal.  All questions were answered.     Consults: general surgery    Significant Diagnostic Studies: labs: CBC, CMP    Treatments: surgery: Laparoscopic cholecystectomy, cholangiogram    Discharge Exam:  See exam dated 5/28/2020    Disposition: home    In process/preliminary results:  Outstanding Order Results     Date and Time Order Name Status Description    5/27/2020 0819 Surgical Pathology In process     5/13/2020 1208 NM MYOCARDIAL SPECT REST EXERCISE OR RX In process           Patient Instructions:   Discharge Medication List as of 5/28/2020  4:40 PM      START taking these medications    Details   oxyCODONE-acetaminophen (PERCOCET) 5-325 MG per tablet Take 1 tablet by mouth every 6 hours as needed for Pain for up to 3 days. , Disp-12 tablet, R-0Print         CONTINUE these medications which have NOT CHANGED    Details   tamsulosin (FLOMAX) 0.4 MG capsule TAKE 1 CAPSULE DAILY, Disp-90 capsule, R-3Normal      omeprazole (PRILOSEC) 20 MG delayed release capsule TAKE 1 CAPSULE DAILY, Disp-90 capsule, R-3Normal      finasteride (PROSCAR) 5 MG tablet TAKE 1 TABLET DAILY, Disp-90 tablet, R-3Normal      nitroGLYCERIN (NITROSTAT) 0.4 MG SL tablet up to max of 3 total doses. If no relief after 1 dose, call 911., Disp-25 tablet, R-3Normal      memantine (NAMENDA) 10 MG tablet Take 1 tablet by mouth 2 times daily, Disp-60 tablet, R-3Normal      ondansetron (ZOFRAN-ODT) 4 MG disintegrating tablet Take 1 tablet by mouth 3 times daily as needed for Nausea or Vomiting, Disp-21 tablet, R-0Normal      SUMAtriptan (IMITREX) 100 MG tablet Historical Med      CPAP Machine MISC Starting u 9/12/2019, Disp-1 each, R-0, PrintStart auto CPAP with minimum of 5, maximum of 12, with low-profile mask and supplies      clopidogrel (PLAVIX) 75 MG tablet Take 1 tablet by mouth daily, Disp-30 tablet, R-3Normal      rosuvastatin (CRESTOR) 10 MG tablet TAKE 1 TABLET DAILY, Disp-90 tablet, R-3Normal      metoprolol succinate (TOPROL XL) 25 MG extended release tablet TAKE 1 TABLET DAILY, Disp-90 tablet, R-3Normal      Psyllium (METAMUCIL FIBER PO) Take by mouthHistorical Med      Cholecalciferol (VITAMIN D3) 1000 UNITS TABS Take 1,900 Units by mouth daily Historical Med      calcium carbonate 600 MG TABS tablet Take 1 tablet by mouth nightly Historical Med      fish oil-omega-3 fatty acids 1000 MG capsule Take 2 g by mouth 2 times daily.         Multiple Vitamin (MULTI-VITAMIN PO) Take 1 tablet by mouth nightly Historical Med      Magnesium 250 MG TABS Take by mouth daily Every other dayHistorical Med      Ascorbic Acid (VITAMIN C) 500 MG tablet Take 500 mg by mouth daily. aspirin 81 MG tablet Take 81 mg by mouth daily. Activity: activity as tolerated  Diet: regular diet  Wound Care: keep wound clean and dry    Follow-up with Claudette Barrera in 1 week.     Jos Webber  5/29/2020  9:55 AM

## 2020-06-02 ENCOUNTER — OFFICE VISIT (OUTPATIENT)
Dept: FAMILY MEDICINE CLINIC | Age: 75
End: 2020-06-02
Payer: MEDICARE

## 2020-06-02 VITALS
HEART RATE: 62 BPM | WEIGHT: 177 LBS | DIASTOLIC BLOOD PRESSURE: 84 MMHG | RESPIRATION RATE: 18 BRPM | HEIGHT: 70 IN | BODY MASS INDEX: 25.34 KG/M2 | SYSTOLIC BLOOD PRESSURE: 128 MMHG | TEMPERATURE: 98.5 F | OXYGEN SATURATION: 98 %

## 2020-06-02 PROCEDURE — 1123F ACP DISCUSS/DSCN MKR DOCD: CPT | Performed by: NURSE PRACTITIONER

## 2020-06-02 PROCEDURE — 3017F COLORECTAL CA SCREEN DOC REV: CPT | Performed by: NURSE PRACTITIONER

## 2020-06-02 PROCEDURE — G0438 PPPS, INITIAL VISIT: HCPCS | Performed by: NURSE PRACTITIONER

## 2020-06-02 PROCEDURE — 1111F DSCHRG MED/CURRENT MED MERGE: CPT | Performed by: NURSE PRACTITIONER

## 2020-06-02 PROCEDURE — 4040F PNEUMOC VAC/ADMIN/RCVD: CPT | Performed by: NURSE PRACTITIONER

## 2020-06-02 PROCEDURE — 99214 OFFICE O/P EST MOD 30 MIN: CPT | Performed by: NURSE PRACTITIONER

## 2020-06-02 ASSESSMENT — VISUAL ACUITY
OD_CC: 20/25
OS_CC: 20/25

## 2020-06-02 ASSESSMENT — PATIENT HEALTH QUESTIONNAIRE - PHQ9
SUM OF ALL RESPONSES TO PHQ QUESTIONS 1-9: 0
SUM OF ALL RESPONSES TO PHQ QUESTIONS 1-9: 0

## 2020-06-02 ASSESSMENT — LIFESTYLE VARIABLES: HOW OFTEN DO YOU HAVE A DRINK CONTAINING ALCOHOL: 0

## 2020-06-02 NOTE — PROGRESS NOTES
mouth nightly  Yes Historical Provider, MD   fish oil-omega-3 fatty acids 1000 MG capsule Take 2 g by mouth 2 times daily. Yes Historical Provider, MD   Multiple Vitamin (MULTI-VITAMIN PO) Take 1 tablet by mouth nightly  Yes Historical Provider, MD   Magnesium 250 MG TABS Take by mouth daily Every other day Yes Historical Provider, MD   Ascorbic Acid (VITAMIN C) 500 MG tablet Take 500 mg by mouth daily. Yes Historical Provider, MD   aspirin 81 MG tablet Take 81 mg by mouth daily. Yes Historical Provider, MD         Past Medical History:   Diagnosis Date    Abnormal cardiovascular stress test 4/19/2016    Equivocal ST-T wave changes; Defect in the inferior wall consistent with prior infarction; LV EF 79%; TID ratio 1.1    Actinic keratoses     BPH (benign prostatic hypertrophy)     CAD (coronary artery disease)     Cancer (HCC)     COLON    Chest pressure 5/3/2016    Cholelithiasis     History of colon cancer     s/p partial colectomy in 2009    Hyperlipidemia     Inflamed seborrheic keratosis     Lumbar radiculopathy 10/26/2016    Mild cognitive impairment     Osteoarthritis     Parathyroid tumor     Parkinson disease ([facility] [address]     RA (retrograde amnesia)     Sinus bradycardia on ECG 4/19/2016    Done 4/5/16 with Dr. Dorene Barbour Syncope and collapse        Past Surgical History:   Procedure Laterality Date    BACK SURGERY  1993    CHOLECYSTECTOMY, LAPAROSCOPIC N/A 5/27/2020    LAP BRAIN/ CHOLANGIOGRAMS performed by Sarah Carbajal MD at [place] [address]  8/18/10,09/11/2012    DR Tona Ramirez    COLONOSCOPY  09/29/14    DR. PIERRE    JOINT REPLACEMENT Left 5/28/13    total    PARATHYROIDECTOMY Bilateral 2011    2 of 4 glands removed    NH COLON CA SCRN NOT HI RSK IND N/A 9/15/2017    COLONOSCOPY performed by Samson Linton MD at [facility] [facility] ENDOSCOPY  8/11/09    DR Tona Ramirez         Family History vaccine  Completed    Pneumococcal 65+ years Vaccine  Completed    AAA screen  Completed    Hepatitis C screen  Completed    Hepatitis A vaccine  Aged Out    Hepatitis B vaccine  Aged Out    Hib vaccine  Aged Out    Meningococcal (ACWY) vaccine  Aged Out     Recommendations for [employer] Due: see orders and patient instructions/AVS.  . Recommended screening schedule for the next 5-10 years is provided to the patient in written form: see Patient Oli Anthony was seen today for medicare aw.     Diagnoses and all orders for this visit:    Routine general medical examination at a health care facility

## 2020-06-02 NOTE — PROGRESS NOTES
Cholecalciferol (VITAMIN D3) 1000 UNITS TABS  Take 1,900 Units by mouth daily              clopidogrel (PLAVIX) 75 MG tablet  Take 1 tablet by mouth daily             finasteride (PROSCAR) 5 MG tablet  TAKE 1 TABLET DAILY             fish oil-omega-3 fatty acids 1000 MG capsule  Take 2 g by mouth 2 times daily. Magnesium 250 MG TABS  Take by mouth daily Every other day             memantine (NAMENDA) 10 MG tablet  Take 1 tablet by mouth 2 times daily             metoprolol succinate (TOPROL XL) 25 MG extended release tablet  TAKE 1 TABLET DAILY             Multiple Vitamin (MULTI-VITAMIN PO)  Take 1 tablet by mouth nightly              nitroGLYCERIN (NITROSTAT) 0.4 MG SL tablet  up to max of 3 total doses. If no relief after 1 dose, call 911. omeprazole (PRILOSEC) 20 MG delayed release capsule  TAKE 1 CAPSULE DAILY             ondansetron (ZOFRAN-ODT) 4 MG disintegrating tablet  Take 1 tablet by mouth 3 times daily as needed for Nausea or Vomiting             Psyllium (METAMUCIL FIBER PO)  Take by mouth             rosuvastatin (CRESTOR) 10 MG tablet  TAKE 1 TABLET DAILY             SUMAtriptan (IMITREX) 100 MG tablet               tamsulosin (FLOMAX) 0.4 MG capsule  TAKE 1 CAPSULE DAILY                   Medications marked \"taking\" at this time  Outpatient Medications Marked as Taking for the 6/2/20 encounter (Office Visit) with ELLIOT Johnson - CNP   Medication Sig Dispense Refill    tamsulosin (FLOMAX) 0.4 MG capsule TAKE 1 CAPSULE DAILY 90 capsule 3    omeprazole (PRILOSEC) 20 MG delayed release capsule TAKE 1 CAPSULE DAILY 90 capsule 3    finasteride (PROSCAR) 5 MG tablet TAKE 1 TABLET DAILY 90 tablet 3    nitroGLYCERIN (NITROSTAT) 0.4 MG SL tablet up to max of 3 total doses.  If no relief after 1 dose, call 911. 25 tablet 3    memantine (NAMENDA) 10 MG tablet Take 1 tablet by mouth 2 times daily 60 tablet 3    ondansetron (ZOFRAN-ODT) 4 MG disintegrating tablet Take 1 tablet by mouth 3 times daily as needed for Nausea or Vomiting 21 tablet 0    SUMAtriptan (IMITREX) 100 MG tablet       clopidogrel (PLAVIX) 75 MG tablet Take 1 tablet by mouth daily 30 tablet 3    rosuvastatin (CRESTOR) 10 MG tablet TAKE 1 TABLET DAILY 90 tablet 3    metoprolol succinate (TOPROL XL) 25 MG extended release tablet TAKE 1 TABLET DAILY 90 tablet 3    Psyllium (METAMUCIL FIBER PO) Take by mouth      Cholecalciferol (VITAMIN D3) 1000 UNITS TABS Take 1,900 Units by mouth daily       calcium carbonate 600 MG TABS tablet Take 1 tablet by mouth nightly       fish oil-omega-3 fatty acids 1000 MG capsule Take 2 g by mouth 2 times daily.  Multiple Vitamin (MULTI-VITAMIN PO) Take 1 tablet by mouth nightly       Magnesium 250 MG TABS Take by mouth daily Every other day      Ascorbic Acid (VITAMIN C) 500 MG tablet Take 500 mg by mouth daily.  aspirin 81 MG tablet Take 81 mg by mouth daily. Medications patient taking as of now reconciled against medications ordered at time of hospital discharge: Yes    Chief Complaint   Patient presents with   [address] from Hospital     Pt. is here for an ER f/u from [address] 05/25/2020-05/28/2020 for chest pain. Pt. had labs, Chest Xray, Cholangiogram and EKG. Pt. had his gallbladder removed last Wednesday which was causing the chest pain. Pt. c/o being fatigued and a cough since the surgery. HPI    Inpatient course: Discharge summary reviewed- see chart. Interval history/Current status: stable    Review of Systems   Constitutional: Positive for fatigue (improving daily since discharge). Negative for appetite change, chills, diaphoresis, fever and unexpected weight change. HENT: Negative. Eyes: Negative. Respiratory: Positive for cough (dry intermittent ). Negative for chest tightness, shortness of breath and wheezing. Cardiovascular: Negative.     Gastrointestinal: Negative for abdominal distention, abdominal pain, anal

## 2020-06-05 ENCOUNTER — OFFICE VISIT (OUTPATIENT)
Dept: SURGERY | Age: 75
End: 2020-06-05

## 2020-06-05 VITALS
HEART RATE: 64 BPM | SYSTOLIC BLOOD PRESSURE: 112 MMHG | DIASTOLIC BLOOD PRESSURE: 62 MMHG | HEIGHT: 70 IN | OXYGEN SATURATION: 96 % | BODY MASS INDEX: 24.91 KG/M2 | WEIGHT: 174 LBS

## 2020-06-05 PROCEDURE — 99024 POSTOP FOLLOW-UP VISIT: CPT | Performed by: COLON & RECTAL SURGERY

## 2020-06-08 ASSESSMENT — ENCOUNTER SYMPTOMS
NAUSEA: 0
CHEST TIGHTNESS: 0
RECTAL PAIN: 0
VOMITING: 0
DIARRHEA: 0
COUGH: 1
SHORTNESS OF BREATH: 0
ANAL BLEEDING: 0
ABDOMINAL DISTENTION: 0
EYES NEGATIVE: 1
WHEEZING: 0
BLOOD IN STOOL: 0
ABDOMINAL PAIN: 0
CONSTIPATION: 0

## 2020-07-08 RX ORDER — ROSUVASTATIN CALCIUM 10 MG/1
TABLET, COATED ORAL
Qty: 90 TABLET | Refills: 3 | Status: SHIPPED | OUTPATIENT
Start: 2020-07-08 | End: 2021-01-01

## 2020-07-09 ENCOUNTER — APPOINTMENT (OUTPATIENT)
Dept: GENERAL RADIOLOGY | Age: 75
End: 2020-07-09
Payer: MEDICARE

## 2020-07-09 ENCOUNTER — HOSPITAL ENCOUNTER (EMERGENCY)
Age: 75
Discharge: HOME | End: 2020-07-09
Attending: EMERGENCY MEDICINE
Payer: MEDICARE

## 2020-07-09 VITALS
OXYGEN SATURATION: 99 % | WEIGHT: 175 LBS | RESPIRATION RATE: 16 BRPM | HEART RATE: 60 BPM | TEMPERATURE: 98.1 F | BODY MASS INDEX: 25.05 KG/M2 | HEIGHT: 70 IN | DIASTOLIC BLOOD PRESSURE: 77 MMHG | SYSTOLIC BLOOD PRESSURE: 171 MMHG

## 2020-07-09 LAB
ALBUMIN SERPL-MCNC: 4.3 G/DL (ref 3.5–4.6)
ALP BLD-CCNC: 55 U/L (ref 35–104)
ALT SERPL-CCNC: 19 U/L (ref 0–41)
ANION GAP SERPL CALCULATED.3IONS-SCNC: 7 MEQ/L (ref 9–15)
AST SERPL-CCNC: 23 U/L (ref 0–40)
BASOPHILS ABSOLUTE: 0 K/UL (ref 0–0.2)
BASOPHILS RELATIVE PERCENT: 0.6 %
BILIRUB SERPL-MCNC: 0.4 MG/DL (ref 0.2–0.7)
BUN BLDV-MCNC: 10 MG/DL (ref 8–23)
CALCIUM SERPL-MCNC: 9.3 MG/DL (ref 8.5–9.9)
CHLORIDE BLD-SCNC: 99 MEQ/L (ref 95–107)
CO2: 26 MEQ/L (ref 20–31)
CREAT SERPL-MCNC: 0.93 MG/DL (ref 0.7–1.2)
EKG ATRIAL RATE: 63 BPM
EKG P AXIS: 67 DEGREES
EKG P-R INTERVAL: 124 MS
EKG Q-T INTERVAL: 434 MS
EKG QRS DURATION: 72 MS
EKG QTC CALCULATION (BAZETT): 444 MS
EKG R AXIS: 41 DEGREES
EKG T AXIS: 61 DEGREES
EKG VENTRICULAR RATE: 63 BPM
EOSINOPHILS ABSOLUTE: 0.1 K/UL (ref 0–0.7)
EOSINOPHILS RELATIVE PERCENT: 1.2 %
GFR AFRICAN AMERICAN: >60
GFR NON-AFRICAN AMERICAN: >60
GLOBULIN: 2.7 G/DL (ref 2.3–3.5)
GLUCOSE BLD-MCNC: 97 MG/DL (ref 70–99)
HCT VFR BLD CALC: 47.3 % (ref 42–52)
HEMOGLOBIN: 15.9 G/DL (ref 14–18)
INR BLD: 0.9
LYMPHOCYTES ABSOLUTE: 1.7 K/UL (ref 1–4.8)
LYMPHOCYTES RELATIVE PERCENT: 27.9 %
MCH RBC QN AUTO: 31.3 PG (ref 27–31.3)
MCHC RBC AUTO-ENTMCNC: 33.7 % (ref 33–37)
MCV RBC AUTO: 93 FL (ref 80–100)
MONOCYTES ABSOLUTE: 0.5 K/UL (ref 0.2–0.8)
MONOCYTES RELATIVE PERCENT: 8.6 %
NEUTROPHILS ABSOLUTE: 3.7 K/UL (ref 1.4–6.5)
NEUTROPHILS RELATIVE PERCENT: 61.7 %
PDW BLD-RTO: 13.4 % (ref 11.5–14.5)
PLATELET # BLD: 159 K/UL (ref 130–400)
POTASSIUM SERPL-SCNC: 3.8 MEQ/L (ref 3.4–4.9)
PROTHROMBIN TIME: 12.2 SEC (ref 12.3–14.9)
RBC # BLD: 5.09 M/UL (ref 4.7–6.1)
SODIUM BLD-SCNC: 132 MEQ/L (ref 135–144)
TOTAL PROTEIN: 7 G/DL (ref 6.3–8)
TROPONIN: <0.01 NG/ML (ref 0–0.01)
WBC # BLD: 6 K/UL (ref 4.8–10.8)

## 2020-07-09 PROCEDURE — 99285 EMERGENCY DEPT VISIT HI MDM: CPT

## 2020-07-09 PROCEDURE — 85025 COMPLETE CBC W/AUTO DIFF WBC: CPT

## 2020-07-09 PROCEDURE — 80053 COMPREHEN METABOLIC PANEL: CPT

## 2020-07-09 PROCEDURE — 84484 ASSAY OF TROPONIN QUANT: CPT

## 2020-07-09 PROCEDURE — 85610 PROTHROMBIN TIME: CPT

## 2020-07-09 PROCEDURE — 71045 X-RAY EXAM CHEST 1 VIEW: CPT

## 2020-07-09 PROCEDURE — 93005 ELECTROCARDIOGRAM TRACING: CPT | Performed by: EMERGENCY MEDICINE

## 2020-07-09 PROCEDURE — 36415 COLL VENOUS BLD VENIPUNCTURE: CPT

## 2020-07-09 ASSESSMENT — PAIN DESCRIPTION - PAIN TYPE: TYPE: ACUTE PAIN

## 2020-07-09 ASSESSMENT — ENCOUNTER SYMPTOMS
SORE THROAT: 0
SHORTNESS OF BREATH: 1
CHEST TIGHTNESS: 0
ABDOMINAL PAIN: 0
VOMITING: 0
COUGH: 1
NAUSEA: 0
EYE PAIN: 0

## 2020-07-09 ASSESSMENT — PAIN DESCRIPTION - ORIENTATION: ORIENTATION: RIGHT

## 2020-07-09 ASSESSMENT — PAIN SCALES - GENERAL: PAINLEVEL_OUTOF10: 2

## 2020-07-09 ASSESSMENT — PAIN DESCRIPTION - LOCATION: LOCATION: CHEST

## 2020-07-09 ASSESSMENT — PAIN DESCRIPTION - DESCRIPTORS: DESCRIPTORS: OTHER (COMMENT)

## 2020-07-09 NOTE — ED PROVIDER NOTES
[address] [facility] ED  eMERGENCY dEPARTMENTeNCOUnter      Pt Name: Jayesh Deng  MRN: [id number]  [place] 1945  Date ofevaluation: 7/9/2020  Provider: Masood Torres DO    CHIEF COMPLAINT       Chief Complaint   Patient presents with    Chest Pain     right sided; since this morning         HISTORY OF PRESENT ILLNESS   (Location/Symptom, Timing/Onset,Context/Setting, Quality, Duration, Modifying Factors, Severity)  Note limiting factors. Jayesh Deng is a 69 y.o. male who presents to the emergency department . Patient comes in because he woke up today and had a stabbing pain in his right chest.  He told his wife that he felt a little short of breath at the time. Patient tells me that he is embarrassed that he is here for this because it feels better now. He has been in and out of the ER several times in the last year. Patient was admitted at the end of May for chest pain and had his gallbladder taken out. Patient had a stress test at that time that was negative. HPI    NursingNotes were reviewed. REVIEW OF SYSTEMS    (2-9 systems for level 4, 10 or more for level 5)     Review of Systems   Constitutional: Negative for activity change, appetite change, fatigue and fever. HENT: Negative for congestion and sore throat. Eyes: Negative for pain and visual disturbance. Respiratory: Positive for cough and shortness of breath. Negative for chest tightness. Cardiovascular: Positive for chest pain. Gastrointestinal: Negative for abdominal pain, nausea and vomiting. Endocrine: Negative for polydipsia. Genitourinary: Negative for flank pain and urgency. Musculoskeletal: Negative for gait problem and neck stiffness. Skin: Negative for rash. Neurological: Negative for weakness, light-headedness and headaches. Psychiatric/Behavioral: Negative for confusion and sleep disturbance.        Except as noted above the remainder of the review of systems was reviewed and negative. PAST MEDICAL HISTORY     Past Medical History:   Diagnosis Date    Abnormal cardiovascular stress test 4/19/2016    Equivocal ST-T wave changes; Defect in the inferior wall consistent with prior infarction; LV EF 79%; TID ratio 1.1    Actinic keratoses     BPH (benign prostatic hypertrophy)     CAD (coronary artery disease)     Cancer (HCC)     COLON    Chest pressure 5/3/2016    Cholelithiasis     History of colon cancer     s/p partial colectomy in 2009    Hyperlipidemia     Inflamed seborrheic keratosis     Lumbar radiculopathy 10/26/2016    Mild cognitive impairment     Osteoarthritis     Parathyroid tumor     Parkinson disease ([address]     RA (retrograde amnesia)     Sinus bradycardia on ECG 4/19/2016    Done 4/5/16 with Dr. Pedro Carlos Syncope and collapse          SURGICALHISTORY       Past Surgical History:   Procedure Laterality Date    BACK SURGERY  1993    CHOLECYSTECTOMY, LAPAROSCOPIC N/A 5/27/2020    LAP BRAIN/ CHOLANGIOGRAMS performed by Jerrell Shin MD at [place] [address]  8/18/10,09/11/2012    DR Hedy Ross    COLONOSCOPY  09/29/14    DR. PIERRE    JOINT REPLACEMENT Left 5/28/13    total    PARATHYROIDECTOMY Bilateral 2011    2 of 4 glands removed    OH COLON CA SCRN NOT HI RSK IND N/A 9/15/2017    COLONOSCOPY performed by Fito Mantilla MD at [address] [facility] [facility] ENDOSCOPY  8/11/09    DR POLK         CURRENT MEDICATIONS       Previous Medications    ASCORBIC ACID (VITAMIN C) 500 MG TABLET    Take 500 mg by mouth daily. ASPIRIN 81 MG TABLET    Take 81 mg by mouth daily.       CALCIUM CARBONATE 600 MG TABS TABLET    Take 1 tablet by mouth nightly     CHOLECALCIFEROL (VITAMIN D3) 1000 UNITS TABS    Take 1,900 Units by mouth daily     CLOPIDOGREL (PLAVIX) 75 MG TABLET    Take 1 tablet by mouth daily    FINASTERIDE (PROSCAR) 5 MG TABLET    TAKE 1 TABLET DAILY    FISH OIL-OMEGA-3 FATTY ACIDS 1000 MG CAPSULE    Take 2 g by mouth 2 times daily. MAGNESIUM 250 MG TABS    Take by mouth daily Every other day    MEMANTINE (NAMENDA) 10 MG TABLET    Take 1 tablet by mouth 2 times daily    METOPROLOL SUCCINATE (TOPROL XL) 25 MG EXTENDED RELEASE TABLET    TAKE 1 TABLET DAILY    MULTIPLE VITAMIN (MULTI-VITAMIN PO)    Take 1 tablet by mouth nightly     NITROGLYCERIN (NITROSTAT) 0.4 MG SL TABLET    up to max of 3 total doses. If no relief after 1 dose, call 911.     OMEPRAZOLE (PRILOSEC) 20 MG DELAYED RELEASE CAPSULE    TAKE 1 CAPSULE DAILY    ONDANSETRON (ZOFRAN-ODT) 4 MG DISINTEGRATING TABLET    Take 1 tablet by mouth 3 times daily as needed for Nausea or Vomiting    PSYLLIUM (METAMUCIL FIBER PO)    Take by mouth    ROSUVASTATIN (CRESTOR) 10 MG TABLET    TAKE 1 TABLET DAILY    SUMATRIPTAN (IMITREX) 100 MG TABLET        TAMSULOSIN (FLOMAX) 0.4 MG CAPSULE    TAKE 1 CAPSULE DAILY       ALLERGIES     Amoxicillin    FAMILY HISTORY       Family History   Problem Relation Age of Onset    Cancer Mother         OVARIAN    Heart Disease Father           SOCIAL HISTORY       Social History     Socioeconomic History    Marital status:      Spouse name: None    Number of children: None    Years of education: None    Highest education level: None   Occupational History    None   Social Needs    Financial resource strain: None    Food insecurity     Worry: None     Inability: None    Transportation needs     Medical: None     Non-medical: None   Tobacco Use    Smoking status: Former Smoker    Smokeless tobacco: Never Used    Tobacco comment: Quit smoking > 30 years ago   Substance and Sexual Activity    Alcohol use: No    Drug use: No    Sexual activity: None   Lifestyle    Physical activity     Days per week: None     Minutes per session: None    Stress: None   Relationships    Social connections     Talks on phone: None     Gets together: None     Attends Church service: None     Active member of club or organization: None     Attends meetings of clubs or organizations: None     Relationship status: None    Intimate partner violence     Fear of current or ex partner: None     Emotionally abused: None     Physically abused: None     Forced sexual activity: None   Other Topics Concern    None   Social History Narrative    None       SCREENINGS              PHYSICAL EXAM    (up to 7 for level 4, 8 or more for level 5)     ED Triage Vitals [07/09/20 1756]   BP Temp Temp Source Pulse Resp SpO2 Height Weight   -- 98.1 °F (36.7 °C) Oral 61 16 97 % 5' 10\" (1.778 m) 175 lb (79.4 kg)       Physical Exam  Vitals signs and nursing note reviewed. Constitutional:       General: He is not in acute distress. Appearance: He is well-developed. He is not diaphoretic. HENT:      Head: Normocephalic and atraumatic. Right Ear: External ear normal.      Left Ear: External ear normal.      Mouth/Throat:      Pharynx: No oropharyngeal exudate. Eyes:      Conjunctiva/sclera: Conjunctivae normal.      Pupils: Pupils are equal, round, and reactive to light. Neck:      Musculoskeletal: Normal range of motion and neck supple. Thyroid: No thyromegaly. Vascular: No JVD. Trachea: No tracheal deviation. Cardiovascular:      Rate and Rhythm: Normal rate. Heart sounds: Normal heart sounds. No murmur. Pulmonary:      Effort: Pulmonary effort is normal. No respiratory distress. Breath sounds: Rhonchi present. No wheezing. Comments: Rhonchi right midlung  Abdominal:      General: Bowel sounds are normal.      Palpations: Abdomen is soft. Tenderness: There is no abdominal tenderness. There is no guarding. Musculoskeletal: Normal range of motion. Skin:     General: Skin is warm and dry. Findings: No rash. Neurological:      Mental Status: He is alert and oriented to person, place, and time. Cranial Nerves: No cranial nerve deficit.    Psychiatric: Behavior: Behavior normal.         DIAGNOSTIC RESULTS     EKG: All EKG's are interpreted by the Emergency Department Physician who either signs or Co-signsthis chart in the absence of a cardiologist.    Normal sinus rhythm with PACs 63 bpm no acute ischemia    RADIOLOGY:   Non-plain filmimages such as CT, Ultrasound and MRI are read by the radiologist. Plain radiographic images are visualized and preliminarily interpreted by the emergency physician with the below findings:    Chest x-ray no acute pulmonary disease    Interpretation per the Radiologist below, if available at the time ofthis note:    XR CHEST PORTABLE    (Results Pending)         ED BEDSIDE ULTRASOUND:   Performed by ED Physician - none    LABS:  Labs Reviewed   CBC WITH AUTO DIFFERENTIAL   COMPREHENSIVE METABOLIC PANEL   TROPONIN   PROTIME-INR       All other labs were within normal range or not returned as of this dictation. EMERGENCY DEPARTMENT COURSE and DIFFERENTIAL DIAGNOSIS/MDM:   Vitals:    Vitals:    07/09/20 1756   Pulse: 61   Resp: 16   Temp: 98.1 °F (36.7 °C)   TempSrc: Oral   SpO2: 97%   Weight: 175 lb (79.4 kg)   Height: 5' 10\" (1.778 m)       Patient came in with atypical right-sided chest pain that is sharp and transient in nature. Cardiac work-up negative. Chest x-ray negative. Patient looks well and is no distress. Atypical chest pain follow-up with primary care    MDM      REASSESSMENT          CRITICAL CARE TIME   Total Critical Care time was 0 minutes, excluding separatelyreportable procedures. There was a high probability ofclinically significant/life threatening deterioration in the patient's condition which required my urgent intervention. CONSULTS:  None    PROCEDURES:  Unless otherwise noted below, none     Procedures    FINAL IMPRESSION      1.  Atypical chest pain          DISPOSITION/PLAN   DISPOSITION        PATIENT REFERREDTO:  Selma Younger MD  [address] [place] [id number] [phone]      As needed      DISCHARGEMEDICATIONS:  New Prescriptions    No medications on file     Controlled Substances Monitoring:     No flowsheet data found.     (Please note that portions of this note were completed with a voice recognition program.  Efforts were made to edit the dictations but occasionally words are mis-transcribed.)    Gaston Cloud DO (electronically signed)  Attending Emergency Physician         Gaston Cloud DO  07/09/20 9172

## 2020-07-09 NOTE — ED TRIAGE NOTES
Pt here from home via [address] [place] [address],[address]. He complains of 2/10 \"biting\" right sided chest pain since this morning.

## 2020-07-09 NOTE — ED NOTES
Bed: 26  Expected date: 7/9/20  Expected time: 5:44 PM  Means of arrival: [facility]  Comments:  [id number], YESENIA  07/09/20 6541

## 2020-07-09 NOTE — ED NOTES
Discharge instructions reviewed with pt and signed. No questions at this time. Pt ambulatory in stable condition with wife at discharge.      Reagan Mcintosh RN  07/09/20 7303

## 2020-07-10 ENCOUNTER — TELEPHONE (OUTPATIENT)
Dept: FAMILY MEDICINE CLINIC | Age: 75
End: 2020-07-10

## 2020-07-10 PROCEDURE — 93010 ELECTROCARDIOGRAM REPORT: CPT | Performed by: INTERNAL MEDICINE

## 2020-07-10 NOTE — TELEPHONE ENCOUNTER
Patient's wife Judi Wright calling for advice. Patient was in ER last night for right sided chest pain and was discharged home. Per wife patient is doing okay but suffers from \"dementia\" and wife concerned that he might benefit from medication for depression as she thinks this might be related. Advised patient's wife that I would send a note to  about this and office would contact her next week when  returns. Please advise. Pharmacy [facility] [address]  Allergies: AMOXICILLIN    Of note wife aware of appointment 7-24-20 with Forrest Herman and appointment with  on 9-2-2020.

## 2020-07-14 DIAGNOSIS — Z85.46 H/O PROSTATE CANCER: ICD-10-CM

## 2020-07-14 LAB — PROSTATE SPECIFIC ANTIGEN: 2.07 NG/ML (ref 0–6.22)

## 2020-07-14 NOTE — TELEPHONE ENCOUNTER
I spoke with patients' wife regarding medication request. Wife aware RX sent to [facility] instead of [facility] but she states that is okay she will wait for mail order.

## 2020-07-21 ENCOUNTER — APPOINTMENT (OUTPATIENT)
Dept: MRI IMAGING | Age: 75
End: 2020-07-21
Payer: MEDICARE

## 2020-07-21 ENCOUNTER — HOSPITAL ENCOUNTER (OUTPATIENT)
Age: 75
Setting detail: OBSERVATION
Discharge: HOME | End: 2020-07-23
Attending: STUDENT IN AN ORGANIZED HEALTH CARE EDUCATION/TRAINING PROGRAM | Admitting: INTERNAL MEDICINE
Payer: MEDICARE

## 2020-07-21 ENCOUNTER — APPOINTMENT (OUTPATIENT)
Dept: GENERAL RADIOLOGY | Age: 75
End: 2020-07-21
Payer: MEDICARE

## 2020-07-21 ENCOUNTER — APPOINTMENT (OUTPATIENT)
Dept: CT IMAGING | Age: 75
End: 2020-07-21
Payer: MEDICARE

## 2020-07-21 PROBLEM — R29.90 STROKE-LIKE EPISODE: Status: ACTIVE | Noted: 2020-07-21

## 2020-07-21 LAB
ALBUMIN SERPL-MCNC: 4.1 G/DL (ref 3.5–4.6)
ALP BLD-CCNC: 51 U/L (ref 35–104)
ALT SERPL-CCNC: 17 U/L (ref 0–41)
ANION GAP SERPL CALCULATED.3IONS-SCNC: 11 MEQ/L (ref 9–15)
APTT: 33.4 SEC (ref 24.4–36.8)
AST SERPL-CCNC: 19 U/L (ref 0–40)
BASOPHILS ABSOLUTE: 0 K/UL (ref 0–0.2)
BASOPHILS RELATIVE PERCENT: 0.4 %
BILIRUB SERPL-MCNC: 0.5 MG/DL (ref 0.2–0.7)
BILIRUBIN URINE: NEGATIVE
BLOOD, URINE: NEGATIVE
BUN BLDV-MCNC: 11 MG/DL (ref 8–23)
CALCIUM SERPL-MCNC: 9.3 MG/DL (ref 8.5–9.9)
CHLORIDE BLD-SCNC: 104 MEQ/L (ref 95–107)
CLARITY: ABNORMAL
CO2: 25 MEQ/L (ref 20–31)
COLOR: YELLOW
CREAT SERPL-MCNC: 0.83 MG/DL (ref 0.7–1.2)
EKG ATRIAL RATE: 64 BPM
EKG P AXIS: 73 DEGREES
EKG P-R INTERVAL: 130 MS
EKG Q-T INTERVAL: 414 MS
EKG QRS DURATION: 80 MS
EKG QTC CALCULATION (BAZETT): 427 MS
EKG R AXIS: 48 DEGREES
EKG T AXIS: 68 DEGREES
EKG VENTRICULAR RATE: 64 BPM
EOSINOPHILS ABSOLUTE: 0 K/UL (ref 0–0.7)
EOSINOPHILS RELATIVE PERCENT: 0.2 %
GFR AFRICAN AMERICAN: >60
GFR NON-AFRICAN AMERICAN: >60
GLOBULIN: 2.6 G/DL (ref 2.3–3.5)
GLUCOSE BLD-MCNC: 107 MG/DL (ref 70–99)
GLUCOSE BLD-MCNC: 112 MG/DL (ref 60–115)
GLUCOSE URINE: NEGATIVE MG/DL
HCT VFR BLD CALC: 45.6 % (ref 42–52)
HEMOGLOBIN: 15.6 G/DL (ref 14–18)
INR BLD: 1
KETONES, URINE: NEGATIVE MG/DL
LEUKOCYTE ESTERASE, URINE: NEGATIVE
LYMPHOCYTES ABSOLUTE: 0.9 K/UL (ref 1–4.8)
LYMPHOCYTES RELATIVE PERCENT: 10.9 %
MAGNESIUM: 2.4 MG/DL (ref 1.7–2.4)
MCH RBC QN AUTO: 32 PG (ref 27–31.3)
MCHC RBC AUTO-ENTMCNC: 34.2 % (ref 33–37)
MCV RBC AUTO: 93.5 FL (ref 80–100)
MONOCYTES ABSOLUTE: 0.6 K/UL (ref 0.2–0.8)
MONOCYTES RELATIVE PERCENT: 7 %
NEUTROPHILS ABSOLUTE: 6.6 K/UL (ref 1.4–6.5)
NEUTROPHILS RELATIVE PERCENT: 81.5 %
NITRITE, URINE: NEGATIVE
PDW BLD-RTO: 13.5 % (ref 11.5–14.5)
PERFORMED ON: NORMAL
PH UA: 7.5 (ref 5–9)
PLATELET # BLD: 164 K/UL (ref 130–400)
POTASSIUM SERPL-SCNC: 4 MEQ/L (ref 3.4–4.9)
PROTEIN UA: NEGATIVE MG/DL
PROTHROMBIN TIME: 13.4 SEC (ref 12.3–14.9)
RBC # BLD: 4.87 M/UL (ref 4.7–6.1)
SODIUM BLD-SCNC: 140 MEQ/L (ref 135–144)
SPECIFIC GRAVITY UA: 1.03 (ref 1–1.03)
TOTAL CK: 97 U/L (ref 0–190)
TOTAL PROTEIN: 6.7 G/DL (ref 6.3–8)
TROPONIN: <0.01 NG/ML (ref 0–0.01)
URINE REFLEX TO CULTURE: ABNORMAL
UROBILINOGEN, URINE: 1 E.U./DL
WBC # BLD: 8.1 K/UL (ref 4.8–10.8)

## 2020-07-21 PROCEDURE — 6360000002 HC RX W HCPCS: Performed by: INTERNAL MEDICINE

## 2020-07-21 PROCEDURE — 93005 ELECTROCARDIOGRAM TRACING: CPT | Performed by: NURSE PRACTITIONER

## 2020-07-21 PROCEDURE — 70498 CT ANGIOGRAPHY NECK: CPT

## 2020-07-21 PROCEDURE — 2580000003 HC RX 258: Performed by: INTERNAL MEDICINE

## 2020-07-21 PROCEDURE — 82550 ASSAY OF CK (CPK): CPT

## 2020-07-21 PROCEDURE — G0378 HOSPITAL OBSERVATION PER HR: HCPCS

## 2020-07-21 PROCEDURE — 96374 THER/PROPH/DIAG INJ IV PUSH: CPT

## 2020-07-21 PROCEDURE — 80053 COMPREHEN METABOLIC PANEL: CPT

## 2020-07-21 PROCEDURE — 85730 THROMBOPLASTIN TIME PARTIAL: CPT

## 2020-07-21 PROCEDURE — 70496 CT ANGIOGRAPHY HEAD: CPT

## 2020-07-21 PROCEDURE — 96372 THER/PROPH/DIAG INJ SC/IM: CPT

## 2020-07-21 PROCEDURE — 6360000002 HC RX W HCPCS: Performed by: NURSE PRACTITIONER

## 2020-07-21 PROCEDURE — 36415 COLL VENOUS BLD VENIPUNCTURE: CPT

## 2020-07-21 PROCEDURE — 85610 PROTHROMBIN TIME: CPT

## 2020-07-21 PROCEDURE — 84484 ASSAY OF TROPONIN QUANT: CPT

## 2020-07-21 PROCEDURE — 70450 CT HEAD/BRAIN W/O DYE: CPT

## 2020-07-21 PROCEDURE — 83735 ASSAY OF MAGNESIUM: CPT

## 2020-07-21 PROCEDURE — 6370000000 HC RX 637 (ALT 250 FOR IP): Performed by: INTERNAL MEDICINE

## 2020-07-21 PROCEDURE — 85025 COMPLETE CBC W/AUTO DIFF WBC: CPT

## 2020-07-21 PROCEDURE — 81003 URINALYSIS AUTO W/O SCOPE: CPT

## 2020-07-21 PROCEDURE — 70544 MR ANGIOGRAPHY HEAD W/O DYE: CPT

## 2020-07-21 PROCEDURE — 70551 MRI BRAIN STEM W/O DYE: CPT

## 2020-07-21 PROCEDURE — 71045 X-RAY EXAM CHEST 1 VIEW: CPT

## 2020-07-21 PROCEDURE — 99285 EMERGENCY DEPT VISIT HI MDM: CPT

## 2020-07-21 RX ORDER — ASPIRIN 300 MG/1
300 SUPPOSITORY RECTAL DAILY
Status: DISCONTINUED | OUTPATIENT
Start: 2020-07-21 | End: 2020-07-22

## 2020-07-21 RX ORDER — ROSUVASTATIN CALCIUM 40 MG/1
40 TABLET, COATED ORAL NIGHTLY
Status: DISCONTINUED | OUTPATIENT
Start: 2020-07-21 | End: 2020-07-23 | Stop reason: HOSPADM

## 2020-07-21 RX ORDER — METOPROLOL SUCCINATE 25 MG/1
25 TABLET, EXTENDED RELEASE ORAL DAILY
Status: DISCONTINUED | OUTPATIENT
Start: 2020-07-21 | End: 2020-07-23 | Stop reason: HOSPADM

## 2020-07-21 RX ORDER — CLOPIDOGREL BISULFATE 75 MG/1
75 TABLET ORAL DAILY
Status: DISCONTINUED | OUTPATIENT
Start: 2020-07-21 | End: 2020-07-23 | Stop reason: HOSPADM

## 2020-07-21 RX ORDER — MEMANTINE HYDROCHLORIDE 10 MG/1
10 TABLET ORAL 2 TIMES DAILY
Status: DISCONTINUED | OUTPATIENT
Start: 2020-07-21 | End: 2020-07-23 | Stop reason: HOSPADM

## 2020-07-21 RX ORDER — MULTIVITAMIN WITH IRON
250 TABLET ORAL DAILY
Status: DISCONTINUED | OUTPATIENT
Start: 2020-07-21 | End: 2020-07-21 | Stop reason: RX

## 2020-07-21 RX ORDER — PROMETHAZINE HYDROCHLORIDE 12.5 MG/1
12.5 TABLET ORAL EVERY 6 HOURS PRN
Status: DISCONTINUED | OUTPATIENT
Start: 2020-07-21 | End: 2020-07-23 | Stop reason: HOSPADM

## 2020-07-21 RX ORDER — PANTOPRAZOLE SODIUM 40 MG/1
40 TABLET, DELAYED RELEASE ORAL
Status: DISCONTINUED | OUTPATIENT
Start: 2020-07-22 | End: 2020-07-23 | Stop reason: HOSPADM

## 2020-07-21 RX ORDER — ASPIRIN 81 MG/1
81 TABLET ORAL DAILY
Status: DISCONTINUED | OUTPATIENT
Start: 2020-07-21 | End: 2020-07-23 | Stop reason: HOSPADM

## 2020-07-21 RX ORDER — ONDANSETRON 2 MG/ML
4 INJECTION INTRAMUSCULAR; INTRAVENOUS EVERY 6 HOURS PRN
Status: DISCONTINUED | OUTPATIENT
Start: 2020-07-21 | End: 2020-07-23 | Stop reason: HOSPADM

## 2020-07-21 RX ORDER — HYDRALAZINE HYDROCHLORIDE 20 MG/ML
10 INJECTION INTRAMUSCULAR; INTRAVENOUS ONCE
Status: COMPLETED | OUTPATIENT
Start: 2020-07-21 | End: 2020-07-21

## 2020-07-21 RX ORDER — CALCIUM CARBONATE 200(500)MG
1 TABLET,CHEWABLE ORAL DAILY
Status: DISCONTINUED | OUTPATIENT
Start: 2020-07-21 | End: 2020-07-23 | Stop reason: HOSPADM

## 2020-07-21 RX ORDER — TAMSULOSIN HYDROCHLORIDE 0.4 MG/1
0.4 CAPSULE ORAL DAILY
Status: DISCONTINUED | OUTPATIENT
Start: 2020-07-21 | End: 2020-07-23 | Stop reason: HOSPADM

## 2020-07-21 RX ORDER — POLYETHYLENE GLYCOL 3350 17 G/17G
17 POWDER, FOR SOLUTION ORAL DAILY PRN
Status: DISCONTINUED | OUTPATIENT
Start: 2020-07-21 | End: 2020-07-23 | Stop reason: HOSPADM

## 2020-07-21 RX ORDER — OMEPRAZOLE 20 MG/1
1 CAPSULE, DELAYED RELEASE ORAL DAILY
Status: DISCONTINUED | OUTPATIENT
Start: 2020-07-21 | End: 2020-07-21 | Stop reason: CLARIF

## 2020-07-21 RX ORDER — FINASTERIDE 5 MG/1
5 TABLET, FILM COATED ORAL DAILY
Status: DISCONTINUED | OUTPATIENT
Start: 2020-07-21 | End: 2020-07-23 | Stop reason: HOSPADM

## 2020-07-21 RX ORDER — LANOLIN ALCOHOL/MO/W.PET/CERES
200 CREAM (GRAM) TOPICAL DAILY
Status: DISCONTINUED | OUTPATIENT
Start: 2020-07-21 | End: 2020-07-23 | Stop reason: HOSPADM

## 2020-07-21 RX ORDER — SODIUM CHLORIDE 0.9 % (FLUSH) 0.9 %
10 SYRINGE (ML) INJECTION EVERY 12 HOURS SCHEDULED
Status: DISCONTINUED | OUTPATIENT
Start: 2020-07-21 | End: 2020-07-23 | Stop reason: HOSPADM

## 2020-07-21 RX ORDER — SODIUM CHLORIDE 0.9 % (FLUSH) 0.9 %
10 SYRINGE (ML) INJECTION PRN
Status: DISCONTINUED | OUTPATIENT
Start: 2020-07-21 | End: 2020-07-23 | Stop reason: HOSPADM

## 2020-07-21 RX ADMIN — MEMANTINE HYDROCHLORIDE 10 MG: 10 TABLET ORAL at 20:10

## 2020-07-21 RX ADMIN — ROSUVASTATIN CALCIUM 40 MG: 40 TABLET, FILM COATED ORAL at 20:10

## 2020-07-21 RX ADMIN — ENOXAPARIN SODIUM 40 MG: 40 INJECTION SUBCUTANEOUS at 20:11

## 2020-07-21 RX ADMIN — HYDRALAZINE HYDROCHLORIDE 20 MG: 20 INJECTION INTRAMUSCULAR; INTRAVENOUS at 15:55

## 2020-07-21 RX ADMIN — ASPIRIN 81 MG: 81 TABLET, COATED ORAL at 20:10

## 2020-07-21 RX ADMIN — Medication 10 ML: at 20:16

## 2020-07-21 RX ADMIN — CLOPIDOGREL BISULFATE 75 MG: 75 TABLET ORAL at 20:11

## 2020-07-21 RX ADMIN — METOPROLOL SUCCINATE 25 MG: 25 TABLET, EXTENDED RELEASE ORAL at 20:10

## 2020-07-21 ASSESSMENT — ENCOUNTER SYMPTOMS
DIARRHEA: 0
SHORTNESS OF BREATH: 0
ABDOMINAL PAIN: 0
TROUBLE SWALLOWING: 0
BACK PAIN: 0
COUGH: 0
CONSTIPATION: 0
WHEEZING: 0
EYE DISCHARGE: 0
NAUSEA: 0
BLOOD IN STOOL: 0
EYE PAIN: 0
EYE REDNESS: 0
VOMITING: 0
COLOR CHANGE: 0
RHINORRHEA: 0
SORE THROAT: 0

## 2020-07-21 ASSESSMENT — PAIN SCALES - GENERAL
PAINLEVEL_OUTOF10: 0
PAINLEVEL_OUTOF10: 0

## 2020-07-21 NOTE — PROGRESS NOTES
Spiritual Care Services     Summary of Visit:  Isabel Gerber responded to a code BAT in the Emergency Room    Spiritual Assessment/Intervention/Outcomes:    Encounter Summary  Services provided to[de-identified] Patient and family together  Support System: Spouse, Children, Family members, Synagogue/talon community, Friends/neighbors  Place of Church: [employer]  Contact Synagogue: Completed  Continue Visiting: Yes  Complexity of Encounter: High  Length of Encounter: 45 minutes     Crisis  Type: Code, Stroke Alert  Assessment: Calm  Intervention: Prayer, Sustaining presence/ Ministry of presence  Outcome: Connection/belonging, Expressed gratitude           Advance Directives (For Healthcare)  Pre-existing DNR Comfort Care/DNR Arrest/DNI Order: No  Healthcare Directive: Yes, patient has an advance directive for healthcare treatment  Type of Healthcare Directive: Living will, Durable power of  for health care  Copy in Chart: No, copy requested from clinic  [address] [place] [address] Street of   If you are unable to speak for yourself, does your Healthcare Agent or Legal Spokesperson know your healthcare wishes?: Yes           Values / Beliefs  Do you have any ethnic, cultural, sacramental, or spiritual [employer] needs you would like us to be aware of while you are in the hospital?: No    Care Plan:        [id number]   Electronically signed by Yessi Mandujano on 7/21/20 at 7:27 PM EDT     To reach a  for emotional and spiritual support, place an [facility] consult request.   If a  is needed immediately, dial 0 and ask to page the on-call .

## 2020-07-21 NOTE — ED PROVIDER NOTES
[facility] ED  EMERGENCY DEPARTMENT ENCOUNTER      Pt Name: Emery Shankar  MRN: [id number]  [place] 1945  Date of evaluation: 7/21/2020  Provider: ELLIOT Scott CNP    CHIEF COMPLAINT       Chief Complaint   Patient presents with    Fatigue     x few hours         HISTORY OF PRESENT ILLNESS   (Location/Symptom, Timing/Onset,Context/Setting, Quality, Duration, Modifying Factors, Severity)  Note limiting factors. Emery Shankar is a 69 y.o. male who presents to the emergency department with a chart reviewed past medical history of hyperlipidemia, osteopenia, coronary artery disease, BPH, TIA and chest pain for a chief complaint of generalized fatigue and weakness. Per wife, around 11:30 AM, the patient decided to take a nap because he stated that he was tired. She reports that he took a nap for approximately an hour and when he woke up he was not responding to her the way he normally does. By that she states that she means that his eyes were not opening as much as previously, and that she was speaking to him and he is not responding to her and appeared to be drowsy. When I inquired from patient, he feels he said he is not sure but something feels off. He denies having any numbness or tingling, denies headache, he is able to respond to questions but is slow to respond and has mild to moderate dysarthria with mild to moderate aphasia. Wife states patient is on Plavix and aspirin. Nursing Notes were reviewed. REVIEW OF SYSTEMS    (2-9 systems for level 4, 10 or more for level 5)     Review of Systems   Constitutional: Negative for activity change, appetite change, fatigue and fever. HENT: Negative for congestion, ear pain, rhinorrhea, sore throat and trouble swallowing. Eyes: Negative for pain, discharge and redness. Respiratory: Negative for cough, shortness of breath and wheezing. Cardiovascular: Negative for chest pain and palpitations. Gastrointestinal: Negative for abdominal pain, blood in stool, constipation, diarrhea, nausea and vomiting. Endocrine: Negative for polydipsia and polyuria. Genitourinary: Negative for decreased urine volume, dysuria, flank pain and hematuria. Musculoskeletal: Negative for arthralgias, back pain and myalgias. Skin: Negative for color change, rash and wound. Neurological: Positive for tremors, speech difficulty and weakness. Negative for dizziness, syncope, light-headedness and headaches. Psychiatric/Behavioral: Negative for behavioral problems. All other systems reviewed and are negative. Except as noted above the remainder of the review of systems was reviewed and negative. PAST MEDICAL HISTORY     Past Medical History:   Diagnosis Date    Abnormal cardiovascular stress test 4/19/2016    Equivocal ST-T wave changes; Defect in the inferior wall consistent with prior infarction; LV EF 79%; TID ratio 1.1    Actinic keratoses     BPH (benign prostatic hypertrophy)     CAD (coronary artery disease)     Cancer (HCC)     COLON    Chest pressure 5/3/2016    Cholelithiasis     History of colon cancer     s/p partial colectomy in 2009    Hyperlipidemia     Inflamed seborrheic keratosis     Lumbar radiculopathy 10/26/2016    Mild cognitive impairment     Osteoarthritis     Parathyroid tumor     Parkinson disease [address] [address]     RA (retrograde amnesia)     Sinus bradycardia on ECG 4/19/2016    Done 4/5/16 with Dr. Joey Tripp Syncope and collapse      Past Surgical History:   Procedure Laterality Date    BACK SURGERY  1993    CHOLECYSTECTOMY, LAPAROSCOPIC N/A 5/27/2020    LAP BRAIN/ CHOLANGIOGRAMS performed by Uche Boone MD at [place] [address]  8/18/10,09/11/2012    DR Taiwo Navarro    COLONOSCOPY  09/29/14    DR. PIERRE    JOINT REPLACEMENT Left 5/28/13    total    PARATHYROIDECTOMY Bilateral 2011    2 of 4 glands removed    OR COLON CA SCRN NOT HI RSK IND N/A 9/15/2017    COLONOSCOPY performed by Sixto Cummins MD at [facility] [facility] ENDOSCOPY  8/11/09    DR POLK     Social History     Socioeconomic History    Marital status:      Spouse name: None    Number of children: None    Years of education: None    Highest education level: None   Occupational History    None   Social Needs    Financial resource strain: None    Food insecurity     Worry: None     Inability: None    Transportation needs     Medical: None     Non-medical: None   Tobacco Use    Smoking status: Former Smoker    Smokeless tobacco: Never Used    Tobacco comment: Quit smoking > 30 years ago   Substance and Sexual Activity    Alcohol use: No    Drug use: No    Sexual activity: Not Currently     Partners: Female   Lifestyle    Physical activity     Days per week: None     Minutes per session: None    Stress: None   Relationships    Social connections     Talks on phone: None     Gets together: None     Attends Yazdanism service: None     Active member of club or organization: None     Attends meetings of clubs or organizations: None     Relationship status: None    Intimate partner violence     Fear of current or ex partner: None     Emotionally abused: None     Physically abused: None     Forced sexual activity: None   Other Topics Concern    None   Social History Narrative    None       SCREENINGS   NIH Stroke Scale  NIH Stroke Scale Assessed: Yes  Interval: Baseline  Level of Consciousness (1a. ): Alert  LOC Questions (1b. ):  Answers both correctly  LOC Commands (1c. ): Performs one task correctly  Best Gaze (2. ): Normal  Visual (3. ): No visual loss  Facial Palsy (4. ): Normal symmetrical movement  Motor Arm, Left (5a. ): No drift  Motor Arm, Right (5b. ): No drift  Motor Leg, Left (6a. ): Drift, but does not hit bed  Motor Leg, Right (6b. ): No drift  Limb Ataxia (7. ): Absent  Sensory (8. ): Normal  Best Language (9. ): Mild to moderate aphasia  Dysarthria (10. ): Mild to moderate dysarthria  Extinction and Inattention (11): No abnormality  Total: 4Glasgow Coma Scale  Eye Opening: Spontaneous  Best Verbal Response: Confused  Best Motor Response: Obeys commands  Helen Coma Scale Score: 14        PHYSICAL EXAM    (up to 7 for level 4, 8 or more for level 5)     ED Triage Vitals [07/21/20 1429]   BP Temp Temp Source Pulse Resp SpO2 Height Weight   (!) 169/81 97.8 °F (36.6 °C) Oral 68 16 97 % 5' 10\" (1.778 m) 175 lb (79.4 kg)       Physical Exam  Vitals signs and nursing note reviewed. Constitutional:       General: He is not in acute distress. Appearance: He is well-developed. He is not diaphoretic. HENT:      Head: Normocephalic and atraumatic. Nose: Nose normal.   Eyes:      Conjunctiva/sclera: Conjunctivae normal.      Pupils: Pupils are equal, round, and reactive to light. Neck:      Musculoskeletal: Normal range of motion and neck supple. Cardiovascular:      Rate and Rhythm: Normal rate and regular rhythm. Heart sounds: Normal heart sounds. Pulmonary:      Effort: Pulmonary effort is normal. No respiratory distress. Breath sounds: Normal breath sounds. No wheezing. Abdominal:      General: Bowel sounds are normal.      Palpations: Abdomen is soft. Tenderness: There is no abdominal tenderness. Skin:     General: Skin is warm and dry. Capillary Refill: Capillary refill takes less than 2 seconds. Findings: No rash. Neurological:      Mental Status: He is alert and oriented to person, place, and time. Cranial Nerves: No cranial nerve deficit. Psychiatric:         Behavior: Behavior normal.         RESULTS     EKG: All EKG's are interpreted by the Emergency Department Physician who either signs or Co-signsthis chart in the absence of a cardiologist.    Normal sinus rhythm, rate of 64 bpm.  Next elevations noted.     RADIOLOGY:   Non-plain Vitals:    Vitals:    07/21/20 1518 07/21/20 1528 07/21/20 1557 07/21/20 1627   BP: (!) 186/95 (!) 198/107 (!) 155/75 124/64   Pulse: 69 78 64 76   Resp: 17 18 16 16   Temp:       TempSrc:       SpO2: 97% 97% 98% 97%   Weight:       Height:                MDM   Patient is a 78-year-old male accompanied by his wife with a chief complaint of possible stroke. Patient is hemodynamically stable nontoxic-appearing. On physical exam it only and most significant deficit is the dysarthria versus aphasia. He does have left-sided lower extremity weakness but also has a history of foot drop so I could not significantly count on that as a neurological deficits. His NIH at this time is a 4, but could potentially be worse if his gait was assessed. He does have history of TIA. He is on Plavix and on aspirin, not on any blood thinners and has not had a procedure recently. He had an MRI done in February. The wife states that patient symptoms that started at 1230 were significantly, and most definitely, abnormal to his baseline. She reports that he has never experienced such an episode before. His wife also states that he has had history of Parkinson's  And Alzheimer's and dementia but none of these diseases has contributed to affecting his speech and causing dysarthria before. I spoke to Dr. Velasquez Michelle, the neurologist, who strongly agrees that this patient is a TPA candidate and needs to be given TPA. Dr. Velasquez Michelle however said to ensure that symptoms truly started at 200 and that patient's wife is well aware of the risks of TPA in consideration to patient's symptoms and chronic vessel disease if his symptoms are truly not something new to today's visit.   I spoke to the wife and I instructed her about Dr. Velasquez Michelle statement and she is adamant and very consistent that patient's symptoms and the way his speech was at 1230 is nowhere near his normal.        After reassessing the patient it seems that his dysarthria is improving rapidly. I strongly suspect that the patient is experiencing a TIA. I relayed this information to Dr. Corwin Pack and we both agreed that this patient should not receive TPA. I instructed the wife about this. Patient will be receiving a shot of Lovenox. Patient is admitted for TIA. Admitted in a stable condition with stable vital signs the service of the hospitalist.  CRITICAL CARE TIME       CONSULTS:  None    PROCEDURES:  Unless otherwise noted below, none     Procedures    FINAL IMPRESSION      1. TIA (transient ischemic attack)          DISPOSITION/PLAN   DISPOSITION Decision To Admit 07/21/2020 04:38:41 PM      PATIENT REFERRED TO:  No follow-up provider specified.     DISCHARGE MEDICATIONS:  New Prescriptions    No medications on file          (Please notethat portions of this note were completed with a voice recognition program.  Efforts were made to edit the dictations but occasionally words are mis-transcribed.)    ELLIOT Fraser CNP (electronically signed)  Attending Emergency Physician          ELLIOT Bagley CNP  07/21/20 7633

## 2020-07-21 NOTE — ACP (ADVANCE CARE PLANNING)
Pt has a living will but I was not able to copy it as it was in a binder. When I asked whether his wishes were the same as on his living will pt was not able to tell me, he is having some confusion per his wife.

## 2020-07-21 NOTE — ED NOTES
Report called to 2N. Tele pack applied. Transportation notified. Patient updated.      Jen Horowitz RN  07/21/20 [id number]

## 2020-07-21 NOTE — H&P
History and Physical    Admit Date: 7/21/2020  PCP: Tamika Reich MD    CHIEF COMPLAINT:    Chief Complaint   Patient presents with    Fatigue     x few hours        HISTORY OF PRESENT ILLNESS:    The patient is a 69 y.o. male with a past medical history of CAD, previous TIA, hypertension, hyperlipidemia who presents to the hospital with stroke symptoms. Patient presented to the ED by squad after his wife noted that patient was having slurred speech and loss of balance. Symptoms started 3-5 hours prior to arrival per pt and his wife. Neurology was contacted and initial recommendations was for tPA however symptoms improved significantly within 15-30 min in the ED so no tPA was given. Upon my interview, pt was close to his baseline per pt and his wife. His speech is almost back to normal. No new weakness or numbness. No chest pain or dyspnea. No fever, chills or cough. No sick contact. Past Medical History:        Diagnosis Date    Abnormal cardiovascular stress test 4/19/2016    Equivocal ST-T wave changes;  Defect in the inferior wall consistent with prior infarction; LV EF 79%; TID ratio 1.1    Actinic keratoses     BPH (benign prostatic hypertrophy)     CAD (coronary artery disease)     Cancer (HCC)     COLON    Chest pressure 5/3/2016    Cholelithiasis     History of colon cancer     s/p partial colectomy in 2009    Hyperlipidemia     Inflamed seborrheic keratosis     Lumbar radiculopathy 10/26/2016    Mild cognitive impairment     Osteoarthritis     Parathyroid tumor     Parkinson disease ([facility] [address]     RA (retrograde amnesia)     Sinus bradycardia on ECG 4/19/2016    Done 4/5/16 with Dr. Joey Tripp Syncope and collapse        Past Surgical History:        Procedure Laterality Date    BACK SURGERY  1993    CHOLECYSTECTOMY, LAPAROSCOPIC N/A 5/27/2020    LAP BRAIN/ CHOLANGIOGRAMS performed by Uche Boone MD at [place] [address]  8/18/10,09/11/2012     JAM    COLONOSCOPY  09/29/14    DR. PIERRE    JOINT REPLACEMENT Left 5/28/13    total    PARATHYROIDECTOMY Bilateral 2011    2 of 4 glands removed    OR COLON CA SCRN NOT HI RSK IND N/A 9/15/2017    COLONOSCOPY performed by Daniel Choudhury MD at [address] [facility] ENDOSCOPY  8/11/09    DR POLK       Social History:   Social History     Socioeconomic History    Marital status:      Spouse name: Not on file    Number of children: Not on file    Years of education: Not on file    Highest education level: Not on file   Occupational History    Not on file   Social Needs    Financial resource strain: Not on file    Food insecurity     Worry: Not on file     Inability: Not on file    Transportation needs     Medical: Not on file     Non-medical: Not on file   Tobacco Use    Smoking status: Former Smoker    Smokeless tobacco: Never Used    Tobacco comment: Quit smoking > 30 years ago   Substance and Sexual Activity    Alcohol use: No    Drug use: No    Sexual activity: Not Currently     Partners: Female   Lifestyle    Physical activity     Days per week: Not on file     Minutes per session: Not on file    Stress: Not on file   Relationships    Social connections     Talks on phone: Not on file     Gets together: Not on file     Attends Zoroastrianism service: Not on file     Active member of club or organization: Not on file     Attends meetings of clubs or organizations: Not on file     Relationship status: Not on file    Intimate partner violence     Fear of current or ex partner: Not on file     Emotionally abused: Not on file     Physically abused: Not on file     Forced sexual activity: Not on file   Other Topics Concern    Not on file   Social History Narrative    Not on file       Family History:   Family History   Problem Relation Age of Onset    Cancer Mother         OVARIAN    Heart Disease Father 427 ms    P Axis 73 degrees    R Axis 48 degrees    T Axis 68 degrees   Comprehensive Metabolic Panel    Collection Time: 07/21/20  3:35 PM   Result Value Ref Range    Sodium 140 135 - 144 mEq/L    Potassium 4.0 3.4 - 4.9 mEq/L    Chloride 104 95 - 107 mEq/L    CO2 25 20 - 31 mEq/L    Anion Gap 11 9 - 15 mEq/L    Glucose 107 (H) 70 - 99 mg/dL    BUN 11 8 - 23 mg/dL    CREATININE 0.83 0.70 - 1.20 mg/dL    GFR Non-African American >60.0 >60    GFR  >60.0 >60    Calcium 9.3 8.5 - 9.9 mg/dL    Total Protein 6.7 6.3 - 8.0 g/dL    Alb 4.1 3.5 - 4.6 g/dL    Total Bilirubin 0.5 0.2 - 0.7 mg/dL    Alkaline Phosphatase 51 35 - 104 U/L    ALT 17 0 - 41 U/L    AST 19 0 - 40 U/L    Globulin 2.6 2.3 - 3.5 g/dL   CBC Auto Differential    Collection Time: 07/21/20  3:35 PM   Result Value Ref Range    WBC 8.1 4.8 - 10.8 K/uL    RBC 4.87 4.70 - 6.10 M/uL    Hemoglobin 15.6 14.0 - 18.0 g/dL    Hematocrit 45.6 42.0 - 52.0 %    MCV 93.5 80.0 - 100.0 fL    MCH 32.0 (H) 27.0 - 31.3 pg    MCHC 34.2 33.0 - 37.0 %    RDW 13.5 11.5 - 14.5 %    Platelets 313 109 - 959 K/uL    Neutrophils % 81.5 %    Lymphocytes % 10.9 %    Monocytes % 7.0 %    Eosinophils % 0.2 %    Basophils % 0.4 %    Neutrophils Absolute 6.6 (H) 1.4 - 6.5 K/uL    Lymphocytes Absolute 0.9 (L) 1.0 - 4.8 K/uL    Monocytes Absolute 0.6 0.2 - 0.8 K/uL    Eosinophils Absolute 0.0 0.0 - 0.7 K/uL    Basophils Absolute 0.0 0.0 - 0.2 K/uL   Magnesium    Collection Time: 07/21/20  3:35 PM   Result Value Ref Range    Magnesium 2.4 1.7 - 2.4 mg/dL   Troponin    Collection Time: 07/21/20  3:35 PM   Result Value Ref Range    Troponin <0.010 0.000 - 0.010 ng/mL   CK    Collection Time: 07/21/20  3:35 PM   Result Value Ref Range    Total CK 97 0 - 190 U/L   APTT    Collection Time: 07/21/20  3:35 PM   Result Value Ref Range    aPTT 33.4 24.4 - 36.8 sec   Protime-INR    Collection Time: 07/21/20  3:35 PM   Result Value Ref Range    Protime 13.4 12.3 - 14.9 sec    INR 1.0 ASSESSMENT AND PLAN:  1)  Stroke like symptoms- slurred speech and balance issues        - will rule out acute CVA/ TIA. Obs. Start asa, statin, consult neuro, PT/OT. Tele.      2)  HLD       Statin as above     3)  H/o drop foot on the right side       Supportive care    DVT PPx         DISCHARGE PLANNING  obs     Code status: Full Code    Electronically signed by Fanny Barrett DO on 7/21/20 at 6:25 PM EDT

## 2020-07-21 NOTE — ED NOTES
Patients symptoms resolved during his head CT. Patient is awake, A&Ox4, speech is clear at this time.      Colette Chou RN  07/21/20 9920

## 2020-07-21 NOTE — ED TRIAGE NOTES
Patient arrived to ED via EMS with C/O lethargy. Patient has alzheimers/dementia per wife. She states patient woke up this morning acting appropriately. Took a nap at 1130. When wife woke him up around 1230 he was \"Not acting right. \"

## 2020-07-21 NOTE — CARE COORDINATION
[facility] AT [place] Case Management Initial Discharge Assessment    Met with Patient and wife to discuss discharge plan. PCP: Hank Nicole MD                                Date of Last Visit: June    If no PCP, list provided? N/A    Discharge Planning    Living Arrangements: independently at home    Who do you live with? wife    Who helps you with your care:  spouse    If lives at home:     Do you have any barriers navigating in your home? no    Patient can perform ADL? He may need some assistance    Current Services (outpatient and in home) :  None    Dialysis: No    Is transportation available to get to your appointments? Yes    DME Equipment:  no    Respiratory equipment: None    Respiratory provider:  no     Pharmacy:  yes - [facility]    Consult with Medication Assistance Program?  No      Does Patient Have a High-Risk for Readmission Diagnosis (CHF, PN, MI, COPD)? No    Initial Discharge Plan? (Note: please see concurrent daily documentation for any updates after initial note). CM to assess for any further d/c needs or referrals.       Electronically signed by Chanel Young on 7/21/2020 at 7:00 PM

## 2020-07-22 PROBLEM — R26.9 GAIT ABNORMALITY: Status: ACTIVE | Noted: 2020-07-22

## 2020-07-22 LAB
CHOLESTEROL, TOTAL: 124 MG/DL (ref 0–199)
HBA1C MFR BLD: 5.4 % (ref 4.8–5.9)
HCT VFR BLD CALC: 46.8 % (ref 42–52)
HDLC SERPL-MCNC: 66 MG/DL (ref 40–59)
HEMOGLOBIN: 15.8 G/DL (ref 14–18)
LDL CHOLESTEROL CALCULATED: 45 MG/DL (ref 0–129)
LV EF: 60 %
LVEF MODALITY: NORMAL
MCH RBC QN AUTO: 31.8 PG (ref 27–31.3)
MCHC RBC AUTO-ENTMCNC: 33.8 % (ref 33–37)
MCV RBC AUTO: 93.9 FL (ref 80–100)
PDW BLD-RTO: 13.3 % (ref 11.5–14.5)
PLATELET # BLD: 170 K/UL (ref 130–400)
RBC # BLD: 4.99 M/UL (ref 4.7–6.1)
TRIGL SERPL-MCNC: 65 MG/DL (ref 0–150)
WBC # BLD: 7.8 K/UL (ref 4.8–10.8)

## 2020-07-22 PROCEDURE — 80061 LIPID PANEL: CPT

## 2020-07-22 PROCEDURE — 6360000002 HC RX W HCPCS: Performed by: INTERNAL MEDICINE

## 2020-07-22 PROCEDURE — 96372 THER/PROPH/DIAG INJ SC/IM: CPT

## 2020-07-22 PROCEDURE — 93306 TTE W/DOPPLER COMPLETE: CPT

## 2020-07-22 PROCEDURE — 6370000000 HC RX 637 (ALT 250 FOR IP): Performed by: PSYCHIATRY & NEUROLOGY

## 2020-07-22 PROCEDURE — 99220 PR INITIAL OBSERVATION CARE/DAY 70 MINUTES: CPT | Performed by: PSYCHIATRY & NEUROLOGY

## 2020-07-22 PROCEDURE — 97162 PT EVAL MOD COMPLEX 30 MIN: CPT

## 2020-07-22 PROCEDURE — 99221 1ST HOSP IP/OBS SF/LOW 40: CPT | Performed by: PHYSICAL MEDICINE & REHABILITATION

## 2020-07-22 PROCEDURE — 6370000000 HC RX 637 (ALT 250 FOR IP): Performed by: INTERNAL MEDICINE

## 2020-07-22 PROCEDURE — 85027 COMPLETE CBC AUTOMATED: CPT

## 2020-07-22 PROCEDURE — 36415 COLL VENOUS BLD VENIPUNCTURE: CPT

## 2020-07-22 PROCEDURE — G0378 HOSPITAL OBSERVATION PER HR: HCPCS

## 2020-07-22 PROCEDURE — 97166 OT EVAL MOD COMPLEX 45 MIN: CPT

## 2020-07-22 PROCEDURE — 2580000003 HC RX 258: Performed by: INTERNAL MEDICINE

## 2020-07-22 PROCEDURE — 83036 HEMOGLOBIN GLYCOSYLATED A1C: CPT

## 2020-07-22 RX ORDER — DIPYRIDAMOLE 75 MG
75 TABLET ORAL 3 TIMES DAILY
Status: DISCONTINUED | OUTPATIENT
Start: 2020-07-22 | End: 2020-07-23 | Stop reason: HOSPADM

## 2020-07-22 RX ORDER — SUMATRIPTAN 100 MG/1
100 TABLET, FILM COATED ORAL ONCE
Status: COMPLETED | OUTPATIENT
Start: 2020-07-22 | End: 2020-07-22

## 2020-07-22 RX ORDER — ACETAMINOPHEN 325 MG/1
650 TABLET ORAL EVERY 4 HOURS PRN
Status: DISCONTINUED | OUTPATIENT
Start: 2020-07-22 | End: 2020-07-23 | Stop reason: HOSPADM

## 2020-07-22 RX ADMIN — FINASTERIDE 5 MG: 5 TABLET, FILM COATED ORAL at 10:11

## 2020-07-22 RX ADMIN — MEMANTINE HYDROCHLORIDE 10 MG: 10 TABLET ORAL at 11:40

## 2020-07-22 RX ADMIN — ENOXAPARIN SODIUM 40 MG: 40 INJECTION SUBCUTANEOUS at 20:29

## 2020-07-22 RX ADMIN — ASPIRIN 81 MG: 81 TABLET, COATED ORAL at 20:29

## 2020-07-22 RX ADMIN — CLOPIDOGREL BISULFATE 75 MG: 75 TABLET ORAL at 20:29

## 2020-07-22 RX ADMIN — DIPYRIDAMOLE 75 MG: 75 TABLET, FILM COATED ORAL at 20:29

## 2020-07-22 RX ADMIN — ANTACID TABLETS 500 MG: 500 TABLET, CHEWABLE ORAL at 10:11

## 2020-07-22 RX ADMIN — DIPYRIDAMOLE 75 MG: 75 TABLET, FILM COATED ORAL at 14:25

## 2020-07-22 RX ADMIN — Medication 200 MG: at 10:11

## 2020-07-22 RX ADMIN — Medication 10 ML: at 10:12

## 2020-07-22 RX ADMIN — TAMSULOSIN HYDROCHLORIDE 0.4 MG: 0.4 CAPSULE ORAL at 10:12

## 2020-07-22 RX ADMIN — ACETAMINOPHEN 650 MG: 325 TABLET, FILM COATED ORAL at 16:56

## 2020-07-22 RX ADMIN — DIPYRIDAMOLE 75 MG: 75 TABLET, FILM COATED ORAL at 10:12

## 2020-07-22 RX ADMIN — METOPROLOL SUCCINATE 25 MG: 25 TABLET, EXTENDED RELEASE ORAL at 20:29

## 2020-07-22 RX ADMIN — MEMANTINE HYDROCHLORIDE 10 MG: 10 TABLET ORAL at 20:29

## 2020-07-22 RX ADMIN — SUMATRIPTAN SUCCINATE 100 MG: 100 TABLET, FILM COATED ORAL at 18:21

## 2020-07-22 RX ADMIN — Medication 10 ML: at 20:36

## 2020-07-22 RX ADMIN — PANTOPRAZOLE SODIUM 40 MG: 40 TABLET, DELAYED RELEASE ORAL at 05:31

## 2020-07-22 RX ADMIN — ROSUVASTATIN CALCIUM 40 MG: 40 TABLET, FILM COATED ORAL at 20:29

## 2020-07-22 ASSESSMENT — ENCOUNTER SYMPTOMS
BACK PAIN: 0
BLOOD IN STOOL: 0
NAUSEA: 0
SHORTNESS OF BREATH: 1
SHORTNESS OF BREATH: 0
DIARRHEA: 0
CHOKING: 0
APNEA: 0
PHOTOPHOBIA: 0
TROUBLE SWALLOWING: 0
CHEST TIGHTNESS: 0
VOMITING: 0
COLOR CHANGE: 0

## 2020-07-22 ASSESSMENT — PAIN SCALES - GENERAL
PAINLEVEL_OUTOF10: 0
PAINLEVEL_OUTOF10: 7
PAINLEVEL_OUTOF10: 4
PAINLEVEL_OUTOF10: 3

## 2020-07-22 NOTE — FLOWSHEET NOTE
Patient complaint of headache. Perfectserve sent to Dr. Margaret Dacosta. States he will add orders. Will continue with plan of care.  Electronically signed by El Peterson RN on 7/22/20 at 4:18 PM EDT

## 2020-07-22 NOTE — PROGRESS NOTES
[facility] OCCUPATIONAL THERAPY EVALUATION - ACUTE     NAME: Arron Ruelas  : 1945 (20 y.o.)  MRN: [id number]  CODE STATUS: Full Code  Room: I401/L906-04    Date of Service: 2020    Patient Diagnosis(es): Stroke-like episode [facility]) [I63.9]   Chief Complaint   Patient presents with    Fatigue     x few hours     Patient Active Problem List    Diagnosis Date Noted    Stroke-like episode ([facility] [address]     Biliary colic     Chest pain     Parkinson disease ([facility] [address]     RA (retrograde amnesia)     Syncope and collapse     Mild cognitive impairment     TIA (transient ischemic attack) 2019    PETRA (obstructive sleep apnea)     Actinic keratoses     Inflamed seborrheic keratosis     Gastroesophageal reflux disease with esophagitis 11/15/2016    Lumbar radiculopathy 10/26/2016    Sinus bradycardia on ECG 2016    Disturbance of skin sensation 2015    Seborrheic keratosis 2014    History of colon cancer 2014    History of repair of hip joint 10/21/2013    History of hip replacement, total 2013    Degenerative joint disease of pelvic region 2013    Degenerative arthritis of lumbar spine 2013    Foot drop, left 2013    CAD (coronary artery disease)     Benign prostatic hyperplasia     Cholelithiasis     Hyperlipidemia 2012    Osteopenia 2012        Past Medical History:   Diagnosis Date    Abnormal cardiovascular stress test 2016    Equivocal ST-T wave changes;  Defect in the inferior wall consistent with prior infarction; LV EF 79%; TID ratio 1.1    Actinic keratoses     BPH (benign prostatic hypertrophy)     CAD (coronary artery disease)     Cancer (HCC)     COLON    Chest pressure 5/3/2016    Cholelithiasis     History of colon cancer     s/p partial colectomy in     Hyperlipidemia     Inflamed seborrheic keratosis     Lumbar radiculopathy 10/26/2016    Mild cognitive impairment     Osteoarthritis     Parathyroid tumor     Parkinson disease ([facility] [address]     RA (retrograde amnesia)     Sinus bradycardia on ECG 4/19/2016    Done 4/5/16 with Dr. Uche Edmond Syncope and collapse     Vascular dementia [facility])      Past Surgical History:   Procedure Laterality Date    BACK SURGERY  1993    CHOLECYSTECTOMY, LAPAROSCOPIC N/A 5/27/2020    LAP BRAIN/ CHOLANGIOGRAMS performed by Farrah Gutierrez MD at [place] [address]  8/18/10,09/11/2012    DR Joshua Rodriguez    COLONOSCOPY  09/29/14    DR. PIERRE    JOINT REPLACEMENT Left 5/28/13    total    PARATHYROIDECTOMY Bilateral 2011    2 of 4 glands removed    OK COLON CA SCRN NOT HI RSK IND N/A 9/15/2017    COLONOSCOPY performed by Jaspreet Valencia MD at [address] [facility] ENDOSCOPY  8/11/09    DR Joshua Rodriguez        Restrictions  Restrictions/Precautions: Fall Risk     Safety Devices: Safety Devices  Safety Devices in place: Yes  Type of devices:  All fall risk precautions in place        Subjective  Pre Treatment Pain Screening  Pain at present: 0  Scale Used: Numeric Score  Intervention List: Patient able to continue with treatment, Patient declined any intervention    Pain Reassessment:   Pain Assessment  Patient Currently in Pain: Denies  Pain Assessment: 0-10  Pain Level: 0       Prior Level of Function:  Social/Functional History  Lives With: Spouse  Type of Home: House  Home Layout: Two level(reports he can stay on 1st floor with bedroom and bathroom though currently he stays upstairs)  Home Access: (Pt unable to state)  Bathroom Shower/Tub: Walk-in shower  Bathroom Equipment: Shower chair, Grab bars in shower  Home Equipment: Rolling walker  ADL Assistance: Independent  Homemaking Assistance: Needs assistance  Ambulation Assistance: Independent(no AD)  Transfer Assistance: Independent  Active : No  Additional Comments: pt is questionable historian, he is unsure of what equipment he has at home or if he was using any    OBJECTIVE:     Orientation Status:  Orientation  Overall Orientation Status: Within Functional Limits(Except to exact date; increased time required to answer questions)    Observation:  Observation/Palpation  Posture: Fair  Observation: Pt. alert and attentive, pleasant and participatory, mildly confused    Cognition Status:  Cognition  Overall Cognitive Status: Exceptions  Arousal/Alertness: Appropriate responses to stimuli  Following Commands:  Follows one step commands with increased time  Attention Span: Difficulty dividing attention  Memory: Decreased recall of precautions, Decreased recall of recent events, Decreased short term memory, Decreased long term memory  Safety Judgement: Decreased awareness of need for safety, Decreased awareness of need for assistance  Problem Solving: Assistance required to generate solutions, Assistance required to identify errors made, Assistance required to correct errors made, Assistance required to implement solutions  Insights: Decreased awareness of deficits  Initiation: Requires cues for some  Sequencing: Requires cues for some  Cognition Comment: Max increased time for word finding, min verbal cues throughout for sequencing and problem solving    Perception Status:  Perception  Overall Perceptual Status: WFL    Sensation Status:  Sensation  Overall Sensation Status: [facility]    Vision and Hearing Status:  Vision  Vision: Impaired  Vision Exceptions: Wears glasses at all times  Hearing  Hearing: Within functional limits     ROM:   LUE AROM (degrees)  LUE AROM : WFL  Left Hand AROM (degrees)  Left Hand AROM: WFL  RUE AROM (degrees)  RUE AROM : WFL  Right Hand AROM (degrees)  Right Hand AROM: WFL    Strength:  LUE Strength  Gross LUE Strength: Exceptions to [facility]  L Hand General: 3+/5  LUE Strength Comment: 3+/5 all planes  RUE Strength  Gross RUE Strength: Exceptions to [facility]  R Hand General: 3+/5  RUE Strength Comment: 3+/5 all planes    Coordination, Tone, Quality of Movement: Tone RUE  RUE Tone: Normotonic  Tone LUE  LUE Tone: Normotonic  Coordination  Movements Are Fluid And Coordinated: No  Coordination and Movement description: Fine motor impairments, Gross motor impairments, Decreased speed, Decreased accuracy, Tremors, Right UE, Left UE  Quality of Movement Other  Comment: Tremors intermittently; pt. unable to state how long he had them    Hand Dominance:  Hand Dominance  Hand Dominance: Right    ADL Status:  ADL  Feeding: Independent  Grooming: Independent  UE Bathing: Supervision  LE Bathing: Supervision  UE Dressing: Supervision  LE Dressing: Supervision  Toileting: Supervision  Additional Comments: Simulated ADLs as above.  Pt. limited by decreased sequencing as well as mild anxiety in mobility  Toilet Transfers  Toilet - Technique: Stand step  Equipment Used: Standard bedside commode  Toilet Transfer: Contact guard assistance  Toilet Transfers Comments: Pt .just transferring off of commode with PCA on therapist arrival       Therapy key for assistance levels -   Independent = Pt. is able to perform task with no assistance but may require a device   Stand by assistance = Pt. does not perform task at an independent level but does not need physical assistance, requires verbal cues  Minimal, Moderate, Maximal Assistance = Pt. requires physical assistance (25%, 50%, 75% assist from helper) for task but is able to actively participate in task   Dependent = Pt. requires total assistance with task and is not able to actively participate with task completion     Functional Mobility:  Functional Mobility  Functional - Mobility Device: Rolling Walker  Activity: Other  Assist Level: Contact guard assistance  Functional Mobility Comments: Household distance in hallway  Transfers  Sit to stand: Contact guard assistance  Stand to sit: Contact guard assistance  Transfer Comments: Verbal cues    Bed Mobility  Bed mobility  Supine to Sit: Stand by assistance  Comment: Up to chair at end of eval    Seated and Standing Balance:  Balance  Sitting Balance: Supervision  Standing Balance: Contact guard assistance    Functional Endurance:  Activity Tolerance  Activity Tolerance: Patient Tolerated treatment well    D/C Recommendations:  OT D/C RECOMMENDATIONS  REQUIRES OT FOLLOW UP: Yes    Equipment Recommendations:  OT Equipment Recommendations  Other: Continue to assess    OT Education:   OT Education  OT Education: OT Role, Plan of Care  Patient Education: Educated pt. on role of acute care OT  Barriers to Learning: Confusion, dementia at baseline    OT Follow Up:  OT D/C RECOMMENDATIONS  REQUIRES OT FOLLOW UP: Yes       Assessment/Discharge Disposition:  Assessment: Pt. is a 69year old man from home who [facility] to [facility] with confusion and ataxia. Pt. demonstrates the above deficits which impact his ability to perform ADLs and IADLs safely and consistently. Pt. would benefit from continued OT to maximize independence and safety with ADL tasks.   Performance deficits / Impairments: Decreased functional mobility , Decreased ADL status, Decreased strength, Decreased safe awareness, Decreased cognition, Decreased endurance, Decreased balance, Decreased high-level IADLs, Decreased fine motor control, Decreased coordination  Prognosis: Good  Discharge Recommendations: Continue to assess pending progress  Decision Making: Medium Complexity  History: Pt's medical history is moderately complex  Exam: Pt. has 10 performance deficits  Assistance / Modification: Pt. requires min A    Six Click Score   How much help for putting on and taking off regular lower body clothing?: A Little  How much help for Bathing?: A Little  How much help for Toileting?: A Little  How much help for putting on and taking off regular upper body clothing?: A Little  How much help for taking care of personal grooming?: A Little  How much help for eating meals?: None  AM-[facility] Inpatient Daily Activity Raw Score: 19  AM-PAC Inpatient ADL T-Scale Score : 40.22  ADL Inpatient CMS 0-100% Score: 42.8    Plan:  Plan  Times per week: 1-3x  Plan weeks: Length of acute stay  Current Treatment Recommendations: Strengthening, Balance Training, Functional Mobility Training, Endurance Training, Cognitive Reorientation, Safety Education & Training, Patient/Caregiver Education & Training, Equipment Evaluation, Education, & procurement, Self-Care / ADL, Cognitive/Perceptual Training    Goals:   Patient will:    - Improve functional endurance to tolerate/complete SBA mins of ADL's  - Be Min A in UB ADLs   - Be min A in LB ADLs  - Be MIn A in ADL transfers without LOB  - Be Min A in toileting tasks  - Improve B hand fine motor coordination to [facility] in order to manage clothing fasteners/self-care containers in a timely manner  - Improve B UE strength and endurance to 4/5 in order to participate in self-care activities as projected. - Access appropriate D/C site with as few architectural barriers as possible. - Sequence self-care tasks with no verbal cues for safety     Patient Goal: Patient goals : \"I want to get home\"      Discussed and agreed upon: Yes Comments:     Therapy Time:   OT Individual Minutes  Time In: 0852  Time Out: 0910  Minutes: 18    Eval: 18 minutes     Electronically signed by:     LB Jarvis  7/22/2020, 9:33 AM

## 2020-07-22 NOTE — CARE COORDINATION
I called patients wife to go over MOON with her and she verbalizes understanding that he is in observation status at present.

## 2020-07-22 NOTE — PROGRESS NOTES
Mild cognitive impairment     Osteoarthritis     Parathyroid tumor     Parkinson disease ([facility] [address]     RA (retrograde amnesia)     Sinus bradycardia on ECG 4/19/2016    Done 4/5/16 with Dr. Joshua Cardenas Syncope and collapse     Vascular dementia [facility])      Past Surgical History:   Procedure Laterality Date    BACK SURGERY  1993    CHOLECYSTECTOMY, LAPAROSCOPIC N/A 5/27/2020    LAP BRAIN/ CHOLANGIOGRAMS performed by Mckayla Prince MD at [place] [address]  8/18/10,09/11/2012    DR Vicente Nearing    COLONOSCOPY  09/29/14    DR. PIERRE    JOINT REPLACEMENT Left 5/28/13    total    PARATHYROIDECTOMY Bilateral 2011    2 of 4 glands removed    [place] COLON CA SCRN NOT HI RSK IND N/A 9/15/2017    COLONOSCOPY performed by Amy Pike MD at [address] [place] [address],[address] ENDOSCOPY  8/11/09    DR POLK       Chart Reviewed: Yes  Patient assessed for rehabilitation services?: Yes  Family / Caregiver Present: No  General Comment  Comments: Pt is awake in bed, agrees to PT eval.    Restrictions:  Restrictions/Precautions: Fall Risk     SUBJECTIVE: Subjective: Pt reports increased confusion, difficulty with finding words. Per Dr. Mary Prince, pt is at his cognitive baseline.     Pain  Pre Treatment Pain Screening  Pain at present: 0    Post Treatment Pain Screening:   Pain Screening  Patient Currently in Pain: Denies  Pain Assessment  Pain Level: 0    Prior Level of Function:  Social/Functional History  Lives With: Spouse  Type of Home: House  Home Layout: Two level(reports he can stay on 1st floor with bedroom and bathroom though currently he stays upstairs)  Bathroom Shower/Tub: Walk-in shower  Bathroom Equipment: Shower chair, Grab bars in shower  Home Equipment: Rolling walker  ADL Assistance: Independent  Homemaking Assistance: Needs assistance  Ambulation Assistance: Independent(no AD)  Transfer Assistance: Independent  Active : No  Additional Comments: pt is questionable historian, he is unsure of what equipment he has at home or if he was using any    OBJECTIVE:        Cognition:  Overall Orientation Status: Within Functional Limits(with confusion to situation)  Follows Commands: Within Functional Limits         ROM:  RLE PROM: WFL  LLE PROM: WFL    Strength:  Strength RLE  Strength RLE: WFL  Strength LLE  Strength LLE: Exception(states he has an AFO but doesn't use it)  Comment: trace ankle DF strength    Neuro:  Balance  Sitting - Static: Good  Sitting - Dynamic: Good  Standing - Static: Good;-  Standing - Dynamic: Fair;+        Sensation  Overall Sensation Status: WFL    Bed mobility  Supine to Sit: Stand by assistance    Transfers  Sit to Stand: Contact guard assistance  Stand to sit: Contact guard assistance  Bed to Chair: Contact guard assistance  Comment: min cues for safety    Ambulation  Ambulation?: Yes  Ambulation 1  Surface: level tile  Device: Rolling Walker  Assistance: Contact guard assistance  Quality of Gait: mildly flexed posture  Distance: 120 ft  Comments: pt with improved stability using [employer] though tends to leave it behind in small spaces              Activity Tolerance  Activity Tolerance: Patient Tolerated treatment well          PT Education  PT Education: General Safety;Plan of Care    ASSESSMENT:   Body structures, Functions, Activity limitations: Decreased functional mobility ; Decreased safe awareness;Decreased balance;Decreased endurance  Decision Making: Medium Complexity  History: high  Exam: medium  Clinical Presentation: medium    Prognosis: Good    DISCHARGE RECOMMENDATIONS:  Discharge Recommendations: Continue to assess pending progress, Patient would benefit from continued therapy after discharge    Assessment: Pt with above impairments though is at or near his functional baseline.  Pt does report he feels \"not quite right\", will continue PT in order to address impairments as able and improve safety with homegoing. REQUIRES PT FOLLOW UP: Yes      PLAN OF CARE:  Plan  Times per week: 3-6  Current Treatment Recommendations: Strengthening, Transfer Training, Endurance Training, Patient/Caregiver Education & Training, Equipment Evaluation, Education, & procurement, Neuromuscular Re-education, Functional Mobility Training, Balance Training, Gait Training, Stair training, Safety Education & Training, Home Exercise Program  Safety Devices  Type of devices: Call light within reach, Chair alarm in place    Goals:  Patient goals : to go home  Long term goals  Long term goal 1: pt to be indep with bed mobility  Long term goal 2: pt to be supervision with transfers  Long term goal 3: pt to ambulate >150 ft with [employer] and supervision assist    [facility] (6 CLICK) [id number] Mobility Raw Score : 19     Therapy Time:   Individual   Time In 0852   Time Out 0910   Minutes [address] [place] [address], 07/22/20 at 9:20 AM         Definitions for assistance levels  Independent = pt does not require any physical supervision or assistance from another person for activity completion. Device may be needed.   Stand by assistance = pt requires verbal cues or instructions from another person, close to but not touching, to perform the activity  Minimal assistance= pt performs 75% or more of the activity; assistance is required to complete the activity  Moderate assistance= pt performs 50% of the activity; assistance is required to complete the activity  Maximal assistance = pt performs 25% of the activity; assistance is required to complete the activity  Dependent = pt requires total physical assistance to accomplish the task

## 2020-07-22 NOTE — CONSULTS
Subjective:      Patient ID: Odalys Bae is a 69 y.o. male who presents today for stroke    HPI 52-year-old right-handed gentleman who was admitted with strokelike symptoms. Patient took a nap and woke up at around 1130 and was in the shower when he noticed the symptoms. He had difficulty comprehending and reported some double vision. He had some slurred speech as well. By the time of the whole events he came to the hospital it was at least 4 hours. Symptoms started after awakening and therefore we are not quite clear when the symptoms started though in the emergency room he started to improve. BAT protocol was called. Detailed history was obtained by our nurse practitioners in the emergency room and confirmed with the wife at this to the onset of the timing. Given that he was making rapid improvement we did not consider him to be a TPA candidate. As noted patient is feeling much better today he feels at baseline he recalls all the events. Is not any dizzy spell today and his speech is improved he does have baseline dysarthria from Parkinson's disease. He did not respond to dopamine agonists and had side effects he has not developed any further worsening of the same and there. Not recommended treatment as this appears to be mostly arteriosclerotic parkinsonism than true idiopathic Parkinson's disease. patient has multiple recurrent TIAs strokelike symptoms consistent with underlying small vessel ischemic changes causing a vascular dementia which is fluctuant. She had a complete neuropsychometric testing with Dr. Huber Marshall as well and this confirms that he has cognitive impairment likely to be of vascular dementia. She still remains mostly independent in his activity of daily living    Review of Systems   Constitutional: Negative for fever. HENT: Negative for ear pain, tinnitus and trouble swallowing. Eyes: Negative for photophobia and visual disturbance.    Respiratory: Negative for choking and shortness of breath. Cardiovascular: Negative for chest pain and palpitations. Gastrointestinal: Negative for nausea and vomiting. Musculoskeletal: Positive for gait problem. Negative for back pain, joint swelling, myalgias, neck pain and neck stiffness. Skin: Negative for color change. Allergic/Immunologic: Negative for food allergies. Neurological: Positive for speech difficulty and weakness. Negative for dizziness, tremors, seizures, syncope, facial asymmetry, light-headedness, numbness and headaches. Psychiatric/Behavioral: Negative for behavioral problems, confusion, hallucinations and sleep disturbance. Past Medical History:   Diagnosis Date    Abnormal cardiovascular stress test 4/19/2016    Equivocal ST-T wave changes; Defect in the inferior wall consistent with prior infarction; LV EF 79%; TID ratio 1.1    Actinic keratoses     BPH (benign prostatic hypertrophy)     CAD (coronary artery disease)     Cancer (HCC)     COLON    Chest pressure 5/3/2016    Cholelithiasis     History of colon cancer     s/p partial colectomy in 2009    Hyperlipidemia     Inflamed seborrheic keratosis     Lumbar radiculopathy 10/26/2016    Mild cognitive impairment     Osteoarthritis     Parathyroid tumor     Parkinson disease ([facility] [address]     RA (retrograde amnesia)     Sinus bradycardia on ECG 4/19/2016    Done 4/5/16 with Dr. Doris Howe Syncope and collapse     Vascular dementia [facility])      Past Surgical History:   Procedure Laterality Date    BACK SURGERY  1993    CHOLECYSTECTOMY, LAPAROSCOPIC N/A 5/27/2020    LAP BRAIN/ CHOLANGIOGRAMS performed by Chano Gupta MD at [place] [address]  8/18/10,09/11/2012    DR Margo Holloway    COLONOSCOPY  09/29/14    DR. PIERRE    JOINT REPLACEMENT Left 5/28/13    total    PARATHYROIDECTOMY Bilateral 2011    2 of 4 glands removed    OR COLON CA SCRN NOT HI RSK IND N/A 9/15/2017    COLONOSCOPY performed by Kristin Diez MD at [address] [place]    TONSILLECTOMY AND ADENOIDECTOMY      UPPER GASTROINTESTINAL ENDOSCOPY  8/11/09    DR Gonzalo Sellers     Social History     Socioeconomic History    Marital status:      Spouse name: Not on file    Number of children: Not on file    Years of education: Not on file    Highest education level: Not on file   Occupational History    Not on file   Social Needs    Financial resource strain: Not on file    Food insecurity     Worry: Not on file     Inability: Not on file    Transportation needs     Medical: Not on file     Non-medical: Not on file   Tobacco Use    Smoking status: Former Smoker    Smokeless tobacco: Never Used    Tobacco comment: Quit smoking > 30 years ago   Substance and Sexual Activity    Alcohol use: No    Drug use: No    Sexual activity: Not Currently     Partners: Female   Lifestyle    Physical activity     Days per week: Not on file     Minutes per session: Not on file    Stress: Not on file   Relationships    Social connections     Talks on phone: Not on file     Gets together: Not on file     Attends Anabaptist service: Not on file     Active member of club or organization: Not on file     Attends meetings of clubs or organizations: Not on file     Relationship status: Not on file    Intimate partner violence     Fear of current or ex partner: Not on file     Emotionally abused: Not on file     Physically abused: Not on file     Forced sexual activity: Not on file   Other Topics Concern    Not on file   Social History Narrative         Lives With: Spouse    Type of Home: [address] [place] [id number] [place]: Two level(reports he can stay on 1st floor with bedroom and bathroom though currently he stays upstairs)    Home Access: (Pt unable to state)    Bathroom Shower/Tub: Walk-in shower    Bathroom Equipment: Shower chair, Grab bars in [facility]: Rolling walker    ADL Assistance: [address]: Needs Venu, DO   10 mg at 07/22/20 1140    metoprolol succinate (TOPROL XL) extended release tablet 25 mg  25 mg Oral Daily Pitney Gabino, DO   25 mg at 07/21/20 2010    tamsulosin [facility]) capsule 0.4 mg  0.4 mg Oral Daily Pitney Gabino, DO   0.4 mg at 07/22/20 1012    magnesium oxide (MAG-OX) tablet 200 mg  200 mg Oral Daily Pitney Gabino, DO   200 mg at 07/22/20 1011    pantoprazole (PROTONIX) tablet 40 mg  40 mg Oral QAM AC Yazid R Venu, DO   40 mg at 07/22/20 0531        Objective:   BP (!) 154/78   Pulse 68   Temp 97.7 °F (36.5 °C)   Resp 16   Ht 5' 10\" (1.778 m)   Wt 175 lb (79.4 kg)   SpO2 96%   BMI 25.11 kg/m²     Physical Exam  Vitals signs reviewed. Eyes:      Pupils: Pupils are equal, round, and reactive to light. Neck:      Musculoskeletal: Normal range of motion. Cardiovascular:      Rate and Rhythm: Normal rate and regular rhythm. Heart sounds: No murmur. Pulmonary:      Effort: Pulmonary effort is normal.      Breath sounds: Normal breath sounds. Abdominal:      General: Bowel sounds are normal.   Musculoskeletal: Normal range of motion. Skin:     General: Skin is warm. Neurological:      Mental Status: He is alert and oriented to person, place, and time. Cranial Nerves: No cranial nerve deficit. Sensory: No sensory deficit. Motor: No abnormal muscle tone. Coordination: Coordination normal.      Deep Tendon Reflexes: Reflexes are normal and symmetric. Babinski sign absent on the right side. Babinski sign absent on the left side. Psychiatric:         Mood and Affect: Mood normal.     Addition to the above patient has dysarthria. He has some general weakness some minor PD features. Cta Head W Wo Contrast    Result Date: 7/21/2020  EXAMINATION:  CTA HEAD W WO CONTRAST, CTA NECK W WO CONTRAST HISTORY:   stroke   generalized weakness.  TECHNIQUE:   Spiral high resolution axial images were obtained through the head, neck and superior mediastinum following bolus administration of intravenous contrast for CT angiography. The data was subsequently post-processed utilizing 3D multi-planar  reconstructions, 3D maximum intensity projections. Contrast:  iopamidol (ISOVUE-370) injection of 100 mL All CT scans at this facility use dose modulation, iterative reconstruction, and/or weight based dosing when appropriate to reduce radiation dose to as low as reasonably achievable. COMPARISON: CT head 7/21/2020 RESULT: BRAIN: Evaluation of the individual slices of the CTA demonstrates no significant interval change from recent CT head NECK: Soft tissues: The soft tissue planes are maintained throughout. No evidence of a soft tissue mass in the neck or superior mediastinum. No significant lymphadenopathy. Spine:  Multilevel degenerative changes. Lung apices:   Lung apices with biapical pleural-parenchymal scarring. No focal consolidation. CT ARTERIOGRAM:     Extracranial Circulation: Aortic Arch: Normal branching pattern. .  There is no significant stenosis in the proximal brachiocephalic vessels. Carotid Stenosis: Right Common:  No significant stenosis. Right Internal Carotid  Plaque:  Mild to moderate plaque at the bifurcation. Right Internal Carotid Stenosis:  No significant stenosis. Left Common:  No significant stenosis. Left Internal Carotid Plaque:  Mild plaque at the bifurcation Left Internal Carotid Stenosis:  No significant stenosis. Cervical Vertebral Arteries: Patency:  Bilateral Dominance:  Codominant Intracranial Circulation: Anterior Circulation:   -Distal Internal carotid arteries (ICAs): Patent with normal caliber.  -Proximal anterior cerebral arteries (ACAs) and anterior communicating artery: Patent with normal caliber without significant stenosis or aneurysm. -Proximal middle cerebral arteries (MCAs): Patent with normal caliber without significant stenosis or aneurysm.  Vertebrobasilar Circulation:    -Distal vertebral and basilar arteries: Patent with normal caliber without significant stenosis or aneurysm. -Proximal posterior cerebral arteries (PCAs) and posterior communicating arteries: Patent with normal caliber without significant stenosis or aneurysm. Fetal origin of the right PCA. -Superior cerebellar arteries (SCAs): Patent with normal caliber without significant stenosis or aneurysm. -Anterior inferior cerebellar arteries (AICAs): Patent with normal caliber without significant stenosis or aneurysm. -Posterior inferior cerebellar arteries (PICAs): Patent with normal caliber without significant stenosis or aneurysm. Dural venous sinuses: Visualized dural venous sinuses are patent. Patent extracranial and intracranial circulation without evidence for occlusion, significant large vessel stenosis, or aneurysm. Ct Head Wo Contrast    Result Date: 7/21/2020  EXAMINATION:  CT HEAD WO CONTRAST HISTORY:   fatigue    general weakness   code BAT TECHNIQUE:  Serial axial images without IV contrast were obtained from the vertex to the foramen magnum. Sagittal and coronal reconstructions. All CT scans at this facility use dose modulation, iterative reconstruction, and/or weight based dosing when appropriate to reduce radiation dose to as low as reasonably achievable. COMPARISON:  CT 8/11/2019 RESULT: Acute change:   No evidence of an acute infarct or other acute parenchymal process. Hemorrhage:    No evidence of acute intracranial hemorrhage. Mass Lesion / Mass Effect:   There is no evidence of an intracranial mass or extraaxial fluid collection. No significant mass effect. Chronic change:   Scattered patchy foci of low attenuation are present within supratentorial white matter which is a nonspecific finding but likely represents mild microvascular ischemia. Parenchyma: There is no significant volume loss for age. The brain parenchyma is otherwise within normal limits for age. Ventricles:    The ventricles are within normal limits of size and Internal Carotid Stenosis:  No significant stenosis. Cervical Vertebral Arteries: Patency:  Bilateral Dominance:  Codominant Intracranial Circulation: Anterior Circulation:   -Distal Internal carotid arteries (ICAs): Patent with normal caliber.  -Proximal anterior cerebral arteries (ACAs) and anterior communicating artery: Patent with normal caliber without significant stenosis or aneurysm. -Proximal middle cerebral arteries (MCAs): Patent with normal caliber without significant stenosis or aneurysm. Vertebrobasilar Circulation:    -Distal vertebral and basilar arteries: Patent with normal caliber without significant stenosis or aneurysm. -Proximal posterior cerebral arteries (PCAs) and posterior communicating arteries: Patent with normal caliber without significant stenosis or aneurysm. Fetal origin of the right PCA. -Superior cerebellar arteries (SCAs): Patent with normal caliber without significant stenosis or aneurysm. -Anterior inferior cerebellar arteries (AICAs): Patent with normal caliber without significant stenosis or aneurysm. -Posterior inferior cerebellar arteries (PICAs): Patent with normal caliber without significant stenosis or aneurysm. Dural venous sinuses: Visualized dural venous sinuses are patent. Patent extracranial and intracranial circulation without evidence for occlusion, significant large vessel stenosis, or aneurysm. Mra Head Without Contrast    Result Date: 7/22/2020  EXAM: MRI of the brain without contrast MRA of the brain without contrast History: Stroke. Confusion. Weakness. Dementia. Tremors. Technique: Multiplanar multisequence MRI of the brain was performed without contrast. Axial 3-D time-of-flight images of the brain were obtained without contrast. 3-D volume rendered images were performed for evaluation of the cerebral vasculature.  Comparison: CT brain from July 21, 2020 and MRI brain from March 11, 2020 Findings: MRI brain: Bilateral supratentorial deep white matter and subcortical white matter areas of hyperintense T2/FLAIR signal are nonspecific but most compatible with chronic small vessel ischemic changes in a patient of this age. There are tiny foci of encephalomalacia  within the posterior right and left cerebellar hemispheres. Prominence of the sulci and ventricles compatible with moderate generalized parenchymal volume loss. No acute hemorrhage, mass, mass effect, midline shift, or abnormal extra-axial fluid collection. Midline structures are within normal limits. There is no diffusion restriction. No suspicious susceptibility artifact is identified on the gradient echo sequence. Cranial nerves 7/8 complexes appear grossly unremarkable. The visualized paranasal sinuses and bilateral mastoid air cells are clear. MRA brain: The bilateral distal cervical internal carotid arteries through the skull base are patent. The bilateral middle cerebral arteries through the trifurcation and opercular branches are patent. The vertebrobasilar system including the superior cerebellar and posterior cerebral arteries are patent. The right posterior communicating artery is patent. The left posterior communicating artery is not identified. The bilateral anterior cerebral arteries and anterior communicating artery are patent. No aneurysm or high-grade stenosis of the visualized cerebral vasculature. MRI brain: No acute ischemia or acute intracranial process. Generalized parenchymal volume loss and nonspecific white matter findings most compatible with chronic small vessel ischemic changes in a patient of this age. MRA brain: No aneurysm or high-grade stenosis of the visualized cerebral vasculature. Xr Chest Portable    Result Date: 2020  Patient MRN: [id number] : 1945 Age:  69 years Gender: Male Order Date: 2020 3:15 PM. Exam: XR CHEST PORTABLE Number of Views: 1 Indication:  Fatigue times a few hours. Shortness of breath.  Comparison: 2020 Findings: Cardiomediastinal silhouette is within normal limits. Lungs are clear without evidence of infiltrate/pneumonia, pneumothorax or pleural effusion. Vascular calcifications thoracic aorta. Impression:  No radiographic evidence of acute cardiopulmonary disease. Vascular calcifications thoracic aorta. Please note this study does not exclude the possibility of underlying CoVid-19 viral infection and/or underlying pulmonary involvement     Mri Brain Without Contrast    Result Date: 7/22/2020  EXAM: MRI of the brain without contrast MRA of the brain without contrast History: Stroke. Confusion. Weakness. Dementia. Tremors. Technique: Multiplanar multisequence MRI of the brain was performed without contrast. Axial 3-D time-of-flight images of the brain were obtained without contrast. 3-D volume rendered images were performed for evaluation of the cerebral vasculature. Comparison: CT brain from July 21, 2020 and MRI brain from March 11, 2020 Findings: MRI brain: Bilateral supratentorial deep white matter and subcortical white matter areas of hyperintense T2/FLAIR signal are nonspecific but most compatible with chronic small vessel ischemic changes in a patient of this age. There are tiny foci of encephalomalacia  within the posterior right and left cerebellar hemispheres. Prominence of the sulci and ventricles compatible with moderate generalized parenchymal volume loss. No acute hemorrhage, mass, mass effect, midline shift, or abnormal extra-axial fluid collection. Midline structures are within normal limits. There is no diffusion restriction. No suspicious susceptibility artifact is identified on the gradient echo sequence. Cranial nerves 7/8 complexes appear grossly unremarkable. The visualized paranasal sinuses and bilateral mastoid air cells are clear. MRA brain: The bilateral distal cervical internal carotid arteries through the skull base are patent.  The bilateral middle cerebral arteries through the trifurcation results found for: LITHIUM, DILFRTOT, VALPROATE    Assessment:   Vertebrobasilar insufficiency with double vision as well as speech difficulty and some difficulty with balance. Patient came to the emergency room and we were considering him for TPA though his symptoms continue to improve at a fast pace and therefore we had not recommended intervention. We were not quite sure of the onset of the timing this was further confirmed on few occasions with his wife. Patient does have risk factors for cerebrovascular disease and is already on aspirin and Plavix. We recommend that we add Persantine to this as they are not quite sure if he still requires anticoagulation at this point in time as this may cause the bleeding risk. Patient has baseline underlying vascular dementia well-documented in my notes and has been seen by neuro psychology as well. Patient has some parkinsonian features though not true proximal disease and this may be arteriosclerotic. He had not responded to dopamine agonist.  At this time we have no other additional medication to add though Persantin may help in this situation now start him on 75 mg 3 times a day. We await his MRI to see if any residual infarcts. This consultation includes my consultation with emergency room at least on 3 different occasions and therefore extended evaluation and complexity of the findings noted. Mika Sanz MD, [place], American Board of Psychiatry & Neurology  Board Certified in Vascular Neurology  Board Certified in Neuromuscular Medicine  Certified in [facility]:

## 2020-07-22 NOTE — PROGRESS NOTES
[facility]   Facility/Department: [facility]  Speech Language Pathology    Elle Quinones  1945  [id number]    Date: 7/22/2020      Speech Therapy attempted to see Elle Stacie on this date for a/an:    Speech Language Evaluation - cognition     Pt was unable to be seen due to: Other: Procedure in room.  Re-attempt as able         Electronically signed by JIM Berg on 7/22/20 at 10:19 AM EDT

## 2020-07-22 NOTE — PROGRESS NOTES
Physician Progress Note    2020   12:34 PM    Name:  Jamel Amado  MRN:    [id number]     IP Day: 0     Admit Date: 2020  2:26 PM  PCP: Sydnie Regalado MD    Code Status:  Full Code      Assessment and Plan: Active Problems/ diagnosis:     Strokelike symptoms then  Hyperlipidemia  History of foot drop on the right    Plan  -MRI is negative.  -Neurology is consulted. Await neurology recommendations.  -Resume statin and antiplatelets. -PT/OT    DVT PPx     Subjective:      no new events.      Physical Examination:      Vitals:  BP (!) 154/78   Pulse 68   Temp 97.7 °F (36.5 °C)   Resp 16   Ht 5' 10\" (1.778 m)   Wt 175 lb (79.4 kg)   SpO2 96%   BMI 25.11 kg/m²   Temp (24hrs), Av °F (36.7 °C), Min:97.7 °F (36.5 °C), Max:98.2 °F (36.8 °C)      General appearance: alert, cooperative and no distress  Mental Status: oriented to person, place and time and normal affect  Lungs: clear to auscultation bilaterally, normal effort  Heart: regular rate and rhythm, no murmur  Abdomen: soft, nontender, nondistended, bowel sounds present, no masses  Extremities: no edema, redness, tenderness in the calves  Skin: no gross lesions, rashes    Data:     Labs:  Recent Labs     20  1535 20  0540   WBC 8.1 7.8   HGB 15.6 15.8    170     Recent Labs     20  1535      K 4.0      CO2 25   BUN 11   CREATININE 0.83   GLUCOSE 107*     Recent Labs     20  1535   AST 19   ALT 17   BILITOT 0.5   ALKPHOS 51       Current Facility-Administered Medications   Medication Dose Route Frequency Provider Last Rate Last Dose    dipyridamole (PERSANTINE) tablet 75 mg  75 mg Oral TID Nico Juárez MD   75 mg at 20 1012    iopamidol (ISOVUE-300) 61 % injection 100 mL  100 mL Intravenous ONCE PRN Anne Burnsuzanne Morrisons, APRN - CNP        sodium chloride flush 0.9 % injection 10 mL  10 mL Intravenous 2 times per day Justa Dsouza DO   10 mL at 20 1012    sodium chloride flush 0.9 % injection 10 mL  10 mL Intravenous PRN Hilary Rave, DO        polyethylene glycol (GLYCOLAX) packet 17 g  17 g Oral Daily PRN Hilary Rave, DO        promethazine (PHENERGAN) tablet 12.5 mg  12.5 mg Oral Q6H PRN Briseno Leaks Venu, DO        Or    ondansetron [facility]) injection 4 mg  4 mg Intravenous Q6H PRN Briseno Leaks Venu, DO        enoxaparin (LOVENOX) injection 40 mg  40 mg Subcutaneous Daily Hilary Rave, DO   40 mg at 07/21/20 2011    aspirin EC tablet 81 mg  81 mg Oral Daily Hilary Rave, DO   81 mg at 07/21/20 2010    rosuvastatin (CRESTOR) tablet 40 mg  40 mg Oral Nightly Hilary Rave, DO   40 mg at 07/21/20 2010    calcium carbonate (TUMS) chewable tablet 500 mg  1 tablet Oral Daily Hilary Rave, DO   500 mg at 07/22/20 1011    clopidogrel (PLAVIX) tablet 75 mg  75 mg Oral Daily Hilary Rave, DO   75 mg at 07/21/20 2011    finasteride (PROSCAR) tablet 5 mg  5 mg Oral Daily Hilary Rave, DO   5 mg at 07/22/20 1011    memantine (NAMENDA) tablet 10 mg  10 mg Oral BID Hilary Rave, DO   10 mg at 07/22/20 1140    metoprolol succinate (TOPROL XL) extended release tablet 25 mg  25 mg Oral Daily Hilary Rave, DO   25 mg at 07/21/20 2010    tamsulosin (FLOMAX) capsule 0.4 mg  0.4 mg Oral Daily Hilary Rave, DO   0.4 mg at 07/22/20 1012    magnesium oxide (MAG-OX) tablet 200 mg  200 mg Oral Daily Hilary Rave, DO   200 mg at 07/22/20 1011    pantoprazole (PROTONIX) tablet 40 mg  40 mg Oral QAM AC Yazid R Venu, DO   40 mg at 07/22/20 1290       Additional work up or/and treatment plan may be added today or then after based on clinical progression. I am managing a portion of pt care. Some medical issues are handled by other specialists. Additional work up and treatment should be done in out pt setting by pt PCP and other out pt providers.       In addition to examining and evaluating pt, I spent additional time explaining care, normaland abnormal findings, and treatment plan. All of pt questions were answered. Counseling, diet and education were provided. Case will be discussed with nursing staff when appropriate. Family will be updated if and when appropriate.        Electronically signed by Rajesh Edwards DO on 7/22/2020 at 12:34 PM

## 2020-07-22 NOTE — CARE COORDINATION
CANELO along with the care team met with the pt to discuss his DC needs. Pt lives at home with his wife and will return home with her at DC. Plan for DC is tomorrow. CANELO spoke with pt's wife to discuss needs for DC as pt has vascular dementia. Wife said that at this time they are managing well at home and she does not anticipate any DC needs. Wife feels that down the road she may havve need for caregivers to stay with him if she needs to go out. We discussed these options and list will be provided.

## 2020-07-22 NOTE — CONSULTS
Physical Medicine & Rehabilitation  Consult Note      Admitting Physician: Muriel Contreras DO    Primary Care Provider: Thai Flores MD     Reason for Consult:  Asses rehab needs,promote physical and mental function, and decrease likelihood of re-admit to the hospital after discharge. History of Present Illness:    Nai Bull is a 69 y.o. male admitted to [facility] on 7/21/2020. Patient has been admitted through the emergency room with a mental status change and possible TIA versus stroke. He has since recovered well and is hoping for discharge home later today with his wife and possible home health care. Fatigue   This is a chronic problem. The current episode started more than 1 year ago. The problem occurs intermittently. The problem has been unchanged. Associated symptoms include anorexia, arthralgias, chest pain, fatigue and weakness. Pertinent negatives include no diaphoresis, headaches, myalgias, nausea, neck pain, numbness or vomiting. The symptoms are aggravated by walking. He has tried rest and acetaminophen for the symptoms. The treatment provided mild relief. Their inpatient work up has included:    Imaging:    Imaging and other studies reviewed and discussed with patient and staff    Cta Head 7/21/2020    BRAIN: Evaluation of the individual slices of the CTA demonstrates no significant interval change from recent CT head NECK: Soft tissues: The soft tissue planes are maintained throughout. No evidence of a soft tissue mass in the neck or superior mediastinum. No significant lymphadenopathy. Spine:  Multilevel degenerative changes. Lung apices:   Lung apices with biapical pleural-parenchymal scarring. No focal consolidation. CT ARTERIOGRAM:     Extracranial Circulation: Aortic Arch: Normal branching pattern. .  There is no significant stenosis in the proximal brachiocephalic vessels. Carotid Stenosis: Right Common:  No significant stenosis. Right Internal Carotid  Plaque:  Mild to moderate plaque at the bifurcation. Right Internal Carotid Stenosis:  No significant stenosis. Left Common:  No significant stenosis. Left Internal Carotid Plaque:  Mild plaque at the bifurcation Left Internal Carotid Stenosis:  No significant stenosis. Cervical Vertebral Arteries: Patency:  Bilateral Dominance:  Codominant Intracranial Circulation: Anterior Circulation:   -Distal Internal carotid arteries (ICAs): Patent with normal caliber.  -Proximal anterior cerebral arteries (ACAs) and anterior communicating artery: Patent with normal caliber without significant stenosis or aneurysm. -Proximal middle cerebral arteries (MCAs): Patent with normal caliber without significant stenosis or aneurysm. Vertebrobasilar Circulation:    -Distal vertebral and basilar arteries: Patent with normal caliber without significant stenosis or aneurysm. -Proximal posterior cerebral arteries (PCAs) and posterior communicating arteries: Patent with normal caliber without significant stenosis or aneurysm. Fetal origin of the right PCA. -Superior cerebellar arteries (SCAs): Patent with normal caliber without significant stenosis or aneurysm. -Anterior inferior cerebellar arteries (AICAs): Patent with normal caliber without significant stenosis or aneurysm. -Posterior inferior cerebellar arteries (PICAs): Patent with normal caliber without significant stenosis or aneurysm. Dural venous sinuses: Visualized dural venous sinuses are patent. Patent extracranial and intracranial circulation without evidence for occlusion, significant large vessel stenosis, or aneurysm. Ct Head   7/21/2020  EXAMINATION:  CT HEAD WO CONTRAST HISTORY:   fatigue    general weakness   code BAT TECHNIQUE:  Serial axial images without IV contrast were obtained from the vertex to the foramen magnum. Sagittal and coronal reconstructions.  All CT scans at this facility use dose modulation, iterative reconstruction, and/or weight based dosing when appropriate to reduce radiation dose to as low as reasonably achievable. COMPARISON:  CT 8/11/2019 RESULT: Acute change:   No evidence of an acute infarct or other acute parenchymal process. Hemorrhage:    No evidence of acute intracranial hemorrhage. Mass Lesion / Mass Effect:   There is no evidence of an intracranial mass or extraaxial fluid collection. No significant mass effect. Chronic change:   Scattered patchy foci of low attenuation are present within supratentorial white matter which is a nonspecific finding but likely represents mild microvascular ischemia. Parenchyma: There is no significant volume loss for age. The brain parenchyma is otherwise within normal limits for age. Ventricles: The ventricles are within normal limits of size and configuration for age. Paranasal sinuses and skull base: The visualized paranasal sinuses are grossly clear. Mastoid air cells are clear. The skull base is unremarkable. Soft tissues unremarkable. No acute intracranial process. COMMUNICATION:  Communicated with: Chauncey Olszewski, CNP on 7/21/2020 at 1500. Cta Neck  7/21/2020  EXAMINATION:  CTA HEAD W WO CONTRAST, CTA NECK W WO CONTRAST HISTORY:   stroke   generalized weakness. TECHNIQUE:   Spiral high resolution axial images were obtained through the head, neck and superior mediastinum following bolus administration of intravenous contrast for CT angiography. The data was subsequently post-processed utilizing 3D multi-planar  reconstructions, 3D maximum intensity projections. Contrast:  iopamidol (ISOVUE-370) injection of 100 mL All CT scans at this facility use dose modulation, iterative reconstruction, and/or weight based dosing when appropriate to reduce radiation dose to as low as reasonably achievable.  COMPARISON: CT head 7/21/2020 RESULT: BRAIN: Evaluation of the individual slices of the CTA demonstrates no significant interval change from recent CT head NECK: Soft tissues: The soft tissue planes are maintained throughout. No evidence of a soft tissue mass in the neck or superior mediastinum. No significant lymphadenopathy. Spine:  Multilevel degenerative changes. Lung apices:   Lung apices with biapical pleural-parenchymal scarring. No focal consolidation. CT ARTERIOGRAM:     Extracranial Circulation: Aortic Arch: Normal branching pattern. .  There is no significant stenosis in the proximal brachiocephalic vessels. Carotid Stenosis: Right Common:  No significant stenosis. Right Internal Carotid  Plaque:  Mild to moderate plaque at the bifurcation. Right Internal Carotid Stenosis:  No significant stenosis. Left Common:  No significant stenosis. Left Internal Carotid Plaque:  Mild plaque at the bifurcation Left Internal Carotid Stenosis:  No significant stenosis. Cervical Vertebral Arteries: Patency:  Bilateral Dominance:  Codominant Intracranial Circulation: Anterior Circulation:   -Distal Internal carotid arteries (ICAs): Patent with normal caliber.  -Proximal anterior cerebral arteries (ACAs) and anterior communicating artery: Patent with normal caliber without significant stenosis or aneurysm. -Proximal middle cerebral arteries (MCAs): Patent with normal caliber without significant stenosis or aneurysm. Vertebrobasilar Circulation:    -Distal vertebral and basilar arteries: Patent with normal caliber without significant stenosis or aneurysm. -Proximal posterior cerebral arteries (PCAs) and posterior communicating arteries: Patent with normal caliber without significant stenosis or aneurysm. Fetal origin of the right PCA. -Superior cerebellar arteries (SCAs): Patent with normal caliber without significant stenosis or aneurysm. -Anterior inferior cerebellar arteries (AICAs): Patent with normal caliber without significant stenosis or aneurysm. -Posterior inferior cerebellar arteries (PICAs): Patent with normal caliber without significant stenosis or aneurysm.  Dural venous sinuses: Visualized dural venous sinuses are patent. Patent extracranial and intracranial circulation without evidence for occlusion, significant large vessel stenosis, or aneurysm. Mra Head Without   7/22/2020  EXAM: MRI of the brain without contrast MRA of the brain without contrast History: Stroke. Confusion. Weakness. Dementia. Tremors. Technique: Multiplanar multisequence MRI of the brain was performed without contrast. Axial 3-D time-of-flight images of the brain were obtained without contrast. 3-D volume rendered images were performed for evaluation of the cerebral vasculature. Comparison: CT brain from July 21, 2020 and MRI brain from March 11, 2020 Findings: MRI brain: Bilateral supratentorial deep white matter and subcortical white matter areas of hyperintense T2/FLAIR signal are nonspecific but most compatible with chronic small vessel ischemic changes in a patient of this age. There are tiny foci of encephalomalacia  within the posterior right and left cerebellar hemispheres. Prominence of the sulci and ventricles compatible with moderate generalized parenchymal volume loss. No acute hemorrhage, mass, mass effect, midline shift, or abnormal extra-axial fluid collection. Midline structures are within normal limits. There is no diffusion restriction. No suspicious susceptibility artifact is identified on the gradient echo sequence. Cranial nerves 7/8 complexes appear grossly unremarkable. The visualized paranasal sinuses and bilateral mastoid air cells are clear. MRA brain: The bilateral distal cervical internal carotid arteries through the skull base are patent. The bilateral middle cerebral arteries through the trifurcation and opercular branches are patent. The vertebrobasilar system including the superior cerebellar and posterior cerebral arteries are patent. The right posterior communicating artery is patent. The left posterior communicating artery is not identified.  The bilateral anterior cerebral arteries and anterior communicating artery are patent. No aneurysm or high-grade stenosis of the visualized cerebral vasculature. MRI brain: No acute ischemia or acute intracranial process. Generalized parenchymal volume loss and nonspecific white matter findings most compatible with chronic small vessel ischemic changes in a patient of this age. MRA brain: No aneurysm or high-grade stenosis of the visualized cerebral vasculature. Xr Chest   2020  Patient MRN: [id number] : 1945 Age:  69 years Gender: Male Order Date: 2020 3:15 PM. Exam: XR CHEST PORTABLE Number of Views: 1 Indication:  Fatigue times a few hours. Shortness of breath. Comparison: 2020 Findings: Cardiomediastinal silhouette is within normal limits. Lungs are clear without evidence of infiltrate/pneumonia, pneumothorax or pleural effusion. Vascular calcifications thoracic aorta. Impression:  No radiographic evidence of acute cardiopulmonary disease. Vascular calcifications thoracic aorta. Please note this study does not exclude the possibility of underlying CoVid-19 viral infection and/or underlying pulmonary involvement     Xr Chest   2020  EXAMINATION: XR CHEST PORTABLE CLINICAL HISTORY: CHEST PAIN COMPARISONS: MAY 25, 2020 FINDINGS: Osseous structures intact. Cardiopericardial silhouette normal. Pulmonary vasculature normal. Lungs clear. NO ACUTE CARDIOPULMONARY DISEASE. Mri Brain   2020   MRI brain: Bilateral supratentorial deep white matter and subcortical white matter areas of hyperintense T2/FLAIR signal are nonspecific but most compatible with chronic small vessel ischemic changes in a patient of this age. There are tiny foci of encephalomalacia  within the posterior right and left cerebellar hemispheres. Prominence of the sulci and ventricles compatible with moderate generalized parenchymal volume loss.  No acute hemorrhage, mass, mass effect, midline shift, or abnormal extra-axial fluid collection. Midline structures are within normal limits. There is no diffusion restriction. No suspicious susceptibility artifact is identified on the gradient echo sequence. Cranial nerves 7/8 complexes appear grossly unremarkable. The visualized paranasal sinuses and bilateral mastoid air cells are clear. MRA brain: The bilateral distal cervical internal carotid arteries through the skull base are patent. The bilateral middle cerebral arteries through the trifurcation and opercular branches are patent. The vertebrobasilar system including the superior cerebellar and posterior cerebral arteries are patent. The right posterior communicating artery is patent. The left posterior communicating artery is not identified. The bilateral anterior cerebral arteries and anterior communicating artery are patent. No aneurysm or high-grade stenosis of the visualized cerebral vasculature. MRI brain: No acute ischemia or acute intracranial process. Generalized parenchymal volume loss and nonspecific white matter findings most compatible with chronic small vessel ischemic changes in a patient of this age. MRA brain: No aneurysm or high-grade stenosis of the visualized cerebral vasculature.              Labs:     labs reviewed and discussed with patient and staff    Lab Results   Component Value Date    POCGLU 112 07/21/2020    POCGLU 93 08/11/2019     Lab Results   Component Value Date     07/21/2020    K 4.0 07/21/2020    K 4.2 05/25/2020     07/21/2020    CO2 25 07/21/2020    BUN 11 07/21/2020    CREATININE 0.83 07/21/2020    CALCIUM 9.3 07/21/2020    LABALBU 4.1 07/21/2020    LABALBU 4.1 03/14/2012    BILITOT 0.5 07/21/2020    ALKPHOS 51 07/21/2020    AST 19 07/21/2020    ALT 17 07/21/2020     Lab Results   Component Value Date    WBC 7.8 07/22/2020    RBC 4.99 07/22/2020    HGB 15.8 07/22/2020    HCT 46.8 07/22/2020    MCV 93.9 07/22/2020    MCH 31.8 07/22/2020    MCHC 33.8 07/22/2020    RDW 13.3 07/22/2020     07/22/2020    MPV 10.9 07/15/2015     Lab Results   Component Value Date    VITD25 44.1 05/20/2019     Lab Results   Component Value Date    COLORU Yellow 07/21/2020    NITRU Negative 07/21/2020    GLUCOSEU Negative 07/21/2020    KETUA Negative 07/21/2020    UROBILINOGEN 1.0 07/21/2020    BILIRUBINUR Negative 07/21/2020    BILIRUBINUR neg 03/05/2020     Lab Results   Component Value Date    PROTIME 13.4 07/21/2020     Lab Results   Component Value Date    INR 1.0 07/21/2020         I discussed results with patient. Current Rehabilitation Assessments:    Rehabilitation:  Physical therapy: FIMS:  BedMobility:      Transfers: Sit to Stand: Contact guard assistance  Stand to sit: Contact guard assistance  Bed to Chair: Contact guard assistance, Ambulation 1  Surface: level tile  Device: Rolling Walker  Assistance: Contact guard assistance  Quality of Gait: mildly flexed posture  Distance: 120 ft  Comments: pt with improved stability using WW though tends to leave it behind in small spaces,      FIMS: ,  , Assessment: Pt with above impairments though is at or near his functional baseline. Pt does report he feels \"not quite right\", will continue PT in order to address impairments as able and improve safety with homegoing. Occupational therapy: FIMS:   ,  , Assessment: Pt. is a 69year old man from home who presents to [facility] with confusion and ataxia. Pt. demonstrates the above deficits which impact his ability to perform ADLs and IADLs safely and consistently. Pt. would benefit from continued OT to maximize independence and safety with ADL tasks. ADL  Feeding: Independent (07/22/20 0926)  Grooming: Independent (07/22/20 0926)  UE Bathing: Supervision (07/22/20 0926)  LE Bathing: Supervision (07/22/20 0926)  UE Dressing: Supervision (07/22/20 0926)  LE Dressing: Supervision (07/22/20 0926)  Toileting: Supervision (07/22/20 0572)  Additional Comments: Simulated ADLs as above.  Pt. limited by decreased sequencing as well as mild anxiety in mobility (07/22/20 0926)  OCCUPATIONAL THERAPY  Hand Dominance: Right  ADL  Feeding: Independent (07/22/20 0926)  Grooming: Independent (07/22/20 0926)  UE Bathing: Supervision (07/22/20 0926)  LE Bathing: Supervision (07/22/20 0926)  UE Dressing: Supervision (07/22/20 0926)  LE Dressing: Supervision (07/22/20 3191)  Toileting: Supervision (07/22/20 0660)  Additional Comments: Simulated ADLs as above. Pt. limited by decreased sequencing as well as mild anxiety in mobility (07/22/20 0926)  Toilet Transfers  Toilet - Technique: Stand step (07/22/20 4772)  Equipment Used: Standard bedside commode (07/22/20 9761)  Toilet Transfer: Contact guard assistance (07/22/20 8038)  Toilet Transfers Comments: Pt .just transferring off of commode with PCA on therapist arrival (07/22/20 0928)            LTG:                 Speech therapy: FIMS:          Past Medical History:          Diagnosis Date    Abnormal cardiovascular stress test 4/19/2016    Equivocal ST-T wave changes;  Defect in the inferior wall consistent with prior infarction; LV EF 79%; TID ratio 1.1    Actinic keratoses     BPH (benign prostatic hypertrophy)     CAD (coronary artery disease)     Cancer (HCC)     COLON    Chest pressure 5/3/2016    Cholelithiasis     History of colon cancer     s/p partial colectomy in 2009    Hyperlipidemia     Inflamed seborrheic keratosis     Lumbar radiculopathy 10/26/2016    Mild cognitive impairment     Osteoarthritis     Parathyroid tumor     [facility] disease ([facility] [address]     RA (retrograde amnesia)     Sinus bradycardia on ECG 4/19/2016    Done 4/5/16 with Dr. Joey Tripp Syncope and collapse     Vascular dementia [facility])          PastSurgical History:          Procedure Laterality Date    BACK SURGERY  1993    CHOLECYSTECTOMY, LAPAROSCOPIC N/A 5/27/2020    LAP BRAIN/ CHOLANGIOGRAMS performed by Uche Boone MD at [address] [place] [address] of children: Not on file    Years of education: Not on file    Highest education level: Not on file   Occupational History    Not on file   Social Needs    Financial resource strain: Not on file    Food insecurity     Worry: Not on file     Inability: Not on file    Transportation needs     Medical: Not on file     Non-medical: Not on file   Tobacco Use    Smoking status: Former Smoker    Smokeless tobacco: Never Used    Tobacco comment: Quit smoking > 30 years ago   Substance and Sexual Activity    Alcohol use: No    Drug use: No    Sexual activity: Not Currently     Partners: Female   Lifestyle    Physical activity     Days per week: Not on file     Minutes per session: Not on file    Stress: Not on file   Relationships    Social connections     Talks on phone: Not on file     Gets together: Not on file     Attends Scientologist service: Not on file     Active member of club or organization: Not on file     Attends meetings of clubs or organizations: Not on file     Relationship status: Not on file    Intimate partner violence     Fear of current or ex partner: Not on file     Emotionally abused: Not on file     Physically abused: Not on file     Forced sexual activity: Not on file   Other Topics Concern    Not on file   Social History Narrative         Lives With: Spouse    Type of Home: [address] [place] [address] [address] [place]: Two level(reports he can stay on 1st floor with bedroom and bathroom though currently he stays upstairs)    Home Access: (Pt unable to state)    Bathroom Shower/Tub: Walk-in shower    Bathroom Equipment: Shower chair, Grab bars in shower    Home Equipment: Rolling walker    ADL Assistance: Independent    Homemaking Assistance: Needs assistance    Ambulation Assistance: Independent  (no AD)    Transfer Assistance: Independent    Active : No    Additional Comments: pt is questionable historian, he is unsure of what equipment he has at home or if he was using any Family History:         Problem Relation Age of Onset    Cancer Mother         OVARIAN    Heart Disease Father        Review of Systems:    Review of Systems   Constitutional: Positive for fatigue. Negative for diaphoresis. HENT: Negative for nosebleeds. Respiratory: Positive for shortness of breath. Negative for apnea and chest tightness. Cardiovascular: Positive for chest pain. Negative for palpitations and leg swelling. Gastrointestinal: Positive for anorexia. Negative for blood in stool, diarrhea, nausea and vomiting. Musculoskeletal: Positive for arthralgias. Negative for myalgias, neck pain and neck stiffness. Neurological: Positive for weakness. Negative for dizziness, seizures, syncope, light-headedness, numbness and headaches. Hematological: Does not bruise/bleed easily. Psychiatric/Behavioral: Negative for confusion. The patient is not nervous/anxious. All other systems reviewed and are negative. Physical Exam:    BP (!) 156/80   Pulse 61   Temp 98.2 °F (36.8 °C)   Resp 16   Ht 5' 10\" (1.778 m)   Wt 175 lb (79.4 kg)   SpO2 97%   BMI 25.11 kg/m²      Physical Exam  Constitutional:       General: He is not in acute distress. Appearance: He is well-developed. He is not ill-appearing, toxic-appearing or diaphoretic. HENT:      Head: Normocephalic and atraumatic. Not macrocephalic and not microcephalic. No raccoon eyes or Archer's sign. Mouth/Throat:      Pharynx: Oropharyngeal exudate present. Eyes:      General: No scleral icterus. Right eye: No discharge. Left eye: No discharge. Extraocular Movements: Extraocular movements intact. Conjunctiva/sclera: Conjunctivae normal.      Pupils: Pupils are equal, round, and reactive to light. Neck:      Musculoskeletal: Normal range of motion and neck supple. Spinous process tenderness and muscular tenderness present. Thyroid: No thyromegaly.       Vascular: Normal carotid pulses. No carotid bruit, hepatojugular reflux or JVD. Trachea: No tracheal deviation. Cardiovascular:      Rate and Rhythm: Normal rate and regular rhythm. Heart sounds: Normal heart sounds. No murmur. Pulmonary:      Effort: Pulmonary effort is normal. No respiratory distress. Breath sounds: Normal breath sounds. No stridor. No wheezing or rhonchi. Abdominal:      General: Bowel sounds are normal. There is no distension. Palpations: Abdomen is soft. There is no mass. Tenderness: There is no abdominal tenderness. There is no guarding or rebound. Musculoskeletal: Normal range of motion. Cervical back: He exhibits tenderness. Thoracic back: He exhibits tenderness. Lumbar back: He exhibits tenderness. Lymphadenopathy:      Head:      Right side of head: No submental or submandibular adenopathy. Left side of head: No submental or submandibular adenopathy. Cervical: No cervical adenopathy. Skin:     General: Skin is warm and dry. Coloration: Skin is not pale. Findings: Bruising present. Comments: Old IV sites   Neurological:      Mental Status: He is alert and oriented to person, place, and time. Sensory: Sensory deficit present. Coordination: Coordination abnormal.      Gait: Gait abnormal.      Deep Tendon Reflexes:      Reflex Scores:       Tricep reflexes are 1+ on the right side and 1+ on the left side. Bicep reflexes are 1+ on the right side and 1+ on the left side. Brachioradialis reflexes are 1+ on the right side and 1+ on the left side. Patellar reflexes are 0 on the right side and 0 on the left side. Achilles reflexes are 0 on the right side and 0 on the left side. Psychiatric:         Attention and Perception: Attention normal. He is attentive. Mood and Affect: Mood normal. Mood is not anxious or depressed. Affect is not angry. Speech: Speech is delayed.  Speech is not rapid and pressured. Behavior: Behavior is slowed. Behavior is not withdrawn. Thought Content: Thought content normal.         Cognition and Memory: Memory is not impaired. He exhibits impaired recent memory. He does not exhibit impaired remote memory. Judgment: Judgment is not impulsive or inappropriate. Ortho Exam  Neurologic Exam     Mental Status   Oriented to person, place, and time. Cranial Nerves     CN III, IV, VI   Pupils are equal, round, and reactive to light.     Gait, Coordination, and Reflexes     Reflexes   Right brachioradialis: 1+  Left brachioradialis: 1+  Right biceps: 1+  Left biceps: 1+  Right triceps: 1+  Left triceps: 1+  Right patellar: 0  Left patellar: 0  Right achilles: 0  Left achilles: 0            Diagnostics:    Recent Results (from the past 24 hour(s))   Urine Reflex to Culture    Collection Time: 07/21/20  2:45 PM    Specimen: Urine, clean catch   Result Value Ref Range    Color, UA Yellow Straw/Yellow    Clarity, UA CLOUDY (A) Clear    Glucose, Ur Negative Negative mg/dL    Bilirubin Urine Negative Negative    Ketones, Urine Negative Negative mg/dL    Specific Gravity, UA 1.034 1.005 - 1.030    Blood, Urine Negative Negative    pH, UA 7.5 5.0 - 9.0    Protein, UA Negative Negative mg/dL    Urobilinogen, Urine 1.0 <2.0 E.U./dL    Nitrite, Urine Negative Negative    Leukocyte Esterase, Urine Negative Negative    Urine Reflex to Culture Not Indicated    POCT Glucose    Collection Time: 07/21/20  2:47 PM   Result Value Ref Range    POC Glucose 112 60 - 115 mg/dl    Performed on ACCU-CHEK    EKG 12 Lead - Chest Pain    Collection Time: 07/21/20  3:13 PM   Result Value Ref Range    Ventricular Rate 64 BPM    Atrial Rate 64 BPM    P-R Interval 130 ms    QRS Duration 80 ms    Q-T Interval 414 ms    QTc Calculation (Bazett) 427 ms    P Axis 73 degrees    R Axis 48 degrees    T Axis 68 degrees   Comprehensive Metabolic Panel    Collection Time: 07/21/20  3:35 PM Result Value Ref Range    Sodium 140 135 - 144 mEq/L    Potassium 4.0 3.4 - 4.9 mEq/L    Chloride 104 95 - 107 mEq/L    CO2 25 20 - 31 mEq/L    Anion Gap 11 9 - 15 mEq/L    Glucose 107 (H) 70 - 99 mg/dL    BUN 11 8 - 23 mg/dL    CREATININE 0.83 0.70 - 1.20 mg/dL    GFR Non-African American >60.0 >60    GFR  >60.0 >60    Calcium 9.3 8.5 - 9.9 mg/dL    Total Protein 6.7 6.3 - 8.0 g/dL    Alb 4.1 3.5 - 4.6 g/dL    Total Bilirubin 0.5 0.2 - 0.7 mg/dL    Alkaline Phosphatase 51 35 - 104 U/L    ALT 17 0 - 41 U/L    AST 19 0 - 40 U/L    Globulin 2.6 2.3 - 3.5 g/dL   CBC Auto Differential    Collection Time: 07/21/20  3:35 PM   Result Value Ref Range    WBC 8.1 4.8 - 10.8 K/uL    RBC 4.87 4.70 - 6.10 M/uL    Hemoglobin 15.6 14.0 - 18.0 g/dL    Hematocrit 45.6 42.0 - 52.0 %    MCV 93.5 80.0 - 100.0 fL    MCH 32.0 (H) 27.0 - 31.3 pg    MCHC 34.2 33.0 - 37.0 %    RDW 13.5 11.5 - 14.5 %    Platelets 894 226 - 950 K/uL    Neutrophils % 81.5 %    Lymphocytes % 10.9 %    Monocytes % 7.0 %    Eosinophils % 0.2 %    Basophils % 0.4 %    Neutrophils Absolute 6.6 (H) 1.4 - 6.5 K/uL    Lymphocytes Absolute 0.9 (L) 1.0 - 4.8 K/uL    Monocytes Absolute 0.6 0.2 - 0.8 K/uL    Eosinophils Absolute 0.0 0.0 - 0.7 K/uL    Basophils Absolute 0.0 0.0 - 0.2 K/uL   Magnesium    Collection Time: 07/21/20  3:35 PM   Result Value Ref Range    Magnesium 2.4 1.7 - 2.4 mg/dL   Troponin    Collection Time: 07/21/20  3:35 PM   Result Value Ref Range    Troponin <0.010 0.000 - 0.010 ng/mL   CK    Collection Time: 07/21/20  3:35 PM   Result Value Ref Range    Total CK 97 0 - 190 U/L   APTT    Collection Time: 07/21/20  3:35 PM   Result Value Ref Range    aPTT 33.4 24.4 - 36.8 sec   Protime-INR    Collection Time: 07/21/20  3:35 PM   Result Value Ref Range    Protime 13.4 12.3 - 14.9 sec    INR 1.0    Lipid panel - fasting    Collection Time: 07/22/20  5:40 AM   Result Value Ref Range    Cholesterol, Total 124 0 - 199 mg/dL    Triglycerides 65 0 - 150 mg/dL    HDL 66 (H) 40 - 59 mg/dL    LDL Calculated 45 0 - 129 mg/dL   CBC    Collection Time: 07/22/20  5:40 AM   Result Value Ref Range    WBC 7.8 4.8 - 10.8 K/uL    RBC 4.99 4.70 - 6.10 M/uL    Hemoglobin 15.8 14.0 - 18.0 g/dL    Hematocrit 46.8 42.0 - 52.0 %    MCV 93.9 80.0 - 100.0 fL    MCH 31.8 (H) 27.0 - 31.3 pg    MCHC 33.8 33.0 - 37.0 %    RDW 13.3 11.5 - 14.5 %    Platelets 322 692 - 958 K/uL              Impression:    1. Impaired mobility and ADLs due to vascular dementia with recent TIA  2. Fatigue and Malaise      Complex Active General Medical Issues thatcomplicate care Assess & Plan:     1. Active Problems:    Stroke-like episode ([address]  Resolved Problems:    * No resolved hospital problems. *            Recommendations:    1. Considering all of the factors aboveincluding the patient's current medical status, social status/home envirnment, their functional needs and their ability to participate in a therapy program, I feel that they would best be served at a home with home health care   Rehabilitationlevel of care. It is my opinion that they will be able to tolerate and benefit from 3 hours of therapy a day. I reviewed the varous local options re these levels of care with the patient and family. Though he could participate in rehab he does not need it. 2. Nursing care to focus on bowel and bladder issues. 3. Vitamin B12 shot times one  4. Improve hydration and Nutrition by adding Proteinex and push PO fluids    Greater 50% of 50 minutes spent evaluating patient face to face. It was my pleasure toevaluate Jayesh Deng today. Please call [phone] with questions.     Alee Samano, DO

## 2020-07-22 NOTE — PROGRESS NOTES
[facility] Occupational Therapy      Date: 2020  Patient Name: Maryam Crain        MRN: [id number]  Account: [de-identified]   : 1945  (69 y.o.)  [facility]    Pt. is of observation status. To comply with medicare and insurance regulations, to see patient we require updated orders including a valid OT treatment diagnosis. Please reorder as appropriate.      Electronically signed by LB King on 2020 at 8:18 AM

## 2020-07-23 VITALS
OXYGEN SATURATION: 96 % | DIASTOLIC BLOOD PRESSURE: 78 MMHG | RESPIRATION RATE: 16 BRPM | HEART RATE: 75 BPM | HEIGHT: 70 IN | WEIGHT: 175 LBS | TEMPERATURE: 97.7 F | SYSTOLIC BLOOD PRESSURE: 132 MMHG | BODY MASS INDEX: 25.05 KG/M2

## 2020-07-23 PROCEDURE — 2580000003 HC RX 258: Performed by: INTERNAL MEDICINE

## 2020-07-23 PROCEDURE — 99226 PR SBSQ OBSERVATION CARE/DAY 35 MINUTES: CPT | Performed by: PSYCHIATRY & NEUROLOGY

## 2020-07-23 PROCEDURE — G0378 HOSPITAL OBSERVATION PER HR: HCPCS

## 2020-07-23 PROCEDURE — 96372 THER/PROPH/DIAG INJ SC/IM: CPT

## 2020-07-23 PROCEDURE — 6370000000 HC RX 637 (ALT 250 FOR IP): Performed by: PSYCHIATRY & NEUROLOGY

## 2020-07-23 PROCEDURE — 6370000000 HC RX 637 (ALT 250 FOR IP): Performed by: INTERNAL MEDICINE

## 2020-07-23 PROCEDURE — 92523 SPEECH SOUND LANG COMPREHEN: CPT

## 2020-07-23 PROCEDURE — 6360000002 HC RX W HCPCS: Performed by: INTERNAL MEDICINE

## 2020-07-23 RX ORDER — DIPYRIDAMOLE 75 MG
75 TABLET ORAL 3 TIMES DAILY
Qty: 90 TABLET | Refills: 3 | Status: SHIPPED | OUTPATIENT
Start: 2020-07-23 | End: 2020-01-01 | Stop reason: SDUPTHER

## 2020-07-23 RX ADMIN — DIPYRIDAMOLE 75 MG: 75 TABLET, FILM COATED ORAL at 08:16

## 2020-07-23 RX ADMIN — TAMSULOSIN HYDROCHLORIDE 0.4 MG: 0.4 CAPSULE ORAL at 08:14

## 2020-07-23 RX ADMIN — PANTOPRAZOLE SODIUM 40 MG: 40 TABLET, DELAYED RELEASE ORAL at 05:01

## 2020-07-23 RX ADMIN — ANTACID TABLETS 500 MG: 500 TABLET, CHEWABLE ORAL at 08:16

## 2020-07-23 RX ADMIN — Medication 10 ML: at 08:21

## 2020-07-23 RX ADMIN — Medication 200 MG: at 08:15

## 2020-07-23 RX ADMIN — FINASTERIDE 5 MG: 5 TABLET, FILM COATED ORAL at 08:15

## 2020-07-23 RX ADMIN — MEMANTINE HYDROCHLORIDE 10 MG: 10 TABLET ORAL at 08:16

## 2020-07-23 RX ADMIN — ENOXAPARIN SODIUM 40 MG: 40 INJECTION SUBCUTANEOUS at 08:17

## 2020-07-23 RX ADMIN — ACETAMINOPHEN 650 MG: 325 TABLET, FILM COATED ORAL at 10:47

## 2020-07-23 ASSESSMENT — PAIN SCALES - GENERAL: PAINLEVEL_OUTOF10: 5

## 2020-07-23 NOTE — DISCHARGE SUMMARY
Hospital Medicine Discharge Summary    Daniella Chan  :  1945  MRN:  [id number]    Admit date:  2020  Discharge date:  2020    Admitting Physician:  Arnold Camacho DO  Primary Care Physician:  Osito Lyons MD      Discharge Diagnoses:      Strokelike symptoms then  Hyperlipidemia  History of foot drop on the right  Vertebrobasilar insufficiency-causing imbalance and headache  Migraine headache history       Chief Complaint   Patient presents with    Fatigue     x few hours     Hospital Course: This is 71-year-old  male with a past medical history of previous stroke, hyperlipidemia who presented to the hospital with fatigue, balance loss and abnormal speech. Patient was initially considered for TPA in the emergency room however his symptoms in the emergency room improved improved within 15 to 30 minutes throughout this was not given. Neurology was consulted by the emergency room provider. Patient was admitted for stroke rule out. His MRI was negative. And neurology thought his symptoms are caused by vertebrobasilar insufficiency. He was also given his home Imitrex for migraine headache. He was started on dipyridamole 75 mg 3 times daily by Dr. Matt Hamlin. He is already on aspirin and Plavix. Patient was discharged with plan to follow-up with neurology within 4 to 6 weeks. He is also instructed to follow-up with his PCP within 1 week. Exam on discharge:   BP (!) 168/74   Pulse 66   Temp 98.1 °F (36.7 °C) (Oral)   Resp 16   Ht 5' 10\" (1.778 m)   Wt 175 lb (79.4 kg)   SpO2 96%   BMI 25.11 kg/m²   General appearance: No apparent distress, appears stated age and cooperative. HEENT: Pupils equal, round, and reactive to light. Conjunctivae/corneas clear. Neck: Supple, with full range of motion. No jugular venous distention. Trachea midline. Respiratory:  Normal respiratory effort. Clear to auscultation, bilaterally without Rales/Wheezes/Rhonchi.   Cardiovascular: degenerative changes. Lung apices:   Lung apices with biapical pleural-parenchymal scarring. No focal consolidation. CT ARTERIOGRAM:     Extracranial Circulation: Aortic Arch: Normal branching pattern. .  There is no significant stenosis in the proximal brachiocephalic vessels. Carotid Stenosis: Right Common:  No significant stenosis. Right Internal Carotid  Plaque:  Mild to moderate plaque at the bifurcation. Right Internal Carotid Stenosis:  No significant stenosis. Left Common:  No significant stenosis. Left Internal Carotid Plaque:  Mild plaque at the bifurcation Left Internal Carotid Stenosis:  No significant stenosis. Cervical Vertebral Arteries: Patency:  Bilateral Dominance:  Codominant Intracranial Circulation: Anterior Circulation:   -Distal Internal carotid arteries (ICAs): Patent with normal caliber.  -Proximal anterior cerebral arteries (ACAs) and anterior communicating artery: Patent with normal caliber without significant stenosis or aneurysm. -Proximal middle cerebral arteries (MCAs): Patent with normal caliber without significant stenosis or aneurysm. Vertebrobasilar Circulation:    -Distal vertebral and basilar arteries: Patent with normal caliber without significant stenosis or aneurysm. -Proximal posterior cerebral arteries (PCAs) and posterior communicating arteries: Patent with normal caliber without significant stenosis or aneurysm. Fetal origin of the right PCA. -Superior cerebellar arteries (SCAs): Patent with normal caliber without significant stenosis or aneurysm. -Anterior inferior cerebellar arteries (AICAs): Patent with normal caliber without significant stenosis or aneurysm. -Posterior inferior cerebellar arteries (PICAs): Patent with normal caliber without significant stenosis or aneurysm. Dural venous sinuses: Visualized dural venous sinuses are patent.      Patent extracranial and intracranial circulation without evidence for occlusion, significant large vessel stenosis, or aneurysm. Ct Head Wo Contrast    Result Date: 7/21/2020  EXAMINATION:  CT HEAD WO CONTRAST HISTORY:   fatigue    general weakness   code BAT TECHNIQUE:  Serial axial images without IV contrast were obtained from the vertex to the foramen magnum. Sagittal and coronal reconstructions. All CT scans at this facility use dose modulation, iterative reconstruction, and/or weight based dosing when appropriate to reduce radiation dose to as low as reasonably achievable. COMPARISON:  CT 8/11/2019 RESULT: Acute change:   No evidence of an acute infarct or other acute parenchymal process. Hemorrhage:    No evidence of acute intracranial hemorrhage. Mass Lesion / Mass Effect:   There is no evidence of an intracranial mass or extraaxial fluid collection. No significant mass effect. Chronic change:   Scattered patchy foci of low attenuation are present within supratentorial white matter which is a nonspecific finding but likely represents mild microvascular ischemia. Parenchyma: There is no significant volume loss for age. The brain parenchyma is otherwise within normal limits for age. Ventricles: The ventricles are within normal limits of size and configuration for age. Paranasal sinuses and skull base: The visualized paranasal sinuses are grossly clear. Mastoid air cells are clear. The skull base is unremarkable. Soft tissues unremarkable. No acute intracranial process. COMMUNICATION:  Communicated with: Yoko Cam CNP on 7/21/2020 at 1500. Cta Neck W Wo Contrast    Result Date: 7/21/2020  EXAMINATION:  CTA HEAD W WO CONTRAST, CTA NECK W WO CONTRAST HISTORY:   stroke   generalized weakness. TECHNIQUE:   Spiral high resolution axial images were obtained through the head, neck and superior mediastinum following bolus administration of intravenous contrast for CT angiography. The data was subsequently post-processed utilizing 3D multi-planar  reconstructions, 3D maximum intensity projections.  Contrast: aneurysm. Fetal origin of the right PCA. -Superior cerebellar arteries (SCAs): Patent with normal caliber without significant stenosis or aneurysm. -Anterior inferior cerebellar arteries (AICAs): Patent with normal caliber without significant stenosis or aneurysm. -Posterior inferior cerebellar arteries (PICAs): Patent with normal caliber without significant stenosis or aneurysm. Dural venous sinuses: Visualized dural venous sinuses are patent. Patent extracranial and intracranial circulation without evidence for occlusion, significant large vessel stenosis, or aneurysm. Mra Head Without Contrast    Result Date: 7/22/2020  EXAM: MRI of the brain without contrast MRA of the brain without contrast History: Stroke. Confusion. Weakness. Dementia. Tremors. Technique: Multiplanar multisequence MRI of the brain was performed without contrast. Axial 3-D time-of-flight images of the brain were obtained without contrast. 3-D volume rendered images were performed for evaluation of the cerebral vasculature. Comparison: CT brain from July 21, 2020 and MRI brain from March 11, 2020 Findings: MRI brain: Bilateral supratentorial deep white matter and subcortical white matter areas of hyperintense T2/FLAIR signal are nonspecific but most compatible with chronic small vessel ischemic changes in a patient of this age. There are tiny foci of encephalomalacia  within the posterior right and left cerebellar hemispheres. Prominence of the sulci and ventricles compatible with moderate generalized parenchymal volume loss. No acute hemorrhage, mass, mass effect, midline shift, or abnormal extra-axial fluid collection. Midline structures are within normal limits. There is no diffusion restriction. No suspicious susceptibility artifact is identified on the gradient echo sequence. Cranial nerves 7/8 complexes appear grossly unremarkable. The visualized paranasal sinuses and bilateral mastoid air cells are clear.  MRA brain: The bilateral distal cervical internal carotid arteries through the skull base are patent. The bilateral middle cerebral arteries through the trifurcation and opercular branches are patent. The vertebrobasilar system including the superior cerebellar and posterior cerebral arteries are patent. The right posterior communicating artery is patent. The left posterior communicating artery is not identified. The bilateral anterior cerebral arteries and anterior communicating artery are patent. No aneurysm or high-grade stenosis of the visualized cerebral vasculature. MRI brain: No acute ischemia or acute intracranial process. Generalized parenchymal volume loss and nonspecific white matter findings most compatible with chronic small vessel ischemic changes in a patient of this age. MRA brain: No aneurysm or high-grade stenosis of the visualized cerebral vasculature. Xr Chest Portable    Result Date: 2020  Patient MRN: [id number] : 1945 Age:  69 years Gender: Male Order Date: 2020 3:15 PM. Exam: XR CHEST PORTABLE Number of Views: 1 Indication:  Fatigue times a few hours. Shortness of breath. Comparison: 2020 Findings: Cardiomediastinal silhouette is within normal limits. Lungs are clear without evidence of infiltrate/pneumonia, pneumothorax or pleural effusion. Vascular calcifications thoracic aorta. Impression:  No radiographic evidence of acute cardiopulmonary disease. Vascular calcifications thoracic aorta. Please note this study does not exclude the possibility of underlying CoVid-19 viral infection and/or underlying pulmonary involvement     Mri Brain Without Contrast    Result Date: 2020  EXAM: MRI of the brain without contrast MRA of the brain without contrast History: Stroke. Confusion. Weakness. Dementia. Tremors.  Technique: Multiplanar multisequence MRI of the brain was performed without contrast. Axial 3-D time-of-flight images of the brain were obtained without contrast. 3-D volume rendered images were performed for evaluation of the cerebral vasculature. Comparison: CT brain from July 21, 2020 and MRI brain from March 11, 2020 Findings: MRI brain: Bilateral supratentorial deep white matter and subcortical white matter areas of hyperintense T2/FLAIR signal are nonspecific but most compatible with chronic small vessel ischemic changes in a patient of this age. There are tiny foci of encephalomalacia  within the posterior right and left cerebellar hemispheres. Prominence of the sulci and ventricles compatible with moderate generalized parenchymal volume loss. No acute hemorrhage, mass, mass effect, midline shift, or abnormal extra-axial fluid collection. Midline structures are within normal limits. There is no diffusion restriction. No suspicious susceptibility artifact is identified on the gradient echo sequence. Cranial nerves 7/8 complexes appear grossly unremarkable. The visualized paranasal sinuses and bilateral mastoid air cells are clear. MRA brain: The bilateral distal cervical internal carotid arteries through the skull base are patent. The bilateral middle cerebral arteries through the trifurcation and opercular branches are patent. The vertebrobasilar system including the superior cerebellar and posterior cerebral arteries are patent. The right posterior communicating artery is patent. The left posterior communicating artery is not identified. The bilateral anterior cerebral arteries and anterior communicating artery are patent. No aneurysm or high-grade stenosis of the visualized cerebral vasculature. MRI brain: No acute ischemia or acute intracranial process. Generalized parenchymal volume loss and nonspecific white matter findings most compatible with chronic small vessel ischemic changes in a patient of this age. MRA brain: No aneurysm or high-grade stenosis of the visualized cerebral vasculature.       Discharge Medications:       Reesa Rumpf \"[place]andquist\"   Home patient: 40 minutes. Greater than 70% of time was spent focused exclusively on this patient. Time was taken to review chart, discuss plans with consultants, reconciling medications, discussing plan answering questions with patient.      Kassi Lewis  7/23/2020, 10:45 AM  ----------------------------------------------------------------------------------------------------------------------    Laura Rocha

## 2020-07-23 NOTE — CARE COORDINATION
Pt was discussed during morning rounds and he will DC home with wife this morning. No DC needs noted. LSW spoke with pt and wife. Wife feels pt is back to baseline and she will not have any HHC needs for DC.

## 2020-07-23 NOTE — PROGRESS NOTES
Neurology Follow up    SUBJECTIVE:  NO Headache, double vision,blurry vision,difficulty with speech,difficulty with swallowing,weakness,numbness,pain,nausea,vomitting,chocking,neck pain,dizziness,  Patient overnight is actually much clearer now. He is almost at his baseline. He does not report any other side effects. We did start him on presented. We discussed this with his wife as well.       Current Facility-Administered Medications   Medication Dose Route Frequency Provider Last Rate Last Dose    dipyridamole (PERSANTINE) tablet 75 mg  75 mg Oral TID Senthil Sweet MD   75 mg at 07/23/20 0816    acetaminophen (TYLENOL) tablet 650 mg  650 mg Oral Q4H PRN Laurina Tashihing, DO   650 mg at 07/22/20 1656    iopamidol (ISOVUE-300) 61 % injection 100 mL  100 mL Intravenous ONCE PRN Anne Douglas, APRN - CNP        sodium chloride flush 0.9 % injection 10 mL  10 mL Intravenous 2 times per day Laurina Tashihing, DO   10 mL at 07/23/20 4228    sodium chloride flush 0.9 % injection 10 mL  10 mL Intravenous PRN Laurina Tashihing, DO        polyethylene glycol (GLYCOLAX) packet 17 g  17 g Oral Daily PRN Laurina Tashihing, DO        promethazine (PHENERGAN) tablet 12.5 mg  12.5 mg Oral Q6H PRN Ebonie Livings Venu, DO        Or    ondansetron [facility]) injection 4 mg  4 mg Intravenous Q6H PRN Ebonie Bgs Venu, DO        enoxaparin (LOVENOX) injection 40 mg  40 mg Subcutaneous Daily Laurina Rushing, DO   40 mg at 07/23/20 4495    aspirin EC tablet 81 mg  81 mg Oral Daily Laurina Rushing, DO   81 mg at 07/22/20 2029    rosuvastatin (CRESTOR) tablet 40 mg  40 mg Oral Nightly Laurina Rushing, DO   40 mg at 07/22/20 2029    calcium carbonate (TUMS) chewable tablet 500 mg  1 tablet Oral Daily Laurina Rushing, DO   500 mg at 07/23/20 0816    clopidogrel (PLAVIX) tablet 75 mg  75 mg Oral Daily Laurina Rushing, DO   75 mg at 07/22/20 2029    finasteride (PROSCAR) tablet 5 mg  5 mg Oral Daily Laurina Rushing, DO   5 mg at 07/23/20 0815    memantine (NAMENDA) tablet 10 mg  10 mg Oral BID Matt Flax, DO   10 mg at 07/23/20 0816    metoprolol succinate (TOPROL XL) extended release tablet 25 mg  25 mg Oral Daily Matt Flax, DO   25 mg at 07/22/20 2029    tamsulosin (FLOMAX) capsule 0.4 mg  0.4 mg Oral Daily Matt Flax, DO   0.4 mg at 07/23/20 8098    magnesium oxide (MAG-OX) tablet 200 mg  200 mg Oral Daily Matt Flax, DO   200 mg at 07/23/20 0815    pantoprazole (PROTONIX) tablet 40 mg  40 mg Oral QAM AC Yazid R Venu, DO   40 mg at 07/23/20 0501       PHYSICAL EXAM:    BP (!) 168/74   Pulse 66   Temp 98.1 °F (36.7 °C) (Oral)   Resp 16   Ht 5' 10\" (1.778 m)   Wt 175 lb (79.4 kg)   SpO2 96%   BMI 25.11 kg/m²    General Appearance:      Skin:  normal  CVS - Normal sounds, No murmurs , No carotid Bruits  RS -CTA  Abdomen Soft, BS present    Mental Status Exam:             Level of Alertness:   awake            Orientation:   person, place, time            Memory:   normal            Fund of Knowledge:  normal            Attention/Concentration:  normal            Language:  normal      Funduscopic Exam:     Cranial Nerves        Cranial nerve II           Visual acuity:  normal           Visual fields:  normal      Cranial nerve III           Pupils:  equal, round, reactive to light      Cranial nerves III, IV, VI           Extraocular Movements: intact      Cranial nerve V           Facial sensation:  intact      Cranial nerve VII           Facial strength: intact      Cranial nerve VIII           Hearing:  intact      Cranial nerve IX           Palate:  intact      Cranial nerve XI         Shoulder shrug:  intact      Cranial nerve XII          Tongue movement:  normal    Motor:    Drift:  absent  Motor exam is symmetrical 5 out of 5 all extremities bilaterally  Tone:  normal  Abnormal Movements:  absent            Sensory:        Pinprick             Right Upper Extremity:  normal             Left Upper Extremity:  normal             Right Lower Extremity:  normal             Left Lower Extremity:  normal           Vibration                         Touch            Proprioception                 Coordination:           Finger/Nose   Right:  normal              Left:  normal          Heel-Knee-Shin                Right:  normal              Left:  normal          Rapid Alternating Movements              Right:  normal              Left:  normal          Gait:                       Casual:  normal                         Romberg:  normal            Reflexes:             Deep Tendon Reflexes:             Reflexes are 2 +             Plantar response:                Right:  downgoing               Left:  downgoing    Vascular:  Cardiac Exam:  normal         Cta Head W Wo Contrast    Result Date: 7/21/2020  EXAMINATION:  CTA HEAD W WO CONTRAST, CTA NECK W WO CONTRAST HISTORY:   stroke   generalized weakness. TECHNIQUE:   Spiral high resolution axial images were obtained through the head, neck and superior mediastinum following bolus administration of intravenous contrast for CT angiography. The data was subsequently post-processed utilizing 3D multi-planar  reconstructions, 3D maximum intensity projections. Contrast:  iopamidol (ISOVUE-370) injection of 100 mL All CT scans at this facility use dose modulation, iterative reconstruction, and/or weight based dosing when appropriate to reduce radiation dose to as low as reasonably achievable. COMPARISON: CT head 7/21/2020 RESULT: BRAIN: Evaluation of the individual slices of the CTA demonstrates no significant interval change from recent CT head NECK: Soft tissues: The soft tissue planes are maintained throughout. No evidence of a soft tissue mass in the neck or superior mediastinum. No significant lymphadenopathy. Spine:  Multilevel degenerative changes. Lung apices:   Lung apices with biapical pleural-parenchymal scarring. No focal consolidation.  CT ARTERIOGRAM: Extracranial Circulation: Aortic Arch: Normal branching pattern. .  There is no significant stenosis in the proximal brachiocephalic vessels. Carotid Stenosis: Right Common:  No significant stenosis. Right Internal Carotid  Plaque:  Mild to moderate plaque at the bifurcation. Right Internal Carotid Stenosis:  No significant stenosis. Left Common:  No significant stenosis. Left Internal Carotid Plaque:  Mild plaque at the bifurcation Left Internal Carotid Stenosis:  No significant stenosis. Cervical Vertebral Arteries: Patency:  Bilateral Dominance:  Codominant Intracranial Circulation: Anterior Circulation:   -Distal Internal carotid arteries (ICAs): Patent with normal caliber.  -Proximal anterior cerebral arteries (ACAs) and anterior communicating artery: Patent with normal caliber without significant stenosis or aneurysm. -Proximal middle cerebral arteries (MCAs): Patent with normal caliber without significant stenosis or aneurysm. Vertebrobasilar Circulation:    -Distal vertebral and basilar arteries: Patent with normal caliber without significant stenosis or aneurysm. -Proximal posterior cerebral arteries (PCAs) and posterior communicating arteries: Patent with normal caliber without significant stenosis or aneurysm. Fetal origin of the right PCA. -Superior cerebellar arteries (SCAs): Patent with normal caliber without significant stenosis or aneurysm. -Anterior inferior cerebellar arteries (AICAs): Patent with normal caliber without significant stenosis or aneurysm. -Posterior inferior cerebellar arteries (PICAs): Patent with normal caliber without significant stenosis or aneurysm. Dural venous sinuses: Visualized dural venous sinuses are patent. Patent extracranial and intracranial circulation without evidence for occlusion, significant large vessel stenosis, or aneurysm.      Ct Head Wo Contrast    Result Date: 7/21/2020  EXAMINATION:  CT HEAD WO CONTRAST HISTORY:   fatigue    general weakness   code dosing when appropriate to reduce radiation dose to as low as reasonably achievable. COMPARISON: CT head 7/21/2020 RESULT: BRAIN: Evaluation of the individual slices of the CTA demonstrates no significant interval change from recent CT head NECK: Soft tissues: The soft tissue planes are maintained throughout. No evidence of a soft tissue mass in the neck or superior mediastinum. No significant lymphadenopathy. Spine:  Multilevel degenerative changes. Lung apices:   Lung apices with biapical pleural-parenchymal scarring. No focal consolidation. CT ARTERIOGRAM:     Extracranial Circulation: Aortic Arch: Normal branching pattern. .  There is no significant stenosis in the proximal brachiocephalic vessels. Carotid Stenosis: Right Common:  No significant stenosis. Right Internal Carotid  Plaque:  Mild to moderate plaque at the bifurcation. Right Internal Carotid Stenosis:  No significant stenosis. Left Common:  No significant stenosis. Left Internal Carotid Plaque:  Mild plaque at the bifurcation Left Internal Carotid Stenosis:  No significant stenosis. Cervical Vertebral Arteries: Patency:  Bilateral Dominance:  Codominant Intracranial Circulation: Anterior Circulation:   -Distal Internal carotid arteries (ICAs): Patent with normal caliber.  -Proximal anterior cerebral arteries (ACAs) and anterior communicating artery: Patent with normal caliber without significant stenosis or aneurysm. -Proximal middle cerebral arteries (MCAs): Patent with normal caliber without significant stenosis or aneurysm. Vertebrobasilar Circulation:    -Distal vertebral and basilar arteries: Patent with normal caliber without significant stenosis or aneurysm. -Proximal posterior cerebral arteries (PCAs) and posterior communicating arteries: Patent with normal caliber without significant stenosis or aneurysm. Fetal origin of the right PCA.   -Superior cerebellar arteries (SCAs): Patent with normal caliber without significant stenosis or aneurysm. -Anterior inferior cerebellar arteries (AICAs): Patent with normal caliber without significant stenosis or aneurysm. -Posterior inferior cerebellar arteries (PICAs): Patent with normal caliber without significant stenosis or aneurysm. Dural venous sinuses: Visualized dural venous sinuses are patent. Patent extracranial and intracranial circulation without evidence for occlusion, significant large vessel stenosis, or aneurysm. Mra Head Without Contrast    Result Date: 7/22/2020  EXAM: MRI of the brain without contrast MRA of the brain without contrast History: Stroke. Confusion. Weakness. Dementia. Tremors. Technique: Multiplanar multisequence MRI of the brain was performed without contrast. Axial 3-D time-of-flight images of the brain were obtained without contrast. 3-D volume rendered images were performed for evaluation of the cerebral vasculature. Comparison: CT brain from July 21, 2020 and MRI brain from March 11, 2020 Findings: MRI brain: Bilateral supratentorial deep white matter and subcortical white matter areas of hyperintense T2/FLAIR signal are nonspecific but most compatible with chronic small vessel ischemic changes in a patient of this age. There are tiny foci of encephalomalacia  within the posterior right and left cerebellar hemispheres. Prominence of the sulci and ventricles compatible with moderate generalized parenchymal volume loss. No acute hemorrhage, mass, mass effect, midline shift, or abnormal extra-axial fluid collection. Midline structures are within normal limits. There is no diffusion restriction. No suspicious susceptibility artifact is identified on the gradient echo sequence. Cranial nerves 7/8 complexes appear grossly unremarkable. The visualized paranasal sinuses and bilateral mastoid air cells are clear. MRA brain: The bilateral distal cervical internal carotid arteries through the skull base are patent.  The bilateral middle cerebral arteries through the trifurcation and opercular branches are patent. The vertebrobasilar system including the superior cerebellar and posterior cerebral arteries are patent. The right posterior communicating artery is patent. The left posterior communicating artery is not identified. The bilateral anterior cerebral arteries and anterior communicating artery are patent. No aneurysm or high-grade stenosis of the visualized cerebral vasculature. MRI brain: No acute ischemia or acute intracranial process. Generalized parenchymal volume loss and nonspecific white matter findings most compatible with chronic small vessel ischemic changes in a patient of this age. MRA brain: No aneurysm or high-grade stenosis of the visualized cerebral vasculature. Xr Chest Portable    Result Date: 2020  Patient MRN: [id number] : 1945 Age:  69 years Gender: Male Order Date: 2020 3:15 PM. Exam: XR CHEST PORTABLE Number of Views: 1 Indication:  Fatigue times a few hours. Shortness of breath. Comparison: 2020 Findings: Cardiomediastinal silhouette is within normal limits. Lungs are clear without evidence of infiltrate/pneumonia, pneumothorax or pleural effusion. Vascular calcifications thoracic aorta. Impression:  No radiographic evidence of acute cardiopulmonary disease. Vascular calcifications thoracic aorta. Please note this study does not exclude the possibility of underlying CoVid-19 viral infection and/or underlying pulmonary involvement     Mri Brain Without Contrast    Result Date: 2020  EXAM: MRI of the brain without contrast MRA of the brain without contrast History: Stroke. Confusion. Weakness. Dementia. Tremors. Technique: Multiplanar multisequence MRI of the brain was performed without contrast. Axial 3-D time-of-flight images of the brain were obtained without contrast. 3-D volume rendered images were performed for evaluation of the cerebral vasculature.  Comparison: CT brain from 2020 and MRI brain from March 11, 2020 Findings: MRI brain: Bilateral supratentorial deep white matter and subcortical white matter areas of hyperintense T2/FLAIR signal are nonspecific but most compatible with chronic small vessel ischemic changes in a patient of this age. There are tiny foci of encephalomalacia  within the posterior right and left cerebellar hemispheres. Prominence of the sulci and ventricles compatible with moderate generalized parenchymal volume loss. No acute hemorrhage, mass, mass effect, midline shift, or abnormal extra-axial fluid collection. Midline structures are within normal limits. There is no diffusion restriction. No suspicious susceptibility artifact is identified on the gradient echo sequence. Cranial nerves 7/8 complexes appear grossly unremarkable. The visualized paranasal sinuses and bilateral mastoid air cells are clear. MRA brain: The bilateral distal cervical internal carotid arteries through the skull base are patent. The bilateral middle cerebral arteries through the trifurcation and opercular branches are patent. The vertebrobasilar system including the superior cerebellar and posterior cerebral arteries are patent. The right posterior communicating artery is patent. The left posterior communicating artery is not identified. The bilateral anterior cerebral arteries and anterior communicating artery are patent. No aneurysm or high-grade stenosis of the visualized cerebral vasculature. MRI brain: No acute ischemia or acute intracranial process. Generalized parenchymal volume loss and nonspecific white matter findings most compatible with chronic small vessel ischemic changes in a patient of this age. MRA brain: No aneurysm or high-grade stenosis of the visualized cerebral vasculature.       Recent Labs     07/21/20  1535 07/22/20  0540   WBC 8.1 7.8   HGB 15.6 15.8    170     Recent Labs     07/21/20  1535      K 4.0      CO2 25   BUN 11   CREATININE 0.83   GLUCOSE 107*

## 2020-07-23 NOTE — FLOWSHEET NOTE
Shift assessment completed and charted. Vitals obtained and monitored throughout the night. Pt presents as confused and agitated throughout the night. Pt states \"someone has been murdered. Have you called 9-1-1? \" Several attempts made to reorient the patient with little success. Pt is often pulling off his gown and telemetry wires while trying to get out of bed.  Pt is currently resting in bed; will continue to monitor

## 2020-07-23 NOTE — PROGRESS NOTES
[facility]   Facility/Department: [facility]  Speech Language Pathology  Initial Speech/Language/Cognitive Assessment    NAME:Ariel Cuba Precise  : 1945 (47 y.o.)   MRN: [id number]  ROOM: [place] [address]  ADMISSION DATE: 2020  PATIENT DIAGNOSIS(ES): Stroke-like episode [facility]) [I63.9]  Chief Complaint   Patient presents with    Fatigue     x few hours     Patient Active Problem List    Diagnosis Date Noted    Gait abnormality 2020    [facility] episode ([facility] [address]     Biliary colic     Chest pain     Parkinson disease ([facility] [address]     RA (retrograde amnesia)     Syncope and collapse     Mild cognitive impairment     TIA (transient ischemic attack) 2019    PETRA (obstructive sleep apnea)     Actinic keratoses     Inflamed seborrheic keratosis     Gastroesophageal reflux disease with esophagitis 11/15/2016    Lumbar radiculopathy 10/26/2016    Sinus bradycardia on ECG 2016    Disturbance of skin sensation 2015    Seborrheic keratosis 2014    History of colon cancer 2014    History of repair of hip joint 10/21/2013    History of hip replacement, total 2013    Degenerative joint disease of pelvic region 2013    Degenerative arthritis of lumbar spine 2013    Foot drop, left 2013    CAD (coronary artery disease)     Benign prostatic hyperplasia     Cholelithiasis     Hyperlipidemia 2012    Osteopenia 2012     Past Medical History:   Diagnosis Date    Abnormal cardiovascular stress test 2016    Equivocal ST-T wave changes;  Defect in the inferior wall consistent with prior infarction; LV EF 79%; TID ratio 1.1    Actinic keratoses     BPH (benign prostatic hypertrophy)     CAD (coronary artery disease)     Cancer (HCC)     COLON    Chest pressure 5/3/2016    Cholelithiasis     History of colon cancer     s/p partial colectomy in     Hyperlipidemia     Inflamed seborrheic keratosis occupation: Was a   Vision  Vision: Impaired  Vision Exceptions: Wears glasses at all times  Hearing  Hearing: Within functional limits           Objective:     Oral/Motor  Oral Motor: Within functional limits    Auditory Comprehension  Comprehension: Exceptions  Yes/No Questions: Mild(80%)  Basic Questions: [facility])  Complex Questions: Moderate  One Step Basic Commands: (WFL)  Two Step Basic Commands: (WFL)  Multistep Basic Commands: Severe(0/2)  Complex/Abstract Commands: Mild  Conversation: Mild         Expression  Primary Mode of Expression: Verbal    Verbal Expression  Verbal Expression: Exceptions to functional limits  Initiation: (WFL)  Automatic Speech: (WFL)  Confrontation: (WFL)  Convergent: (DNT)  Divergent: Moderate(Named 7 animals in 1 minute)  Responsive: (WFL)  Conversation: Moderate(Paraphasias, hesitations)  Interfering Components: Impaired thought organization;Paraphasia; Tangential speech  Effective Techniques: Provide extra time    Written Expression  Dominant Hand: Right    Motor Speech  Motor Speech: Within Functional Limits    Pragmatics/Social Functioning  Pragmatics: Within functional limits    Cognition:      Orientation  Overall Orientation Status: Impaired(oriented to current month and year)  Orientation Level: Oriented to place; Disoriented to time;Oriented to situation  Attention  Attention: Exceptions to [facility]  Selective Attention: Mild  Sustained Attention: Mild  Memory  Memory: Exceptions to [facility]  Daily Routines: Mild  Long-term Memory: Mild(stated incorrect address, however aphasic with response)  People Encountered: Mild  Short-term Memory: Moderate  Working Memory: Mild  Problem Solving  Problem Solving: Exceptions to [facility]  Complex Functional Tasks: Severe  Simple Functional Tasks: Moderate  Verbal Reasoning Skills:  Moderate  Safety/Judgement  Safety/Judgement: Exceptions to [facility]  Complex Functional Tasks: Severe  Novel Situations: Severe  Routine Tasks: Mild  Insight: Mild  (pt aware of word finding and memory difficulties)      Prognosis:  Individuals consulted  Consulted and agree with results and recommendations: Patient; Family member  Family member consulted: Spouse    Education:  Patient Education: Educated pt and spouse on word finding strategies. Patient Education Response: Verbalizes understanding  Safety Devices in place: Yes  Type of devices: Call light within reach; Chair alarm in place    Pain:  Pain Assessment  Patient Currently in Pain: Denies  Pain Assessment: 0-10  Pain Level: 0        Therapy Time  SLP Individual Minutes  Time In: 0910  Time Out: 0930  Minutes: 20                 Signature: Electronically signed by Melody Blair.  JIM Clark on 7/23/2020 at 11:12 AM

## 2020-07-24 PROCEDURE — 93010 ELECTROCARDIOGRAM REPORT: CPT | Performed by: INTERNAL MEDICINE

## 2020-07-24 NOTE — PROGRESS NOTES
Physical Therapy  Facility/Department: [facility] [id number]  Physical Therapy Discharge      NAME: Marina Lopez    : 1945 (35 y.o.)  MRN: [id number]    Account: [de-identified]  Gender: male      Patient has been discharged from acute care hospital. DC patient from current PT program.      Electronically signed by Rosalina Lazcano PT on 20 at 3:19 PM EDT

## 2020-07-31 ENCOUNTER — OFFICE VISIT (OUTPATIENT)
Dept: UROLOGY | Age: 75
End: 2020-07-31
Payer: MEDICARE

## 2020-07-31 VITALS
HEART RATE: 60 BPM | DIASTOLIC BLOOD PRESSURE: 80 MMHG | BODY MASS INDEX: 25.05 KG/M2 | SYSTOLIC BLOOD PRESSURE: 120 MMHG | WEIGHT: 175 LBS | HEIGHT: 70 IN

## 2020-07-31 PROCEDURE — 99213 OFFICE O/P EST LOW 20 MIN: CPT | Performed by: UROLOGY

## 2020-07-31 PROCEDURE — 1036F TOBACCO NON-USER: CPT | Performed by: UROLOGY

## 2020-07-31 PROCEDURE — 3017F COLORECTAL CA SCREEN DOC REV: CPT | Performed by: UROLOGY

## 2020-07-31 PROCEDURE — 51798 US URINE CAPACITY MEASURE: CPT | Performed by: UROLOGY

## 2020-07-31 PROCEDURE — G8427 DOCREV CUR MEDS BY ELIG CLIN: HCPCS | Performed by: UROLOGY

## 2020-07-31 PROCEDURE — 1123F ACP DISCUSS/DSCN MKR DOCD: CPT | Performed by: UROLOGY

## 2020-07-31 PROCEDURE — 4040F PNEUMOC VAC/ADMIN/RCVD: CPT | Performed by: UROLOGY

## 2020-07-31 PROCEDURE — G8417 CALC BMI ABV UP PARAM F/U: HCPCS | Performed by: UROLOGY

## 2020-07-31 PROCEDURE — 51741 ELECTRO-UROFLOWMETRY FIRST: CPT | Performed by: UROLOGY

## 2020-07-31 ASSESSMENT — ENCOUNTER SYMPTOMS
ABDOMINAL PAIN: 0
SHORTNESS OF BREATH: 0
ABDOMINAL DISTENTION: 0

## 2020-07-31 NOTE — PROGRESS NOTES
at [address] [place] [address] Loop COLONOSCOPY  8/18/10,09/11/2012    DR POLK    COLONOSCOPY  09/29/14    DR. PIERRE    JOINT REPLACEMENT Left 5/28/13    total    PARATHYROIDECTOMY Bilateral 2011    2 of 4 glands removed    [place] COLON CA SCRN NOT HI RSK IND N/A 9/15/2017    COLONOSCOPY performed by Mavis Cuello MD at [facility],[address] ENDOSCOPY  8/11/09    DR POLK     Social History     Socioeconomic History    Marital status:      Spouse name: None    Number of children: None    Years of education: None    Highest education level: None   Occupational History    None   Social Needs    Financial resource strain: None    Food insecurity     Worry: None     Inability: None    Transportation needs     Medical: None     Non-medical: None   Tobacco Use    Smoking status: Former Smoker    Smokeless tobacco: Never Used    Tobacco comment: Quit smoking > 30 years ago   Substance and Sexual Activity    Alcohol use: No    Drug use: No    Sexual activity: Not Currently     Partners: Female   Lifestyle    Physical activity     Days per week: None     Minutes per session: None    Stress: None   Relationships    Social connections     Talks on phone: None     Gets together: None     Attends Adventism service: None     Active member of club or organization: None     Attends meetings of clubs or organizations: None     Relationship status: None    Intimate partner violence     Fear of current or ex partner: None     Emotionally abused: None     Physically abused: None     Forced sexual activity: None   Other Topics Concern    None   Social History Narrative         Lives With: Spouse    Type of Home: [address] [facility] [place] [address] [place]: Two level(reports he can stay on 1st floor with bedroom and bathroom though currently he stays upstairs)    Home Access: (Pt unable to state)    Bathroom Shower/Tub: Walk-in shower Bathroom Equipment: Shower chair, Grab bars in shower    Home Equipment: Rolling walker    ADL Assistance: Independent    Homemaking Assistance: Needs assistance    Ambulation Assistance: Independent  (no AD)    Transfer Assistance: Independent    Active : No    Additional Comments: pt is questionable historian, he is unsure of what equipment he has at home or if he was using any     Family History   Problem Relation Age of Onset    Cancer Mother         OVARIAN    Heart Disease Father      Current Outpatient Medications   Medication Sig Dispense Refill    dipyridamole (PERSANTINE) 75 MG tablet Take 1 tablet by mouth 3 times daily 90 tablet 3    rosuvastatin (CRESTOR) 10 MG tablet TAKE 1 TABLET DAILY 90 tablet 3    tamsulosin (FLOMAX) 0.4 MG capsule TAKE 1 CAPSULE DAILY 90 capsule 3    omeprazole (PRILOSEC) 20 MG delayed release capsule TAKE 1 CAPSULE DAILY 90 capsule 3    finasteride (PROSCAR) 5 MG tablet TAKE 1 TABLET DAILY 90 tablet 3    nitroGLYCERIN (NITROSTAT) 0.4 MG SL tablet up to max of 3 total doses. If no relief after 1 dose, call 911. 25 tablet 3    memantine (NAMENDA) 10 MG tablet Take 1 tablet by mouth 2 times daily 60 tablet 3    ondansetron (ZOFRAN-ODT) 4 MG disintegrating tablet Take 1 tablet by mouth 3 times daily as needed for Nausea or Vomiting 21 tablet 0    SUMAtriptan (IMITREX) 100 MG tablet       clopidogrel (PLAVIX) 75 MG tablet Take 1 tablet by mouth daily 30 tablet 3    metoprolol succinate (TOPROL XL) 25 MG extended release tablet TAKE 1 TABLET DAILY 90 tablet 3    Psyllium (METAMUCIL FIBER PO) Take by mouth      Cholecalciferol (VITAMIN D3) 1000 UNITS TABS Take 1,900 Units by mouth daily       calcium carbonate 600 MG TABS tablet Take 1 tablet by mouth daily       fish oil-omega-3 fatty acids 1000 MG capsule Take 2 g by mouth 2 times daily.         Multiple Vitamin (MULTI-VITAMIN PO) Take 1 tablet by mouth nightly       Magnesium 250 MG TABS Take by mouth daily

## 2020-08-03 ENCOUNTER — TELEPHONE (OUTPATIENT)
Dept: FAMILY MEDICINE CLINIC | Age: 75
End: 2020-08-03

## 2020-08-03 ENCOUNTER — PATIENT MESSAGE (OUTPATIENT)
Dept: FAMILY MEDICINE CLINIC | Age: 75
End: 2020-08-03

## 2020-08-04 ENCOUNTER — TELEPHONE (OUTPATIENT)
Dept: FAMILY MEDICINE CLINIC | Age: 75
End: 2020-08-04

## 2020-08-04 NOTE — TELEPHONE ENCOUNTER
Patient came into the office to  the handicap placard but it doesn't have a DX code on it. Could you print a new prescription with the diagnosis code.

## 2020-08-06 ENCOUNTER — TELEPHONE (OUTPATIENT)
Dept: FAMILY MEDICINE CLINIC | Age: 75
End: 2020-08-06

## 2020-08-06 NOTE — TELEPHONE ENCOUNTER
Called and spoke to Wife Torres Elizabeth letting her know that Carolina's prescription for his handicap placard has been corrected and is ready for  at the .

## 2020-08-12 RX ORDER — CLOPIDOGREL BISULFATE 75 MG/1
75 TABLET ORAL DAILY
Qty: 90 TABLET | Refills: 3 | Status: SHIPPED | OUTPATIENT
Start: 2020-08-12 | End: 2021-01-01

## 2020-08-17 PROBLEM — R26.0 ATAXIC GAIT: Status: ACTIVE | Noted: 2020-07-22

## 2020-08-17 PROBLEM — F01.518 VASCULAR DEMENTIA WITH BEHAVIOR DISTURBANCE: Status: ACTIVE | Noted: 2020-01-01

## 2020-08-17 NOTE — PROGRESS NOTES
4/5/16 with Dr. Naa Shannon Syncope and collapse     Vascular dementia [facility])      Past Surgical History:   Procedure Laterality Date    BACK SURGERY  1993    CHOLECYSTECTOMY, LAPAROSCOPIC N/A 5/27/2020    LAP BRAIN/ CHOLANGIOGRAMS performed by Nathalie Phelan MD at [place] [address]  8/18/10,09/11/2012    DR Ernestina El    COLONOSCOPY  09/29/14    DR. PIERRE    JOINT REPLACEMENT Left 5/28/13    total    PARATHYROIDECTOMY Bilateral 2011    2 of 4 glands removed    [place] COLON CA SCRN NOT HI RSK IND N/A 9/15/2017    COLONOSCOPY performed by Shaheen Soares MD at [address] [place] [address],[address] ENDOSCOPY  8/11/09    DR POLK     Social History     Socioeconomic History    Marital status:      Spouse name: Not on file    Number of children: Not on file    Years of education: Not on file    Highest education level: Not on file   Occupational History    Not on file   Social Needs    Financial resource strain: Not on file    Food insecurity     Worry: Not on file     Inability: Not on file   [employer] needs     Medical: Not on file     Non-medical: Not on file   Tobacco Use    Smoking status: Former Smoker    Smokeless tobacco: Never Used    Tobacco comment: Quit smoking > 30 years ago   Substance and Sexual Activity    Alcohol use: No    Drug use: No    Sexual activity: Not Currently     Partners: Female   Lifestyle    Physical activity     Days per week: Not on file     Minutes per session: Not on file    Stress: Not on file   Relationships    Social connections     Talks on phone: Not on file     Gets together: Not on file     Attends Rastafarian service: Not on file     Active member of club or organization: Not on file     Attends meetings of clubs or organizations: Not on file     Relationship status: Not on file    Intimate partner violence     Fear of current or ex partner: Not on file     Emotionally Physical Exam  Vitals signs reviewed. Eyes:      Pupils: Pupils are equal, round, and reactive to light. Neck:      Musculoskeletal: Normal range of motion. Cardiovascular:      Rate and Rhythm: Normal rate and regular rhythm. Heart sounds: No murmur. Pulmonary:      Effort: Pulmonary effort is normal.      Breath sounds: Normal breath sounds. Abdominal:      General: Bowel sounds are normal.   Musculoskeletal: Normal range of motion. Skin:     General: Skin is warm. Neurological:      Mental Status: He is alert and oriented to person, place, and time. Cranial Nerves: No cranial nerve deficit. Sensory: No sensory deficit. Motor: No abnormal muscle tone. Coordination: Coordination normal.      Deep Tendon Reflexes: Reflexes are normal and symmetric. Babinski sign absent on the right side. Babinski sign absent on the left side. Psychiatric:         Mood and Affect: Mood normal.     Gait ataxia walking with a walker    Cta Head W Wo Contrast    Result Date: 7/21/2020  EXAMINATION:  CTA HEAD W WO CONTRAST, CTA NECK W WO CONTRAST HISTORY:   stroke   generalized weakness. TECHNIQUE:   Spiral high resolution axial images were obtained through the head, neck and superior mediastinum following bolus administration of intravenous contrast for CT angiography. The data was subsequently post-processed utilizing 3D multi-planar  reconstructions, 3D maximum intensity projections. Contrast:  iopamidol (ISOVUE-370) injection of 100 mL All CT scans at this facility use dose modulation, iterative reconstruction, and/or weight based dosing when appropriate to reduce radiation dose to as low as reasonably achievable. COMPARISON: CT head 7/21/2020 RESULT: BRAIN: Evaluation of the individual slices of the CTA demonstrates no significant interval change from recent CT head NECK: Soft tissues: The soft tissue planes are maintained throughout.   No evidence of a soft tissue mass in the neck or superior mediastinum. No significant lymphadenopathy. Spine:  Multilevel degenerative changes. Lung apices:   Lung apices with biapical pleural-parenchymal scarring. No focal consolidation. CT ARTERIOGRAM:     Extracranial Circulation: Aortic Arch: Normal branching pattern. .  There is no significant stenosis in the proximal brachiocephalic vessels. Carotid Stenosis: Right Common:  No significant stenosis. Right Internal Carotid  Plaque:  Mild to moderate plaque at the bifurcation. Right Internal Carotid Stenosis:  No significant stenosis. Left Common:  No significant stenosis. Left Internal Carotid Plaque:  Mild plaque at the bifurcation Left Internal Carotid Stenosis:  No significant stenosis. Cervical Vertebral Arteries: Patency:  Bilateral Dominance:  Codominant Intracranial Circulation: Anterior Circulation:   -Distal Internal carotid arteries (ICAs): Patent with normal caliber.  -Proximal anterior cerebral arteries (ACAs) and anterior communicating artery: Patent with normal caliber without significant stenosis or aneurysm. -Proximal middle cerebral arteries (MCAs): Patent with normal caliber without significant stenosis or aneurysm. Vertebrobasilar Circulation:    -Distal vertebral and basilar arteries: Patent with normal caliber without significant stenosis or aneurysm. -Proximal posterior cerebral arteries (PCAs) and posterior communicating arteries: Patent with normal caliber without significant stenosis or aneurysm. Fetal origin of the right PCA. -Superior cerebellar arteries (SCAs): Patent with normal caliber without significant stenosis or aneurysm. -Anterior inferior cerebellar arteries (AICAs): Patent with normal caliber without significant stenosis or aneurysm. -Posterior inferior cerebellar arteries (PICAs): Patent with normal caliber without significant stenosis or aneurysm. Dural venous sinuses: Visualized dural venous sinuses are patent.      Patent extracranial and intracranial circulation without evidence for occlusion, significant large vessel stenosis, or aneurysm. Ct Head Wo Contrast    Result Date: 7/21/2020  EXAMINATION:  CT HEAD WO CONTRAST HISTORY:   fatigue    general weakness   code BAT TECHNIQUE:  Serial axial images without IV contrast were obtained from the vertex to the foramen magnum. Sagittal and coronal reconstructions. All CT scans at this facility use dose modulation, iterative reconstruction, and/or weight based dosing when appropriate to reduce radiation dose to as low as reasonably achievable. COMPARISON:  CT 8/11/2019 RESULT: Acute change:   No evidence of an acute infarct or other acute parenchymal process. Hemorrhage:    No evidence of acute intracranial hemorrhage. Mass Lesion / Mass Effect:   There is no evidence of an intracranial mass or extraaxial fluid collection. No significant mass effect. Chronic change:   Scattered patchy foci of low attenuation are present within supratentorial white matter which is a nonspecific finding but likely represents mild microvascular ischemia. Parenchyma: There is no significant volume loss for age. The brain parenchyma is otherwise within normal limits for age. Ventricles: The ventricles are within normal limits of size and configuration for age. Paranasal sinuses and skull base: The visualized paranasal sinuses are grossly clear. Mastoid air cells are clear. The skull base is unremarkable. Soft tissues unremarkable. No acute intracranial process. COMMUNICATION:  Communicated with: Brian Freeman CNP on 7/21/2020 at 1500. Cta Neck W Wo Contrast    Result Date: 7/21/2020  EXAMINATION:  CTA HEAD W WO CONTRAST, CTA NECK W WO CONTRAST HISTORY:   stroke   generalized weakness. TECHNIQUE:   Spiral high resolution axial images were obtained through the head, neck and superior mediastinum following bolus administration of intravenous contrast for CT angiography.    The data was subsequently post-processed utilizing 3D multi-planar  reconstructions, 3D maximum intensity projections. Contrast:  iopamidol (ISOVUE-370) injection of 100 mL All CT scans at this facility use dose modulation, iterative reconstruction, and/or weight based dosing when appropriate to reduce radiation dose to as low as reasonably achievable. COMPARISON: CT head 7/21/2020 RESULT: BRAIN: Evaluation of the individual slices of the CTA demonstrates no significant interval change from recent CT head NECK: Soft tissues: The soft tissue planes are maintained throughout. No evidence of a soft tissue mass in the neck or superior mediastinum. No significant lymphadenopathy. Spine:  Multilevel degenerative changes. Lung apices:   Lung apices with biapical pleural-parenchymal scarring. No focal consolidation. CT ARTERIOGRAM:     Extracranial Circulation: Aortic Arch: Normal branching pattern. .  There is no significant stenosis in the proximal brachiocephalic vessels. Carotid Stenosis: Right Common:  No significant stenosis. Right Internal Carotid  Plaque:  Mild to moderate plaque at the bifurcation. Right Internal Carotid Stenosis:  No significant stenosis. Left Common:  No significant stenosis. Left Internal Carotid Plaque:  Mild plaque at the bifurcation Left Internal Carotid Stenosis:  No significant stenosis. Cervical Vertebral Arteries: Patency:  Bilateral Dominance:  Codominant Intracranial Circulation: Anterior Circulation:   -Distal Internal carotid arteries (ICAs): Patent with normal caliber.  -Proximal anterior cerebral arteries (ACAs) and anterior communicating artery: Patent with normal caliber without significant stenosis or aneurysm. -Proximal middle cerebral arteries (MCAs): Patent with normal caliber without significant stenosis or aneurysm. Vertebrobasilar Circulation:    -Distal vertebral and basilar arteries: Patent with normal caliber without significant stenosis or aneurysm.   -Proximal posterior cerebral arteries (PCAs) and posterior communicating arteries: Patent with normal caliber without significant stenosis or aneurysm. Fetal origin of the right PCA. -Superior cerebellar arteries (SCAs): Patent with normal caliber without significant stenosis or aneurysm. -Anterior inferior cerebellar arteries (AICAs): Patent with normal caliber without significant stenosis or aneurysm. -Posterior inferior cerebellar arteries (PICAs): Patent with normal caliber without significant stenosis or aneurysm. Dural venous sinuses: Visualized dural venous sinuses are patent. Patent extracranial and intracranial circulation without evidence for occlusion, significant large vessel stenosis, or aneurysm. Mra Head Without Contrast    Result Date: 7/22/2020  EXAM: MRI of the brain without contrast MRA of the brain without contrast History: Stroke. Confusion. Weakness. Dementia. Tremors. Technique: Multiplanar multisequence MRI of the brain was performed without contrast. Axial 3-D time-of-flight images of the brain were obtained without contrast. 3-D volume rendered images were performed for evaluation of the cerebral vasculature. Comparison: CT brain from July 21, 2020 and MRI brain from March 11, 2020 Findings: MRI brain: Bilateral supratentorial deep white matter and subcortical white matter areas of hyperintense T2/FLAIR signal are nonspecific but most compatible with chronic small vessel ischemic changes in a patient of this age. There are tiny foci of encephalomalacia  within the posterior right and left cerebellar hemispheres. Prominence of the sulci and ventricles compatible with moderate generalized parenchymal volume loss. No acute hemorrhage, mass, mass effect, midline shift, or abnormal extra-axial fluid collection. Midline structures are within normal limits. There is no diffusion restriction. No suspicious susceptibility artifact is identified on the gradient echo sequence. Cranial nerves 7/8 complexes appear grossly unremarkable.   The visualized paranasal sinuses and bilateral mastoid air cells are clear. MRA brain: The bilateral distal cervical internal carotid arteries through the skull base are patent. The bilateral middle cerebral arteries through the trifurcation and opercular branches are patent. The vertebrobasilar system including the superior cerebellar and posterior cerebral arteries are patent. The right posterior communicating artery is patent. The left posterior communicating artery is not identified. The bilateral anterior cerebral arteries and anterior communicating artery are patent. No aneurysm or high-grade stenosis of the visualized cerebral vasculature. MRI brain: No acute ischemia or acute intracranial process. Generalized parenchymal volume loss and nonspecific white matter findings most compatible with chronic small vessel ischemic changes in a patient of this age. MRA brain: No aneurysm or high-grade stenosis of the visualized cerebral vasculature. Xr Chest Portable    Result Date: 2020  Patient MRN: [id number] : 1945 Age:  69 years Gender: Male Order Date: 2020 3:15 PM. Exam: XR CHEST PORTABLE Number of Views: 1 Indication:  Fatigue times a few hours. Shortness of breath. Comparison: 2020 Findings: Cardiomediastinal silhouette is within normal limits. Lungs are clear without evidence of infiltrate/pneumonia, pneumothorax or pleural effusion. Vascular calcifications thoracic aorta. Impression:  No radiographic evidence of acute cardiopulmonary disease. Vascular calcifications thoracic aorta. Please note this study does not exclude the possibility of underlying CoVid-19 viral infection and/or underlying pulmonary involvement     Mri Brain Without Contrast    Result Date: 2020  EXAM: MRI of the brain without contrast MRA of the brain without contrast History: Stroke. Confusion. Weakness. Dementia. Tremors.  Technique: Multiplanar multisequence MRI of the brain was performed without cerebral vasculature. Lab Results   Component Value Date    WBC 7.8 07/22/2020    RBC 4.99 07/22/2020    HGB 15.8 07/22/2020    HCT 46.8 07/22/2020    MCV 93.9 07/22/2020    MCH 31.8 07/22/2020    MCHC 33.8 07/22/2020    RDW 13.3 07/22/2020     07/22/2020    MPV 10.9 07/15/2015     Lab Results   Component Value Date     07/21/2020    K 4.0 07/21/2020    K 4.2 05/25/2020     07/21/2020    CO2 25 07/21/2020    BUN 11 07/21/2020    CREATININE 0.83 07/21/2020    GFRAA >60.0 07/21/2020    LABGLOM >60.0 07/21/2020    GLUCOSE 107 07/21/2020    GLUCOSE 107 03/14/2012    PROT 6.7 07/21/2020    LABALBU 4.1 07/21/2020    LABALBU 4.1 03/14/2012    CALCIUM 9.3 07/21/2020    BILITOT 0.5 07/21/2020    ALKPHOS 51 07/21/2020    AST 19 07/21/2020    ALT 17 07/21/2020     Lab Results   Component Value Date    PROTIME 13.4 07/21/2020    INR 1.0 07/21/2020     Lab Results   Component Value Date    TSH 4.720 04/28/2016    [id number] 619 05/08/2017    FOLATE >20.0 05/08/2017     Lab Results   Component Value Date    TRIG 65 07/22/2020    HDL 66 07/22/2020    LDLCALC 45 07/22/2020     No results found for: LABAMPH, BARBSCNU, LABBENZ, CANNAB, COCAINESCRN, LABMETH, OPIATESCREENURINE, PHENCYCLIDINESCREENURINE, PPXUR, ETOH  No results found for: LITHIUM, DILFRTOT, VALPROATE    Assessment:       Diagnosis Orders   1. Stroke-like episode ([facility]     2. Lumbar radiculopathy     3. Parkinson disease ([facility]     4. Vascular dementia with behavior disturbance ([facility]     5. Ataxic gait       Vascular dementia with recurrent cerebral TIA and confusional episodes. Patient has been in the hospital and we have continued him on aspirin and Plavix with addition of Persantine. Trying to avoid anticoagulation. Some of his symptoms may be secondary sleep apnea as he wakes confused in the middle of the night and in the morning. He was not able to tolerate machine. Patient may require additional help with this if his confusion continues. He is on Namenda. 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 Since his hospitalization he is walking with a walker and  is likely had a small stroke which we are not able to identify on the MRI  As this was quite small. Patient is undergoing physical therapy and patient was records reviewed in detail. We will Keep observation and follow and have discussed the findings and that he is likely to have a fluctuating course with vascular dementia. Plan:      No orders of the defined types were placed in this encounter. Orders Placed This Encounter   Medications    memantine (NAMENDA) 10 MG tablet     Sig: Take 1 tablet by mouth 2 times daily     Dispense:  180 tablet     Refill:  3    dipyridamole (PERSANTINE) 75 MG tablet     Sig: Take 1 tablet by mouth 3 times daily     Dispense:  270 tablet     Refill:  3       Return in about 4 months (around 12/17/2020).       Ute Brooke MD

## 2020-08-18 NOTE — PROGRESS NOTES
(MULTI-VITAMIN PO) Take 1 tablet by mouth nightly       Magnesium 250 MG TABS Take by mouth daily Every Monday Wednesday Friday Sunday      Ascorbic Acid (VITAMIN C) 500 MG tablet Take 500 mg by mouth daily.  aspirin 81 MG tablet Take 81 mg by mouth every evening        No current facility-administered medications for this visit. Review of Systems:   Review of Systems   Constitutional: Negative for appetite change, diaphoresis and fatigue. HENT: Negative for nosebleeds. Respiratory: Negative for apnea, chest tightness and shortness of breath. Cardiovascular: Negative for chest pain, palpitations and leg swelling. Gastrointestinal: Negative for blood in stool, diarrhea, nausea and vomiting. Musculoskeletal: Negative for myalgias, neck pain and neck stiffness. Skin: Negative for color change, pallor, rash and wound. Neurological: Negative for dizziness, seizures, syncope, weakness, light-headedness, numbness and headaches. Hematological: Does not bruise/bleed easily. Psychiatric/Behavioral: Negative for agitation, behavioral problems and confusion. The patient is not nervous/anxious and is not hyperactive. All other systems reviewed and are negative. Review of System is negative except for as mentioned above. Physical Examination:    /62 (Site: Right Upper Arm, Position: Sitting, Cuff Size: Small Adult)   Pulse 63   Temp 97.5 °F (36.4 °C) (Temporal)   Resp 14   Ht 5' 10.5\" (1.791 m)   Wt 175 lb (79.4 kg)   SpO2 99%   BMI 24.75 kg/m²    Physical Exam   Constitutional: He appears healthy. HENT:   Nose: Nose normal.   Mouth/Throat: Dentition is normal. Oropharynx is clear. Eyes: Pupils are equal, round, and reactive to light. Neck: Normal range of motion. Cardiovascular: Normal rate, regular rhythm, S1 normal, S2 normal, normal heart sounds, intact distal pulses and normal pulses. No extrasystoles are present. Exam reveals no gallop.    No murmur heard.  Pulmonary/Chest: Effort normal and breath sounds normal. He has no wheezes. He has no rales. He exhibits no tenderness. Abdominal: Soft. Bowel sounds are normal. He exhibits no distension and no mass. There is no splenomegaly or hepatomegaly. There is no abdominal tenderness. Musculoskeletal: Normal range of motion. General: No tenderness, deformity or edema. Neurological: He is alert and oriented to person, place, and time. He has normal motor skills and normal reflexes. Gait normal.   Skin: Skin is warm and dry. Patient Active Problem List   Diagnosis    Hyperlipidemia    Osteopenia    CAD (coronary artery disease)    Benign prostatic hyperplasia    Cholelithiasis    Foot drop, left    Degenerative arthritis of lumbar spine    History of hip replacement, total    Seborrheic keratosis    History of colon cancer    Disturbance of skin sensation    Degenerative joint disease of pelvic region    History of repair of hip joint    Sinus bradycardia on ECG    Lumbar radiculopathy    Gastroesophageal reflux disease with esophagitis    Actinic keratoses    Inflamed seborrheic keratosis    PETRA (obstructive sleep apnea)    TIA (transient ischemic attack)    Parkinson disease (HCC)    RA (retrograde amnesia)    Syncope and collapse    Mild cognitive impairment    Chest pain    Biliary colic    Stroke-like episode ([address]    Ataxic gait    Vascular dementia with behavior disturbance ([address]           No orders of the defined types were placed in this encounter. No orders of the defined types were placed in this encounter. Assessment:    1. Coronary artery disease involving native heart without angina pectoris, unspecified vessel or lesion type    2. TIA (transient ischemic attack)         Plan:     Stay on same medications. See me in 3 months.       This note was partially generated using Dragon voice recognition system, and there may be some incorrect words, spellings, punctuation that were not noticed in checking the note before saving.         Electronically signed by Francisco Hinojosa MD on 8/19/2020 at 8:33 AM

## 2020-09-02 NOTE — PROGRESS NOTES
Patient is seen in follow up for   Chief Complaint   Patient presents with    3 Month Follow-Up     Patient presents today for 3 month follow up. HPIthree month follow up dementia doing well. Past Medical History:   Diagnosis Date    Abnormal cardiovascular stress test 4/19/2016    Equivocal ST-T wave changes;  Defect in the inferior wall consistent with prior infarction; LV EF 79%; TID ratio 1.1    Actinic keratoses     BPH (benign prostatic hypertrophy)     CAD (coronary artery disease)     Cancer (HCC)     COLON    Chest pressure 5/3/2016    Cholelithiasis     History of colon cancer     s/p partial colectomy in 2009    Hyperlipidemia     Inflamed seborrheic keratosis     Lumbar radiculopathy 10/26/2016    Mild cognitive impairment     Osteoarthritis     Parathyroid tumor     Parkinson disease ([address]     RA (retrograde amnesia)     Sinus bradycardia on ECG 4/19/2016    Done 4/5/16 with Dr. Rahman [facility]    Syncope and collapse     Vascular dementia [facility])      Patient Active Problem List    Diagnosis Date Noted    Vascular dementia with behavior disturbance ([address] 08/17/2020    Ataxic gait 07/22/2020    Stroke-like episode ([address] 61/44/9573    Biliary colic     Chest pain 42/59/5760    Parkinson disease ([address]     RA (retrograde amnesia)     Syncope and collapse     Mild cognitive impairment     TIA (transient ischemic attack) 08/01/2019    PETRA (obstructive sleep apnea)     Actinic keratoses     Inflamed seborrheic keratosis     Gastroesophageal reflux disease with esophagitis 11/15/2016    Lumbar radiculopathy 10/26/2016    Sinus bradycardia on ECG 04/19/2016    Disturbance of skin sensation 07/22/2015    Seborrheic keratosis 01/13/2014    History of colon cancer 01/13/2014    History of repair of hip joint 10/21/2013    History of hip replacement, total 07/02/2013    Degenerative joint disease of pelvic region 05/06/2013    Degenerative arthritis of lumbar spine 02/13/2013  Foot drop, left 2013    CAD (coronary artery disease)     Benign prostatic hyperplasia     Cholelithiasis     Hyperlipidemia 2012    Osteopenia 2012     Past Surgical History:   Procedure Laterality Date    BACK SURGERY      CHOLECYSTECTOMY, LAPAROSCOPIC N/A 2020    LAP BRAIN/ CHOLANGIOGRAMS performed by Miguel Angel Garcia MD at [place] [address]  8/18/10,2012    DR Radha Navarro    COLONOSCOPY  14    DR. PIERRE    JOINT REPLACEMENT Left 13    total    PARATHYROIDECTOMY Bilateral     2 of 4 glands removed    VA COLON CA SCRN NOT HI RSK IND N/A 9/15/2017    COLONOSCOPY performed by Danika Garsia MD at [address] [facility] ENDOSCOPY  09    DR POLK     Family History   Problem Relation Age of Onset    Cancer Mother         OVARIAN    Heart Disease Father      Social History     Socioeconomic History    Marital status:      Spouse name: None    Number of children: None    Years of education: None    Highest education level: None   Occupational History    None   Social Needs    Financial resource strain: None    Food insecurity     Worry: None     Inability: None    Transportation needs     Medical: None     Non-medical: None   Tobacco Use    Smoking status: Former Smoker     Packs/day: 1.00     Years: 1.00     Pack years: 1.00     Types: Cigarettes     Last attempt to quit: 1/15/1969     Years since quittin.6    Smokeless tobacco: Never Used    Tobacco comment: Quit smoking > 30 years ago   Substance and Sexual Activity    Alcohol use: No    Drug use: No    Sexual activity: Not Currently     Partners: Female   Lifestyle    Physical activity     Days per week: None     Minutes per session: None    Stress: None   Relationships    Social connections     Talks on phone: None     Gets together: None     Attends [employer]ian[employer] service: None (ZOFRAN-ODT) 4 MG disintegrating tablet Take 1 tablet by mouth 3 times daily as needed for Nausea or Vomiting 21 tablet 0    SUMAtriptan (IMITREX) 100 MG tablet       metoprolol succinate (TOPROL XL) 25 MG extended release tablet TAKE 1 TABLET DAILY 90 tablet 3    Psyllium (METAMUCIL FIBER PO) Take by mouth      Cholecalciferol (VITAMIN D3) 1000 UNITS TABS Take 1,900 Units by mouth daily       calcium carbonate 600 MG TABS tablet Take 1 tablet by mouth daily       fish oil-omega-3 fatty acids 1000 MG capsule Take 2 g by mouth 2 times daily.  Multiple Vitamin (MULTI-VITAMIN PO) Take 1 tablet by mouth nightly       Magnesium 250 MG TABS Take by mouth daily Every Monday Wednesday Friday Sunday      Ascorbic Acid (VITAMIN C) 500 MG tablet Take 500 mg by mouth daily.  aspirin 81 MG tablet Take 81 mg by mouth every evening        No current facility-administered medications for this visit. Current Outpatient Medications on File Prior to Visit   Medication Sig Dispense Refill    memantine (NAMENDA) 10 MG tablet Take 1 tablet by mouth 2 times daily 180 tablet 3    dipyridamole (PERSANTINE) 75 MG tablet Take 1 tablet by mouth 3 times daily 270 tablet 3    clopidogrel (PLAVIX) 75 MG tablet Take 1 tablet by mouth daily 90 tablet 3    Handicap Placard MISC by Does not apply route Good for five years 1 each 0    Handicap Placard MISC by Does not apply route Good for five years 1 each 0    rosuvastatin (CRESTOR) 10 MG tablet TAKE 1 TABLET DAILY 90 tablet 3    tamsulosin (FLOMAX) 0.4 MG capsule TAKE 1 CAPSULE DAILY 90 capsule 3    omeprazole (PRILOSEC) 20 MG delayed release capsule TAKE 1 CAPSULE DAILY 90 capsule 3    finasteride (PROSCAR) 5 MG tablet TAKE 1 TABLET DAILY 90 tablet 3    nitroGLYCERIN (NITROSTAT) 0.4 MG SL tablet up to max of 3 total doses.  If no relief after 1 dose, call 911. 25 tablet 3    ondansetron (ZOFRAN-ODT) 4 MG disintegrating tablet Take 1 tablet by mouth 3 times daily as needed for Nausea or Vomiting 21 tablet 0    SUMAtriptan (IMITREX) 100 MG tablet       metoprolol succinate (TOPROL XL) 25 MG extended release tablet TAKE 1 TABLET DAILY 90 tablet 3    Psyllium (METAMUCIL FIBER PO) Take by mouth      Cholecalciferol (VITAMIN D3) 1000 UNITS TABS Take 1,900 Units by mouth daily       calcium carbonate 600 MG TABS tablet Take 1 tablet by mouth daily       fish oil-omega-3 fatty acids 1000 MG capsule Take 2 g by mouth 2 times daily.  Multiple Vitamin (MULTI-VITAMIN PO) Take 1 tablet by mouth nightly       Magnesium 250 MG TABS Take by mouth daily Every Monday Wednesday Friday Sunday      Ascorbic Acid (VITAMIN C) 500 MG tablet Take 500 mg by mouth daily.  aspirin 81 MG tablet Take 81 mg by mouth every evening        No current facility-administered medications on file prior to visit. Allergies   Allergen Reactions    Amoxicillin Other (See Comments)     stomatitis     Health Maintenance   Topic Date Due    Shingles Vaccine (1 of 2) 11/27/1996    Flu vaccine (1) 09/01/2020    Colon cancer screen colonoscopy  09/15/2020    Annual Wellness Visit (AWV)  06/03/2021    Lipid screen  07/22/2021    DTaP/Tdap/Td vaccine (2 - Td) 01/10/2024    Pneumococcal 65+ years Vaccine  Completed    AAA screen  Completed    Hepatitis C screen  Completed    Hepatitis A vaccine  Aged Out    Hepatitis B vaccine  Aged Out    Hib vaccine  Aged Out    Meningococcal (ACWY) vaccine  Aged Out       Review of Systems     Review of Systems   Constitutional: Negative for activity change, appetite change, chills, fever and unexpected weight change. HENT: Negative. Eyes: Negative. Respiratory: Negative. Negative for shortness of breath. Cardiovascular: Negative. Negative for chest pain and palpitations. Gastrointestinal: Negative. Endocrine: Negative. Genitourinary: Negative. Musculoskeletal: Negative. Skin: Negative. Allergic/Immunologic: Negative.

## 2020-11-04 NOTE — ED TRIAGE NOTES
Per EMS, the pts wife stated to them that the pt has began having CP at 2030 tonight. Pt was given ASA and x2 nitro per the wife prior to EMS arrival. EMS states that the pt is also having altered mental status, pt is not responding well to questions and is slow to answer. Pt is not able to given year correctly.

## 2020-11-04 NOTE — ED NOTES
Pt understands discharge instructions.   Pt instructed to follow up with PCP   Prescriptions explained   Pt told to come back for new or worsening symptoms  No further questions         Jyoti Flores RN  11/03/20 [id number]

## 2020-11-04 NOTE — ED NOTES
Bed: 04  Expected date: 11/3/20  Expected time: 9:16 PM  Means of arrival:   Comments:  68 y/o M decreased mental status cp      El Mckeon RN  11/03/20 [id number]

## 2020-11-04 NOTE — ED NOTES
Pt more alert now then at admission    Pt resting on ER cot with wife at [id number] Edmar Lowe RN  11/03/20 3002

## 2020-11-04 NOTE — ED PROVIDER NOTES
[address] [facility] ED  EMERGENCY DEPARTMENT ENCOUNTER      Pt Name: Kayode Harrington  MRN: [id number]  Vandana 1945  Date of evaluation: 11/3/2020  Provider: Avi Figueroa MD    CHIEF COMPLAINT       Chief Complaint   Patient presents with    Altered Mental Status    Chest Pain         HISTORY OF PRESENT ILLNESS   (Location/Symptom, Timing/Onset, Context/Setting, Quality, Duration, Modifying Factors, Severity)  Note limiting factors. 23-year-old male presenting with confusion. Symptoms started earlier in the day, around 1p. History of vascular dementia but wife states he has been less conversant than usual.  He noted some chest pain earlier in the day and this has not gone away. Given ASA and nitro prior to arrival.  Due to his multiple symptoms she became concerned and called 911. History is difficult to obtain from the patient. Wife states that he is confused at baseline but she believes that today he is more confused than usual.          Nursing Notes were reviewed. REVIEW OF SYSTEMS    (2-9 systems for level 4, 10 or more for level 5)     Review of Systems   Unable to perform ROS: Dementia       Except as noted above the remainder of the review of systems was reviewed and negative. PAST MEDICAL HISTORY     Past Medical History:   Diagnosis Date    Abnormal cardiovascular stress test 4/19/2016    Equivocal ST-T wave changes;  Defect in the inferior wall consistent with prior infarction; LV EF 79%; TID ratio 1.1    Actinic keratoses     BPH (benign prostatic hypertrophy)     CAD (coronary artery disease)     Cancer (HCC)     COLON    Chest pressure 5/3/2016    Cholelithiasis     History of colon cancer     s/p partial colectomy in 2009    Hyperlipidemia     Inflamed seborrheic keratosis     Lumbar radiculopathy 10/26/2016    Mild cognitive impairment     Osteoarthritis     Parathyroid tumor     Parkinson disease (HCC)     RA (retrograde amnesia)     Sinus bradycardia on ECG 4/19/2016    Done 4/5/16 with Dr. Cristi Hobbs Syncope and collapse     Vascular dementia [facility])          SURGICAL HISTORY       Past Surgical History:   Procedure Laterality Date    BACK SURGERY  1993    CHOLECYSTECTOMY, LAPAROSCOPIC N/A 5/27/2020    LAP BRAIN/ CHOLANGIOGRAMS performed by Jaspreet John MD at [place] [address]  8/18/10,09/11/2012    DR Elysia Valentino    COLONOSCOPY  09/29/14    DR. PIERRE    JOINT REPLACEMENT Left 5/28/13    total    PARATHYROIDECTOMY Bilateral 2011    2 of 4 glands removed    FL COLON CA SCRN NOT HI RSK IND N/A 9/15/2017    COLONOSCOPY performed by Jose Maria Powell MD at [address] [facility] ENDOSCOPY  8/11/09     [id number]       Current Discharge Medication List      CONTINUE these medications which have NOT CHANGED    Details   memantine (NAMENDA) 10 MG tablet Take 1 tablet by mouth 2 times daily  Qty: 180 tablet, Refills: 3      dipyridamole (PERSANTINE) 75 MG tablet Take 1 tablet by mouth 3 times daily  Qty: 270 tablet, Refills: 3      clopidogrel (PLAVIX) 75 MG tablet Take 1 tablet by mouth daily  Qty: 90 tablet, Refills: 3      !! Handicap Placard MISC by Does not apply route Good for five years  Qty: 1 each, Refills: 0    Associated Diagnoses: Parkinson disease ([facility] [address]      ! ! Handicap Placard MISC by Does not apply route Good for five years  Qty: 1 each, Refills: 0      rosuvastatin (CRESTOR) 10 MG tablet TAKE 1 TABLET DAILY  Qty: 90 tablet, Refills: 3    Associated Diagnoses: Hyperlipidemia, unspecified hyperlipidemia type; Coronary artery disease involving native heart, angina presence unspecified, unspecified vessel or lesion type      tamsulosin (FLOMAX) 0.4 MG capsule TAKE 1 CAPSULE DAILY  Qty: 90 capsule, Refills: 3      omeprazole (PRILOSEC) 20 MG delayed release capsule TAKE 1 CAPSULE DAILY  Qty: 90 capsule, Refills: 3    Associated Diagnoses: Gastroesophageal reflux disease, esophagitis presence not specified      finasteride (PROSCAR) 5 MG tablet TAKE 1 TABLET DAILY  Qty: 90 tablet, Refills: 3      nitroGLYCERIN (NITROSTAT) 0.4 MG SL tablet up to max of 3 total doses. If no relief after 1 dose, call 911. Qty: 25 tablet, Refills: 3      ondansetron (ZOFRAN-ODT) 4 MG disintegrating tablet Take 1 tablet by mouth 3 times daily as needed for Nausea or Vomiting  Qty: 21 tablet, Refills: 0      SUMAtriptan (IMITREX) 100 MG tablet       metoprolol succinate (TOPROL XL) 25 MG extended release tablet TAKE 1 TABLET DAILY  Qty: 90 tablet, Refills: 3    Associated Diagnoses: Coronary artery disease involving native heart without angina pectoris, unspecified vessel or lesion type      Psyllium (METAMUCIL FIBER PO) Take by mouth      Cholecalciferol (VITAMIN D3) 1000 UNITS TABS Take 1,900 Units by mouth daily       calcium carbonate 600 MG TABS tablet Take 1 tablet by mouth daily       fish oil-omega-3 fatty acids 1000 MG capsule Take 2 g by mouth 2 times daily. Multiple Vitamin (MULTI-VITAMIN PO) Take 1 tablet by mouth nightly       Magnesium 250 MG TABS Take by mouth daily Every Monday Wednesday Friday Sunday      Ascorbic Acid (VITAMIN C) 500 MG tablet Take 500 mg by mouth daily. aspirin 81 MG tablet Take 81 mg by mouth every evening        !! - Potential duplicate medications found. Please discuss with provider.           ALLERGIES     Amoxicillin    FAMILY HISTORY       Family History   Problem Relation Age of Onset    Cancer Mother         OVARIAN    Heart Disease Father           SOCIAL HISTORY       Social History     Socioeconomic History    Marital status:      Spouse name: None    Number of children: None    Years of education: None    Highest education level: None   Occupational History    None   Social Needs    Financial resource strain: None    Food insecurity     Worry: None     Inability: None    Transportation needs Medical: None     Non-medical: None   Tobacco Use    Smoking status: Former Smoker     Packs/day: 1.00     Years: 1.00     Pack years: 1.00     Types: Cigarettes     Last attempt to quit: 1/15/1969     Years since quittin.8    Smokeless tobacco: Never Used    Tobacco comment: Quit smoking > 30 years ago   Substance and Sexual Activity    Alcohol use: No    Drug use: No    Sexual activity: Not Currently     Partners: Female   Lifestyle    Physical activity     Days per week: None     Minutes per session: None    Stress: None   Relationships    Social connections     Talks on phone: None     Gets together: None     Attends [employer] service: None     Active member of club or organization: None     Attends meetings of clubs or organizations: None     Relationship status: None    Intimate partner violence     Fear of current or ex partner: None     Emotionally abused: None     Physically abused: None     Forced sexual activity: None   Other Topics Concern    None   Social History Narrative         Lives With: Spouse    Type of Home: [address] [place] [address] [place]: Two level(reports he can stay on 1st floor with bedroom and bathroom though currently he stays upstairs)    Home Access: (Pt unable to state)    Bathroom Shower/Tub: Walk-in shower    Bathroom Equipment: Shower chair, Grab bars in shower    Home Equipment: Rolling walker    ADL Assistance: Independent    Homemaking Assistance: Needs assistance    Ambulation Assistance: Independent  (no AD)    Transfer Assistance: Independent    Active : No    Additional Comments: pt is questionable historian, he is unsure of what equipment he has at home or if he was using any       SCREENINGS    [place] Coma Scale  Eye Opening: Spontaneous  Best Verbal Response: Oriented  Best Motor Response: Obeys commands  [place] Coma Scale Score: 15          PHYSICAL EXAM    (up to 7 for level 4, 8 or more for level 5)     ED Triage Vitals [11/03/20 2130]   BP Temp Temp Source Pulse Resp SpO2 Height Weight   138/73 98 °F (36.7 °C) Oral 58 18 98 % 5' 10\" (1.778 m) 180 lb (81.6 kg)       Physical Exam  Vitals signs and nursing note reviewed. Constitutional:       General: He is not in acute distress. Appearance: Normal appearance. He is well-developed. HENT:      Head: Normocephalic and atraumatic. Eyes:      Extraocular Movements: Extraocular movements intact. Conjunctiva/sclera: Conjunctivae normal.      Pupils: Pupils are equal, round, and reactive to light. Neck:      Musculoskeletal: Normal range of motion and neck supple. Cardiovascular:      Rate and Rhythm: Normal rate. Rhythm irregular. Pulmonary:      Effort: Pulmonary effort is normal.      Breath sounds: Normal breath sounds. Abdominal:      General: Bowel sounds are normal.      Palpations: Abdomen is soft. Tenderness: There is no abdominal tenderness. There is no guarding or rebound. Musculoskeletal: Normal range of motion. General: No deformity. Skin:     General: Skin is warm and dry. Capillary Refill: Capillary refill takes less than 2 seconds. Neurological:      General: No focal deficit present. Mental Status: He is alert. Cranial Nerves: No cranial nerve deficit. Psychiatric:         Thought Content:  Thought content normal.         DIAGNOSTIC RESULTS     EKG: All EKG's are interpreted by the Emergency Department Physician who either signs or Co-signs this chart in the absence of a cardiologist.    NSR w PACs, rate 66, normal intervals, no ST elevation/ depression    RADIOLOGY:   Non-plain film images such as CT, Ultrasound and MRI are read by the radiologist. Plain radiographic images are visualized and preliminarily interpreted by the emergency physician with the below findings:    CXR- no acute findings    Interpretation per the Radiologist below, if available at the time of this note:    CT Head WO Contrast    (Results Pending)   XR CHEST PORTABLE    (Results Pending)       LABS:  Labs Reviewed   COMPREHENSIVE METABOLIC PANEL - Abnormal; Notable for the following components:       Result Value    AST 44 (*)     All other components within normal limits   CBC WITH AUTO DIFFERENTIAL - Abnormal; Notable for the following components:    Hemoglobin 13.5 (*)     Hematocrit 41.6 (*)     MCH 26.8 (*)     MCHC 32.3 (*)     RDW 15.7 (*)     All other components within normal limits   URINE RT REFLEX TO CULTURE - Abnormal; Notable for the following components:    Color, UA DARK YELLOW (*)     Ketones, Urine TRACE (*)     All other components within normal limits   TROPONIN   MAGNESIUM   URINE DRUG SCREEN       All other labs were within normal range or not returned as of this dictation. EMERGENCY DEPARTMENT COURSE and DIFFERENTIAL DIAGNOSIS/MDM:   Vitals:    Vitals:    11/03/20 2130 11/03/20 2233   BP: 138/73 127/74   Pulse: 58 57   Resp: 18 18   Temp: 98 °F (36.7 °C)    TempSrc: Oral    SpO2: 98% 98%   Weight: 180 lb (81.6 kg)    Height: 5' 10\" (1.778 m)        MDM  Number of Diagnoses or Management Options  Confusion:   Diagnosis management comments: 81-year-old male presenting with transient confusion. His work-up in the ER including CT/ labs is unremarkable. On reevaluation his wife says he is at his baseline. I did review patient's chart. Wife states he was here in July for similar episode. Diagnosed with vascular dementia at that time. Patient is on dual antiplatelet therapy. As he is back to his baseline I recommend that he follow-up with the primary doctor. No organic cause for his symptoms noted, this may be progression of his vascular dementia. Patient will be discharged home in good condition. Patient has been hemodynamically stable throughout ED course and is appropriate for outpatient follow up. Patient should follow up with PCP in 2-3 days or return to ED immediately for any new or worsening symptoms.   Patient is

## 2020-11-04 NOTE — ED NOTES
Pt on arrival to ER is not able to answer is he is having pain. Pt was asked multiple time and asked to point to areas of pain and unable to give answer. Pt was asked year and states 2025, pt is able to given name and birthday. Pt does not know where he is or what brought him to the er. Pt states that he is cold at this time. Pt was asked if he had CP and denies at this time.  Pt was placed on bedside cardiac monitor      Yojana Frey RN  11/03/20 1790

## 2020-11-17 NOTE — PROGRESS NOTES
[facility] CARDIOLOGY OFFICE FOLLOW-UP      Patient: Ashish Mcdonald  YOB: 1945  MRN: [id number]    Chief Complaint:  Chief Complaint   Patient presents with    3 Month Follow-Up     TIA    Coronary Artery Disease         Subjective/HPI:  11/17/2020: Patient presents today for follow-up of recent visit to the ER for chest pain. He was discharged home. He has had previous cardiac catheterization which revealed mild nonobstructive disease. I think there is a significant element of anxiety when he complains of chest pain. I tried to reassure him and told him to see us in the office if he gets frequent episodes of that rather than go to the ER. He has previous TIAs. And is on combination of Persantine and Plavix. Also has some memory impairment. Probably from consequences of TIA. Has GERD BPH has not driven since his last major episode of TIA. Try to reassure him and his wife. He will see me in 3 months. I might address dose of Imdur in the future if his episodes get frequent       8/18/2020: Patient presents today for follow-up of recent hospitalization for TIA. Now Plavix was added to dipyridamole 3 times a day. Accompanied by his wife. Memory is slightly impaired. Still fairly functional.  No chest pain congestive heart failure symptoms or syncope. Had a cardiac catheterization in the past which revealed mild disease. LV ejection fraction was normal continue same medications and see me in 3 months     5/19/2020 (VIRTUAL): Serena Pate was seen in the hospital for chest pain.  Had a stress test that was negative.  Supposed to have ultrasound the gallbladder tomorrow. Sylvia Chisholm the past had a cardiac catheterization which revealed mild disease LV ejection fraction normal at 79%.  Last catheterization was 2016. Agustokellen Camejoy has a history of vascular dementia followed by Dr. Tj Hensley will see me in 3 months     5/28/19: Patient presents today for follow-up of chest pain.  Cardiac cath was negative. He was told by Dr. Kasandra Arnett that MRI showed that he may have had a prior CVA. Congestive heart failure symptoms or syncope. See me in one year     5/16/18: Patient presents today for follow-up of chest pain. That has resolved. Cardiac cath was negative. His mild hypertension dyslipidemia that stable. Was diagnosed lately to have either Parkinson's disease. And supposed to have a follow-up visit with Dr. Maryjo Bolivar in the near future. He'll see me in 6 months.     5/9/17: For follow-up of chest pain. Not anymore. Cath was negative. Had a false-positive nuclear scan. Lopressor 25 milligram a day and Crestor. He has noticed blood pressure running a bit high he walks regularly and bikes when the weather is right. He has dyslipidemia and BPH status stable. See me in one year.     5/3/16: For followup of CATH. No significant CAD. False positive nuke. Retired last week. Very active. Bikes. Non smoker. Stay on toprol 25 See the written copy of this report in the patient's paper medical record. These results did not interface directly into the electronic medical record and are summarized here. In 1 year. no restriction. Has HLP. On crestor.     4/19/2016:For abnormal stress test.Brandt Garcias. Was [facility] patient. C/O angina. Retrosternal.No NV. No radiation. No HA. Was on lopressor 25 for 10 years. Mainly for PVCs. Has HLN,BPH. Will need cath next tues. Bikes and walks regularly. CP aggravated with exercise. Metoprolol succinate 25. Past Medical History:   Diagnosis Date    Abnormal cardiovascular stress test 4/19/2016    Equivocal ST-T wave changes;  Defect in the inferior wall consistent with prior infarction; LV EF 79%; TID ratio 1.1    Actinic keratoses     BPH (benign prostatic hypertrophy)     CAD (coronary artery disease)     Cancer (HCC)     COLON    Chest pressure 5/3/2016    Cholelithiasis     History of colon cancer     s/p partial colectomy in 2009    Hyperlipidemia     Inflamed seborrheic keratosis     Lumbar radiculopathy 10/26/2016    Mild cognitive impairment     Osteoarthritis     Parathyroid tumor     Parkinson disease ([facility] [address]     RA (retrograde amnesia)     Sinus bradycardia on ECG 2016    Done 16 with Dr. Christiano Luo Syncope and collapse     Vascular dementia [facility])        Past Surgical History:   Procedure Laterality Date    BACK SURGERY      CHOLECYSTECTOMY, LAPAROSCOPIC N/A 2020    LAP BRAIN/ CHOLANGIOGRAMS performed by John Zamora MD at [place] [address]  8/18/10,2012    DR Noble Hansen    COLONOSCOPY  14    DR. PIERRE    JOINT REPLACEMENT Left 13    total    PARATHYROIDECTOMY Bilateral     2 of 4 glands removed    [place] COLON CA SCRN NOT HI RSK IND N/A 9/15/2017    COLONOSCOPY performed by Alyx Barnett MD at [address] [place] [address],[address] ENDOSCOPY  09    DR POLK       Family History   Problem Relation Age of Onset    Cancer Mother         OVARIAN    Heart Disease Father        Social History     Socioeconomic History    Marital status:      Spouse name: None    Number of children: None    Years of education: None    Highest education level: None   Occupational History    None   Social Needs    Financial resource strain: None    Food insecurity     Worry: None     Inability: None    Transportation needs     Medical: None     Non-medical: None   Tobacco Use    Smoking status: Former Smoker     Packs/day: 1.00     Years: 1.00     Pack years: 1.00     Types: Cigarettes     Last attempt to quit: 1/15/1969     Years since quittin.8    Smokeless tobacco: Never Used    Tobacco comment: Quit smoking > 30 years ago   Substance and Sexual Activity    Alcohol use: No    Drug use: No    Sexual activity: Not Currently     Partners: Female   Lifestyle    Physical activity     Days per week: None     Minutes per session: None    Stress: None   Relationships  Social connections     Talks on phone: None     Gets together: None     Attends Rastafari service: None     Active member of club or organization: None     Attends meetings of clubs or organizations: None     Relationship status: None    Intimate partner violence     Fear of current or ex partner: None     Emotionally abused: None     Physically abused: None     Forced sexual activity: None   Other Topics Concern    None   Social History Narrative         Lives With: Spouse    Type of Home: [address] [place] [id number] [place]: Two level(reports he can stay on 1st floor with bedroom and bathroom though currently he stays upstairs)    Home Access: (Pt unable to state)    Bathroom Shower/Tub: Walk-in shower    Bathroom Equipment: Shower chair, Grab bars in shower    Home Equipment: Rolling walker    ADL Assistance: Independent    Homemaking Assistance: Needs assistance    Ambulation Assistance: Independent  (no AD)    Transfer Assistance: Independent    Active : No    Additional Comments: pt is questionable historian, he is unsure of what equipment he has at home or if he was using any       Allergies   Allergen Reactions    Amoxicillin Other (See Comments)     stomatitis       Current Outpatient Medications   Medication Sig Dispense Refill    memantine (NAMENDA) 10 MG tablet Take 1 tablet by mouth 2 times daily 180 tablet 3    dipyridamole (PERSANTINE) 75 MG tablet Take 1 tablet by mouth 3 times daily 270 tablet 3    clopidogrel (PLAVIX) 75 MG tablet Take 1 tablet by mouth daily 90 tablet 3    Handicap Placard MISC by Does not apply route Good for five years 1 each 0    Handicap Placard MISC by Does not apply route Good for five years 1 each 0    rosuvastatin (CRESTOR) 10 MG tablet TAKE 1 TABLET DAILY 90 tablet 3    tamsulosin (FLOMAX) 0.4 MG capsule TAKE 1 CAPSULE DAILY 90 capsule 3    omeprazole (PRILOSEC) 20 MG delayed release capsule TAKE 1 CAPSULE DAILY 90 capsule 3    finasteride (PROSCAR) 5 MG tablet TAKE 1 TABLET DAILY 90 tablet 3    nitroGLYCERIN (NITROSTAT) 0.4 MG SL tablet up to max of 3 total doses. If no relief after 1 dose, call 911. 25 tablet 3    ondansetron (ZOFRAN-ODT) 4 MG disintegrating tablet Take 1 tablet by mouth 3 times daily as needed for Nausea or Vomiting 21 tablet 0    SUMAtriptan (IMITREX) 100 MG tablet       metoprolol succinate (TOPROL XL) 25 MG extended release tablet TAKE 1 TABLET DAILY 90 tablet 3    Psyllium (METAMUCIL FIBER PO) Take by mouth      Cholecalciferol (VITAMIN D3) 1000 UNITS TABS Take 1,900 Units by mouth daily       calcium carbonate 600 MG TABS tablet Take 1 tablet by mouth daily       fish oil-omega-3 fatty acids 1000 MG capsule Take 2 g by mouth 2 times daily.  Multiple Vitamin (MULTI-VITAMIN PO) Take 1 tablet by mouth nightly       Magnesium 250 MG TABS Take by mouth daily Every Monday Wednesday Friday Sunday      Ascorbic Acid (VITAMIN C) 500 MG tablet Take 500 mg by mouth daily.  aspirin 81 MG tablet Take 81 mg by mouth every evening        No current facility-administered medications for this visit. Review of Systems:   Review of Systems   Constitutional: Negative for appetite change, diaphoresis and fatigue. HENT: Negative for nosebleeds. Respiratory: Negative for apnea, chest tightness and shortness of breath. Cardiovascular: Negative for chest pain, palpitations and leg swelling. Gastrointestinal: Negative for abdominal distention, blood in stool, diarrhea, nausea and vomiting. Musculoskeletal: Negative for myalgias, neck pain and neck stiffness. Skin: Negative for color change, pallor, rash and wound. Neurological: Negative for dizziness, seizures, syncope, weakness, light-headedness, numbness and headaches. Hematological: Does not bruise/bleed easily. Psychiatric/Behavioral: Negative for agitation, behavioral problems and confusion.  The patient is not nervous/anxious and is not hyperactive. All other systems reviewed and are negative. Review of System is negative except for as mentioned above. Physical Examination:    /82 (Site: Left Upper Arm, Position: Sitting, Cuff Size: Medium Adult)   Pulse 62   Resp 22   Ht 5' 10\" (1.778 m)   Wt 180 lb (81.6 kg)   SpO2 99%   BMI 25.83 kg/m²    Physical Exam   Constitutional: He appears healthy. No distress. HENT:   Nose: Nose normal.   Mouth/Throat: Dentition is normal. Oropharynx is clear. Eyes: Pupils are equal, round, and reactive to light. Conjunctivae are normal.   Neck: Normal range of motion and thyroid normal. Neck supple. Cardiovascular: Regular rhythm, S1 normal, S2 normal, normal heart sounds, intact distal pulses and normal pulses. PMI is not displaced. No murmur heard. Pulmonary/Chest: He has no wheezes. He has no rales. He exhibits no tenderness. Abdominal: Soft. Bowel sounds are normal. He exhibits no distension and no mass. There is no splenomegaly or hepatomegaly. There is no abdominal tenderness. No hernia. Neurological: He is alert and oriented to person, place, and time. He has normal motor skills. Gait normal.   Skin: Skin is warm and dry. No cyanosis. No jaundice. Nails show no clubbing.            Patient Active Problem List   Diagnosis    Hyperlipidemia    Osteopenia    CAD (coronary artery disease)    Benign prostatic hyperplasia    Cholelithiasis    Foot drop, left    Degenerative arthritis of lumbar spine    History of hip replacement, total    Seborrheic keratosis    History of colon cancer    Disturbance of skin sensation    Degenerative joint disease of pelvic region    History of repair of hip joint    Sinus bradycardia on ECG    Lumbar radiculopathy    Gastroesophageal reflux disease with esophagitis    Actinic keratoses    Inflamed seborrheic keratosis    PETRA (obstructive sleep apnea)    TIA (transient ischemic attack)    Parkinson disease (HCC)    RA (retrograde amnesia)    Syncope and collapse    Mild cognitive impairment    Chest pain    Biliary colic    Stroke-like episode    Ataxic gait    Vascular dementia with behavior disturbance (HCC)           No orders of the defined types were placed in this encounter. No orders of the defined types were placed in this encounter. Assessment:    1. Coronary artery disease involving native heart without angina pectoris, unspecified vessel or lesion type    2. TIA (transient ischemic attack)    3. Precordial pain         Plan:     Stay on same medications. See me in 3 months. This note was partially generated using Dragon voice recognition system, and there may be some incorrect words, spellings, punctuation that were not noticed in checking the note before saving.         Electronically signed by Chauncy Holter, MD on 11/17/2020 at 4:51 PM

## 2020-12-02 NOTE — PROGRESS NOTES
Patient is seen in follow up for   Chief Complaint   Patient presents with    Skin Problem     Spot on the top of the left ear and the left side of forehead above eye brow. Has been there for about 7-8 years. HPIhere for follow up on skin changes on the face    Past Medical History:   Diagnosis Date    Abnormal cardiovascular stress test 4/19/2016    Equivocal ST-T wave changes;  Defect in the inferior wall consistent with prior infarction; LV EF 79%; TID ratio 1.1    Actinic keratoses     BPH (benign prostatic hypertrophy)     CAD (coronary artery disease)     Cancer (HCC)     COLON    Chest pressure 5/3/2016    Cholelithiasis     History of colon cancer     s/p partial colectomy in 2009    Hyperlipidemia     Inflamed seborrheic keratosis     Lumbar radiculopathy 10/26/2016    Mild cognitive impairment     Osteoarthritis     Parathyroid tumor     Parkinson disease ([facility] [address]     RA (retrograde amnesia)     Sinus bradycardia on ECG 4/19/2016    Done 4/5/16 with Dr. Stacey Ortez    Syncope and collapse     Vascular dementia [facility])      Patient Active Problem List    Diagnosis Date Noted    Vascular dementia with behavior disturbance ([facility] [address] 08/17/2020    Ataxic gait 07/22/2020    Stroke-like episode 80/73/6232    Biliary colic     Chest pain 16/90/9815    Parkinson disease ([facility] [address]     RA (retrograde amnesia)     Syncope and collapse     Mild cognitive impairment     TIA (transient ischemic attack) 08/01/2019    PETRA (obstructive sleep apnea)     Actinic keratoses     Inflamed seborrheic keratosis     Gastroesophageal reflux disease with esophagitis 11/15/2016    Lumbar radiculopathy 10/26/2016    Sinus bradycardia on ECG 04/19/2016    Disturbance of skin sensation 07/22/2015    Seborrheic keratosis 01/13/2014    History of colon cancer 01/13/2014    History of repair of hip joint 10/21/2013    History of hip replacement, total 07/02/2013    Degenerative joint disease of pelvic region 2013    Degenerative arthritis of lumbar spine 2013    Foot drop, left 2013    CAD (coronary artery disease)     Benign prostatic hyperplasia     Cholelithiasis     Hyperlipidemia 2012    Osteopenia 2012     Past Surgical History:   Procedure Laterality Date    BACK SURGERY      CHOLECYSTECTOMY, LAPAROSCOPIC N/A 2020    LAP BRAIN/ CHOLANGIOGRAMS performed by Jessica Yoder MD at [place] [address]  8/18/10,2012    DR Andrade Vogel    COLONOSCOPY  14    DR. PIERRE    JOINT REPLACEMENT Left 13    total    PARATHYROIDECTOMY Bilateral     2 of 4 glands removed    [place] COLON CA SCRN NOT HI RSK IND N/A 9/15/2017    COLONOSCOPY performed by Ayden Villeda MD at [address] [place] [address],[address] ENDOSCOPY  09    DR POLK     Family History   Problem Relation Age of Onset    Cancer Mother         OVARIAN    Heart Disease Father      Social History     Socioeconomic History    Marital status:      Spouse name: None    Number of children: None    Years of education: None    Highest education level: None   Occupational History    None   Social Needs    Financial resource strain: None    Food insecurity     Worry: None     Inability: None    Transportation needs     Medical: None     Non-medical: None   Tobacco Use    Smoking status: Former Smoker     Packs/day: 1.00     Years: 1.00     Pack years: 1.00     Types: Cigarettes     Last attempt to quit: 1/15/1969     Years since quittin.9    Smokeless tobacco: Never Used    Tobacco comment: Quit smoking > 30 years ago   Substance and Sexual Activity    Alcohol use: No    Drug use: No    Sexual activity: Not Currently     Partners: Female   Lifestyle    Physical activity     Days per week: None     Minutes per session: None    Stress: None   Relationships    Social connections     Talks on phone: None Gets together: None     Attends Caodaism service: None     Active member of club or organization: None     Attends meetings of clubs or organizations: None     Relationship status: None    Intimate partner violence     Fear of current or ex partner: None     Emotionally abused: None     Physically abused: None     Forced sexual activity: None   Other Topics Concern    None   Social History Narrative         Lives With: Spouse    Type of Home: [address] [place] [address] [place]: Two level(reports he can stay on 1st floor with bedroom and bathroom though currently he stays upstairs)    Home Access: (Pt unable to state)    Bathroom Shower/Tub: Walk-in shower    Bathroom Equipment: Shower chair, Grab bars in shower    Home Equipment: Rolling walker    ADL Assistance: Independent    Homemaking Assistance: Needs assistance    Ambulation Assistance: Independent  (no AD)    Transfer Assistance: Independent    Active : No    Additional Comments: pt is questionable historian, he is unsure of what equipment he has at home or if he was using any     Current Outpatient Medications   Medication Sig Dispense Refill    memantine (NAMENDA) 10 MG tablet Take 1 tablet by mouth 2 times daily 180 tablet 3    dipyridamole (PERSANTINE) 75 MG tablet Take 1 tablet by mouth 3 times daily 270 tablet 3    clopidogrel (PLAVIX) 75 MG tablet Take 1 tablet by mouth daily 90 tablet 3    Handicap Placard MISC by Does not apply route Good for five years 1 each 0    Handicap Placard MISC by Does not apply route Good for five years 1 each 0    rosuvastatin (CRESTOR) 10 MG tablet TAKE 1 TABLET DAILY 90 tablet 3    tamsulosin (FLOMAX) 0.4 MG capsule TAKE 1 CAPSULE DAILY 90 capsule 3    omeprazole (PRILOSEC) 20 MG delayed release capsule TAKE 1 CAPSULE DAILY 90 capsule 3    finasteride (PROSCAR) 5 MG tablet TAKE 1 TABLET DAILY 90 tablet 3    nitroGLYCERIN (NITROSTAT) 0.4 MG SL tablet up to max of 3 total doses.  If no disintegrating tablet Take 1 tablet by mouth 3 times daily as needed for Nausea or Vomiting 21 tablet 0    SUMAtriptan (IMITREX) 100 MG tablet       metoprolol succinate (TOPROL XL) 25 MG extended release tablet TAKE 1 TABLET DAILY 90 tablet 3    Psyllium (METAMUCIL FIBER PO) Take by mouth      Cholecalciferol (VITAMIN D3) 1000 UNITS TABS Take 1,900 Units by mouth daily       calcium carbonate 600 MG TABS tablet Take 1 tablet by mouth daily       fish oil-omega-3 fatty acids 1000 MG capsule Take 2 g by mouth 2 times daily.  Multiple Vitamin (MULTI-VITAMIN PO) Take 1 tablet by mouth nightly       Magnesium 250 MG TABS Take by mouth daily Every Monday Wednesday Friday Sunday      Ascorbic Acid (VITAMIN C) 500 MG tablet Take 500 mg by mouth daily.  aspirin 81 MG tablet Take 81 mg by mouth every evening        No current facility-administered medications on file prior to visit. Allergies   Allergen Reactions    Amoxicillin Other (See Comments)     stomatitis     Health Maintenance   Topic Date Due    Shingles Vaccine (1 of 2) 11/27/1996    Colon cancer screen colonoscopy  09/15/2020    Annual Wellness Visit (AWV)  06/03/2021    Lipid screen  07/22/2021    DTaP/Tdap/Td vaccine (2 - Td) 01/10/2024    Flu vaccine  Completed    Pneumococcal 65+ years Vaccine  Completed    AAA screen  Completed    Hepatitis C screen  Completed    Hepatitis A vaccine  Aged Out    Hepatitis B vaccine  Aged Out    Hib vaccine  Aged Out    Meningococcal (ACWY) vaccine  Aged Out       Review of Systems     Review of Systems   Constitutional: Negative for activity change, appetite change, chills, fever and unexpected weight change. HENT: Negative. Eyes: Negative. Respiratory: Negative. Negative for shortness of breath. Cardiovascular: Negative. Negative for chest pain and palpitations. Gastrointestinal: Negative. Endocrine: Negative. Genitourinary: Negative.     Musculoskeletal: Negative. Skin: Negative. Allergic/Immunologic: Negative. Neurological: Negative. Hematological: Negative. Psychiatric/Behavioral: Negative. Physical Exam  Vitals:    12/02/20 1151   BP: 128/78   Site: Left Upper Arm   Position: Sitting   Cuff Size: Medium Adult   Pulse: 65   Temp: 97.8 °F (36.6 °C)   TempSrc: Oral   SpO2: 98%   Weight: 178 lb (80.7 kg)   Height: 5' 10\" (1.778 m)       Physical Exam  Constitutional:       Appearance: He is well-developed. HENT:      Right Ear: External ear normal.      Left Ear: External ear normal.   Eyes:      Conjunctiva/sclera: Conjunctivae normal.      Pupils: Pupils are equal, round, and reactive to light. Neck:      Musculoskeletal: Normal range of motion and neck supple. Thyroid: No thyromegaly. Cardiovascular:      Rate and Rhythm: Normal rate and regular rhythm. Heart sounds: Normal heart sounds. No murmur. No friction rub. No gallop. Pulmonary:      Effort: Pulmonary effort is normal. No respiratory distress. Breath sounds: Normal breath sounds. No wheezing. Abdominal:      General: Bowel sounds are normal. There is no distension. Palpations: Abdomen is soft. There is no mass. Tenderness: There is no abdominal tenderness. There is no guarding or rebound. Hernia: No hernia is present. Genitourinary:     Penis: Normal.    Musculoskeletal: Normal range of motion. General: No tenderness. Lymphadenopathy:      Cervical: No cervical adenopathy. Skin:     General: Skin is warm and dry. Neurological:      Mental Status: He is alert and oriented to person, place, and time. Cranial Nerves: No cranial nerve deficit. Coordination: Coordination normal.         Assessment   Diagnosis Orders   1. Essential hypertension     2. Screening for colon cancer     3. Coronary artery disease involving native heart without angina pectoris, unspecified vessel or lesion type     4.  AK (actinic keratosis)

## 2021-01-01 ENCOUNTER — OFFICE VISIT (OUTPATIENT)
Dept: FAMILY MEDICINE CLINIC | Age: 76
End: 2021-01-01
Payer: MEDICARE

## 2021-01-01 ENCOUNTER — OFFICE VISIT (OUTPATIENT)
Dept: NEUROLOGY | Age: 76
End: 2021-01-01
Payer: MEDICARE

## 2021-01-01 ENCOUNTER — APPOINTMENT (OUTPATIENT)
Dept: ULTRASOUND IMAGING | Age: 76
DRG: 177 | End: 2021-01-01
Payer: MEDICARE

## 2021-01-01 ENCOUNTER — HOSPITAL ENCOUNTER (INPATIENT)
Age: 76
LOS: 4 days | Discharge: HOSPICE | DRG: 177 | End: 2021-08-11
Attending: EMERGENCY MEDICINE | Admitting: INTERNAL MEDICINE
Payer: MEDICARE

## 2021-01-01 ENCOUNTER — APPOINTMENT (OUTPATIENT)
Dept: CT IMAGING | Age: 76
DRG: 884 | End: 2021-01-01
Payer: MEDICARE

## 2021-01-01 ENCOUNTER — APPOINTMENT (OUTPATIENT)
Dept: GENERAL RADIOLOGY | Age: 76
DRG: 884 | End: 2021-01-01
Payer: MEDICARE

## 2021-01-01 ENCOUNTER — TELEPHONE (OUTPATIENT)
Dept: NEUROLOGY | Age: 76
End: 2021-01-01

## 2021-01-01 ENCOUNTER — HOSPITAL ENCOUNTER (EMERGENCY)
Age: 76
Discharge: HOME | End: 2021-01-05
Payer: MEDICARE

## 2021-01-01 ENCOUNTER — OFFICE VISIT (OUTPATIENT)
Dept: UROLOGY | Age: 76
End: 2021-01-01
Payer: MEDICARE

## 2021-01-01 ENCOUNTER — OFFICE VISIT (OUTPATIENT)
Dept: CARDIOLOGY CLINIC | Age: 76
End: 2021-01-01
Payer: MEDICARE

## 2021-01-01 ENCOUNTER — APPOINTMENT (OUTPATIENT)
Dept: GENERAL RADIOLOGY | Age: 76
End: 2021-01-01
Payer: MEDICARE

## 2021-01-01 ENCOUNTER — TELEPHONE (OUTPATIENT)
Dept: FAMILY MEDICINE CLINIC | Age: 76
End: 2021-01-01

## 2021-01-01 ENCOUNTER — APPOINTMENT (OUTPATIENT)
Dept: GENERAL RADIOLOGY | Age: 76
DRG: 177 | End: 2021-01-01
Payer: MEDICARE

## 2021-01-01 ENCOUNTER — APPOINTMENT (OUTPATIENT)
Dept: MRI IMAGING | Age: 76
DRG: 884 | End: 2021-01-01
Payer: MEDICARE

## 2021-01-01 ENCOUNTER — VIRTUAL VISIT (OUTPATIENT)
Dept: FAMILY MEDICINE CLINIC | Age: 76
End: 2021-01-01
Payer: MEDICARE

## 2021-01-01 ENCOUNTER — HOSPITAL ENCOUNTER (INPATIENT)
Age: 76
LOS: 5 days | Discharge: NURSING, REHAB OR LONG-TERM CARE | DRG: 884 | End: 2021-08-04
Attending: INTERNAL MEDICINE | Admitting: INTERNAL MEDICINE
Payer: MEDICARE

## 2021-01-01 ENCOUNTER — NURSE TRIAGE (OUTPATIENT)
Dept: OTHER | Facility: CLINIC | Age: 76
End: 2021-01-01

## 2021-01-01 VITALS
WEIGHT: 174.2 LBS | SYSTOLIC BLOOD PRESSURE: 102 MMHG | HEIGHT: 70 IN | HEART RATE: 62 BPM | OXYGEN SATURATION: 97 % | DIASTOLIC BLOOD PRESSURE: 60 MMHG | BODY MASS INDEX: 24.94 KG/M2 | TEMPERATURE: 98.5 F

## 2021-01-01 VITALS
HEIGHT: 70 IN | DIASTOLIC BLOOD PRESSURE: 64 MMHG | OXYGEN SATURATION: 98 % | TEMPERATURE: 97.7 F | HEART RATE: 60 BPM | WEIGHT: 172 LBS | SYSTOLIC BLOOD PRESSURE: 106 MMHG | BODY MASS INDEX: 24.62 KG/M2

## 2021-01-01 VITALS
WEIGHT: 176 LBS | BODY MASS INDEX: 25.2 KG/M2 | HEART RATE: 63 BPM | SYSTOLIC BLOOD PRESSURE: 100 MMHG | DIASTOLIC BLOOD PRESSURE: 66 MMHG | HEIGHT: 70 IN

## 2021-01-01 VITALS
HEART RATE: 110 BPM | OXYGEN SATURATION: 100 % | RESPIRATION RATE: 20 BRPM | WEIGHT: 175 LBS | HEIGHT: 73 IN | DIASTOLIC BLOOD PRESSURE: 93 MMHG | SYSTOLIC BLOOD PRESSURE: 117 MMHG | BODY MASS INDEX: 23.19 KG/M2 | TEMPERATURE: 98.4 F

## 2021-01-01 VITALS
WEIGHT: 174 LBS | HEART RATE: 60 BPM | SYSTOLIC BLOOD PRESSURE: 102 MMHG | HEIGHT: 70 IN | DIASTOLIC BLOOD PRESSURE: 62 MMHG | OXYGEN SATURATION: 98 % | BODY MASS INDEX: 24.91 KG/M2

## 2021-01-01 VITALS
DIASTOLIC BLOOD PRESSURE: 66 MMHG | HEIGHT: 70 IN | WEIGHT: 168 LBS | BODY MASS INDEX: 24.05 KG/M2 | HEART RATE: 66 BPM | OXYGEN SATURATION: 98 % | SYSTOLIC BLOOD PRESSURE: 110 MMHG

## 2021-01-01 VITALS
HEART RATE: 68 BPM | TEMPERATURE: 97.7 F | BODY MASS INDEX: 25.92 KG/M2 | HEIGHT: 69 IN | WEIGHT: 175 LBS | DIASTOLIC BLOOD PRESSURE: 60 MMHG | RESPIRATION RATE: 18 BRPM | OXYGEN SATURATION: 99 % | SYSTOLIC BLOOD PRESSURE: 132 MMHG

## 2021-01-01 VITALS
RESPIRATION RATE: 18 BRPM | TEMPERATURE: 99.7 F | BODY MASS INDEX: 20.81 KG/M2 | DIASTOLIC BLOOD PRESSURE: 79 MMHG | HEART RATE: 70 BPM | SYSTOLIC BLOOD PRESSURE: 159 MMHG | WEIGHT: 157 LBS | OXYGEN SATURATION: 92 % | HEIGHT: 73 IN

## 2021-01-01 VITALS — DIASTOLIC BLOOD PRESSURE: 64 MMHG | HEART RATE: 60 BPM | SYSTOLIC BLOOD PRESSURE: 135 MMHG

## 2021-01-01 VITALS
DIASTOLIC BLOOD PRESSURE: 72 MMHG | HEART RATE: 62 BPM | WEIGHT: 169.4 LBS | SYSTOLIC BLOOD PRESSURE: 116 MMHG | BODY MASS INDEX: 24.31 KG/M2

## 2021-01-01 DIAGNOSIS — F01.518 VASCULAR DEMENTIA WITH BEHAVIOR DISTURBANCE: Primary | ICD-10-CM

## 2021-01-01 DIAGNOSIS — G31.83 LEWY BODY DEMENTIA WITHOUT BEHAVIORAL DISTURBANCE (HCC): ICD-10-CM

## 2021-01-01 DIAGNOSIS — R53.83 FATIGUE, UNSPECIFIED TYPE: ICD-10-CM

## 2021-01-01 DIAGNOSIS — G20 PARKINSON DISEASE (HCC): Primary | ICD-10-CM

## 2021-01-01 DIAGNOSIS — G20 PARKINSON DISEASE (HCC): ICD-10-CM

## 2021-01-01 DIAGNOSIS — R47.01 APHASIA: ICD-10-CM

## 2021-01-01 DIAGNOSIS — K21.9 GASTROESOPHAGEAL REFLUX DISEASE: ICD-10-CM

## 2021-01-01 DIAGNOSIS — G45.9 TIA (TRANSIENT ISCHEMIC ATTACK): ICD-10-CM

## 2021-01-01 DIAGNOSIS — F02.80 DEMENTIA ASSOCIATED WITH OTHER UNDERLYING DISEASE WITHOUT BEHAVIORAL DISTURBANCE (HCC): ICD-10-CM

## 2021-01-01 DIAGNOSIS — N40.1 BPH WITH OBSTRUCTION/LOWER URINARY TRACT SYMPTOMS: ICD-10-CM

## 2021-01-01 DIAGNOSIS — R06.02 SHORTNESS OF BREATH: Primary | ICD-10-CM

## 2021-01-01 DIAGNOSIS — H61.92 SKIN LESION OF LEFT EXTERNAL EAR: ICD-10-CM

## 2021-01-01 DIAGNOSIS — N40.1 BPH WITH OBSTRUCTION/LOWER URINARY TRACT SYMPTOMS: Primary | ICD-10-CM

## 2021-01-01 DIAGNOSIS — I25.10 CORONARY ARTERY DISEASE INVOLVING NATIVE HEART, ANGINA PRESENCE UNSPECIFIED, UNSPECIFIED VESSEL OR LESION TYPE: ICD-10-CM

## 2021-01-01 DIAGNOSIS — I25.10 CORONARY ARTERY DISEASE INVOLVING NATIVE HEART WITHOUT ANGINA PECTORIS, UNSPECIFIED VESSEL OR LESION TYPE: ICD-10-CM

## 2021-01-01 DIAGNOSIS — F01.518 VASCULAR DEMENTIA WITH BEHAVIOR DISTURBANCE: ICD-10-CM

## 2021-01-01 DIAGNOSIS — M54.16 LUMBAR RADICULOPATHY: ICD-10-CM

## 2021-01-01 DIAGNOSIS — N13.8 BPH WITH OBSTRUCTION/LOWER URINARY TRACT SYMPTOMS: Primary | ICD-10-CM

## 2021-01-01 DIAGNOSIS — I69.30 CEREBRAL MULTI-INFARCT STATE: ICD-10-CM

## 2021-01-01 DIAGNOSIS — N13.8 BPH WITH OBSTRUCTION/LOWER URINARY TRACT SYMPTOMS: ICD-10-CM

## 2021-01-01 DIAGNOSIS — J18.9 PNEUMONIA OF LEFT LOWER LOBE DUE TO INFECTIOUS ORGANISM: Primary | ICD-10-CM

## 2021-01-01 DIAGNOSIS — E78.5 HYPERLIPIDEMIA, UNSPECIFIED HYPERLIPIDEMIA TYPE: ICD-10-CM

## 2021-01-01 DIAGNOSIS — R41.82 ALTERED MENTAL STATUS, UNSPECIFIED ALTERED MENTAL STATUS TYPE: Primary | ICD-10-CM

## 2021-01-01 DIAGNOSIS — R06.00 DYSPNEA, UNSPECIFIED TYPE: ICD-10-CM

## 2021-01-01 DIAGNOSIS — I10 ESSENTIAL HYPERTENSION: ICD-10-CM

## 2021-01-01 DIAGNOSIS — J06.9 UPPER RESPIRATORY TRACT INFECTION, UNSPECIFIED TYPE: Primary | ICD-10-CM

## 2021-01-01 DIAGNOSIS — I25.10 CORONARY ARTERY DISEASE INVOLVING NATIVE HEART WITHOUT ANGINA PECTORIS, UNSPECIFIED VESSEL OR LESION TYPE: Primary | ICD-10-CM

## 2021-01-01 DIAGNOSIS — R41.3 MEMORY LOSS: ICD-10-CM

## 2021-01-01 DIAGNOSIS — F02.80 LEWY BODY DEMENTIA WITHOUT BEHAVIORAL DISTURBANCE (HCC): ICD-10-CM

## 2021-01-01 DIAGNOSIS — R09.02 HYPOXIA: ICD-10-CM

## 2021-01-01 DIAGNOSIS — R25.8 BRADYKINESIA: ICD-10-CM

## 2021-01-01 DIAGNOSIS — Z12.11 SCREENING FOR COLON CANCER: ICD-10-CM

## 2021-01-01 DIAGNOSIS — R06.02 SHORTNESS OF BREATH: ICD-10-CM

## 2021-01-01 DIAGNOSIS — F51.01 PRIMARY INSOMNIA: ICD-10-CM

## 2021-01-01 LAB
ALBUMIN SERPL-MCNC: 3.1 G/DL (ref 3.5–4.6)
ALBUMIN SERPL-MCNC: 3.8 G/DL (ref 3.5–4.6)
ALBUMIN SERPL-MCNC: 3.9 G/DL (ref 3.5–4.6)
ALBUMIN SERPL-MCNC: 3.9 G/DL (ref 3.5–4.6)
ALBUMIN SERPL-MCNC: 4.8 G/DL (ref 3.5–4.6)
ALP BLD-CCNC: 121 U/L (ref 35–104)
ALP BLD-CCNC: 164 U/L (ref 35–104)
ALP BLD-CCNC: 46 U/L (ref 35–104)
ALP BLD-CCNC: 47 U/L (ref 35–104)
ALP BLD-CCNC: 53 U/L (ref 35–104)
ALT SERPL-CCNC: 12 U/L (ref 0–41)
ALT SERPL-CCNC: 129 U/L (ref 0–41)
ALT SERPL-CCNC: 6 U/L (ref 0–41)
ALT SERPL-CCNC: 77 U/L (ref 0–41)
ALT SERPL-CCNC: 9 U/L (ref 0–41)
AMMONIA: 36 UMOL/L (ref 16–60)
ANION GAP SERPL CALCULATED.3IONS-SCNC: 10 MEQ/L (ref 9–15)
ANION GAP SERPL CALCULATED.3IONS-SCNC: 12 MEQ/L (ref 9–15)
ANION GAP SERPL CALCULATED.3IONS-SCNC: 12 MEQ/L (ref 9–15)
ANION GAP SERPL CALCULATED.3IONS-SCNC: 13 MEQ/L (ref 9–15)
ANION GAP SERPL CALCULATED.3IONS-SCNC: 6 MEQ/L (ref 9–15)
APTT: 26 SEC (ref 24.4–36.8)
APTT: 33 SEC (ref 24.4–36.8)
APTT: 38.7 SEC (ref 24.4–36.8)
AST SERPL-CCNC: 14 U/L (ref 0–40)
AST SERPL-CCNC: 17 U/L (ref 0–40)
AST SERPL-CCNC: 173 U/L (ref 0–40)
AST SERPL-CCNC: 30 U/L (ref 0–40)
AST SERPL-CCNC: 48 U/L (ref 0–40)
BASE EXCESS ARTERIAL: 1 (ref -3–3)
BASOPHILS ABSOLUTE: 0 K/UL (ref 0–0.2)
BASOPHILS RELATIVE PERCENT: 0.1 %
BASOPHILS RELATIVE PERCENT: 0.5 %
BASOPHILS RELATIVE PERCENT: 0.7 %
BILIRUB SERPL-MCNC: 0.3 MG/DL (ref 0.2–0.7)
BILIRUB SERPL-MCNC: 0.4 MG/DL (ref 0.2–0.7)
BILIRUB SERPL-MCNC: 0.4 MG/DL (ref 0.2–0.7)
BILIRUB SERPL-MCNC: 0.6 MG/DL (ref 0.2–0.7)
BILIRUB SERPL-MCNC: 0.8 MG/DL (ref 0.2–0.7)
BILIRUBIN DIRECT: <0.2 MG/DL (ref 0–0.4)
BILIRUBIN URINE: NEGATIVE
BILIRUBIN URINE: NEGATIVE
BILIRUBIN, INDIRECT: ABNORMAL MG/DL (ref 0–0.6)
BLOOD CULTURE, ROUTINE: NORMAL
BLOOD CULTURE, ROUTINE: NORMAL
BLOOD, URINE: NEGATIVE
BLOOD, URINE: NEGATIVE
BUN BLDV-MCNC: 11 MG/DL (ref 8–23)
BUN BLDV-MCNC: 12 MG/DL (ref 8–23)
BUN BLDV-MCNC: 15 MG/DL (ref 8–23)
BUN BLDV-MCNC: 20 MG/DL (ref 8–23)
BUN BLDV-MCNC: 21 MG/DL (ref 8–23)
CALCIUM IONIZED: 1.15 MMOL/L (ref 1.12–1.32)
CALCIUM SERPL-MCNC: 8.6 MG/DL (ref 8.5–9.9)
CALCIUM SERPL-MCNC: 8.7 MG/DL (ref 8.5–9.9)
CALCIUM SERPL-MCNC: 9.1 MG/DL (ref 8.5–9.9)
CALCIUM SERPL-MCNC: 9.3 MG/DL (ref 8.5–9.9)
CALCIUM SERPL-MCNC: 9.3 MG/DL (ref 8.5–9.9)
CHLORIDE BLD-SCNC: 103 MEQ/L (ref 95–107)
CHLORIDE BLD-SCNC: 104 MEQ/L (ref 95–107)
CHLORIDE BLD-SCNC: 104 MEQ/L (ref 95–107)
CHLORIDE BLD-SCNC: 106 MEQ/L (ref 95–107)
CHLORIDE BLD-SCNC: 114 MEQ/L (ref 95–107)
CHP ED QC CHECK: YES
CK MB: 2.2 NG/ML (ref 0–6.7)
CLARITY: CLEAR
CLARITY: CLEAR
CO2: 24 MEQ/L (ref 20–31)
CO2: 25 MEQ/L (ref 20–31)
CO2: 28 MEQ/L (ref 20–31)
COLOR: YELLOW
COLOR: YELLOW
CREAT SERPL-MCNC: 0.81 MG/DL (ref 0.7–1.2)
CREAT SERPL-MCNC: 0.84 MG/DL (ref 0.7–1.2)
CREAT SERPL-MCNC: 0.85 MG/DL (ref 0.7–1.2)
CREAT SERPL-MCNC: 0.94 MG/DL (ref 0.7–1.2)
CREAT SERPL-MCNC: 0.99 MG/DL (ref 0.7–1.2)
CREATINE KINASE-MB INDEX: 0.8 % (ref 0–3.5)
CULTURE, BLOOD 2: NORMAL
CULTURE, BLOOD 2: NORMAL
EKG ATRIAL RATE: 55 BPM
EKG ATRIAL RATE: 60 BPM
EKG ATRIAL RATE: 77 BPM
EKG P AXIS: 69 DEGREES
EKG P AXIS: 73 DEGREES
EKG P AXIS: 74 DEGREES
EKG P-R INTERVAL: 122 MS
EKG P-R INTERVAL: 128 MS
EKG P-R INTERVAL: 132 MS
EKG Q-T INTERVAL: 386 MS
EKG Q-T INTERVAL: 430 MS
EKG Q-T INTERVAL: 430 MS
EKG QRS DURATION: 80 MS
EKG QRS DURATION: 82 MS
EKG QRS DURATION: 82 MS
EKG QTC CALCULATION (BAZETT): 411 MS
EKG QTC CALCULATION (BAZETT): 430 MS
EKG QTC CALCULATION (BAZETT): 436 MS
EKG R AXIS: 42 DEGREES
EKG R AXIS: 44 DEGREES
EKG R AXIS: 47 DEGREES
EKG T AXIS: 59 DEGREES
EKG T AXIS: 64 DEGREES
EKG T AXIS: 76 DEGREES
EKG VENTRICULAR RATE: 55 BPM
EKG VENTRICULAR RATE: 60 BPM
EKG VENTRICULAR RATE: 77 BPM
EOSINOPHILS ABSOLUTE: 0 K/UL (ref 0–0.7)
EOSINOPHILS RELATIVE PERCENT: 0 %
EOSINOPHILS RELATIVE PERCENT: 0.4 %
EOSINOPHILS RELATIVE PERCENT: 0.9 %
GFR AFRICAN AMERICAN: >60
GFR NON-AFRICAN AMERICAN: >60
GLOBULIN: 2.1 G/DL (ref 2.3–3.5)
GLOBULIN: 2.3 G/DL (ref 2.3–3.5)
GLOBULIN: 2.4 G/DL (ref 2.3–3.5)
GLOBULIN: 3.3 G/DL (ref 2.3–3.5)
GLUCOSE BLD-MCNC: 101 MG/DL
GLUCOSE BLD-MCNC: 101 MG/DL (ref 60–115)
GLUCOSE BLD-MCNC: 114 MG/DL (ref 70–99)
GLUCOSE BLD-MCNC: 121 MG/DL (ref 70–99)
GLUCOSE BLD-MCNC: 131 MG/DL (ref 60–115)
GLUCOSE BLD-MCNC: 136 MG/DL (ref 60–115)
GLUCOSE BLD-MCNC: 145 MG/DL (ref 70–99)
GLUCOSE BLD-MCNC: 97 MG/DL (ref 70–99)
GLUCOSE BLD-MCNC: 99 MG/DL (ref 70–99)
GLUCOSE URINE: NEGATIVE MG/DL
GLUCOSE URINE: NEGATIVE MG/DL
HAV IGM SER IA-ACNC: NORMAL
HCO3 ARTERIAL: 24.9 MMOL/L (ref 21–29)
HCT VFR BLD CALC: 36.8 % (ref 42–52)
HCT VFR BLD CALC: 38.6 % (ref 42–52)
HCT VFR BLD CALC: 38.7 % (ref 42–52)
HCT VFR BLD CALC: 41.7 % (ref 42–52)
HCT VFR BLD CALC: 42.4 % (ref 42–52)
HEMOGLOBIN: 12 G/DL (ref 14–18)
HEMOGLOBIN: 12.3 G/DL (ref 14–18)
HEMOGLOBIN: 12.7 G/DL (ref 14–18)
HEMOGLOBIN: 13 GM/DL (ref 13.5–17.5)
HEMOGLOBIN: 13.4 G/DL (ref 14–18)
HEMOGLOBIN: 13.9 G/DL (ref 14–18)
HEPATITIS B CORE IGM ANTIBODY: NORMAL
HEPATITIS B SURFACE ANTIGEN INTERPRETATION: NORMAL
HEPATITIS C ANTIBODY INTERPRETATION: NORMAL
HEPATITIS INTERPRETATION:: NORMAL
INR BLD: 1
INR BLD: 1
INR BLD: 1.1
KETONES, URINE: NEGATIVE MG/DL
KETONES, URINE: NEGATIVE MG/DL
LACTATE: 0.86 MMOL/L (ref 0.4–2)
LACTIC ACID: 1.6 MMOL/L (ref 0.5–2.2)
LEUKOCYTE ESTERASE, URINE: NEGATIVE
LEUKOCYTE ESTERASE, URINE: NEGATIVE
LYMPHOCYTES ABSOLUTE: 0.3 K/UL (ref 1–4.8)
LYMPHOCYTES ABSOLUTE: 0.8 K/UL (ref 1–4.8)
LYMPHOCYTES ABSOLUTE: 1.1 K/UL (ref 1–4.8)
LYMPHOCYTES RELATIVE PERCENT: 15.7 %
LYMPHOCYTES RELATIVE PERCENT: 2.3 %
LYMPHOCYTES RELATIVE PERCENT: 21 %
MAGNESIUM: 2 MG/DL (ref 1.7–2.4)
MCH RBC QN AUTO: 26.6 PG (ref 27–31.3)
MCH RBC QN AUTO: 26.8 PG (ref 27–31.3)
MCH RBC QN AUTO: 27 PG (ref 27–31.3)
MCH RBC QN AUTO: 27.3 PG (ref 27–31.3)
MCH RBC QN AUTO: 27.4 PG (ref 27–31.3)
MCHC RBC AUTO-ENTMCNC: 31.9 % (ref 33–37)
MCHC RBC AUTO-ENTMCNC: 32.1 % (ref 33–37)
MCHC RBC AUTO-ENTMCNC: 32.7 % (ref 33–37)
MCHC RBC AUTO-ENTMCNC: 32.7 % (ref 33–37)
MCHC RBC AUTO-ENTMCNC: 32.8 % (ref 33–37)
MCV RBC AUTO: 82.7 FL (ref 80–100)
MCV RBC AUTO: 83.2 FL (ref 80–100)
MCV RBC AUTO: 83.3 FL (ref 80–100)
MCV RBC AUTO: 83.6 FL (ref 80–100)
MCV RBC AUTO: 83.8 FL (ref 80–100)
MONOCYTES ABSOLUTE: 0.4 K/UL (ref 0.2–0.8)
MONOCYTES ABSOLUTE: 0.6 K/UL (ref 0.2–0.8)
MONOCYTES ABSOLUTE: 0.8 K/UL (ref 0.2–0.8)
MONOCYTES RELATIVE PERCENT: 11.3 %
MONOCYTES RELATIVE PERCENT: 6.2 %
MONOCYTES RELATIVE PERCENT: 7.7 %
NEUTROPHILS ABSOLUTE: 11.3 K/UL (ref 1.4–6.5)
NEUTROPHILS ABSOLUTE: 3.6 K/UL (ref 1.4–6.5)
NEUTROPHILS ABSOLUTE: 3.9 K/UL (ref 1.4–6.5)
NEUTROPHILS RELATIVE PERCENT: 66.3 %
NEUTROPHILS RELATIVE PERCENT: 75.5 %
NEUTROPHILS RELATIVE PERCENT: 91.4 %
NITRITE, URINE: NEGATIVE
NITRITE, URINE: NEGATIVE
O2 SAT, ARTERIAL: 97 % (ref 93–100)
PCO2 ARTERIAL: 35 MM HG (ref 35–45)
PDW BLD-RTO: 16.4 % (ref 11.5–14.5)
PDW BLD-RTO: 16.4 % (ref 11.5–14.5)
PDW BLD-RTO: 16.7 % (ref 11.5–14.5)
PDW BLD-RTO: 16.8 % (ref 11.5–14.5)
PDW BLD-RTO: 17.6 % (ref 11.5–14.5)
PERFORMED ON: ABNORMAL
PERFORMED ON: ABNORMAL
PERFORMED ON: NORMAL
PH ARTERIAL: 7.46 (ref 7.35–7.45)
PH UA: 6.5 (ref 5–9)
PH UA: 6.5 (ref 5–9)
PLATELET # BLD: 166 K/UL (ref 130–400)
PLATELET # BLD: 179 K/UL (ref 130–400)
PLATELET # BLD: 194 K/UL (ref 130–400)
PLATELET # BLD: 238 K/UL (ref 130–400)
PLATELET # BLD: 290 K/UL (ref 130–400)
PO2 ARTERIAL: 86 MM HG (ref 75–108)
POC CHLORIDE: 106 MEQ/L (ref 99–110)
POC CREATININE: 1 MG/DL (ref 0.8–1.3)
POC FIO2: 2
POC HEMATOCRIT: 38 % (ref 41–53)
POC POTASSIUM: 3.8 MEQ/L (ref 3.5–5.1)
POC SAMPLE TYPE: ABNORMAL
POC SODIUM: 141 MEQ/L (ref 136–145)
POTASSIUM REFLEX MAGNESIUM: 4 MEQ/L (ref 3.4–4.9)
POTASSIUM SERPL-SCNC: 3.8 MEQ/L (ref 3.4–4.9)
POTASSIUM SERPL-SCNC: 4.1 MEQ/L (ref 3.4–4.9)
POTASSIUM SERPL-SCNC: 4.1 MEQ/L (ref 3.4–4.9)
POTASSIUM SERPL-SCNC: 4.7 MEQ/L (ref 3.4–4.9)
PRO-BNP: 116 PG/ML
PROLACTIN: 8.3 NG/ML (ref 4–15.2)
PROSTATE SPECIFIC ANTIGEN: 2.64 NG/ML (ref 0–4)
PROTEIN UA: NEGATIVE MG/DL
PROTEIN UA: NEGATIVE MG/DL
PROTHROMBIN TIME: 12.9 SEC (ref 12.3–14.9)
PROTHROMBIN TIME: 13.1 SEC (ref 12.3–14.9)
PROTHROMBIN TIME: 13.7 SEC (ref 12.3–14.9)
RBC # BLD: 4.39 M/UL (ref 4.7–6.1)
RBC # BLD: 4.63 M/UL (ref 4.7–6.1)
RBC # BLD: 4.65 M/UL (ref 4.7–6.1)
RBC # BLD: 4.99 M/UL (ref 4.7–6.1)
RBC # BLD: 5.13 M/UL (ref 4.7–6.1)
SARS-COV-2, NAAT: NOT DETECTED
SARS-COV-2, NAAT: NOT DETECTED
SODIUM BLD-SCNC: 137 MEQ/L (ref 135–144)
SODIUM BLD-SCNC: 139 MEQ/L (ref 135–144)
SODIUM BLD-SCNC: 140 MEQ/L (ref 135–144)
SODIUM BLD-SCNC: 143 MEQ/L (ref 135–144)
SODIUM BLD-SCNC: 150 MEQ/L (ref 135–144)
SPECIFIC GRAVITY UA: 1 (ref 1–1.03)
SPECIFIC GRAVITY UA: 1.02 (ref 1–1.03)
TCO2 ARTERIAL: 26 (ref 22–29)
TOTAL CK: 277 U/L (ref 0–190)
TOTAL CK: 68 U/L (ref 0–190)
TOTAL CK: 79 U/L (ref 0–190)
TOTAL CK: 84 U/L (ref 0–190)
TOTAL CK: 97 U/L (ref 0–190)
TOTAL PROTEIN: 6.2 G/DL (ref 6.3–8)
TOTAL PROTEIN: 6.2 G/DL (ref 6.3–8)
TOTAL PROTEIN: 6.3 G/DL (ref 6.3–8)
TOTAL PROTEIN: 6.9 G/DL (ref 6.3–8)
TOTAL PROTEIN: 7.1 G/DL (ref 6.3–8)
TROPONIN: <0.01 NG/ML (ref 0–0.01)
TSH REFLEX: 2.56 UIU/ML (ref 0.44–3.86)
URINE REFLEX TO CULTURE: NORMAL
URINE REFLEX TO CULTURE: NORMAL
UROBILINOGEN, URINE: 0.2 E.U./DL
UROBILINOGEN, URINE: 0.2 E.U./DL
WBC # BLD: 11.8 K/UL (ref 4.8–10.8)
WBC # BLD: 12.3 K/UL (ref 4.8–10.8)
WBC # BLD: 5.1 K/UL (ref 4.8–10.8)
WBC # BLD: 5.4 K/UL (ref 4.8–10.8)
WBC # BLD: 8.3 K/UL (ref 4.8–10.8)

## 2021-01-01 PROCEDURE — 85610 PROTHROMBIN TIME: CPT

## 2021-01-01 PROCEDURE — 36415 COLL VENOUS BLD VENIPUNCTURE: CPT

## 2021-01-01 PROCEDURE — APPSS30 APP SPLIT SHARED TIME 16-30 MINUTES: Performed by: NURSE PRACTITIONER

## 2021-01-01 PROCEDURE — 1210000000 HC MED SURG R&B

## 2021-01-01 PROCEDURE — 6370000000 HC RX 637 (ALT 250 FOR IP): Performed by: INTERNAL MEDICINE

## 2021-01-01 PROCEDURE — 82550 ASSAY OF CK (CPK): CPT

## 2021-01-01 PROCEDURE — 85027 COMPLETE CBC AUTOMATED: CPT

## 2021-01-01 PROCEDURE — 94640 AIRWAY INHALATION TREATMENT: CPT

## 2021-01-01 PROCEDURE — 1123F ACP DISCUSS/DSCN MKR DOCD: CPT | Performed by: UROLOGY

## 2021-01-01 PROCEDURE — 6360000002 HC RX W HCPCS: Performed by: INTERNAL MEDICINE

## 2021-01-01 PROCEDURE — 99233 SBSQ HOSP IP/OBS HIGH 50: CPT | Performed by: PSYCHIATRY & NEUROLOGY

## 2021-01-01 PROCEDURE — 99214 OFFICE O/P EST MOD 30 MIN: CPT | Performed by: FAMILY MEDICINE

## 2021-01-01 PROCEDURE — 6370000000 HC RX 637 (ALT 250 FOR IP): Performed by: FAMILY MEDICINE

## 2021-01-01 PROCEDURE — 95816 EEG AWAKE AND DROWSY: CPT

## 2021-01-01 PROCEDURE — 99232 SBSQ HOSP IP/OBS MODERATE 35: CPT | Performed by: INTERNAL MEDICINE

## 2021-01-01 PROCEDURE — 2580000003 HC RX 258: Performed by: INTERNAL MEDICINE

## 2021-01-01 PROCEDURE — G8427 DOCREV CUR MEDS BY ELIG CLIN: HCPCS | Performed by: FAMILY MEDICINE

## 2021-01-01 PROCEDURE — 99221 1ST HOSP IP/OBS SF/LOW 40: CPT | Performed by: NURSE PRACTITIONER

## 2021-01-01 PROCEDURE — 99284 EMERGENCY DEPT VISIT MOD MDM: CPT

## 2021-01-01 PROCEDURE — G8484 FLU IMMUNIZE NO ADMIN: HCPCS | Performed by: PSYCHIATRY & NEUROLOGY

## 2021-01-01 PROCEDURE — 2580000003 HC RX 258: Performed by: EMERGENCY MEDICINE

## 2021-01-01 PROCEDURE — 1123F ACP DISCUSS/DSCN MKR DOCD: CPT | Performed by: FAMILY MEDICINE

## 2021-01-01 PROCEDURE — 3017F COLORECTAL CA SCREEN DOC REV: CPT | Performed by: PSYCHIATRY & NEUROLOGY

## 2021-01-01 PROCEDURE — 3017F COLORECTAL CA SCREEN DOC REV: CPT | Performed by: INTERNAL MEDICINE

## 2021-01-01 PROCEDURE — 3017F COLORECTAL CA SCREEN DOC REV: CPT | Performed by: UROLOGY

## 2021-01-01 PROCEDURE — 4040F PNEUMOC VAC/ADMIN/RCVD: CPT | Performed by: UROLOGY

## 2021-01-01 PROCEDURE — 71045 X-RAY EXAM CHEST 1 VIEW: CPT

## 2021-01-01 PROCEDURE — 82565 ASSAY OF CREATININE: CPT

## 2021-01-01 PROCEDURE — 4040F PNEUMOC VAC/ADMIN/RCVD: CPT | Performed by: INTERNAL MEDICINE

## 2021-01-01 PROCEDURE — 4040F PNEUMOC VAC/ADMIN/RCVD: CPT | Performed by: PSYCHIATRY & NEUROLOGY

## 2021-01-01 PROCEDURE — 99222 1ST HOSP IP/OBS MODERATE 55: CPT | Performed by: INTERNAL MEDICINE

## 2021-01-01 PROCEDURE — 94761 N-INVAS EAR/PLS OXIMETRY MLT: CPT

## 2021-01-01 PROCEDURE — 2580000003 HC RX 258: Performed by: NURSE PRACTITIONER

## 2021-01-01 PROCEDURE — 3017F COLORECTAL CA SCREEN DOC REV: CPT | Performed by: FAMILY MEDICINE

## 2021-01-01 PROCEDURE — 99214 OFFICE O/P EST MOD 30 MIN: CPT | Performed by: PSYCHIATRY & NEUROLOGY

## 2021-01-01 PROCEDURE — 99222 1ST HOSP IP/OBS MODERATE 55: CPT | Performed by: PSYCHIATRY & NEUROLOGY

## 2021-01-01 PROCEDURE — 83605 ASSAY OF LACTIC ACID: CPT

## 2021-01-01 PROCEDURE — 80074 ACUTE HEPATITIS PANEL: CPT

## 2021-01-01 PROCEDURE — 85730 THROMBOPLASTIN TIME PARTIAL: CPT

## 2021-01-01 PROCEDURE — 2500000003 HC RX 250 WO HCPCS: Performed by: INTERNAL MEDICINE

## 2021-01-01 PROCEDURE — G8427 DOCREV CUR MEDS BY ELIG CLIN: HCPCS | Performed by: PSYCHIATRY & NEUROLOGY

## 2021-01-01 PROCEDURE — 93005 ELECTROCARDIOGRAM TRACING: CPT

## 2021-01-01 PROCEDURE — G8417 CALC BMI ABV UP PARAM F/U: HCPCS | Performed by: FAMILY MEDICINE

## 2021-01-01 PROCEDURE — 80048 BASIC METABOLIC PNL TOTAL CA: CPT

## 2021-01-01 PROCEDURE — C9113 INJ PANTOPRAZOLE SODIUM, VIA: HCPCS | Performed by: INTERNAL MEDICINE

## 2021-01-01 PROCEDURE — 97535 SELF CARE MNGMENT TRAINING: CPT

## 2021-01-01 PROCEDURE — 82553 CREATINE MB FRACTION: CPT

## 2021-01-01 PROCEDURE — 70551 MRI BRAIN STEM W/O DYE: CPT

## 2021-01-01 PROCEDURE — 6360000002 HC RX W HCPCS: Performed by: NURSE PRACTITIONER

## 2021-01-01 PROCEDURE — 82140 ASSAY OF AMMONIA: CPT

## 2021-01-01 PROCEDURE — 82330 ASSAY OF CALCIUM: CPT

## 2021-01-01 PROCEDURE — 6370000000 HC RX 637 (ALT 250 FOR IP): Performed by: EMERGENCY MEDICINE

## 2021-01-01 PROCEDURE — G8417 CALC BMI ABV UP PARAM F/U: HCPCS | Performed by: PSYCHIATRY & NEUROLOGY

## 2021-01-01 PROCEDURE — 2700000000 HC OXYGEN THERAPY PER DAY

## 2021-01-01 PROCEDURE — G8420 CALC BMI NORM PARAMETERS: HCPCS | Performed by: INTERNAL MEDICINE

## 2021-01-01 PROCEDURE — 84443 ASSAY THYROID STIM HORMONE: CPT

## 2021-01-01 PROCEDURE — 4130F TOPICAL PREP RX AOE: CPT | Performed by: FAMILY MEDICINE

## 2021-01-01 PROCEDURE — 1036F TOBACCO NON-USER: CPT | Performed by: FAMILY MEDICINE

## 2021-01-01 PROCEDURE — 84146 ASSAY OF PROLACTIN: CPT

## 2021-01-01 PROCEDURE — 95816 EEG AWAKE AND DROWSY: CPT | Performed by: PSYCHIATRY & NEUROLOGY

## 2021-01-01 PROCEDURE — U0002 COVID-19 LAB TEST NON-CDC: HCPCS

## 2021-01-01 PROCEDURE — G8484 FLU IMMUNIZE NO ADMIN: HCPCS | Performed by: INTERNAL MEDICINE

## 2021-01-01 PROCEDURE — APPSS15 APP SPLIT SHARED TIME 0-15 MINUTES: Performed by: NURSE PRACTITIONER

## 2021-01-01 PROCEDURE — 70450 CT HEAD/BRAIN W/O DYE: CPT

## 2021-01-01 PROCEDURE — 93010 ELECTROCARDIOGRAM REPORT: CPT | Performed by: INTERNAL MEDICINE

## 2021-01-01 PROCEDURE — 94664 DEMO&/EVAL PT USE INHALER: CPT

## 2021-01-01 PROCEDURE — 4040F PNEUMOC VAC/ADMIN/RCVD: CPT | Performed by: FAMILY MEDICINE

## 2021-01-01 PROCEDURE — G8417 CALC BMI ABV UP PARAM F/U: HCPCS | Performed by: UROLOGY

## 2021-01-01 PROCEDURE — 85025 COMPLETE CBC W/AUTO DIFF WBC: CPT

## 2021-01-01 PROCEDURE — 6370000000 HC RX 637 (ALT 250 FOR IP): Performed by: PSYCHIATRY & NEUROLOGY

## 2021-01-01 PROCEDURE — 99213 OFFICE O/P EST LOW 20 MIN: CPT | Performed by: UROLOGY

## 2021-01-01 PROCEDURE — 87635 SARS-COV-2 COVID-19 AMP PRB: CPT

## 2021-01-01 PROCEDURE — 81003 URINALYSIS AUTO W/O SCOPE: CPT

## 2021-01-01 PROCEDURE — 6360000002 HC RX W HCPCS: Performed by: PSYCHIATRY & NEUROLOGY

## 2021-01-01 PROCEDURE — 2060000000 HC ICU INTERMEDIATE R&B

## 2021-01-01 PROCEDURE — 51798 US URINE CAPACITY MEASURE: CPT | Performed by: UROLOGY

## 2021-01-01 PROCEDURE — 36600 WITHDRAWAL OF ARTERIAL BLOOD: CPT

## 2021-01-01 PROCEDURE — 1036F TOBACCO NON-USER: CPT | Performed by: INTERNAL MEDICINE

## 2021-01-01 PROCEDURE — 96365 THER/PROPH/DIAG IV INF INIT: CPT

## 2021-01-01 PROCEDURE — 96375 TX/PRO/DX INJ NEW DRUG ADDON: CPT

## 2021-01-01 PROCEDURE — 1036F TOBACCO NON-USER: CPT | Performed by: PSYCHIATRY & NEUROLOGY

## 2021-01-01 PROCEDURE — APPNB15 APP NON BILLABLE TIME 0-15 MINS: Performed by: NURSE PRACTITIONER

## 2021-01-01 PROCEDURE — 97112 NEUROMUSCULAR REEDUCATION: CPT

## 2021-01-01 PROCEDURE — 80053 COMPREHEN METABOLIC PANEL: CPT

## 2021-01-01 PROCEDURE — G8484 FLU IMMUNIZE NO ADMIN: HCPCS | Performed by: FAMILY MEDICINE

## 2021-01-01 PROCEDURE — 99213 OFFICE O/P EST LOW 20 MIN: CPT | Performed by: INTERNAL MEDICINE

## 2021-01-01 PROCEDURE — 83880 ASSAY OF NATRIURETIC PEPTIDE: CPT

## 2021-01-01 PROCEDURE — 93005 ELECTROCARDIOGRAM TRACING: CPT | Performed by: PHYSICIAN ASSISTANT

## 2021-01-01 PROCEDURE — 1123F ACP DISCUSS/DSCN MKR DOCD: CPT | Performed by: PSYCHIATRY & NEUROLOGY

## 2021-01-01 PROCEDURE — 85014 HEMATOCRIT: CPT

## 2021-01-01 PROCEDURE — 6360000002 HC RX W HCPCS: Performed by: EMERGENCY MEDICINE

## 2021-01-01 PROCEDURE — 97167 OT EVAL HIGH COMPLEX 60 MIN: CPT

## 2021-01-01 PROCEDURE — 1123F ACP DISCUSS/DSCN MKR DOCD: CPT | Performed by: INTERNAL MEDICINE

## 2021-01-01 PROCEDURE — 92610 EVALUATE SWALLOWING FUNCTION: CPT

## 2021-01-01 PROCEDURE — 84132 ASSAY OF SERUM POTASSIUM: CPT

## 2021-01-01 PROCEDURE — 84484 ASSAY OF TROPONIN QUANT: CPT

## 2021-01-01 PROCEDURE — G8420 CALC BMI NORM PARAMETERS: HCPCS | Performed by: PSYCHIATRY & NEUROLOGY

## 2021-01-01 PROCEDURE — 97162 PT EVAL MOD COMPLEX 30 MIN: CPT

## 2021-01-01 PROCEDURE — 2580000003 HC RX 258: Performed by: PSYCHIATRY & NEUROLOGY

## 2021-01-01 PROCEDURE — 83735 ASSAY OF MAGNESIUM: CPT

## 2021-01-01 PROCEDURE — 51741 ELECTRO-UROFLOWMETRY FIRST: CPT | Performed by: UROLOGY

## 2021-01-01 PROCEDURE — 93005 ELECTROCARDIOGRAM TRACING: CPT | Performed by: EMERGENCY MEDICINE

## 2021-01-01 PROCEDURE — G8427 DOCREV CUR MEDS BY ELIG CLIN: HCPCS | Performed by: INTERNAL MEDICINE

## 2021-01-01 PROCEDURE — G8420 CALC BMI NORM PARAMETERS: HCPCS | Performed by: FAMILY MEDICINE

## 2021-01-01 PROCEDURE — 80076 HEPATIC FUNCTION PANEL: CPT

## 2021-01-01 PROCEDURE — 97110 THERAPEUTIC EXERCISES: CPT

## 2021-01-01 PROCEDURE — 1036F TOBACCO NON-USER: CPT | Performed by: UROLOGY

## 2021-01-01 PROCEDURE — 87040 BLOOD CULTURE FOR BACTERIA: CPT

## 2021-01-01 PROCEDURE — 99441 PR PHYS/QHP TELEPHONE EVALUATION 5-10 MIN: CPT | Performed by: PHYSICIAN ASSISTANT

## 2021-01-01 PROCEDURE — G8427 DOCREV CUR MEDS BY ELIG CLIN: HCPCS | Performed by: UROLOGY

## 2021-01-01 PROCEDURE — 97163 PT EVAL HIGH COMPLEX 45 MIN: CPT

## 2021-01-01 PROCEDURE — 84295 ASSAY OF SERUM SODIUM: CPT

## 2021-01-01 PROCEDURE — 99213 OFFICE O/P EST LOW 20 MIN: CPT | Performed by: FAMILY MEDICINE

## 2021-01-01 PROCEDURE — 76705 ECHO EXAM OF ABDOMEN: CPT

## 2021-01-01 PROCEDURE — 82435 ASSAY OF BLOOD CHLORIDE: CPT

## 2021-01-01 PROCEDURE — 82803 BLOOD GASES ANY COMBINATION: CPT

## 2021-01-01 RX ORDER — CARBIDOPA, LEVODOPA AND ENTACAPONE 25; 200; 100 MG/1; MG/1; MG/1
1 TABLET, FILM COATED ORAL 2 TIMES DAILY
Status: DISCONTINUED | OUTPATIENT
Start: 2021-01-01 | End: 2021-01-01 | Stop reason: HOSPADM

## 2021-01-01 RX ORDER — METOPROLOL SUCCINATE 25 MG/1
TABLET, EXTENDED RELEASE ORAL
Qty: 90 TABLET | Refills: 3 | Status: SHIPPED | OUTPATIENT
Start: 2021-01-01

## 2021-01-01 RX ORDER — LORAZEPAM 2 MG/ML
1 INJECTION INTRAMUSCULAR
Status: ACTIVE | OUTPATIENT
Start: 2021-01-01 | End: 2021-01-01

## 2021-01-01 RX ORDER — CARBIDOPA, LEVODOPA AND ENTACAPONE 25; 200; 100 MG/1; MG/1; MG/1
TABLET, FILM COATED ORAL
Qty: 60 TABLET | Refills: 3 | Status: SHIPPED | OUTPATIENT
Start: 2021-01-01 | End: 2021-01-01 | Stop reason: SDUPTHER

## 2021-01-01 RX ORDER — ATORVASTATIN CALCIUM 20 MG/1
20 TABLET, FILM COATED ORAL NIGHTLY
COMMUNITY

## 2021-01-01 RX ORDER — 0.9 % SODIUM CHLORIDE 0.9 %
30 INTRAVENOUS SOLUTION INTRAVENOUS ONCE
Status: COMPLETED | OUTPATIENT
Start: 2021-01-01 | End: 2021-01-01

## 2021-01-01 RX ORDER — PANTOPRAZOLE SODIUM 40 MG/10ML
40 INJECTION, POWDER, LYOPHILIZED, FOR SOLUTION INTRAVENOUS DAILY
Status: DISCONTINUED | OUTPATIENT
Start: 2021-01-01 | End: 2021-01-01 | Stop reason: HOSPADM

## 2021-01-01 RX ORDER — MEMANTINE HYDROCHLORIDE 10 MG/1
10 TABLET ORAL 2 TIMES DAILY
Status: DISCONTINUED | OUTPATIENT
Start: 2021-01-01 | End: 2021-01-01

## 2021-01-01 RX ORDER — SODIUM CHLORIDE 9 MG/ML
INJECTION, SOLUTION INTRAVENOUS CONTINUOUS
Status: DISCONTINUED | OUTPATIENT
Start: 2021-01-01 | End: 2021-01-01 | Stop reason: HOSPADM

## 2021-01-01 RX ORDER — METOPROLOL TARTRATE 5 MG/5ML
2.5 INJECTION INTRAVENOUS EVERY 6 HOURS
Status: DISCONTINUED | OUTPATIENT
Start: 2021-01-01 | End: 2021-01-01 | Stop reason: HOSPADM

## 2021-01-01 RX ORDER — DIPYRIDAMOLE 75 MG
75 TABLET ORAL 3 TIMES DAILY
Status: DISCONTINUED | OUTPATIENT
Start: 2021-01-01 | End: 2021-01-01 | Stop reason: HOSPADM

## 2021-01-01 RX ORDER — ROSUVASTATIN CALCIUM 10 MG/1
TABLET, COATED ORAL
Qty: 90 TABLET | Refills: 3 | Status: SHIPPED | OUTPATIENT
Start: 2021-01-01 | End: 2021-01-01

## 2021-01-01 RX ORDER — CLOPIDOGREL BISULFATE 75 MG/1
75 TABLET ORAL DAILY
Status: DISCONTINUED | OUTPATIENT
Start: 2021-01-01 | End: 2021-01-01 | Stop reason: HOSPADM

## 2021-01-01 RX ORDER — ACETAMINOPHEN 650 MG/1
650 SUPPOSITORY RECTAL EVERY 6 HOURS PRN
Status: DISCONTINUED | OUTPATIENT
Start: 2021-01-01 | End: 2021-01-01 | Stop reason: HOSPADM

## 2021-01-01 RX ORDER — TAMSULOSIN HYDROCHLORIDE 0.4 MG/1
0.4 CAPSULE ORAL NIGHTLY
Status: DISCONTINUED | OUTPATIENT
Start: 2021-01-01 | End: 2021-01-01 | Stop reason: HOSPADM

## 2021-01-01 RX ORDER — DIPYRIDAMOLE 75 MG
TABLET ORAL
Qty: 270 TABLET | Refills: 3 | Status: SHIPPED | OUTPATIENT
Start: 2021-01-01

## 2021-01-01 RX ORDER — METOPROLOL SUCCINATE 25 MG/1
25 TABLET, EXTENDED RELEASE ORAL DAILY
Status: DISCONTINUED | OUTPATIENT
Start: 2021-01-01 | End: 2021-01-01

## 2021-01-01 RX ORDER — MODAFINIL 100 MG/1
100 TABLET ORAL DAILY
Status: DISCONTINUED | OUTPATIENT
Start: 2021-01-01 | End: 2021-01-01 | Stop reason: HOSPADM

## 2021-01-01 RX ORDER — IPRATROPIUM BROMIDE AND ALBUTEROL SULFATE 2.5; .5 MG/3ML; MG/3ML
1 SOLUTION RESPIRATORY (INHALATION)
Status: DISCONTINUED | OUTPATIENT
Start: 2021-01-01 | End: 2021-01-01

## 2021-01-01 RX ORDER — SODIUM CHLORIDE 0.9 % (FLUSH) 0.9 %
5-40 SYRINGE (ML) INJECTION EVERY 12 HOURS SCHEDULED
Status: DISCONTINUED | OUTPATIENT
Start: 2021-01-01 | End: 2021-01-01

## 2021-01-01 RX ORDER — SODIUM CHLORIDE 9 MG/ML
25 INJECTION, SOLUTION INTRAVENOUS PRN
Status: DISCONTINUED | OUTPATIENT
Start: 2021-01-01 | End: 2021-01-01 | Stop reason: HOSPADM

## 2021-01-01 RX ORDER — IPRATROPIUM BROMIDE AND ALBUTEROL SULFATE 2.5; .5 MG/3ML; MG/3ML
1 SOLUTION RESPIRATORY (INHALATION) EVERY 4 HOURS PRN
Status: DISCONTINUED | OUTPATIENT
Start: 2021-01-01 | End: 2021-01-01 | Stop reason: HOSPADM

## 2021-01-01 RX ORDER — OMEPRAZOLE 20 MG/1
CAPSULE, DELAYED RELEASE ORAL
Qty: 90 CAPSULE | Refills: 3 | Status: SHIPPED | OUTPATIENT
Start: 2021-01-01

## 2021-01-01 RX ORDER — FUROSEMIDE 10 MG/ML
20 INJECTION INTRAMUSCULAR; INTRAVENOUS ONCE
Status: COMPLETED | OUTPATIENT
Start: 2021-01-01 | End: 2021-01-01

## 2021-01-01 RX ORDER — ONDANSETRON 4 MG/1
4 TABLET, ORALLY DISINTEGRATING ORAL EVERY 8 HOURS PRN
Status: DISCONTINUED | OUTPATIENT
Start: 2021-01-01 | End: 2021-01-01 | Stop reason: HOSPADM

## 2021-01-01 RX ORDER — SODIUM CHLORIDE 9 MG/ML
10 INJECTION INTRAVENOUS DAILY
Status: DISCONTINUED | OUTPATIENT
Start: 2021-01-01 | End: 2021-01-01 | Stop reason: HOSPADM

## 2021-01-01 RX ORDER — ACETAMINOPHEN 325 MG/1
650 TABLET ORAL EVERY 6 HOURS PRN
Status: DISCONTINUED | OUTPATIENT
Start: 2021-01-01 | End: 2021-01-01 | Stop reason: HOSPADM

## 2021-01-01 RX ORDER — POLYETHYLENE GLYCOL 3350 17 G/17G
17 POWDER, FOR SOLUTION ORAL DAILY PRN
Status: DISCONTINUED | OUTPATIENT
Start: 2021-01-01 | End: 2021-01-01 | Stop reason: HOSPADM

## 2021-01-01 RX ORDER — ONDANSETRON 2 MG/ML
4 INJECTION INTRAMUSCULAR; INTRAVENOUS EVERY 6 HOURS PRN
Status: DISCONTINUED | OUTPATIENT
Start: 2021-01-01 | End: 2021-01-01 | Stop reason: HOSPADM

## 2021-01-01 RX ORDER — CARBIDOPA, LEVODOPA AND ENTACAPONE 25; 200; 100 MG/1; MG/1; MG/1
TABLET, FILM COATED ORAL
Qty: 180 TABLET | Refills: 3 | Status: SHIPPED | OUTPATIENT
Start: 2021-01-01

## 2021-01-01 RX ORDER — GUAIFENESIN 600 MG/1
600 TABLET, EXTENDED RELEASE ORAL 2 TIMES DAILY
Status: DISCONTINUED | OUTPATIENT
Start: 2021-01-01 | End: 2021-01-01 | Stop reason: HOSPADM

## 2021-01-01 RX ORDER — TAMSULOSIN HYDROCHLORIDE 0.4 MG/1
CAPSULE ORAL
Qty: 90 CAPSULE | Refills: 3 | Status: SHIPPED | OUTPATIENT
Start: 2021-01-01

## 2021-01-01 RX ORDER — ASPIRIN 81 MG/1
81 TABLET ORAL EVERY EVENING
Status: DISCONTINUED | OUTPATIENT
Start: 2021-01-01 | End: 2021-01-01 | Stop reason: HOSPADM

## 2021-01-01 RX ORDER — SODIUM CHLORIDE 0.9 % (FLUSH) 0.9 %
5-40 SYRINGE (ML) INJECTION PRN
Status: DISCONTINUED | OUTPATIENT
Start: 2021-01-01 | End: 2021-01-01 | Stop reason: HOSPADM

## 2021-01-01 RX ORDER — PANTOPRAZOLE SODIUM 40 MG/1
40 TABLET, DELAYED RELEASE ORAL
Status: DISCONTINUED | OUTPATIENT
Start: 2021-01-01 | End: 2021-01-01

## 2021-01-01 RX ORDER — HYDRALAZINE HYDROCHLORIDE 20 MG/ML
10 INJECTION INTRAMUSCULAR; INTRAVENOUS ONCE
Status: COMPLETED | OUTPATIENT
Start: 2021-01-01 | End: 2021-01-01

## 2021-01-01 RX ORDER — TAMSULOSIN HYDROCHLORIDE 0.4 MG/1
0.4 CAPSULE ORAL DAILY
Status: DISCONTINUED | OUTPATIENT
Start: 2021-01-01 | End: 2021-01-01 | Stop reason: HOSPADM

## 2021-01-01 RX ORDER — CARBIDOPA, LEVODOPA AND ENTACAPONE 12.5; 200; 5 MG/1; MG/1; MG/1
TABLET, FILM COATED ORAL
Qty: 60 TABLET | Refills: 3 | Status: SHIPPED | OUTPATIENT
Start: 2021-01-01 | End: 2021-01-01

## 2021-01-01 RX ORDER — LEVETIRACETAM 500 MG/1
500 TABLET ORAL 2 TIMES DAILY
Status: DISCONTINUED | OUTPATIENT
Start: 2021-01-01 | End: 2021-01-01

## 2021-01-01 RX ORDER — PANTOPRAZOLE SODIUM 40 MG/1
40 TABLET, DELAYED RELEASE ORAL
Status: DISCONTINUED | OUTPATIENT
Start: 2021-01-01 | End: 2021-01-01 | Stop reason: HOSPADM

## 2021-01-01 RX ORDER — FINASTERIDE 5 MG/1
TABLET, FILM COATED ORAL
Qty: 90 TABLET | Refills: 3 | Status: SHIPPED | OUTPATIENT
Start: 2021-01-01

## 2021-01-01 RX ORDER — CLOPIDOGREL BISULFATE 75 MG/1
TABLET ORAL
Qty: 90 TABLET | Refills: 3 | Status: SHIPPED | OUTPATIENT
Start: 2021-01-01

## 2021-01-01 RX ORDER — NITROGLYCERIN 0.4 MG/1
0.4 TABLET SUBLINGUAL EVERY 5 MIN PRN
Status: DISCONTINUED | OUTPATIENT
Start: 2021-01-01 | End: 2021-01-01 | Stop reason: HOSPADM

## 2021-01-01 RX ORDER — FINASTERIDE 5 MG/1
5 TABLET, FILM COATED ORAL DAILY
Status: DISCONTINUED | OUTPATIENT
Start: 2021-01-01 | End: 2021-01-01 | Stop reason: HOSPADM

## 2021-01-01 RX ORDER — SODIUM CHLORIDE 0.9 % (FLUSH) 0.9 %
5-40 SYRINGE (ML) INJECTION EVERY 12 HOURS SCHEDULED
Status: DISCONTINUED | OUTPATIENT
Start: 2021-01-01 | End: 2021-01-01 | Stop reason: HOSPADM

## 2021-01-01 RX ORDER — METOPROLOL SUCCINATE 25 MG/1
25 TABLET, EXTENDED RELEASE ORAL DAILY
Status: DISCONTINUED | OUTPATIENT
Start: 2021-01-01 | End: 2021-01-01 | Stop reason: HOSPADM

## 2021-01-01 RX ORDER — ROSUVASTATIN CALCIUM 10 MG/1
10 TABLET, COATED ORAL NIGHTLY
Status: DISCONTINUED | OUTPATIENT
Start: 2021-01-01 | End: 2021-01-01 | Stop reason: HOSPADM

## 2021-01-01 RX ORDER — MEMANTINE HYDROCHLORIDE 10 MG/1
TABLET ORAL
Qty: 180 TABLET | Refills: 3 | Status: SHIPPED | OUTPATIENT
Start: 2021-01-01

## 2021-01-01 RX ORDER — IPRATROPIUM BROMIDE AND ALBUTEROL SULFATE 2.5; .5 MG/3ML; MG/3ML
1 SOLUTION RESPIRATORY (INHALATION) 3 TIMES DAILY PRN
Status: DISCONTINUED | OUTPATIENT
Start: 2021-01-01 | End: 2021-01-01

## 2021-01-01 RX ADMIN — SODIUM CHLORIDE, PRESERVATIVE FREE 10 ML: 5 INJECTION INTRAVENOUS at 22:06

## 2021-01-01 RX ADMIN — CARBIDOPA, LEVODOPA, AND ENTACAPONE 1 TABLET: 25; 100; 200 TABLET, FILM COATED ORAL at 21:24

## 2021-01-01 RX ADMIN — LEVETIRACETAM 500 MG: 100 INJECTION, SOLUTION INTRAVENOUS at 04:14

## 2021-01-01 RX ADMIN — FINASTERIDE 5 MG: 5 TABLET, FILM COATED ORAL at 21:24

## 2021-01-01 RX ADMIN — HYDRALAZINE HYDROCHLORIDE 10 MG: 20 INJECTION INTRAMUSCULAR; INTRAVENOUS at 02:41

## 2021-01-01 RX ADMIN — ASPIRIN 81 MG: 81 TABLET, COATED ORAL at 17:45

## 2021-01-01 RX ADMIN — PANTOPRAZOLE SODIUM 40 MG: 40 INJECTION, POWDER, FOR SOLUTION INTRAVENOUS at 08:40

## 2021-01-01 RX ADMIN — ENOXAPARIN SODIUM 40 MG: 40 INJECTION SUBCUTANEOUS at 09:17

## 2021-01-01 RX ADMIN — PIPERACILLIN SODIUM AND TAZOBACTAM SODIUM 3375 MG: 3; .375 INJECTION, POWDER, LYOPHILIZED, FOR SOLUTION INTRAVENOUS at 18:35

## 2021-01-01 RX ADMIN — PIPERACILLIN SODIUM AND TAZOBACTAM SODIUM 3375 MG: 3; .375 INJECTION, POWDER, LYOPHILIZED, FOR SOLUTION INTRAVENOUS at 02:52

## 2021-01-01 RX ADMIN — MODAFINIL 100 MG: 100 TABLET ORAL at 12:21

## 2021-01-01 RX ADMIN — PANTOPRAZOLE SODIUM 40 MG: 40 INJECTION, POWDER, FOR SOLUTION INTRAVENOUS at 21:06

## 2021-01-01 RX ADMIN — PIPERACILLIN SODIUM AND TAZOBACTAM SODIUM 3375 MG: 3; .375 INJECTION, POWDER, LYOPHILIZED, FOR SOLUTION INTRAVENOUS at 18:27

## 2021-01-01 RX ADMIN — ASPIRIN 81 MG: 81 TABLET, COATED ORAL at 16:52

## 2021-01-01 RX ADMIN — ACETAMINOPHEN 650 MG: 325 TABLET ORAL at 08:49

## 2021-01-01 RX ADMIN — SODIUM CHLORIDE, PRESERVATIVE FREE 10 ML: 5 INJECTION INTRAVENOUS at 08:40

## 2021-01-01 RX ADMIN — DIPYRIDAMOLE 75 MG: 75 TABLET, FILM COATED ORAL at 20:52

## 2021-01-01 RX ADMIN — PANTOPRAZOLE SODIUM 40 MG: 40 TABLET, DELAYED RELEASE ORAL at 06:05

## 2021-01-01 RX ADMIN — CARBIDOPA, LEVODOPA, AND ENTACAPONE 1 TABLET: 25; 100; 200 TABLET, FILM COATED ORAL at 09:17

## 2021-01-01 RX ADMIN — ENOXAPARIN SODIUM 40 MG: 100 INJECTION SUBCUTANEOUS at 08:40

## 2021-01-01 RX ADMIN — DIPYRIDAMOLE 75 MG: 75 TABLET, FILM COATED ORAL at 09:58

## 2021-01-01 RX ADMIN — CARBIDOPA, LEVODOPA, AND ENTACAPONE 1 TABLET: 25; 100; 200 TABLET, FILM COATED ORAL at 15:05

## 2021-01-01 RX ADMIN — LEVETIRACETAM 500 MG: 100 INJECTION, SOLUTION INTRAVENOUS at 16:05

## 2021-01-01 RX ADMIN — ENOXAPARIN SODIUM 40 MG: 40 INJECTION SUBCUTANEOUS at 08:48

## 2021-01-01 RX ADMIN — PIPERACILLIN SODIUM AND TAZOBACTAM SODIUM 3375 MG: 3; .375 INJECTION, POWDER, LYOPHILIZED, FOR SOLUTION INTRAVENOUS at 10:47

## 2021-01-01 RX ADMIN — DIPYRIDAMOLE 75 MG: 75 TABLET, FILM COATED ORAL at 14:26

## 2021-01-01 RX ADMIN — ASPIRIN 81 MG: 81 TABLET, COATED ORAL at 17:02

## 2021-01-01 RX ADMIN — Medication 10 ML: at 09:58

## 2021-01-01 RX ADMIN — PIPERACILLIN SODIUM AND TAZOBACTAM SODIUM 3375 MG: 3; .375 INJECTION, POWDER, LYOPHILIZED, FOR SOLUTION INTRAVENOUS at 10:34

## 2021-01-01 RX ADMIN — ENOXAPARIN SODIUM 40 MG: 100 INJECTION SUBCUTANEOUS at 08:55

## 2021-01-01 RX ADMIN — FUROSEMIDE 20 MG: 10 INJECTION, SOLUTION INTRAMUSCULAR; INTRAVENOUS at 02:15

## 2021-01-01 RX ADMIN — CARBIDOPA, LEVODOPA, AND ENTACAPONE 1 TABLET: 25; 100; 200 TABLET, FILM COATED ORAL at 08:49

## 2021-01-01 RX ADMIN — SODIUM CHLORIDE: 9 INJECTION, SOLUTION INTRAVENOUS at 18:27

## 2021-01-01 RX ADMIN — Medication 10 ML: at 21:07

## 2021-01-01 RX ADMIN — ROSUVASTATIN CALCIUM 10 MG: 10 TABLET, FILM COATED ORAL at 21:24

## 2021-01-01 RX ADMIN — ACETAMINOPHEN 650 MG: 650 SUPPOSITORY RECTAL at 08:55

## 2021-01-01 RX ADMIN — PIPERACILLIN SODIUM AND TAZOBACTAM SODIUM 3375 MG: 3; .375 INJECTION, POWDER, LYOPHILIZED, FOR SOLUTION INTRAVENOUS at 18:20

## 2021-01-01 RX ADMIN — ENOXAPARIN SODIUM 40 MG: 100 INJECTION SUBCUTANEOUS at 08:56

## 2021-01-01 RX ADMIN — PIPERACILLIN SODIUM AND TAZOBACTAM SODIUM 3375 MG: 3; .375 INJECTION, POWDER, LYOPHILIZED, FOR SOLUTION INTRAVENOUS at 17:34

## 2021-01-01 RX ADMIN — SODIUM CHLORIDE: 9 INJECTION, SOLUTION INTRAVENOUS at 07:21

## 2021-01-01 RX ADMIN — LEVETIRACETAM 500 MG: 100 INJECTION, SOLUTION INTRAVENOUS at 22:05

## 2021-01-01 RX ADMIN — DIPYRIDAMOLE 75 MG: 75 TABLET, FILM COATED ORAL at 08:49

## 2021-01-01 RX ADMIN — PIPERACILLIN SODIUM AND TAZOBACTAM SODIUM 3375 MG: 3; .375 INJECTION, POWDER, LYOPHILIZED, FOR SOLUTION INTRAVENOUS at 02:23

## 2021-01-01 RX ADMIN — SODIUM CHLORIDE, PRESERVATIVE FREE 10 ML: 5 INJECTION INTRAVENOUS at 08:56

## 2021-01-01 RX ADMIN — GUAIFENESIN 600 MG: 600 TABLET ORAL at 02:20

## 2021-01-01 RX ADMIN — ENOXAPARIN SODIUM 40 MG: 100 INJECTION SUBCUTANEOUS at 16:15

## 2021-01-01 RX ADMIN — TAMSULOSIN HYDROCHLORIDE 0.4 MG: 0.4 CAPSULE ORAL at 20:52

## 2021-01-01 RX ADMIN — CARBIDOPA, LEVODOPA, AND ENTACAPONE 1 TABLET: 25; 100; 200 TABLET, FILM COATED ORAL at 14:26

## 2021-01-01 RX ADMIN — Medication 5 ML: at 09:47

## 2021-01-01 RX ADMIN — ASPIRIN 81 MG: 81 TABLET, COATED ORAL at 18:36

## 2021-01-01 RX ADMIN — Medication 10 ML: at 09:57

## 2021-01-01 RX ADMIN — Medication 10 ML: at 22:04

## 2021-01-01 RX ADMIN — TAMSULOSIN HYDROCHLORIDE 0.4 MG: 0.4 CAPSULE ORAL at 21:24

## 2021-01-01 RX ADMIN — CLOPIDOGREL BISULFATE 75 MG: 75 TABLET ORAL at 09:58

## 2021-01-01 RX ADMIN — CLOPIDOGREL BISULFATE 75 MG: 75 TABLET ORAL at 09:17

## 2021-01-01 RX ADMIN — PIPERACILLIN SODIUM AND TAZOBACTAM SODIUM 3375 MG: 3; .375 INJECTION, POWDER, LYOPHILIZED, FOR SOLUTION INTRAVENOUS at 10:42

## 2021-01-01 RX ADMIN — CLOPIDOGREL BISULFATE 75 MG: 75 TABLET ORAL at 09:57

## 2021-01-01 RX ADMIN — METOPROLOL TARTRATE 2.5 MG: 5 INJECTION INTRAVENOUS at 21:09

## 2021-01-01 RX ADMIN — ENOXAPARIN SODIUM 40 MG: 40 INJECTION SUBCUTANEOUS at 09:57

## 2021-01-01 RX ADMIN — PIPERACILLIN SODIUM AND TAZOBACTAM SODIUM 3375 MG: 3; .375 INJECTION, POWDER, LYOPHILIZED, FOR SOLUTION INTRAVENOUS at 02:41

## 2021-01-01 RX ADMIN — LEVETIRACETAM 500 MG: 100 INJECTION, SOLUTION INTRAVENOUS at 12:09

## 2021-01-01 RX ADMIN — IPRATROPIUM BROMIDE AND ALBUTEROL SULFATE 1 AMPULE: .5; 3 SOLUTION RESPIRATORY (INHALATION) at 10:01

## 2021-01-01 RX ADMIN — Medication 10 ML: at 09:17

## 2021-01-01 RX ADMIN — METOPROLOL SUCCINATE 25 MG: 25 TABLET, EXTENDED RELEASE ORAL at 08:49

## 2021-01-01 RX ADMIN — Medication 10 ML: at 20:27

## 2021-01-01 RX ADMIN — SODIUM CHLORIDE: 9 INJECTION, SOLUTION INTRAVENOUS at 08:39

## 2021-01-01 RX ADMIN — PIPERACILLIN SODIUM AND TAZOBACTAM SODIUM 3375 MG: 3; .375 INJECTION, POWDER, LYOPHILIZED, FOR SOLUTION INTRAVENOUS at 02:11

## 2021-01-01 RX ADMIN — ROSUVASTATIN CALCIUM 10 MG: 10 TABLET, FILM COATED ORAL at 20:52

## 2021-01-01 RX ADMIN — METOPROLOL SUCCINATE 25 MG: 25 TABLET, EXTENDED RELEASE ORAL at 09:57

## 2021-01-01 RX ADMIN — PIPERACILLIN SODIUM AND TAZOBACTAM SODIUM 3375 MG: 3; .375 INJECTION, POWDER, LYOPHILIZED, FOR SOLUTION INTRAVENOUS at 18:30

## 2021-01-01 RX ADMIN — GUAIFENESIN 600 MG: 600 TABLET ORAL at 08:51

## 2021-01-01 RX ADMIN — VANCOMYCIN HYDROCHLORIDE 1000 MG: 1 INJECTION, POWDER, LYOPHILIZED, FOR SOLUTION INTRAVENOUS at 10:43

## 2021-01-01 RX ADMIN — CARBIDOPA, LEVODOPA, AND ENTACAPONE 1 TABLET: 25; 100; 200 TABLET, FILM COATED ORAL at 20:52

## 2021-01-01 RX ADMIN — SODIUM CHLORIDE, PRESERVATIVE FREE 10 ML: 5 INJECTION INTRAVENOUS at 21:06

## 2021-01-01 RX ADMIN — METOPROLOL TARTRATE 2.5 MG: 5 INJECTION INTRAVENOUS at 21:06

## 2021-01-01 RX ADMIN — SODIUM CHLORIDE 1000 ML: 9 INJECTION, SOLUTION INTRAVENOUS at 09:59

## 2021-01-01 RX ADMIN — PIPERACILLIN AND TAZOBACTAM 3375 MG: 3; .375 INJECTION, POWDER, LYOPHILIZED, FOR SOLUTION INTRAVENOUS at 11:51

## 2021-01-01 RX ADMIN — ASCORBIC ACID, VITAMIN A PALMITATE, CHOLECALCIFEROL, THIAMINE HYDROCHLORIDE, RIBOFLAVIN-5 PHOSPHATE SODIUM, PYRIDOXINE HYDROCHLORIDE, NIACINAMIDE, DEXPANTHENOL, ALPHA-TOCOPHEROL ACETATE, VITAMIN K1, FOLIC ACID, BIOTIN, CYANOCOBALAMIN: 200; 3300; 200; 6; 3.6; 6; 40; 15; 10; 150; 600; 60; 5 INJECTION, SOLUTION INTRAVENOUS at 18:46

## 2021-01-01 RX ADMIN — PANTOPRAZOLE SODIUM 40 MG: 40 INJECTION, POWDER, FOR SOLUTION INTRAVENOUS at 08:56

## 2021-01-01 RX ADMIN — FINASTERIDE 5 MG: 5 TABLET, FILM COATED ORAL at 20:52

## 2021-01-01 RX ADMIN — METOPROLOL TARTRATE 2.5 MG: 5 INJECTION INTRAVENOUS at 02:22

## 2021-01-01 RX ADMIN — LEVETIRACETAM 500 MG: 100 INJECTION, SOLUTION INTRAVENOUS at 16:40

## 2021-01-01 RX ADMIN — Medication 5 ML: at 19:35

## 2021-01-01 RX ADMIN — CARBIDOPA, LEVODOPA, AND ENTACAPONE 1 TABLET: 25; 100; 200 TABLET, FILM COATED ORAL at 09:58

## 2021-01-01 RX ADMIN — CLOPIDOGREL BISULFATE 75 MG: 75 TABLET ORAL at 08:49

## 2021-01-01 RX ADMIN — SODIUM CHLORIDE, PRESERVATIVE FREE 10 ML: 5 INJECTION INTRAVENOUS at 08:55

## 2021-01-01 RX ADMIN — PANTOPRAZOLE SODIUM 40 MG: 40 INJECTION, POWDER, FOR SOLUTION INTRAVENOUS at 08:55

## 2021-01-01 RX ADMIN — LEVETIRACETAM 500 MG: 500 TABLET ORAL at 12:21

## 2021-01-01 RX ADMIN — LEVETIRACETAM 500 MG: 100 INJECTION, SOLUTION INTRAVENOUS at 23:39

## 2021-01-01 RX ADMIN — ENOXAPARIN SODIUM 40 MG: 100 INJECTION SUBCUTANEOUS at 09:46

## 2021-01-01 RX ADMIN — DIPYRIDAMOLE 75 MG: 75 TABLET, FILM COATED ORAL at 21:24

## 2021-01-01 RX ADMIN — PIPERACILLIN SODIUM AND TAZOBACTAM SODIUM 3375 MG: 3; .375 INJECTION, POWDER, LYOPHILIZED, FOR SOLUTION INTRAVENOUS at 09:46

## 2021-01-01 RX ADMIN — PANTOPRAZOLE SODIUM 40 MG: 40 TABLET, DELAYED RELEASE ORAL at 05:40

## 2021-01-01 RX ADMIN — DIPYRIDAMOLE 75 MG: 75 TABLET, FILM COATED ORAL at 13:44

## 2021-01-01 RX ADMIN — Medication 10 ML: at 21:24

## 2021-01-01 RX ADMIN — ACETAMINOPHEN 650 MG: 650 SUPPOSITORY RECTAL at 18:20

## 2021-01-01 RX ADMIN — LEVETIRACETAM 500 MG: 100 INJECTION, SOLUTION INTRAVENOUS at 03:15

## 2021-01-01 RX ADMIN — METOPROLOL SUCCINATE 25 MG: 25 TABLET, EXTENDED RELEASE ORAL at 21:24

## 2021-01-01 ASSESSMENT — PAIN DESCRIPTION - LOCATION: LOCATION: ARM

## 2021-01-01 ASSESSMENT — PAIN SCALES - WONG BAKER

## 2021-01-01 ASSESSMENT — ENCOUNTER SYMPTOMS
SHORTNESS OF BREATH: 0
SHORTNESS OF BREATH: 1
COUGH: 0
WHEEZING: 0
TROUBLE SWALLOWING: 0
APNEA: 0
ABDOMINAL DISTENTION: 0
RESPIRATORY NEGATIVE: 1
CHOKING: 0
COUGH: 0
COUGH: 1
ALLERGIC/IMMUNOLOGIC NEGATIVE: 1
WHEEZING: 0
VOMITING: 0
NAUSEA: 0
SORE THROAT: 0
BACK PAIN: 0
WHEEZING: 0
SHORTNESS OF BREATH: 0
WHEEZING: 0
RESPIRATORY NEGATIVE: 1
ABDOMINAL PAIN: 0
SHORTNESS OF BREATH: 0
ABDOMINAL DISTENTION: 0
GASTROINTESTINAL NEGATIVE: 1
TROUBLE SWALLOWING: 0
SHORTNESS OF BREATH: 0
ABDOMINAL DISTENTION: 0
SHORTNESS OF BREATH: 0
SHORTNESS OF BREATH: 1
NAUSEA: 0
ABDOMINAL PAIN: 0
TROUBLE SWALLOWING: 0
STRIDOR: 0
CHEST TIGHTNESS: 0
SHORTNESS OF BREATH: 0
COUGH: 0
VOMITING: 0
SINUS PRESSURE: 0
NAUSEA: 0
DIARRHEA: 0
COLOR CHANGE: 0
EYES NEGATIVE: 1
SHORTNESS OF BREATH: 0
VOMITING: 0
TROUBLE SWALLOWING: 0
COLOR CHANGE: 0
COUGH: 0
SHORTNESS OF BREATH: 1
COLOR CHANGE: 0
ABDOMINAL PAIN: 0
VOMITING: 0
RHINORRHEA: 0
COLOR CHANGE: 0
CHEST TIGHTNESS: 0
NAUSEA: 0
WHEEZING: 0
COLOR CHANGE: 0
CONSTIPATION: 0
COLOR CHANGE: 0
WHEEZING: 0
VOMITING: 0
GASTROINTESTINAL NEGATIVE: 1
VOMITING: 0
CHEST TIGHTNESS: 0
COUGH: 0
COLOR CHANGE: 0
WHEEZING: 0
COLOR CHANGE: 0
EYE DISCHARGE: 0
SHORTNESS OF BREATH: 1
PHOTOPHOBIA: 0
APNEA: 0
COLOR CHANGE: 0
TROUBLE SWALLOWING: 0
NAUSEA: 0
SORE THROAT: 0
DIARRHEA: 0
VOMITING: 0
SHORTNESS OF BREATH: 0
COUGH: 0
SHORTNESS OF BREATH: 0
BACK PAIN: 0
VOMITING: 0
COUGH: 0
VOMITING: 0
TROUBLE SWALLOWING: 0
COLOR CHANGE: 0
NAUSEA: 0
BACK PAIN: 0
VOMITING: 0
PHOTOPHOBIA: 0
TROUBLE SWALLOWING: 0
COLOR CHANGE: 0
TROUBLE SWALLOWING: 0
EYES NEGATIVE: 1
TROUBLE SWALLOWING: 0
CHOKING: 0
ALLERGIC/IMMUNOLOGIC NEGATIVE: 1
ABDOMINAL DISTENTION: 0
SHORTNESS OF BREATH: 0
COLOR CHANGE: 0
RHINORRHEA: 0
CONSTIPATION: 0
EYE DISCHARGE: 0

## 2021-01-01 ASSESSMENT — PAIN SCALES - GENERAL
PAINLEVEL_OUTOF10: 0
PAINLEVEL_OUTOF10: 5
PAINLEVEL_OUTOF10: 0
PAINLEVEL_OUTOF10: 5
PAINLEVEL_OUTOF10: 0

## 2021-01-01 ASSESSMENT — PATIENT HEALTH QUESTIONNAIRE - PHQ9
1. LITTLE INTEREST OR PLEASURE IN DOING THINGS: NOT AT ALL
SUM OF ALL RESPONSES TO PHQ9 QUESTIONS 1 & 2: 0
SUM OF ALL RESPONSES TO PHQ QUESTIONS 1-9: 2
SUM OF ALL RESPONSES TO PHQ QUESTIONS 1-9: 2
2. FEELING DOWN, DEPRESSED OR HOPELESS: NOT AT ALL
DEPRESSION UNABLE TO ASSESS: YES

## 2021-01-01 ASSESSMENT — PAIN DESCRIPTION - ORIENTATION: ORIENTATION: RIGHT

## 2021-01-01 ASSESSMENT — PAIN DESCRIPTION - PAIN TYPE: TYPE: ACUTE PAIN

## 2021-01-05 PROBLEM — R06.02 SHORTNESS OF BREATH: Status: ACTIVE | Noted: 2021-01-01

## 2021-01-05 NOTE — TELEPHONE ENCOUNTER
Reason for Disposition   MODERATE weakness (i.e., interferes with work, school, normal activities) and cause unknown (Exceptions: weakness with acute minor illness, or weakness from poor fluid intake)    Answer Assessment - Initial Assessment Questions  1. DESCRIPTION: \"Describe how you are feeling. \"      Tired and feeling like he can't catch his breath   2. SEVERITY: \"How bad is it? \"  \"Can you stand and walk? \"    - MILD - Feels weak or tired, but does not interfere with work, school or normal activities    - [facility] to stand and walk; weakness interferes with work, school, or normal activities    - SEVERE - Unable to stand or walk      Moderate   3. ONSET:  \"When did the weakness begin? \"      This morning   4. CAUSE: \"What do you think is causing the weakness? \"     Unknown   5. MEDICINES: Lynheatha Ivorian you recently started a new medicine or had a change in the amount of a medicine? \"       no  6. OTHER SYMPTOMS: \"Do you have any other symptoms? \" (e.g., chest pain, fever, cough, SOB, vomiting, diarrhea, bleeding, other areas of pain)      no  7. PREGNANCY: \"Is there any chance you are pregnant? \" \"When was your last menstrual period? \"      no    Protocols used: WEAKNESS (GENERALIZED) AND FATIGUE-ADULT-OH    Patient called pre-service center [facility] with red flag complaint. Brief description of triage: pt is feeling weak not acting like himself - wants to sleep constantly. Wife notes he says he \"needs more air\" but isn't having any SOB or gasping . PCP office full - no available appointment, sent to walk in clinic at PCP office     Triage indicates for patient to go to office now    Care advice provided, patient verbalizes understanding; denies any other questions or concerns; instructed to call back for any new or worsening symptoms. Attention Provider: Thank you for allowing me to participate in the care of your patient.  The patient was connected to triage in response to information provided to the ECC. Please do not respond through this encounter as the response is not directed to a shared pool.

## 2021-01-05 NOTE — ED TRIAGE NOTES
Pt c/o SOB and general weakness for the past two days, Pt is A&OX3, calm, afebrile, breathes are equal and unlabored,

## 2021-01-05 NOTE — ED PROVIDER NOTES
[address] [facility] ED  eMERGENCY dEPARTMENT eNCOUnter      Pt Name: Soha Qiu  MRN: [id number]  [place] 1945  Date of evaluation: 1/5/2021  Provider: Law Prado PA-C    CHIEF COMPLAINT       Chief Complaint   Patient presents with    Shortness of Breath     pt c/o SOB and general weakness for the past two days         HISTORY OF PRESENT ILLNESS   (Location/Symptom, Timing/Onset,Context/Setting, Quality, Duration, Modifying Factors, Severity)  Note limiting factors. Soha Qiu is a 77 y.o. male who presents to the emergency department with a complaint of shortness of breath and generalized weakness which patient states been ongoing for last 2 days. He denies any fevers, no nausea vomiting, no chest pain. He has a cough which is dry nonproductive. He states he has not come in contact with anybody known to have the coronavirus. Wife states that he seems to be a little weaker than normal, he is unsteady on his feet, she states unsteady as not necessarily abnormal for him but he seems to be worse than his norm. He is eating and drinking well. He has no urinary complaints, no constipation or diarrhea. He denies any pain at the time of my evaluation. Past medical history significant for bradycardia, coronary disease, cancer, osteoarthritis, parathyroid tumor ocular dementia. HPI    NursingNotes were reviewed. REVIEW OF SYSTEMS    (2-9 systems for level 4, 10 or more for level 5)     Review of Systems   Constitutional: Negative for activity change and appetite change. HENT: Negative for congestion, ear discharge, ear pain, nosebleeds, rhinorrhea and sore throat. Eyes: Negative for discharge. Respiratory: Positive for cough and shortness of breath. Negative for wheezing and stridor. Cardiovascular: Negative for chest pain, palpitations and leg swelling. Gastrointestinal: Negative for abdominal distention, abdominal pain and constipation. ASCORBIC ACID (VITAMIN C) 500 MG TABLET    Take 500 mg by mouth daily. ASPIRIN 81 MG TABLET    Take 81 mg by mouth every evening     CALCIUM CARBONATE 600 MG TABS TABLET    Take 1 tablet by mouth daily     CHOLECALCIFEROL (VITAMIN D3) 1000 UNITS TABS    Take 1,900 Units by mouth daily     CLOPIDOGREL (PLAVIX) 75 MG TABLET    Take 1 tablet by mouth daily    DIPYRIDAMOLE (PERSANTINE) 75 MG TABLET    Take 1 tablet by mouth 3 times daily    FINASTERIDE (PROSCAR) 5 MG TABLET    TAKE 1 TABLET DAILY    FISH OIL-OMEGA-3 FATTY ACIDS 1000 MG CAPSULE    Take 2 g by mouth 2 times daily. HANDICAP PLACARD MISC    by Does not apply route Good for five years    HANDICAP PLACARD MISC    by Does not apply route Good for five years    MAGNESIUM 250 MG TABS    Take by mouth daily Every Monday Wednesday Friday Sunday    MEMANTINE (NAMENDA) 10 MG TABLET    Take 1 tablet by mouth 2 times daily    METOPROLOL SUCCINATE (TOPROL XL) 25 MG EXTENDED RELEASE TABLET    TAKE 1 TABLET DAILY    MULTIPLE VITAMIN (MULTI-VITAMIN PO)    Take 1 tablet by mouth nightly     NITROGLYCERIN (NITROSTAT) 0.4 MG SL TABLET    up to max of 3 total doses. If no relief after 1 dose, call 911.     OMEPRAZOLE (PRILOSEC) 20 MG DELAYED RELEASE CAPSULE    TAKE 1 CAPSULE DAILY    ONDANSETRON (ZOFRAN-ODT) 4 MG DISINTEGRATING TABLET    Take 1 tablet by mouth 3 times daily as needed for Nausea or Vomiting    PSYLLIUM (METAMUCIL FIBER PO)    Take by mouth    ROSUVASTATIN (CRESTOR) 10 MG TABLET    TAKE 1 TABLET DAILY    SUMATRIPTAN (IMITREX) 100 MG TABLET        TAMSULOSIN (FLOMAX) 0.4 MG CAPSULE    TAKE 1 CAPSULE DAILY       ALLERGIES     Amoxicillin    FAMILY HISTORY       Family History   Problem Relation Age of Onset    Cancer Mother         OVARIAN    Heart Disease Father           SOCIAL HISTORY       Social History     Socioeconomic History    Marital status:      Spouse name: None    Number of children: None    Years of education: None  Highest education level: None   Occupational History    None   Social Needs    Financial resource strain: None    Food insecurity     Worry: None     Inability: None    Transportation needs     Medical: None     Non-medical: None   Tobacco Use    Smoking status: Former Smoker     Packs/day: 1.00     Years: 1.00     Pack years: 1.00     Types: Cigarettes     Quit date: 1/15/1969     Years since quittin.0    Smokeless tobacco: Never Used    Tobacco comment: Quit smoking > 30 years ago   Substance and Sexual Activity    Alcohol use: No    Drug use: No    Sexual activity: Not Currently     Partners: Female   Lifestyle    Physical activity     Days per week: None     Minutes per session: None    Stress: None   Relationships    Social connections     Talks on phone: None     Gets together: None     Attends Restorationism service: None     Active member of club or organization: None     Attends meetings of clubs or organizations: None     Relationship status: None    Intimate partner violence     Fear of current or ex partner: None     Emotionally abused: None     Physically abused: None     Forced sexual activity: None   Other Topics Concern    None   Social History Narrative         Lives With: Spouse    Type of Home: House-[facility] [place] [id number] [address]: Two level(reports he can stay on 1st floor with bedroom and bathroom though currently he stays upstairs)    Home Access: (Pt unable to state)    Bathroom Shower/Tub: Walk-in shower    Bathroom Equipment: Shower chair, Grab bars in shower    Home Equipment: Rolling walker    ADL Assistance: Independent    Homemaking Assistance: Needs assistance    Ambulation Assistance: Independent  (no AD)    Transfer Assistance: Independent    Active : No    Additional Comments: pt is questionable historian, he is unsure of what equipment he has at home or if he was using any       SCREENINGS      @FLOW([id number])@      PHYSICAL EXAM (up to 7 for level 4, 8 or more for level 5)     ED Triage Vitals [01/05/21 1415]   BP Temp Temp Source Pulse Resp SpO2 Height Weight   138/79 97.7 °F (36.5 °C) Oral 65 18 97 % 5' 9\" (1.753 m) 175 lb (79.4 kg)       Physical Exam  Vitals signs and nursing note reviewed. Constitutional:       General: He is not in acute distress. Appearance: He is well-developed. He is not ill-appearing, toxic-appearing or diaphoretic. HENT:      Head: Normocephalic. Nose: No congestion. Mouth/Throat:      Mouth: Mucous membranes are moist.      Pharynx: No oropharyngeal exudate or posterior oropharyngeal erythema. Eyes:      Extraocular Movements: Extraocular movements intact. Conjunctiva/sclera: Conjunctivae normal.      Pupils: Pupils are equal, round, and reactive to light. Neck:      Musculoskeletal: Normal range of motion and neck supple. No neck rigidity. Vascular: No JVD. Trachea: No tracheal deviation. Cardiovascular:      Rate and Rhythm: Normal rate. Pulses: Normal pulses. Heart sounds: Normal heart sounds. No murmur. No friction rub. No gallop. Pulmonary:      Effort: Pulmonary effort is normal. No tachypnea, accessory muscle usage, respiratory distress or retractions. Breath sounds: Normal breath sounds. No stridor. No wheezing, rhonchi or rales. Comments: Sounds are clear in all fields, there is no wheezes rales or rhonchi, no accessory muscle use, no retractions, room air saturations are 97%  Chest:      Chest wall: No tenderness. Abdominal:      General: Abdomen is flat. Bowel sounds are normal. There is no distension or abdominal bruit. Palpations: There is no shifting dullness, fluid wave, hepatomegaly, splenomegaly, mass or pulsatile mass. Tenderness: There is no abdominal tenderness. There is no right CVA tenderness, left CVA tenderness, guarding or rebound. Negative signs include Duran's sign, Rovsing's sign and McBurney's sign. Musculoskeletal:         General: No deformity. Right lower leg: No edema. Left lower leg: No edema. Skin:     General: Skin is warm and dry. Capillary Refill: Capillary refill takes less than 2 seconds. Coloration: Skin is not jaundiced. Neurological:      General: No focal deficit present. Mental Status: He is alert and oriented to person, place, and time. Mental status is at baseline. Cranial Nerves: No cranial nerve deficit. Sensory: No sensory deficit. Motor: No weakness. Coordination: Coordination normal.   Psychiatric:         Mood and Affect: Mood normal.         DIAGNOSTIC RESULTS     EKG: All EKG's are interpreted by the Emergency Department Physician who either signs or Co-signsthis chart in the absence of a cardiologist.    EKG shows sinus bradycardia with occasional premature atrial complexes at 55 bpm there is T wave inversions in leads V1 no other acute ST segment abnormality no ventricular ectopy QTC is 411 ms    RADIOLOGY:   Non-plain filmimages such as CT, Ultrasound and MRI are read by the radiologist. Plain radiographic images are visualized and preliminarily interpreted by the emergency physician with the below findings:        Interpretation per the Radiologist below, if available at the time ofthis note:    XR CHEST PORTABLE   Final Result   NO ACUTE CARDIOPULMONARY DISEASE.             ED BEDSIDE ULTRASOUND:   Performed by ED Physician - none    LABS:  Labs Reviewed   COMPREHENSIVE METABOLIC PANEL - Abnormal; Notable for the following components:       Result Value    Anion Gap 6 (*)     Alb 4.8 (*)     Globulin 2.1 (*)     All other components within normal limits   CBC WITH AUTO DIFFERENTIAL - Abnormal; Notable for the following components:    Hemoglobin 13.4 (*)     Hematocrit 41.7 (*)     MCH 26.8 (*)     MCHC 32.1 (*)     RDW 17.6 (*)     Lymphocytes Absolute 0.8 (*)     All other components within normal limits   CULTURE, BLOOD 2 PATIENT REFERRED TO:  Nicola Potter MD  [address] [address]  [phone]    In 2 days        DISCHARGE MEDICATIONS:  New Prescriptions    No medications on file          (Please note that portions of this note were completed with a voice recognition program.  Efforts were made to edit the dictations but occasionally words are mis-transcribed.)    Jose Miguel Guerrero PA-C (electronically signed)  Attending Emergency Physician         Jose Miguel Guerrero PA-C  01/05/21 1645

## 2021-01-05 NOTE — ED NOTES
Helped pt with urinal  Gave pt something to eat and drink     [address] [facility] TIFFANY, RN  01/05/21 9240

## 2021-01-05 NOTE — PROGRESS NOTES
Constitutional: Positive for fatigue. Negative for activity change, appetite change, chills, fever and unexpected weight change. HENT: Negative for drooling, ear pain, nosebleeds, sinus pressure and trouble swallowing. Respiratory: Positive for shortness of breath. Negative for cough and chest tightness. Cardiovascular: Negative for chest pain and leg swelling. Gastrointestinal: Negative for abdominal pain, diarrhea and vomiting. Endocrine: Negative for polydipsia and polyphagia. Genitourinary: Negative for dysuria, flank pain and frequency. Musculoskeletal: Negative for back pain and myalgias. Skin: Negative for pallor and rash. Neurological: Negative for syncope, weakness and headaches. Hematological: Does not bruise/bleed easily. Psychiatric/Behavioral: Negative for agitation, behavioral problems and confusion. All other systems reviewed and are negative. Prior to Visit Medications    Medication Sig Taking? Authorizing Provider   memantine (NAMENDA) 10 MG tablet Take 1 tablet by mouth 2 times daily  Mika Isaacs MD   dipyridamole (PERSANTINE) 75 MG tablet Take 1 tablet by mouth 3 times daily  Mika Isaacs MD   clopidogrel (PLAVIX) 75 MG tablet Take 1 tablet by mouth daily  Mika Isaacs MD   Handicap Placard MISC by Does not apply route Good for five years  MD Terry Arredondo by Does not apply route Good for five years  Fanny Hayes MD   rosuvastatin (CRESTOR) 10 MG tablet TAKE 1 TABLET DAILY  Shilo Sol, APRN - CNP   tamsulosin (FLOMAX) 0.4 MG capsule TAKE 1 CAPSULE DAILY  Harvrosita Sol, APRN - CNP   omeprazole (PRILOSEC) 20 MG delayed release capsule TAKE 1 CAPSULE DAILY  Harvy Sol, APRN - CNP   finasteride (PROSCAR) 5 MG tablet TAKE 1 TABLET DAILY  Harvy Sol, APRN - CNP   nitroGLYCERIN (NITROSTAT) 0.4 MG SL tablet up to max of 3 total doses. If no relief after 1 dose, call 911.   Med HOLMAN Holmaite, DO  BACK SURGERY  1993    CHOLECYSTECTOMY, LAPAROSCOPIC N/A 2020    LAP BRAIN/ CHOLANGIOGRAMS performed by Patricio Bumpers, MD at [place] [address]  8/18/10,2012    DR Jam Gerber    COLONOSCOPY  14    DR. PIERRE    JOINT REPLACEMENT Left 13    total    PARATHYROIDECTOMY Bilateral     2 of 4 glands removed    CO COLON CA SCRN NOT HI RSK IND N/A 9/15/2017    COLONOSCOPY performed by Mina Morton MD at [facility] ENDOSCOPY  09    DR POLK     Social History     Socioeconomic History    Marital status:      Spouse name: Not on file    Number of children: Not on file    Years of education: Not on file    Highest education level: Not on file   Occupational History    Not on file   Social Needs    Financial resource strain: Not on file    Food insecurity     Worry: Not on file     Inability: Not on file   [employer] needs     Medical: Not on file     Non-medical: Not on file   Tobacco Use    Smoking status: Former Smoker     Packs/day: 1.00     Years: 1.00     Pack years: 1.00     Types: Cigarettes     Quit date: 1/15/1969     Years since quittin.0    Smokeless tobacco: Never Used    Tobacco comment: Quit smoking > 30 years ago   Substance and Sexual Activity    Alcohol use: No    Drug use: No    Sexual activity: Not Currently     Partners: Female   Lifestyle    Physical activity     Days per week: Not on file     Minutes per session: Not on file    Stress: Not on file   Relationships    Social connections     Talks on phone: Not on file     Gets together: Not on file     Attends Buddhist service: Not on file     Active member of club or organization: Not on file     Attends meetings of clubs or organizations: Not on file     Relationship status: Not on file    Intimate partner violence     Fear of current or ex partner: Not on file Pulmonary/Chest: [x] Respiratory effort normal.  [x] No visualized signs of difficulty breathing or respiratory distress        [] Abnormal-      Musculoskeletal:   [x] Normal gait with no signs of ataxia         [x] Normal range of motion of neck        [] Abnormal-       Neurological:       [x] No Facial Asymmetry (Cranial nerve 7 motor function) (limited exam to video visit)          [x] No gaze palsy        [] Abnormal-         Skin:        [x] No significant exanthematous lesions or discoloration noted on facial skin         [] Abnormal-            Psychiatric:       [x] Normal Affect [x] No Hallucinations        [] Abnormal-     Other pertinent observable physical exam findings-   Results for orders placed or performed during the hospital encounter of 11/03/20   Comprehensive Metabolic Panel   Result Value Ref Range    Sodium 140 135 - 144 mEq/L    Potassium 4.8 3.4 - 4.9 mEq/L    Chloride 104 95 - 107 mEq/L    CO2 25 20 - 31 mEq/L    Anion Gap 11 9 - 15 mEq/L    Glucose 89 70 - 99 mg/dL    BUN 14 8 - 23 mg/dL    CREATININE 0.92 0.70 - 1.20 mg/dL    GFR Non-African American >60.0 >60    GFR  >60.0 >60    Calcium 8.9 8.5 - 9.9 mg/dL    Total Protein 7.4 6.3 - 8.0 g/dL    Alb 4.5 3.5 - 4.6 g/dL    Total Bilirubin 0.3 0.2 - 0.7 mg/dL    Alkaline Phosphatase 45 35 - 104 U/L    ALT 16 0 - 41 U/L    AST 44 (H) 0 - 40 U/L    Globulin 2.9 2.3 - 3.5 g/dL   CBC Auto Differential   Result Value Ref Range    WBC 4.9 4.8 - 10.8 K/uL    RBC 5.02 4.70 - 6.10 M/uL    Hemoglobin 13.5 (L) 14.0 - 18.0 g/dL    Hematocrit 41.6 (L) 42.0 - 52.0 %    MCV 82.9 80.0 - 100.0 fL    MCH 26.8 (L) 27.0 - 31.3 pg    MCHC 32.3 (L) 33.0 - 37.0 %    RDW 15.7 (H) 11.5 - 14.5 %    Platelets 672 712 - 185 K/uL    Neutrophils % 62.6 %    Lymphocytes % 23.5 %    Monocytes % 11.8 %    Eosinophils % 1.2 %    Basophils % 0.9 %    Neutrophils Absolute 3.1 1.4 - 6.5 K/uL    Lymphocytes Absolute 1.1 1.0 - 4.8 K/uL Monocytes Absolute 0.6 0.2 - 0.8 K/uL    Eosinophils Absolute 0.1 0.0 - 0.7 K/uL    Basophils Absolute 0.0 0.0 - 0.2 K/uL   Troponin   Result Value Ref Range    Troponin <0.010 0.000 - 0.010 ng/mL   Magnesium   Result Value Ref Range    Magnesium 2.3 1.7 - 2.4 mg/dL   Urine Drug Screen   Result Value Ref Range    Amphetamine Screen, Urine Neg Negative <1000 ng/mL    Barbiturate Screen, Ur Neg Negative < 200 ng/mL    Benzodiazepine Screen, Urine Neg Negative < 200 ng/mL    Cannabinoid Scrn, Ur Neg Negative < 50 ng/mL    Cocaine Metabolite Screen, Urine Neg Negative < 300 ng/mL    Opiate Scrn, Ur Neg Negative < 300 ng/mL    PCP Screen, Urine Neg Negative < 25 ng/mL    Methadone Screen, Urine Neg Negative <300 ng/mL    Propoxyphene Scrn, Ur Neg Negative <300 ng/mL    Oxycodone Urine Neg Negative <100 ng/mL    Drug Screen Comment: see below    Urine Reflex to Culture    Specimen: Urine, clean catch   Result Value Ref Range    Color, UA DARK YELLOW (A) Straw/Yellow    Clarity, UA Clear Clear    Glucose, Ur Negative Negative mg/dL    Bilirubin Urine Negative Negative    Ketones, Urine TRACE (A) Negative mg/dL    Specific Gravity, UA 1.025 1.005 - 1.030    Blood, Urine Negative Negative    pH, UA 5.0 5.0 - 9.0    Protein, UA Negative Negative mg/dL    Urobilinogen, Urine 0.2 <2.0 E.U./dL    Nitrite, Urine Negative Negative    Leukocyte Esterase, Urine Negative Negative    Urine Reflex to Culture Not Indicated    EKG 12 Lead - Chest Pain   Result Value Ref Range    Ventricular Rate 66 BPM    Atrial Rate 66 BPM    P-R Interval 120 ms    QRS Duration 76 ms    Q-T Interval 418 ms    QTc Calculation (Bazett) 438 ms    P Axis 71 degrees    R Axis 60 degrees    T Axis 65 degrees       ASSESSMENT/PLAN:  Assessment & Plan   Diagnoses and all orders for this visit:    Shortness of breath    Dyspnea, unspecified type The patient is complaining of severe shortness of breath with speech and minimal exertion. He can only speak one or two words before having to stop and catch his breath. The patient was instructed to go to the ED for evaluation. He is severely short of breath and complains of feeling tired from it. Due to the severity of his shortness of breath, the patient was instructed to go to the ED. He is in agreement and wanted to go    No orders of the defined types were placed in this encounter. No orders of the defined types were placed in this encounter. There are no discontinued medications. Return if symptoms worsen or fail to improve. Reviewed with the patient: current clinical status, medications, activities and diet. Side effects, adverse effects of the medication prescribed today, as well as treatment plan/ rationale and result expectations have been discussed with the patient who expresses understanding and desires to proceed. Close follow up to evaluate treatment results and for coordination of care. I have reviewed the patient's medical history in detail and updated the computerized patient record. Patient identification was verified at the start of the visit: Yes    Total time spent on this encounter: Not billed by time      --GAYLA Swenson on 1/5/2021 at 2:02 PM    An electronic signature was used to authenticate this note.

## 2021-01-11 NOTE — PROGRESS NOTES
Patient is seen in follow up for   Chief Complaint   Patient presents with    Other     Was in the ED for viral URI and fatigue on 1/5. States that he's not feeling better. Still tired, SOB when walking at all. If he keeps walking has pain in the chest and pressure that changes. No fevers. Eating OK. HPIsob and fatigue. multiple complaints     Past Medical History:   Diagnosis Date    Abnormal cardiovascular stress test 4/19/2016    Equivocal ST-T wave changes;  Defect in the inferior wall consistent with prior infarction; LV EF 79%; TID ratio 1.1    Actinic keratoses     BPH (benign prostatic hypertrophy)     CAD (coronary artery disease)     Cancer (HCC)     COLON    Chest pressure 5/3/2016    Cholelithiasis     History of colon cancer     s/p partial colectomy in 2009    Hyperlipidemia     Inflamed seborrheic keratosis     Lumbar radiculopathy 10/26/2016    Mild cognitive impairment     Osteoarthritis     Parathyroid tumor     [facility] disease ([facility]     RA (retrograde amnesia)     Sinus bradycardia on ECG 4/19/2016    Done 4/5/16 with Dr. Ninfa Burton    Syncope and collapse     Vascular dementia [facility])      Patient Active Problem List    Diagnosis Date Noted    Shortness of breath 01/05/2021    Vascular dementia with behavior disturbance ([facility] 08/17/2020    Ataxic gait 07/22/2020    Stroke-like episode 59/02/8348    Biliary colic     Chest pain 51/32/7451    Parkinson disease ([facility]     RA (retrograde amnesia)     Syncope and collapse     Mild cognitive impairment     TIA (transient ischemic attack) 08/01/2019    PETRA (obstructive sleep apnea)     Actinic keratoses     Inflamed seborrheic keratosis     Gastroesophageal reflux disease with esophagitis 11/15/2016    Lumbar radiculopathy 10/26/2016    Sinus bradycardia on ECG 04/19/2016    Disturbance of skin sensation 07/22/2015    Seborrheic keratosis 01/13/2014    History of colon cancer 01/13/2014  History of repair of hip joint 10/21/2013    History of hip replacement, total 2013    Degenerative joint disease of pelvic region 2013    Degenerative arthritis of lumbar spine 2013    Foot drop, left 2013    CAD (coronary artery disease)     Benign prostatic hyperplasia     Cholelithiasis     Hyperlipidemia 2012    Osteopenia 2012     Past Surgical History:   Procedure Laterality Date    BACK SURGERY      CHOLECYSTECTOMY, LAPAROSCOPIC N/A 2020    LAP BRAIN/ CHOLANGIOGRAMS performed by Kecia Izaguirre MD at [place] [address]  8/18/10,2012    DR Marcelina Boothe    COLONOSCOPY  14      [facility]    JOINT REPLACEMENT Left 13    total    PARATHYROIDECTOMY Bilateral     2 of 4 glands removed    PA COLON CA SCRN NOT HI RSK IND N/A 9/15/2017    COLONOSCOPY performed by Pam Solitario MD at [address] [place] [address],[address] ENDOSCOPY  09    DR POLK     Family History   Problem Relation Age of Onset    Cancer Mother         OVARIAN    Heart Disease Father      Social History     Socioeconomic History    Marital status:      Spouse name: None    Number of children: None    Years of education: None    Highest education level: None   Occupational History    None   Social Needs    Financial resource strain: None    Food insecurity     Worry: None     Inability: None    Transportation needs     Medical: None     Non-medical: None   Tobacco Use    Smoking status: Former Smoker     Packs/day: 1.00     Years: 1.00     Pack years: 1.00     Types: Cigarettes     Quit date: 1/15/1969     Years since quittin.0    Smokeless tobacco: Never Used    Tobacco comment: Quit smoking > 30 years ago   Substance and Sexual Activity    Alcohol use: No    Drug use: No    Sexual activity: Not Currently     Partners: Female   Lifestyle    Physical activity Days per week: None     Minutes per session: None    Stress: None   Relationships    Social connections     Talks on phone: None     Gets together: None     Attends [employer] service: None     Active member of club or organization: None     Attends meetings of clubs or organizations: None     Relationship status: None    Intimate partner violence     Fear of current or ex partner: None     Emotionally abused: None     Physically abused: None     Forced sexual activity: None   Other Topics Concern    None   Social History Narrative         Lives With: Spouse    Type of Home: [address] [place] [address] [place]: Two level(reports he can stay on 1st floor with bedroom and bathroom though currently he stays upstairs)    Home Access: (Pt unable to state)    Bathroom Shower/Tub: Walk-in shower    Bathroom Equipment: Shower chair, Grab bars in shower    Home Equipment: Rolling walker    ADL Assistance: Independent    Homemaking Assistance: Needs assistance    Ambulation Assistance: Independent  (no AD)    Transfer Assistance: Independent    Active : No    Additional Comments: pt is questionable historian, he is unsure of what equipment he has at home or if he was using any     Current Outpatient Medications   Medication Sig Dispense Refill    memantine (NAMENDA) 10 MG tablet Take 1 tablet by mouth 2 times daily 180 tablet 3    dipyridamole (PERSANTINE) 75 MG tablet Take 1 tablet by mouth 3 times daily 270 tablet 3    clopidogrel (PLAVIX) 75 MG tablet Take 1 tablet by mouth daily 90 tablet 3    Handicap Placard MISC by Does not apply route Good for five years 1 each 0    Handicap Placard MISC by Does not apply route Good for five years 1 each 0    rosuvastatin (CRESTOR) 10 MG tablet TAKE 1 TABLET DAILY 90 tablet 3    tamsulosin (FLOMAX) 0.4 MG capsule TAKE 1 CAPSULE DAILY 90 capsule 3    omeprazole (PRILOSEC) 20 MG delayed release capsule TAKE 1 CAPSULE DAILY 90 capsule 3  finasteride (PROSCAR) 5 MG tablet TAKE 1 TABLET DAILY 90 tablet 3    nitroGLYCERIN (NITROSTAT) 0.4 MG SL tablet up to max of 3 total doses. If no relief after 1 dose, call 911. 25 tablet 3    ondansetron (ZOFRAN-ODT) 4 MG disintegrating tablet Take 1 tablet by mouth 3 times daily as needed for Nausea or Vomiting 21 tablet 0    SUMAtriptan (IMITREX) 100 MG tablet       metoprolol succinate (TOPROL XL) 25 MG extended release tablet TAKE 1 TABLET DAILY 90 tablet 3    Psyllium (METAMUCIL FIBER PO) Take by mouth      Cholecalciferol (VITAMIN D3) 1000 UNITS TABS Take 1,900 Units by mouth daily       calcium carbonate 600 MG TABS tablet Take 1 tablet by mouth daily       fish oil-omega-3 fatty acids 1000 MG capsule Take 2 g by mouth 2 times daily.  Multiple Vitamin (MULTI-VITAMIN PO) Take 1 tablet by mouth nightly       Magnesium 250 MG TABS Take by mouth daily Every Monday Wednesday Friday Sunday      Ascorbic Acid (VITAMIN C) 500 MG tablet Take 500 mg by mouth daily.  aspirin 81 MG tablet Take 81 mg by mouth every evening        No current facility-administered medications for this visit.       Current Outpatient Medications on File Prior to Visit   Medication Sig Dispense Refill    memantine (NAMENDA) 10 MG tablet Take 1 tablet by mouth 2 times daily 180 tablet 3    dipyridamole (PERSANTINE) 75 MG tablet Take 1 tablet by mouth 3 times daily 270 tablet 3    clopidogrel (PLAVIX) 75 MG tablet Take 1 tablet by mouth daily 90 tablet 3    Handicap Placard MISC by Does not apply route Good for five years 1 each 0    Handicap Placard MISC by Does not apply route Good for five years 1 each 0    rosuvastatin (CRESTOR) 10 MG tablet TAKE 1 TABLET DAILY 90 tablet 3    tamsulosin (FLOMAX) 0.4 MG capsule TAKE 1 CAPSULE DAILY 90 capsule 3    omeprazole (PRILOSEC) 20 MG delayed release capsule TAKE 1 CAPSULE DAILY 90 capsule 3    finasteride (PROSCAR) 5 MG tablet TAKE 1 TABLET DAILY 90 tablet 3  nitroGLYCERIN (NITROSTAT) 0.4 MG SL tablet up to max of 3 total doses. If no relief after 1 dose, call 911. 25 tablet 3    ondansetron (ZOFRAN-ODT) 4 MG disintegrating tablet Take 1 tablet by mouth 3 times daily as needed for Nausea or Vomiting 21 tablet 0    SUMAtriptan (IMITREX) 100 MG tablet       metoprolol succinate (TOPROL XL) 25 MG extended release tablet TAKE 1 TABLET DAILY 90 tablet 3    Psyllium (METAMUCIL FIBER PO) Take by mouth      Cholecalciferol (VITAMIN D3) 1000 UNITS TABS Take 1,900 Units by mouth daily       calcium carbonate 600 MG TABS tablet Take 1 tablet by mouth daily       fish oil-omega-3 fatty acids 1000 MG capsule Take 2 g by mouth 2 times daily.  Multiple Vitamin (MULTI-VITAMIN PO) Take 1 tablet by mouth nightly       Magnesium 250 MG TABS Take by mouth daily Every Monday Wednesday Friday Sunday      Ascorbic Acid (VITAMIN C) 500 MG tablet Take 500 mg by mouth daily.  aspirin 81 MG tablet Take 81 mg by mouth every evening        No current facility-administered medications on file prior to visit. Allergies   Allergen Reactions    Amoxicillin Other (See Comments)     stomatitis     Health Maintenance   Topic Date Due    Shingles Vaccine (1 of 2) 11/27/1996    Colon cancer screen colonoscopy  09/15/2020    Annual Wellness Visit (AWV)  06/03/2021    Lipid screen  07/22/2021    DTaP/Tdap/Td vaccine (2 - Td) 01/10/2024    Flu vaccine  Completed    Pneumococcal 65+ years Vaccine  Completed    AAA screen  Completed    Hepatitis C screen  Completed    Hepatitis A vaccine  Aged Out    Hepatitis B vaccine  Aged Out    Hib vaccine  Aged Out    Meningococcal (ACWY) vaccine  Aged Out       Review of Systems     Review of Systems   Constitutional: Negative for activity change, appetite change, chills, fever and unexpected weight change. HENT: Negative. Eyes: Negative. Respiratory: Negative. Negative for shortness of breath. Cardiovascular: Negative. Negative for chest pain and palpitations. Gastrointestinal: Negative. Endocrine: Negative. Genitourinary: Negative. Musculoskeletal: Negative. Skin: Negative. Allergic/Immunologic: Negative. Neurological: Negative. Hematological: Negative. Psychiatric/Behavioral: Negative. Physical Exam  Vitals:    01/11/21 1429   Weight: 174 lb 3.2 oz (79 kg)   Height: 5' 10\" (1.778 m)       Physical Exam  Constitutional:       Appearance: He is well-developed. HENT:      Right Ear: External ear normal.      Left Ear: External ear normal.   Eyes:      Conjunctiva/sclera: Conjunctivae normal.      Pupils: Pupils are equal, round, and reactive to light. Neck:      Musculoskeletal: Normal range of motion and neck supple. Thyroid: No thyromegaly. Cardiovascular:      Rate and Rhythm: Normal rate and regular rhythm. Heart sounds: Normal heart sounds. No murmur. No friction rub. No gallop. Pulmonary:      Effort: Pulmonary effort is normal. No respiratory distress. Breath sounds: Normal breath sounds. No wheezing. Abdominal:      General: Bowel sounds are normal. There is no distension. Palpations: Abdomen is soft. There is no mass. Tenderness: There is no abdominal tenderness. There is no guarding or rebound. Hernia: No hernia is present. Genitourinary:     Penis: Normal.    Musculoskeletal: Normal range of motion. General: No tenderness. Lymphadenopathy:      Cervical: No cervical adenopathy. Skin:     General: Skin is warm and dry. Neurological:      Mental Status: He is alert and oriented to person, place, and time. Cranial Nerves: No cranial nerve deficit. Coordination: Coordination normal.         Assessment   Diagnosis Orders   1. Parkinson disease ([facility]     2. Screening for colon cancer     3.  Essential hypertension 4. Dementia associated with other underlying disease without behavioral disturbance ([facility]       Problem List     [facility] ([facility] - Primary    Relevant Medications    Handicap Placard MISC          Plan  Symptoms are related to Parkisons will discuss with Dr. Ciaran Wagoner. No orders of the defined types were placed in this encounter. No follow-ups on file.   Judy Calix MD

## 2021-01-28 NOTE — TELEPHONE ENCOUNTER
Wife is calling in saying need letter for  to get home health service but needs referral for  [facility]  home health care [address] [place], [address], sent to insurance

## 2021-01-29 NOTE — TELEPHONE ENCOUNTER
Pt's wife returned call to office stating insurance [employer] is requesting referral as well  [place] phone # [phone] clinical intake team

## 2021-02-02 NOTE — TELEPHONE ENCOUNTER
[facility], is this something that you do with the referrals? Or do you need me to do something with it? - Haven't dealt with this before.

## 2021-02-09 PROBLEM — M79.674 PAIN IN TOES OF BOTH FEET: Status: ACTIVE | Noted: 2020-08-12

## 2021-02-09 PROBLEM — M79.675 PAIN IN TOES OF BOTH FEET: Status: ACTIVE | Noted: 2020-08-12

## 2021-02-09 PROBLEM — B35.1 ONYCHOMYCOSIS: Status: ACTIVE | Noted: 2020-08-12

## 2021-02-09 PROBLEM — I73.9 PERIPHERAL VASCULAR DISEASE OF FOOT (HCC): Status: ACTIVE | Noted: 2020-08-12

## 2021-02-09 PROBLEM — G62.9 PERIPHERAL NERVE DISEASE: Status: ACTIVE | Noted: 2020-08-12

## 2021-02-15 PROBLEM — F51.01 PRIMARY INSOMNIA: Status: ACTIVE | Noted: 2021-01-01

## 2021-02-15 PROBLEM — R25.8 BRADYKINESIA: Status: ACTIVE | Noted: 2021-01-01

## 2021-02-15 PROBLEM — I69.30 CEREBRAL MULTI-INFARCT STATE: Status: ACTIVE | Noted: 2021-01-01

## 2021-02-15 PROBLEM — R47.01 APHASIA: Status: ACTIVE | Noted: 2021-01-01

## 2021-02-15 NOTE — PROGRESS NOTES
Subjective:      Patient ID: Katherine Montgomery is a 77 y.o. male who presents today for:  Chief Complaint   Patient presents with    6 Month Follow-Up     Pt's wife states that he has been having shortness of breath, xrays came back good. She states he is not sleeping, he will fall asleep but won't stay asleep. She says his tremor is worse and has upper body spasms now. She says he has a disconnect cognitively, and a hard hard sometimes coordinating body movements. He states that he is having a rough time getting up after sitting down now. HPI 77 right-handed gentleman with a history of vascular dementia. Patient has intermittent confusion has been in the hospital few times. Patient difficulty walking will obtain MRI did not show anything significant probable small stroke cannot be identified. Has sleep apnea but does not tolerate CPAP machine. Pt  Does fluctuate, declined since last seen [facility] since July. He has vascular dementia with arteriosclerotic parkinsonism. We will try him on carbidopa levodopa 1 day he had urinary retention is did not have any other symptoms although this was never retried. Is becoming more stiff for his memory appears to be decline even more is having word finding difficulties considerable insomnia goes to the bathroom 6 times at night. Patient had neuropsychometric testing consistent with vascular dementia few years ago. We tried Namenda. any significant response we have tried him on dopamine agonist in the past and he had not responded. Past Medical History:   Diagnosis Date    Abnormal cardiovascular stress test 4/19/2016    Equivocal ST-T wave changes;  Defect in the inferior wall consistent with prior infarction; LV EF 79%; TID ratio 1.1    Actinic keratoses     BPH (benign prostatic hypertrophy)     CAD (coronary artery disease)     Cancer (HCC)     COLON    Chest pressure 5/3/2016    Cholelithiasis     History of colon cancer s/p partial colectomy in     Hyperlipidemia     Inflamed seborrheic keratosis     Lumbar radiculopathy 10/26/2016    Mild cognitive impairment     Osteoarthritis     Parathyroid tumor     Parkinson disease ([facility] [address]     RA (retrograde amnesia)     Sinus bradycardia on ECG 2016    Done 16 with Dr. Carrington Oppenheim Syncope and collapse     Vascular dementia [facility])      Past Surgical History:   Procedure Laterality Date    BACK SURGERY      CHOLECYSTECTOMY, LAPAROSCOPIC N/A 2020    LAP BRAIN/ CHOLANGIOGRAMS performed by Sharyle Haff, MD at [place] [address]  8/18/10,2012    DR Alexander Dose    COLONOSCOPY  14    DR. PIERRE    JOINT REPLACEMENT Left 13    total    PARATHYROIDECTOMY Bilateral     2 of 4 glands removed    ND COLON CA SCRN NOT HI RSK IND N/A 9/15/2017    COLONOSCOPY performed by Tyler Persaud MD at [address] [place] [address],[address] ENDOSCOPY  09    DR POLK     Social History     Socioeconomic History    Marital status:      Spouse name: Not on file    Number of children: Not on file    Years of education: Not on file    Highest education level: Not on file   Occupational History    Not on file   Social Needs    Financial resource strain: Not on file    Food insecurity     Worry: Not on file     Inability: Not on file   [employer] needs     Medical: Not on file     Non-medical: Not on file   Tobacco Use    Smoking status: Former Smoker     Packs/day: 1.00     Years: 1.00     Pack years: 1.00     Types: Cigarettes     Quit date: 1/15/1969     Years since quittin.1    Smokeless tobacco: Never Used    Tobacco comment: Quit smoking > 30 years ago   Substance and Sexual Activity    Alcohol use: No    Drug use: No    Sexual activity: Not Currently     Partners: Female   Lifestyle    Physical activity     Days per week: Not on file Minutes per session: Not on file    Stress: Not on file   Relationships    Social connections     Talks on phone: Not on file     Gets together: Not on file     Attends Yazidi service: Not on file     Active member of club or organization: Not on file     Attends meetings of clubs or organizations: Not on file     Relationship status: Not on file    Intimate partner violence     Fear of current or ex partner: Not on file     Emotionally abused: Not on file     Physically abused: Not on file     Forced sexual activity: Not on file   Other Topics Concern    Not on file   Social History Narrative         Lives With: Spouse    Type of Home: [address] [place] [address] [place]: Two level(reports he can stay on 1st floor with bedroom and bathroom though currently he stays upstairs)    Home Access: (Pt unable to state)    Bathroom Shower/Tub: Walk-in shower    Bathroom Equipment: Shower chair, Grab bars in shower    Home Equipment: Rolling walker    ADL Assistance: Independent    Homemaking Assistance: Needs assistance    Ambulation Assistance: Independent  (no AD)    Transfer Assistance: Independent    Active : No    Additional Comments: pt is questionable historian, he is unsure of what equipment he has at home or if he was using any     Family History   Problem Relation Age of Onset    Cancer Mother         OVARIAN    Heart Disease Father      Allergies   Allergen Reactions    Amoxicillin Other (See Comments)     stomatitis       Current Outpatient Medications   Medication Sig Dispense Refill    carbidopa-levodopa-entacapone (STALEVO 50) 12.5- MG TABS per tablet One qam for 1 week, then BID (am and 2 pm ) 60 tablet 3    memantine (NAMENDA) 10 MG tablet Take 1 tablet by mouth 2 times daily 180 tablet 3    dipyridamole (PERSANTINE) 75 MG tablet Take 1 tablet by mouth 3 times daily 270 tablet 3    clopidogrel (PLAVIX) 75 MG tablet Take 1 tablet by mouth daily 90 tablet 3  Handicap Placard MISC by Does not apply route Good for five years 1 each 0    Handicap Placard MISC by Does not apply route Good for five years 1 each 0    rosuvastatin (CRESTOR) 10 MG tablet TAKE 1 TABLET DAILY 90 tablet 3    tamsulosin (FLOMAX) 0.4 MG capsule TAKE 1 CAPSULE DAILY 90 capsule 3    omeprazole (PRILOSEC) 20 MG delayed release capsule TAKE 1 CAPSULE DAILY 90 capsule 3    finasteride (PROSCAR) 5 MG tablet TAKE 1 TABLET DAILY 90 tablet 3    nitroGLYCERIN (NITROSTAT) 0.4 MG SL tablet up to max of 3 total doses. If no relief after 1 dose, call 911. 25 tablet 3    ondansetron (ZOFRAN-ODT) 4 MG disintegrating tablet Take 1 tablet by mouth 3 times daily as needed for Nausea or Vomiting 21 tablet 0    SUMAtriptan (IMITREX) 100 MG tablet       metoprolol succinate (TOPROL XL) 25 MG extended release tablet TAKE 1 TABLET DAILY 90 tablet 3    Psyllium (METAMUCIL FIBER PO) Take by mouth      Cholecalciferol (VITAMIN D3) 1000 UNITS TABS Take 1,900 Units by mouth daily       calcium carbonate 600 MG TABS tablet Take 1 tablet by mouth daily       fish oil-omega-3 fatty acids 1000 MG capsule Take 2 g by mouth 2 times daily.  Multiple Vitamin (MULTI-VITAMIN PO) Take 1 tablet by mouth nightly       Magnesium 250 MG TABS Take by mouth daily Every Monday Wednesday Friday Sunday      Ascorbic Acid (VITAMIN C) 500 MG tablet Take 500 mg by mouth daily.  aspirin 81 MG tablet Take 81 mg by mouth every evening        No current facility-administered medications for this visit. Review of Systems   Unable to perform ROS: Mental status change       Objective:   /64 (Site: Left Upper Arm, Position: Sitting, Cuff Size: Medium Adult)   Pulse 60     Physical Exam  Vitals signs reviewed. Eyes:      Pupils: Pupils are equal, round, and reactive to light. Neck:      Musculoskeletal: Normal range of motion. Cardiovascular:      Rate and Rhythm: Normal rate and regular rhythm. Heart sounds: No murmur. Pulmonary:      Effort: Pulmonary effort is normal.      Breath sounds: Normal breath sounds. Abdominal:      General: Bowel sounds are normal.   Musculoskeletal: Normal range of motion. Skin:     General: Skin is warm. Neurological:      Mental Status: He is alert. He is disoriented. Cranial Nerves: Dysarthria present. No cranial nerve deficit. Sensory: No sensory deficit. Motor: Weakness present. No abnormal muscle tone. Coordination: Coordination normal.      Gait: Gait abnormal.      Deep Tendon Reflexes: Babinski sign absent on the right side. Babinski sign absent on the left side. Reflex Scores:       Tricep reflexes are 2+ on the right side. Bicep reflexes are 2+ on the right side and 2+ on the left side. Brachioradialis reflexes are 2+ on the right side. Patellar reflexes are 2+ on the right side and 2+ on the left side. Achilles reflexes are 1+ on the right side and 1+ on the left side. Psychiatric:         Mood and Affect: Mood normal.     In addition to this patient is having significant word finding difficulty. Patient appears to be showing more dementia now and he has a stooped posture and has difficulty getting up from the chair without support he has some minor degree of bradykinesia. He is to push decreased arm swings and slowness of movement  Xr Chest Portable    Result Date: 1/5/2021  EXAMINATION: XR CHEST PORTABLE CLINICAL HISTORY: SHORTNESS OF BREATH, WEAKNESS COMPARISONS: NOVEMBER 3, 2020 FINDINGS: Osseous structures are intact. Cardiopericardial silhouette is normal. Pulmonary vasculature is normal. Lungs are clear. NO ACUTE CARDIOPULMONARY DISEASE.       Lab Results   Component Value Date    WBC 5.1 01/05/2021    RBC 4.99 01/05/2021    HGB 13.4 01/05/2021    HCT 41.7 01/05/2021    MCV 83.6 01/05/2021    MCH 26.8 01/05/2021    MCHC 32.1 01/05/2021    RDW 17.6 01/05/2021     01/05/2021 MPV 10.9 07/15/2015     Lab Results   Component Value Date     01/05/2021    K 4.7 01/05/2021    K 4.2 05/25/2020     01/05/2021    CO2 28 01/05/2021    BUN 11 01/05/2021    CREATININE 0.84 01/05/2021    GFRAA >60.0 01/05/2021    LABGLOM >60.0 01/05/2021    GLUCOSE 97 01/05/2021    GLUCOSE 107 03/14/2012    PROT 6.9 01/05/2021    LABALBU 4.8 01/05/2021    LABALBU 4.1 03/14/2012    CALCIUM 9.1 01/05/2021    BILITOT 0.4 01/05/2021    ALKPHOS 47 01/05/2021    AST 30 01/05/2021    ALT 12 01/05/2021     Lab Results   Component Value Date    PROTIME 12.9 01/05/2021    INR 1.0 01/05/2021     Lab Results   Component Value Date    TSH 4.720 04/28/2016    [id number] 619 05/08/2017    FOLATE >20.0 05/08/2017     Lab Results   Component Value Date    TRIG 65 07/22/2020    HDL 66 07/22/2020    LDLCALC 45 07/22/2020     Lab Results   Component Value Date    LABAMPH Neg 11/03/2020    BARBSCNU Neg 11/03/2020    LABBENZ Neg 11/03/2020    LABMETH Neg 11/03/2020    OPIATESCREENURINE Neg 11/03/2020    PHENCYCLIDINESCREENURINE Neg 11/03/2020     No results found for: LITHIUM, DILFRTOT, VALPROATE    Assessment:       Diagnosis Orders   1. Vascular dementia with behavior disturbance ([facility] [address]     2. Parkinson disease ([facility] [address]     3. Lumbar radiculopathy     4. Bradykinesia     5. Memory loss     6. Aphasia     7. Primary insomnia     Vascular dementia well-documented on neuropsychometric testing a few years ago. Patient also has parkinsonian features but I truly feel this is arteriosclerotic parkinsonism with visual dysfunction without session of idiopathic Parkinson's disease. We tried him on a single dose of carbidopa levodopa he had not responded to this in fact he had urinary retention which may not be connected. He is declined significantly since last seen. He is more bradykinetic now having an aphasia or word finding difficulty walking difficulty. He does not sleep well he has significant insomnia. Patient requires more help now as well. Neck soft recommended that we rechallenge him with dopamine agonist with a very low-dose of Stalevo to see if this helps again side effects can expect any arteriosclerotic parkinsonism as these are different than idiopathic Parkinson's disease in response to dopamine agonist and side effects are with a higher incidence this is very similar to Lewy body dementia. Patient has considerable insomnia recommend try Tylenol PM or melatonin first as any other medications likely to cause major issues in terms of side effects and early morning drowsiness. Patient continues on memantine. Patient has multiple strokelike symptoms and he is on Plavix and Persantine. We are less inclined to start him on anticoagulation. He has multiple stroke and TIAs in the past.    Patient is a multi-infarct state    We will bring him in 2 months to see how is doing with the medication      Plan:      No orders of the defined types were placed in this encounter. Orders Placed This Encounter   Medications    carbidopa-levodopa-entacapone (STALEVO 50) 12.5- MG TABS per tablet     Sig: One qam for 1 week, then BID (am and 2 pm )     Dispense:  60 tablet     Refill:  3       No follow-ups on file.       Catrachito Chan MD

## 2021-03-02 NOTE — PROGRESS NOTES
[facility] CARDIOLOGY OFFICE FOLLOW-UP      Patient: Carina Loyd  YOB: 1945  MRN: [id number]    Chief Complaint:  Chief Complaint   Patient presents with    Follow-up     4 month f/u    Coronary Artery Disease    Transient Ischemic Attack         Subjective/HPI:  03/2/21: Patient presents today for follow-up of coronary artery disease TIAs. Last TIA he had caused significant memory issues. He is a . Accompanied by his wife. Memory has been impaired but still not bad. Very pleasant. He is on Persantine and Plavix. Has dyslipidemia for which he is on Crestor and is on Stalevo. Has BPH which is controlled. No chest pain congestive heart failure. See me in 3 months    11/17/2020: Patient presents today for follow-up of recent visit to the ER for chest pain. He was discharged home. He has had previous cardiac catheterization which revealed mild nonobstructive disease. I think there is a significant element of anxiety when he complains of chest pain. I tried to reassure him and told him to see us in the office if he gets frequent episodes of that rather than go to the ER. He has previous TIAs. And is on combination of Persantine and Plavix. Also has some memory impairment. Probably from consequences of TIA. Has GERD BPH has not driven since his last major episode of TIA. Try to reassure him and his wife. He will see me in 3 months.   I might address dose of Imdur in the future if his episodes get frequent        8/18/2020: Patient presents today for follow-up of recent hospitalization for TIA.  Now Plavix was added to dipyridamole 3 times a day.  Accompanied by his wife. Lili Foreman is slightly impaired.  Still fairly functional.  No chest pain congestive heart failure symptoms or syncope.  Had a cardiac catheterization in the past which revealed mild disease.  LV ejection fraction was normal continue same medications and see me in 3 months     5/19/2020 (VIRTUAL): Aleena was seen in the hospital for chest pain.  Had a stress test that was negative.  Supposed to have ultrasound the gallbladder tomorrow.  In the past had a cardiac catheterization which revealed mild disease LV ejection fraction normal at 79%.  Last catheterization was 2016. Deshawn Underwood has a history of vascular dementia followed by Dr. Waverly Cogan will see me in 3 months     5/28/19: Patient presents today for follow-up of chest pain. Cardiac cath was negative. He was told by Dr. Alee Kyle that MRI showed that he may have had a prior CVA. Congestive heart failure symptoms or syncope. See me in one year     5/16/18: Patient presents today for follow-up of chest pain. That has resolved. Cardiac cath was negative. His mild hypertension dyslipidemia that stable. Was diagnosed lately to have either Parkinson's disease. And supposed to have a follow-up visit with Dr. Gabby Veliz in the near future. He'll see me in 6 months.     5/9/17: For follow-up of chest pain. Not anymore. Cath was negative. Had a false-positive nuclear scan. Lopressor 25 milligram a day and Crestor. He has noticed blood pressure running a bit high he walks regularly and bikes when the weather is right. He has dyslipidemia and BPH status stable. See me in one year.     5/3/16: For followup of CATH. No significant CAD. False positive nuke. Retired last week. Very active. Bikes. Non smoker. Stay on toprol 25 See the written copy of this report in the patient's paper medical record. These results did not interface directly into the electronic medical record and are summarized here. In 1 year. no restriction. Has HLP. On crestor.     4/19/2016:For abnormal stress test.Brandt Garcias. Was [facility] patient. C/O angina. Retrosternal.No NV. No radiation. No HA. Was on lopressor 25 for 10 years. Mainly for PVCs. Has HLN,BPH. Will need cath next tues. Bikes and walks regularly. CP aggravated with exercise. Metoprolol succinate 25.         Past Medical History:   Diagnosis Date Non-medical: Not on file   Tobacco Use    Smoking status: Former Smoker     Packs/day: 1.00     Years: 1.00     Pack years: 1.00     Types: Cigarettes     Quit date: 1/15/1969     Years since quittin.1    Smokeless tobacco: Never Used    Tobacco comment: Quit smoking > 30 years ago   Substance and Sexual Activity    Alcohol use: No    Drug use: No    Sexual activity: Not Currently     Partners: Female   Lifestyle    Physical activity     Days per week: Not on file     Minutes per session: Not on file    Stress: Not on file   Relationships    Social connections     Talks on phone: Not on file     Gets together: Not on file     Attends Congregational service: Not on file     Active member of club or organization: Not on file     Attends meetings of clubs or organizations: Not on file     Relationship status: Not on file    Intimate partner violence     Fear of current or ex partner: Not on file     Emotionally abused: Not on file     Physically abused: Not on file     Forced sexual activity: Not on file   Other Topics Concern    Not on file   Social History Narrative         Lives With: Spouse    Type of Home: House[facility] [place] [id number] [place]: Two level(reports he can stay on 1st floor with bedroom and bathroom though currently he stays upstairs)    Home Access: (Pt unable to state)    Bathroom Shower/Tub: Walk-in shower    Bathroom Equipment: Shower chair, Grab bars in shower    Home Equipment: Rolling walker    ADL Assistance: Independent    Homemaking Assistance: Needs assistance    Ambulation Assistance: Independent  (no AD)    Transfer Assistance: Independent    Active : No    Additional Comments: pt is questionable historian, he is unsure of what equipment he has at home or if he was using any       Allergies   Allergen Reactions    Amoxicillin Other (See Comments)     stomatitis       Current Outpatient Medications   Medication Sig Dispense Refill    carbidopa-levodopa-entacapone (STALEVO 50) 12.5- MG TABS per tablet One qam for 1 week, then BID (am and 2 pm ) 60 tablet 3    memantine (NAMENDA) 10 MG tablet Take 1 tablet by mouth 2 times daily 180 tablet 3    dipyridamole (PERSANTINE) 75 MG tablet Take 1 tablet by mouth 3 times daily 270 tablet 3    clopidogrel (PLAVIX) 75 MG tablet Take 1 tablet by mouth daily 90 tablet 3    Handicap Placard MISC by Does not apply route Good for five years 1 each 0    Handicap Placard MISC by Does not apply route Good for five years 1 each 0    rosuvastatin (CRESTOR) 10 MG tablet TAKE 1 TABLET DAILY 90 tablet 3    tamsulosin (FLOMAX) 0.4 MG capsule TAKE 1 CAPSULE DAILY 90 capsule 3    omeprazole (PRILOSEC) 20 MG delayed release capsule TAKE 1 CAPSULE DAILY 90 capsule 3    finasteride (PROSCAR) 5 MG tablet TAKE 1 TABLET DAILY 90 tablet 3    nitroGLYCERIN (NITROSTAT) 0.4 MG SL tablet up to max of 3 total doses. If no relief after 1 dose, call 911. 25 tablet 3    ondansetron (ZOFRAN-ODT) 4 MG disintegrating tablet Take 1 tablet by mouth 3 times daily as needed for Nausea or Vomiting 21 tablet 0    SUMAtriptan (IMITREX) 100 MG tablet       metoprolol succinate (TOPROL XL) 25 MG extended release tablet TAKE 1 TABLET DAILY 90 tablet 3    Psyllium (METAMUCIL FIBER PO) Take by mouth      Cholecalciferol (VITAMIN D3) 1000 UNITS TABS Take 1,900 Units by mouth daily       calcium carbonate 600 MG TABS tablet Take 1 tablet by mouth daily       fish oil-omega-3 fatty acids 1000 MG capsule Take 2 g by mouth 2 times daily.  Multiple Vitamin (MULTI-VITAMIN PO) Take 1 tablet by mouth nightly       Magnesium 250 MG TABS Take by mouth daily Every Monday Wednesday Friday Sunday      Ascorbic Acid (VITAMIN C) 500 MG tablet Take 500 mg by mouth daily.  aspirin 81 MG tablet Take 81 mg by mouth every evening        No current facility-administered medications for this visit.         Review of Systems:   Review of Systems   Constitutional: Negative for appetite change, diaphoresis and fatigue. Respiratory: Negative for apnea, chest tightness and shortness of breath. Cardiovascular: Positive for chest pain. Negative for palpitations and leg swelling. Pain across the top of chest.   Musculoskeletal: Positive for gait problem. Negative for myalgias. TIA   Skin: Negative for color change, pallor, rash and wound. Neurological: Negative for dizziness, syncope, weakness, light-headedness, numbness and headaches. Hematological: Does not bruise/bleed easily. Psychiatric/Behavioral: Positive for confusion. Negative for agitation and behavioral problems. The patient is not nervous/anxious and is not hyperactive. TIA   All other systems reviewed and are negative. Review of System is negative except for as mentioned above. Physical Examination:    /62 (Site: Left Upper Arm, Position: Sitting, Cuff Size: Medium Adult)   Pulse 60   Ht 5' 10\" (1.778 m)   Wt 174 lb (78.9 kg)   SpO2 98%   BMI 24.97 kg/m²    Physical Exam   Constitutional: He appears healthy. HENT:   Nose: Nose normal.   Mouth/Throat: Dentition is normal. Oropharynx is clear. Eyes: Pupils are equal, round, and reactive to light. Neck: Normal range of motion. Cardiovascular: Normal rate, regular rhythm, S1 normal, S2 normal, normal heart sounds, intact distal pulses and normal pulses. No extrasystoles are present. Exam reveals no gallop. No murmur heard. Pulmonary/Chest: Effort normal and breath sounds normal. He has no wheezes. He has no rales. He exhibits no tenderness. Abdominal: Soft. Bowel sounds are normal. He exhibits no distension and no mass. There is no splenomegaly or hepatomegaly. There is no abdominal tenderness. Musculoskeletal: Normal range of motion. General: No tenderness, deformity or edema. Neurological: He is alert and oriented to person, place, and time.  He has normal motor skills and normal reflexes. Gait normal.   Skin: Skin is warm and dry. Patient Active Problem List   Diagnosis    Hyperlipidemia    Osteopenia    CAD (coronary artery disease)    Benign prostatic hyperplasia    Cholelithiasis    Foot drop, left    Degenerative arthritis of lumbar spine    History of hip replacement, total    Seborrheic keratosis    History of colon cancer    Disturbance of skin sensation    Degenerative joint disease of pelvic region    History of repair of hip joint    Sinus bradycardia on ECG    Lumbar radiculopathy    Gastroesophageal reflux disease with esophagitis    Actinic keratoses    Inflamed seborrheic keratosis    PETRA (obstructive sleep apnea)    TIA (transient ischemic attack)    Parkinson disease ([facility] [address]    Memory loss    Syncope and collapse    Mild cognitive impairment    Chest pain    Biliary colic    Stroke-like episode    Ataxic gait    Vascular dementia with behavior disturbance (HCC)    Shortness of breath    Onychomycosis    Pain in toes of both feet    Peripheral nerve disease    Peripheral vascular disease of foot (HCC)    Bradykinesia    Aphasia    Primary insomnia    Cerebral multi-infarct state           No orders of the defined types were placed in this encounter. No orders of the defined types were placed in this encounter. Assessment/Orders:       ICD-10-CM    1. Coronary artery disease involving native heart without angina pectoris, unspecified vessel or lesion type  I25.10    2. TIA (transient ischemic attack)  G45.9        No orders of the defined types were placed in this encounter. There are no discontinued medications. No orders of the defined types were placed in this encounter. Plan:    Stay on same medications.     See me in 3 months        Electronically signed by: Ana Rodney MD  3/8/2021 1:14 PM

## 2021-03-26 NOTE — TELEPHONE ENCOUNTER
Wife called and states that Ariel started stalevo a month ago, he takes 1 in the morning and 1 at 2 pm. Wife states that at first it helped his tremors but this week his tremors have been worse.  Please advised     Thanks Abbie Barker

## 2021-03-30 NOTE — TELEPHONE ENCOUNTER
Patients wife called again, stated tremors are much worse. Is there anything they can do? Can the Sinemet be increased?     Please advise

## 2021-04-12 NOTE — PROGRESS NOTES
dementia with behavior disturbance ([facility] 08/17/2020    Onychomycosis 08/12/2020    Pain in toes of both feet 08/12/2020    Peripheral nerve disease 08/12/2020    Peripheral vascular disease of foot ([facility] 08/12/2020    Ataxic gait 07/22/2020    Stroke-like episode 22/48/5371    Biliary colic     Chest pain 02/90/0030    Parkinson disease ([facility]     Memory loss     Syncope and collapse     Mild cognitive impairment     TIA (transient ischemic attack) 08/01/2019    PETRA (obstructive sleep apnea)     Actinic keratoses     Inflamed seborrheic keratosis     Gastroesophageal reflux disease with esophagitis 11/15/2016    Lumbar radiculopathy 10/26/2016    Sinus bradycardia on ECG 04/19/2016    Disturbance of skin sensation 07/22/2015    Seborrheic keratosis 01/13/2014    History of colon cancer 01/13/2014    History of repair of hip joint 10/21/2013    History of hip replacement, total 07/02/2013    Degenerative joint disease of pelvic region 05/06/2013    Degenerative arthritis of lumbar spine 02/13/2013    Foot drop, left 01/14/2013    CAD (coronary artery disease)     Benign prostatic hyperplasia     Cholelithiasis     Hyperlipidemia 01/18/2012    Osteopenia 01/18/2012     Past Surgical History:   Procedure Laterality Date    BACK SURGERY  1993    CHOLECYSTECTOMY, LAPAROSCOPIC N/A 5/27/2020    LAP BRAIN/ CHOLANGIOGRAMS performed by Jamison Christianson MD at [place] [address]  8/18/10,09/11/2012    DR POLK    COLONOSCOPY  09/29/14    DR. PIERRE    JOINT REPLACEMENT Left 5/28/13    total    PARATHYROIDECTOMY Bilateral 2011    2 of 4 glands removed    HI COLON CA SCRN NOT HI RSK IND N/A 9/15/2017    COLONOSCOPY performed by Claudeen Drown, MD at [address] [facility] ENDOSCOPY  8/11/09    DR POLK     Family History   Problem Relation Age of Onset    Cancer Mother         OVARIAN    Heart Disease Father      Social History     Socioeconomic History    Marital status:      Spouse name: None    Number of children: None    Years of education: None    Highest education level: None   Occupational History    None   Social Needs    Financial resource strain: None    Food insecurity     Worry: None     Inability: None    Transportation needs     Medical: None     Non-medical: None   Tobacco Use    Smoking status: Former Smoker     Packs/day: 1.00     Years: 1.00     Pack years: 1.00     Types: Cigarettes     Quit date: 1/15/1969     Years since quittin.2    Smokeless tobacco: Never Used    Tobacco comment: Quit smoking > 30 years ago   Substance and Sexual Activity    Alcohol use: No    Drug use: No    Sexual activity: Not Currently     Partners: Female   Lifestyle    Physical activity     Days per week: None     Minutes per session: None    Stress: None   Relationships    Social connections     Talks on phone: None     Gets together: None     Attends Mandaeism service: None     Active member of club or organization: None     Attends meetings of clubs or organizations: None     Relationship status: None    Intimate partner violence     Fear of current or ex partner: None     Emotionally abused: None     Physically abused: None     Forced sexual activity: None   Other Topics Concern    None   Social History Narrative         Lives With: Spouse    Type of Home: [address] [place]: Two level(reports he can stay on 1st floor with bedroom and bathroom though currently he stays upstairs)    Home Access: (Pt unable to state)    Bathroom Shower/Tub: Walk-in shower    Bathroom Equipment: Shower chair, Grab bars in shower    Home Equipment: Rolling walker    ADL Assistance: Independent    Homemaking Assistance: Needs assistance    Ambulation Assistance: Independent  (no AD)    Transfer Assistance: Independent    Active : No    Additional Comments: pt is questionable historian, he is unsure of what equipment he has at home or if he was using any     Current Outpatient Medications   Medication Sig Dispense Refill    carbidopa-levodopa-entacapone (STALEVO 100) -200 MG TABS 1 tablet in the morning and 1 tablet at 2 pm 180 tablet 3    memantine (NAMENDA) 10 MG tablet Take 1 tablet by mouth 2 times daily 180 tablet 3    dipyridamole (PERSANTINE) 75 MG tablet Take 1 tablet by mouth 3 times daily 270 tablet 3    clopidogrel (PLAVIX) 75 MG tablet Take 1 tablet by mouth daily 90 tablet 3    Handicap Placard MISC by Does not apply route Good for five years 1 each 0    rosuvastatin (CRESTOR) 10 MG tablet TAKE 1 TABLET DAILY 90 tablet 3    tamsulosin (FLOMAX) 0.4 MG capsule TAKE 1 CAPSULE DAILY 90 capsule 3    omeprazole (PRILOSEC) 20 MG delayed release capsule TAKE 1 CAPSULE DAILY 90 capsule 3    finasteride (PROSCAR) 5 MG tablet TAKE 1 TABLET DAILY 90 tablet 3    nitroGLYCERIN (NITROSTAT) 0.4 MG SL tablet up to max of 3 total doses. If no relief after 1 dose, call 911. 25 tablet 3    ondansetron (ZOFRAN-ODT) 4 MG disintegrating tablet Take 1 tablet by mouth 3 times daily as needed for Nausea or Vomiting 21 tablet 0    SUMAtriptan (IMITREX) 100 MG tablet       metoprolol succinate (TOPROL XL) 25 MG extended release tablet TAKE 1 TABLET DAILY 90 tablet 3    Psyllium (METAMUCIL FIBER PO) Take by mouth      Cholecalciferol (VITAMIN D3) 1000 UNITS TABS Take 1,900 Units by mouth daily       calcium carbonate 600 MG TABS tablet Take 1 tablet by mouth daily       fish oil-omega-3 fatty acids 1000 MG capsule Take 2 g by mouth 2 times daily.  Multiple Vitamin (MULTI-VITAMIN PO) Take 1 tablet by mouth nightly       Magnesium 250 MG TABS Take by mouth daily Every Monday Wednesday Friday Sunday      Ascorbic Acid (VITAMIN C) 500 MG tablet Take 500 mg by mouth daily.         aspirin 81 MG tablet Take 81 mg by mouth every evening        No current facility-administered medications for this visit. Current Outpatient Medications on File Prior to Visit   Medication Sig Dispense Refill    carbidopa-levodopa-entacapone (STALEVO 100) -200 MG TABS 1 tablet in the morning and 1 tablet at 2 pm 180 tablet 3    memantine (NAMENDA) 10 MG tablet Take 1 tablet by mouth 2 times daily 180 tablet 3    dipyridamole (PERSANTINE) 75 MG tablet Take 1 tablet by mouth 3 times daily 270 tablet 3    clopidogrel (PLAVIX) 75 MG tablet Take 1 tablet by mouth daily 90 tablet 3    Handicap Placard MISC by Does not apply route Good for five years 1 each 0    rosuvastatin (CRESTOR) 10 MG tablet TAKE 1 TABLET DAILY 90 tablet 3    tamsulosin (FLOMAX) 0.4 MG capsule TAKE 1 CAPSULE DAILY 90 capsule 3    omeprazole (PRILOSEC) 20 MG delayed release capsule TAKE 1 CAPSULE DAILY 90 capsule 3    finasteride (PROSCAR) 5 MG tablet TAKE 1 TABLET DAILY 90 tablet 3    nitroGLYCERIN (NITROSTAT) 0.4 MG SL tablet up to max of 3 total doses. If no relief after 1 dose, call 911. 25 tablet 3    ondansetron (ZOFRAN-ODT) 4 MG disintegrating tablet Take 1 tablet by mouth 3 times daily as needed for Nausea or Vomiting 21 tablet 0    SUMAtriptan (IMITREX) 100 MG tablet       metoprolol succinate (TOPROL XL) 25 MG extended release tablet TAKE 1 TABLET DAILY 90 tablet 3    Psyllium (METAMUCIL FIBER PO) Take by mouth      Cholecalciferol (VITAMIN D3) 1000 UNITS TABS Take 1,900 Units by mouth daily       calcium carbonate 600 MG TABS tablet Take 1 tablet by mouth daily       fish oil-omega-3 fatty acids 1000 MG capsule Take 2 g by mouth 2 times daily.  Multiple Vitamin (MULTI-VITAMIN PO) Take 1 tablet by mouth nightly       Magnesium 250 MG TABS Take by mouth daily Every Monday Wednesday Friday Sunday      Ascorbic Acid (VITAMIN C) 500 MG tablet Take 500 mg by mouth daily.         aspirin 81 MG tablet Take 81 mg by mouth every evening        No current facility-administered medications on file prior to visit. Allergies   Allergen Reactions    Amoxicillin Other (See Comments)     stomatitis     Health Maintenance   Topic Date Due    Shingles Vaccine (1 of 2) Never done    Colon cancer screen colonoscopy  09/15/2020    Annual Wellness Visit (AWV)  06/03/2021    Lipid screen  07/22/2021    DTaP/Tdap/Td vaccine (2 - Td) 01/10/2024    Flu vaccine  Completed    Pneumococcal 65+ years Vaccine  Completed    COVID-19 Vaccine  Completed    AAA screen  Completed    Hepatitis C screen  Completed    Hepatitis A vaccine  Aged Out    Hepatitis B vaccine  Aged Out    Hib vaccine  Aged Out    Meningococcal (ACWY) vaccine  Aged Out       Review of Systems     Review of Systems   Constitutional: Negative for activity change, appetite change, chills, fever and unexpected weight change. HENT: Negative. Eyes: Negative. Respiratory: Negative. Negative for shortness of breath. Cardiovascular: Negative. Negative for chest pain and palpitations. Gastrointestinal: Negative. Endocrine: Negative. Genitourinary: Negative. Musculoskeletal: Negative. Skin: Negative. Allergic/Immunologic: Negative. Neurological: Negative. Hematological: Negative. Psychiatric/Behavioral: Negative. Physical Exam  Vitals:    04/12/21 1415   BP: 106/64   Site: Left Upper Arm   Position: Sitting   Cuff Size: Medium Adult   Pulse: 60   Temp: 97.7 °F (36.5 °C)   TempSrc: Oral   SpO2: 98%   Weight: 172 lb (78 kg)   Height: 5' 10\" (1.778 m)       Physical Exam  Constitutional:       Appearance: He is well-developed. HENT:      Right Ear: External ear normal.      Left Ear: External ear normal.   Eyes:      Conjunctiva/sclera: Conjunctivae normal.      Pupils: Pupils are equal, round, and reactive to light. Neck:      Musculoskeletal: Normal range of motion and neck supple. Thyroid: No thyromegaly. Cardiovascular:      Rate and Rhythm: Normal rate and regular rhythm.

## 2021-04-18 NOTE — TELEPHONE ENCOUNTER
Rx requested:  Requested Prescriptions     Pending Prescriptions Disp Refills    metoprolol succinate (TOPROL XL) 25 MG extended release tablet [Pharmacy Med Name: METOPROLOL SUCCINATE ER TABS 25MG] 90 tablet 3     Sig: TAKE 1 TABLET DAILY       Last Office Visit:   4/12/2021        Next Visit Date:  Future Appointments   Date Time Provider Tamia Espana   4/19/2021  2:30 PM MD Celeste Wilson   6/2/2021 11:45 AM Joan Zuluaga MD [facility]   6/8/2021  1:45 PM Billy Kaplan MD [id number]   7/29/2021  2:15 PM MD Celeste Hernandez   10/11/2021  2:30 PM Joan Zuluaga  [place] [place] [address]

## 2021-04-19 NOTE — PROGRESS NOTES
Subjective:      Patient ID: Sanju Hickey is a 77 y.o. male who presents today for:  Chief Complaint   Patient presents with    Follow-up     Pt's wife says that pt is still doing the same as he was before. SHe wants to know if all the medications that he is taking are nesicary for him to still continue taking. HPI 77 right-handed gentleman with a known history of Parkinson's disease and vascular dementia with multi-infarct state and lumbar Radiculopathy  Patient actually is remained quite stable since last seen. He is not declined. He has his good days and bad days he has to get up many times at night to go to the bathroom and back causes some degree of insomnia and then hypersomnolence during the day. He is not any falls uses a walker carbidopa has helped his tremors. The past he had not responded to dopamine agonist but now he is responding. He recalls his wife on most occasions that she tells me. Past Medical History:   Diagnosis Date    Abnormal cardiovascular stress test 4/19/2016    Equivocal ST-T wave changes;  Defect in the inferior wall consistent with prior infarction; LV EF 79%; TID ratio 1.1    Actinic keratoses     BPH (benign prostatic hypertrophy)     CAD (coronary artery disease)     Cancer (HCC)     COLON    Chest pressure 5/3/2016    Cholelithiasis     History of colon cancer     s/p partial colectomy in 2009    Hyperlipidemia     Inflamed seborrheic keratosis     Lumbar radiculopathy 10/26/2016    Mild cognitive impairment     Osteoarthritis     Parathyroid tumor     Parkinson disease ([address]     RA (retrograde amnesia)     Sinus bradycardia on ECG 4/19/2016    Done 4/5/16 with Dr. Theresa Carter Syncope and collapse     Vascular dementia [facility])      Past Surgical History:   Procedure Laterality Date    BACK SURGERY  1993    CHOLECYSTECTOMY, LAPAROSCOPIC N/A 5/27/2020    LAP BRAIN/ CHOLANGIOGRAMS performed by Venu Lucas MD at [address]  COLONOSCOPY  8/18/10,2012    DR POLK    COLONOSCOPY  14    DR. PIERRE    JOINT REPLACEMENT Left 13    total    PARATHYROIDECTOMY Bilateral     2 of 4 glands removed    [place] COLON CA SCRN NOT HI RSK IND N/A 9/15/2017    COLONOSCOPY performed by Amrita Banks MD at [address] [facility] ENDOSCOPY  09    DR POLK     Social History     Socioeconomic History    Marital status:      Spouse name: Not on file    Number of children: Not on file    Years of education: Not on file    Highest education level: Not on file   Occupational History    Not on file   Social Needs    Financial resource strain: Not on file    Food insecurity     Worry: Not on file     Inability: Not on file   [employer] needs     Medical: Not on file     Non-medical: Not on file   Tobacco Use    Smoking status: Former Smoker     Packs/day: 1.00     Years: 1.00     Pack years: 1.00     Types: Cigarettes     Quit date: 1/15/1969     Years since quittin.2    Smokeless tobacco: Never Used    Tobacco comment: Quit smoking > 30 years ago   Substance and Sexual Activity    Alcohol use: No    Drug use: No    Sexual activity: Not Currently     Partners: Female   Lifestyle    Physical activity     Days per week: Not on file     Minutes per session: Not on file    Stress: Not on file   Relationships    Social connections     Talks on phone: Not on file     Gets together: Not on file     Attends Evangelical service: Not on file     Active member of club or organization: Not on file     Attends meetings of clubs or organizations: Not on file     Relationship status: Not on file    Intimate partner violence     Fear of current or ex partner: Not on file     Emotionally abused: Not on file     Physically abused: Not on file     Forced sexual activity: Not on file   Other Topics Concern    Not on file   Social History Narrative Lives With: Spouse    Type of Home: [address] [place] [id number] [place]: Two level(reports he can stay on 1st floor with bedroom and bathroom though currently he stays upstairs)    Home Access: (Pt unable to state)    Bathroom Shower/Tub: Walk-in shower    Bathroom Equipment: Shower chair, Grab bars in shower    Home Equipment: Rolling walker    ADL Assistance: Independent    Homemaking Assistance: Needs assistance    Ambulation Assistance: Independent  (no AD)    Transfer Assistance: Independent    Active : No    Additional Comments: pt is questionable historian, he is unsure of what equipment he has at home or if he was using any     Family History   Problem Relation Age of Onset    Cancer Mother         OVARIAN    Heart Disease Father      Allergies   Allergen Reactions    Amoxicillin Other (See Comments)     stomatitis       Current Outpatient Medications   Medication Sig Dispense Refill    metoprolol succinate (TOPROL XL) 25 MG extended release tablet TAKE 1 TABLET DAILY 90 tablet 3    carbidopa-levodopa-entacapone (STALEVO 100) -200 MG TABS 1 tablet in the morning and 1 tablet at 2 pm 180 tablet 3    memantine (NAMENDA) 10 MG tablet Take 1 tablet by mouth 2 times daily 180 tablet 3    dipyridamole (PERSANTINE) 75 MG tablet Take 1 tablet by mouth 3 times daily 270 tablet 3    clopidogrel (PLAVIX) 75 MG tablet Take 1 tablet by mouth daily 90 tablet 3    Handicap Placard MISC by Does not apply route Good for five years 1 each 0    rosuvastatin (CRESTOR) 10 MG tablet TAKE 1 TABLET DAILY 90 tablet 3    tamsulosin (FLOMAX) 0.4 MG capsule TAKE 1 CAPSULE DAILY 90 capsule 3    omeprazole (PRILOSEC) 20 MG delayed release capsule TAKE 1 CAPSULE DAILY 90 capsule 3    finasteride (PROSCAR) 5 MG tablet TAKE 1 TABLET DAILY 90 tablet 3    nitroGLYCERIN (NITROSTAT) 0.4 MG SL tablet up to max of 3 total doses.  If no relief after 1 dose, call 911. 25 tablet 3    ondansetron (ZOFRAN-ODT) 4 MG disintegrating tablet Take 1 tablet by mouth 3 times daily as needed for Nausea or Vomiting 21 tablet 0    SUMAtriptan (IMITREX) 100 MG tablet       Psyllium (METAMUCIL FIBER PO) Take by mouth      Cholecalciferol (VITAMIN D3) 1000 UNITS TABS Take 1,900 Units by mouth daily       calcium carbonate 600 MG TABS tablet Take 1 tablet by mouth daily       fish oil-omega-3 fatty acids 1000 MG capsule Take 2 g by mouth 2 times daily.  Multiple Vitamin (MULTI-VITAMIN PO) Take 1 tablet by mouth nightly       Magnesium 250 MG TABS Take by mouth daily Every Monday Wednesday Friday Sunday      Ascorbic Acid (VITAMIN C) 500 MG tablet Take 500 mg by mouth daily.  aspirin 81 MG tablet Take 81 mg by mouth every evening        No current facility-administered medications for this visit. Review of Systems   Constitutional: Negative for fever. HENT: Negative for ear pain, tinnitus and trouble swallowing. Eyes: Negative for photophobia and visual disturbance. Respiratory: Negative for choking and shortness of breath. Cardiovascular: Negative for chest pain and palpitations. Gastrointestinal: Negative for nausea and vomiting. Musculoskeletal: Negative for back pain, gait problem, joint swelling, myalgias, neck pain and neck stiffness. Skin: Negative for color change. Allergic/Immunologic: Negative for food allergies. Neurological: Negative for dizziness, tremors, seizures, syncope, facial asymmetry, speech difficulty, weakness, light-headedness, numbness and headaches. Psychiatric/Behavioral: Negative for behavioral problems, confusion, hallucinations and sleep disturbance. Objective:   /72 (Site: Left Upper Arm, Position: Sitting, Cuff Size: Medium Adult)   Pulse 62   Wt 169 lb 6.4 oz (76.8 kg)   BMI 24.31 kg/m²     Physical Exam  Vitals signs reviewed. Eyes:      Pupils: Pupils are equal, round, and reactive to light.    Neck:      Musculoskeletal: Normal range of motion. Cardiovascular:      Rate and Rhythm: Normal rate and regular rhythm. Heart sounds: No murmur. Pulmonary:      Effort: Pulmonary effort is normal.      Breath sounds: Normal breath sounds. Abdominal:      General: Bowel sounds are normal.   Musculoskeletal: Normal range of motion. Skin:     General: Skin is warm. Neurological:      Mental Status: He is alert. He is disoriented. Cranial Nerves: No cranial nerve deficit. Sensory: No sensory deficit. Motor: No abnormal muscle tone. Coordination: Coordination normal.      Deep Tendon Reflexes: Reflexes are normal and symmetric. Babinski sign absent on the right side. Babinski sign absent on the left side. Comments: Patient has central ataxia no tremor is noted today. He is a pathic. Walks with a slow gait   Psychiatric:         Mood and Affect: Mood normal.         Xr Chest Portable    Result Date: 1/5/2021  EXAMINATION: XR CHEST PORTABLE CLINICAL HISTORY: SHORTNESS OF BREATH, WEAKNESS COMPARISONS: NOVEMBER 3, 2020 FINDINGS: Osseous structures are intact. Cardiopericardial silhouette is normal. Pulmonary vasculature is normal. Lungs are clear. NO ACUTE CARDIOPULMONARY DISEASE.       Lab Results   Component Value Date    WBC 5.1 01/05/2021    RBC 4.99 01/05/2021    HGB 13.4 01/05/2021    HCT 41.7 01/05/2021    MCV 83.6 01/05/2021    MCH 26.8 01/05/2021    MCHC 32.1 01/05/2021    RDW 17.6 01/05/2021     01/05/2021    MPV 10.9 07/15/2015     Lab Results   Component Value Date     01/05/2021    K 4.7 01/05/2021    K 4.2 05/25/2020     01/05/2021    CO2 28 01/05/2021    BUN 11 01/05/2021    CREATININE 0.84 01/05/2021    GFRAA >60.0 01/05/2021    LABGLOM >60.0 01/05/2021    GLUCOSE 97 01/05/2021    GLUCOSE 107 03/14/2012    PROT 6.9 01/05/2021    LABALBU 4.8 01/05/2021    LABALBU 4.1 03/14/2012    CALCIUM 9.1 01/05/2021    BILITOT 0.4 01/05/2021    ALKPHOS 47 01/05/2021    AST 30 01/05/2021    ALT 12 01/05/2021     Lab Results   Component Value Date    PROTIME 12.9 01/05/2021    INR 1.0 01/05/2021     Lab Results   Component Value Date    TSH 4.720 04/28/2016    GRSFXJRK21 619 05/08/2017    FOLATE >20.0 05/08/2017     Lab Results   Component Value Date    TRIG 65 07/22/2020    HDL 66 07/22/2020    LDLCALC 45 07/22/2020     Lab Results   Component Value Date    LABAMPH Neg 11/03/2020    BARBSCNU Neg 11/03/2020    LABBENZ Neg 11/03/2020    LABMETH Neg 11/03/2020    OPIATESCREENURINE Neg 11/03/2020    PHENCYCLIDINESCREENURINE Neg 11/03/2020     No results found for: LITHIUM, DILFRTOT, VALPROATE    Assessment:       Diagnosis Orders   1. Parkinson disease ([facility] [address]     2. Lumbar radiculopathy     3. Vascular dementia with behavior disturbance ([facility] [address]     4. Cerebral multi-infarct state     Parkinson's disease with vascular dementia which is stable patient is on carbidopa levodopa 2 a day with this is helped his tremors in the past he did not respond to this he is on Stalevo and therefore likely that is tolerating this better. He is on Persantine 3 times a day as he had a spell while being on aspirin and Plavix. We are less inclined to start him on anticoagulation and is not any further cerebral TIAs. Patient is remained static though his memory continues to worsen. He has good days and bad days as expected. Clinically though remains quite stable since last seen we will see him in a few months there is not much to be added to the expectations of side effects which we have discussed. Plan:      No orders of the defined types were placed in this encounter. No orders of the defined types were placed in this encounter. No follow-ups on file.       Latoya Contreras MD

## 2021-05-20 NOTE — TELEPHONE ENCOUNTER
Rx requested:  Requested Prescriptions     Pending Prescriptions Disp Refills    omeprazole (PRILOSEC) 20 MG delayed release capsule [Pharmacy Med Name: OMEPRAZOLE DR CAPS 20MG] 90 capsule 3     Sig: TAKE 1 CAPSULE DAILY    tamsulosin (FLOMAX) 0.4 MG capsule [Pharmacy Med Name: TAMSULOSIN HCL CAPS 0.4MG] 90 capsule 3     Sig: TAKE 1 CAPSULE DAILY    finasteride (PROSCAR) 5 MG tablet [Pharmacy Med Name: FINASTERIDE TABS 5MG] 90 tablet 3     Sig: TAKE 1 TABLET DAILY       Last Office Visit:   6/2/2020        Next Visit Date:  Future Appointments   Date Time Provider Tamia Espana   6/2/2021 11:45 AM Delfina Boston MD [facility]   6/8/2021  1:45 PM Melinda Lutz MD [address]   7/29/2021  2:15 PM Cleavon Nageotte, MD [facility]   8/16/2021  2:30 PM Mika Dowling MD [facility]   10/11/2021  2:30 PM Delfina Boston  [place] [id number]

## 2021-06-01 NOTE — TELEPHONE ENCOUNTER
Future Appointments     Provider Department   10/11/2021 MD BENJAMÍN Wheeler AT [facility]   Appt Notes: 6 mo checkup    Recent Visits    04/12/2021 Parkinson disease [facility])   [facility] AT [facility] Riri Chowdhury MD

## 2021-06-08 NOTE — PROGRESS NOTES
[facility] CARDIOLOGY OFFICE FOLLOW-UP      Patient: Anum Giordano  YOB: 1945  MRN: [id number]    Chief Complaint:  Chief Complaint   Patient presents with    3 Month Follow-Up    Coronary Artery Disease    Other     hx of stroke         Subjective/HPI:  6/8/21: Patient presents today for for follow-up of coronary artery disease. Multiple memory issues. Is a . Accompanied by his wife. Usually wobbly in the morning. But previous TIAs. On Namenda Persantine and Plavix. Had a discussion with her about CODE STATUS. I think she said he is a DNR CC which is eminently sensible thing to do because of his significant dementia    03/2/21: Patient presents today for follow-up of coronary artery disease TIAs. Last TIA he had caused significant memory issues. He is a . Accompanied by his wife. Memory has been impaired but still not bad. Very pleasant. He is on Persantine and Plavix. Has dyslipidemia for which he is on Crestor and is on Stalevo. Has BPH which is controlled. No chest pain congestive heart failure. See me in 3 months     11/17/2020: Patient presents today for follow-up of recent visit to the ER for chest pain.  He was discharged home. Maritza Ellis has had previous cardiac catheterization which revealed mild nonobstructive disease.  I think there is a significant element of anxiety when he complains of chest pain.  I tried to reassure him and told him to see us in the office if he gets frequent episodes of that rather than go to the ER. Maritza Ellis has previous TIAs.  And is on combination of Persantine and Plavix.  Also has some memory impairment.  Probably from consequences of TIA.  Has GERD BPH has not driven since his last major episode of TIA. Tabby Martinez to reassure him and his wife. Maritza Ellis will see me in 3 months.  I might address dose of Imdur in the future if his episodes get frequent        8/18/2020: Patient presents today for follow-up of recent hospitalization for TIA.  Now Plavix was added to dipyridamole 3 times a day.  Accompanied by his wife. Ben Soni is slightly impaired.  Still fairly functional.  No chest pain congestive heart failure symptoms or syncope.  Had a cardiac catheterization in the past which revealed mild disease.  LV ejection fraction was normal continue same medications and see me in 3 months     5/19/2020 (VIRTUAL): Aleena was seen in the hospital for chest pain.  Had a stress test that was negative.  Supposed to have ultrasound the gallbladder tomorrow.  In the past had a cardiac catheterization which revealed mild disease LV ejection fraction normal at 79%.  Last catheterization was 2016. Diego Burnham has a history of vascular dementia followed by Dr. Hughes Ground will see me in 3 months     5/28/19: Patient presents today for follow-up of chest pain. Cardiac cath was negative. He was told by Dr. Katalina Salcido that MRI showed that he may have had a prior CVA. Congestive heart failure symptoms or syncope. See me in one year     5/16/18: Patient presents today for follow-up of chest pain. That has resolved. Cardiac cath was negative. His mild hypertension dyslipidemia that stable. Was diagnosed lately to have either Parkinson's disease. And supposed to have a follow-up visit with Dr. Bia Nunez in the near future. He'll see me in 6 months.     5/9/17: For follow-up of chest pain. Not anymore. Cath was negative. Had a false-positive nuclear scan. Lopressor 25 milligram a day and Crestor. He has noticed blood pressure running a bit high he walks regularly and bikes when the weather is right. He has dyslipidemia and BPH status stable. See me in one year.     5/3/16: For followup of CATH. No significant CAD. False positive nuke. Retired last week. Very active. Bikes. Non smoker. Stay on toprol 25 See the written copy of this report in the patient's paper medical record. These results did not interface directly into the electronic medical record and are summarized here. In 1 year. no months     5/19/2020 (VIRTUAL): Aleena was seen in the hospital for chest pain.  Had a stress test that was negative.  Supposed to have ultrasound the gallbladder tomorrow. Julia Tomlin the past had a cardiac catheterization which revealed mild disease LV ejection fraction normal at 79%.  Last catheterization was 2016. Olegario Wisdom has a history of vascular dementia followed by Dr. Brenda Elliott will see me in 3 months     5/28/19: Patient presents today for follow-up of chest pain. Cardiac cath was negative. He was told by Dr. Sanchez Late that MRI showed that he may have had a prior CVA. Congestive heart failure symptoms or syncope. See me in one year     5/16/18: Patient presents today for follow-up of chest pain. That has resolved. Cardiac cath was negative. His mild hypertension dyslipidemia that stable. Was diagnosed lately to have either Parkinson's disease. And supposed to have a follow-up visit with Dr. Shaquille Jiménez in the near future. He'll see me in 6 months.     5/9/17: For follow-up of chest pain. Not anymore. Cath was negative. Had a false-positive nuclear scan. Lopressor 25 milligram a day and Crestor. He has noticed blood pressure running a bit high he walks regularly and bikes when the weather is right. He has dyslipidemia and BPH status stable. See me in one year.     5/3/16: For followup of CATH. No significant CAD. False positive nuke. Retired last week. Very active. Bikes. Non smoker. Stay on toprol 25 See the written copy of this report in the patient's paper medical record. These results did not interface directly into the electronic medical record and are summarized here. In 1 year. no restriction. Has HLP. On crestor.     4/19/2016:For abnormal stress test.Brandt Garcias. Was [facility]. C/O angina. Retrosternal.No NV. No radiation. No HA. Was on lopressor 25 for 10 years. Mainly for PVCs. Has HLN,BPH. Will need cath next tues. Bikes and walks regularly. CP aggravated with exercise. Metoprolol succinate 25.       03/2/21: Patient presents today for follow-up of coronary artery disease TIAs. Last TIA he had caused significant memory issues. He is a . Accompanied by his wife. Memory has been impaired but still not bad. Very pleasant. He is on Persantine and Plavix. Has dyslipidemia for which he is on Crestor and is on Stalevo. Has BPH which is controlled. No chest pain congestive heart failure.   See me in 3 months     11/17/2020: Patient presents today for follow-up of recent visit to the ER for chest pain.  He was discharged home. Tank Galarza has had previous cardiac catheterization which revealed mild nonobstructive disease.  I think there is a significant element of anxiety when he complains of chest pain.  I tried to reassure him and told him to see us in the office if he gets frequent episodes of that rather than go to the ER. Tank Galarza has previous TIAs.  And is on combination of Persantine and Plavix.  Also has some memory impairment.  Probably from consequences of TIA.  Has GERD BPH has not driven since his last major episode of TIA. Adelina Montejo to reassure him and his wife. Tank Galarza will see me in 3 months.  I might address dose of Imdur in the future if his episodes get frequent        8/18/2020: Patient presents today for follow-up of recent hospitalization for TIA.  Now Plavix was added to dipyridamole 3 times a day.  Accompanied by his wife. Anali Fuller is slightly impaired.  Still fairly functional.  No chest pain congestive heart failure symptoms or syncope.  Had a cardiac catheterization in the past which revealed mild disease.  LV ejection fraction was normal continue same medications and see me in 3 months     5/19/2020 (VIRTUAL): Aleena was seen in the hospital for chest pain.  Had a stress test that was negative.  Supposed to have ultrasound the gallbladder tomorrow.  In the past had a cardiac catheterization which revealed mild disease LV ejection fraction normal at 79%.  Last catheterization was 2016. Darion Tapia has a history of controlled. No chest pain congestive heart failure. See me in 3 months     11/17/2020: Patient presents today for follow-up of recent visit to the ER for chest pain.  He was discharged home. [facility] has had previous cardiac catheterization which revealed mild nonobstructive disease.  I think there is a significant element of anxiety when he complains of chest pain.  I tried to reassure him and told him to see us in the office if he gets frequent episodes of that rather than go to the ER. [facility] has previous TIAs.  And is on combination of Persantine and Plavix.  Also has some memory impairment.  Probably from consequences of TIA.  Has GERD BPH has not driven since his last major episode of TIA. Ann Ly to reassure him and his wife. [facility] will see me in 3 months.  I might address dose of Imdur in the future if his episodes get frequent        8/18/2020: Patient presents today for follow-up of recent hospitalization for TIA.  Now Plavix was added to dipyridamole 3 times a day.  Accompanied by his wife. Camelia Adame is slightly impaired.  Still fairly functional.  No chest pain congestive heart failure symptoms or syncope.  Had a cardiac catheterization in the past which revealed mild disease.  LV ejection fraction was normal continue same medications and see me in 3 months     5/19/2020 (VIRTUAL): Aleena was seen in the hospital for chest pain.  Had a stress test that was negative.  Supposed to have ultrasound the gallbladder tomorrow.  In the past had a cardiac catheterization which revealed mild disease LV ejection fraction normal at 79%.  Last catheterization was 2016. Trisha Mirza has a history of vascular dementia followed by Dr. Eduardo Roca will see me in 3 months     5/28/19: Patient presents today for follow-up of chest pain. Cardiac cath was negative. He was told by Dr. Neha Burkett that MRI showed that he may have had a prior CVA. Congestive heart failure symptoms or syncope.  See me in one year     5/16/18: Patient presents today for follow-up of chest pain. That has resolved. Cardiac cath was negative. His mild hypertension dyslipidemia that stable. Was diagnosed lately to have either Parkinson's disease. And supposed to have a follow-up visit with Dr. Alan Gomez in the near future. He'll see me in 6 months.     5/9/17: For follow-up of chest pain. Not anymore. Cath was negative. Had a false-positive nuclear scan. Lopressor 25 milligram a day and Crestor. He has noticed blood pressure running a bit high he walks regularly and bikes when the weather is right. He has dyslipidemia and BPH status stable. See me in one year.     5/3/16: For followup of CATH. No significant CAD. False positive nuke. Retired last week. Very active. Bikes. Non smoker. Stay on toprol 25 See the written copy of this report in the patient's paper medical record. These results did not interface directly into the electronic medical record and are summarized here. In 1 year. no restriction. Has HLP. On crestor.     4/19/2016:For abnormal stress test.Brandt Garcias. Was [facility] patient. C/O angina. Retrosternal.No NV. No radiation. No HA. Was on lopressor 25 for 10 years. Mainly for PVCs. Has HLN,BPH. Will need cath next tues. Bikes and walks regularly. CP aggravated with exercise. Metoprolol succinate 25.       03/2/21: Patient presents today for follow-up of coronary artery disease TIAs. Last TIA he had caused significant memory issues. He is a . Accompanied by his wife. Memory has been impaired but still not bad. Very pleasant. He is on Persantine and Plavix. Has dyslipidemia for which he is on Crestor and is on Stalevo. Has BPH which is controlled. No chest pain congestive heart failure.   See me in 3 months     11/17/2020: Patient presents today for follow-up of recent visit to the ER for chest pain.  He was discharged home. [facility] has had previous cardiac catheterization which revealed mild nonobstructive disease.  I think there is a significant element of anxiety when he Cath was negative. Had a false-positive nuclear scan. Lopressor 25 milligram a day and Crestor. He has noticed blood pressure running a bit high he walks regularly and bikes when the weather is right. He has dyslipidemia and BPH status stable. See me in one year.     5/3/16: For followup of CATH. No significant CAD. False positive nuke. Retired last week. Very active. Bikes. Non smoker. Stay on toprol 25 See the written copy of this report in the patient's paper medical record. These results did not interface directly into the electronic medical record and are summarized here. In 1 year. no restriction. Has HLP. On crestor.     4/19/2016:For abnormal stress test.Brandt Garcias. Was [facility] patient. C/O angina. Retrosternal.No NV. No radiation. No HA. Was on lopressor 25 for 10 years. Mainly for PVCs. Has HLN,BPH. Will need cath next tues. Bikes and walks regularly. CP aggravated with exercise. Metoprolol succinate 25.               Past Medical History:   Diagnosis Date    Abnormal cardiovascular stress test 4/19/2016    Equivocal ST-T wave changes;  Defect in the inferior wall consistent with prior infarction; LV EF 79%; TID ratio 1.1    Actinic keratoses     BPH (benign prostatic hypertrophy)     CAD (coronary artery disease)     Cancer (HCC)     COLON    Chest pressure 5/3/2016    Cholelithiasis     History of colon cancer     s/p partial colectomy in 2009    Hyperlipidemia     Inflamed seborrheic keratosis     Lumbar radiculopathy 10/26/2016    Mild cognitive impairment     Osteoarthritis     Parathyroid tumor     Parkinson disease ([facility] [address]     RA (retrograde amnesia)     Sinus bradycardia on ECG 4/19/2016    Done 4/5/16 with Dr. Sharon Vu Syncope and collapse     Vascular dementia [facility])        Past Surgical History:   Procedure Laterality Date    BACK SURGERY  1993    CHOLECYSTECTOMY, LAPAROSCOPIC N/A 5/27/2020    LAP BRAIN/ CHOLANGIOGRAMS performed by Sherice Purvis MD at [place] [address] 8/18/10,2012    DR Tamiko Dai    COLONOSCOPY  14      [facility]    JOINT REPLACEMENT Left 13    total    PARATHYROIDECTOMY Bilateral     2 of 4 glands removed    ID COLON CA SCRN NOT HI RSK IND N/A 9/15/2017    COLONOSCOPY performed by Oneil Lundborg, MD at [address] [facility] [facility] ENDOSCOPY  09    DR POLK       Family History   Problem Relation Age of Onset    Cancer Mother         OVARIAN    Heart Disease Father        Social History     Socioeconomic History    Marital status:      Spouse name: Not on file    Number of children: Not on file    Years of education: Not on file    Highest education level: Not on file   Occupational History    Not on file   Tobacco Use    Smoking status: Former Smoker     Packs/day: 1.00     Years: 1.00     Pack years: 1.00     Types: Cigarettes     Quit date: 1/15/1969     Years since quittin.4    Smokeless tobacco: Never Used    Tobacco comment: Quit smoking > 30 years ago   Vaping Use    Vaping Use: Never used   Substance and Sexual Activity    Alcohol use: No    Drug use: No    Sexual activity: Not Currently     Partners: Female   Other Topics Concern    Not on file   Social History Narrative         Lives With: Spouse    Type of Home: [address] [place] [id number] [place]: Two level(reports he can stay on 1st floor with bedroom and bathroom though currently he stays upstairs)    Home Access: (Pt unable to state)    Bathroom Shower/Tub: Walk-in shower    Bathroom Equipment: Shower chair, Grab bars in [address] [place]: Rolling walker    ADL Assistance: Independent    Homemaking Assistance: Needs assistance    Ambulation Assistance: Independent  (no AD)    Transfer Assistance: Independent    Active : No    Additional Comments: pt is questionable historian, he is unsure of what equipment he has at home or if he was using any     Social Determinants of Health     Financial Resource Strain:     Difficulty of Paying Living Expenses:    Food Insecurity:     Worried About Running Out of Food in the Last Year:     [address] in the Last Year:    Transportation Needs:     Lack of Transportation (Medical):  Lack of Transportation (Non-Medical):    Physical Activity:     Days of Exercise per Week:     Minutes of Exercise per Session:    Stress:     Feeling of Stress :    Social Connections:     Frequency of Communication with Friends and Family:     Frequency of Social Gatherings with Friends and Family:     Attends Voodoo Services:     Active Member of Clubs or Organizations:     Attends Club or Organization Meetings:     Marital Status:    Intimate Partner Violence:     Fear of Current or Ex-Partner:     Emotionally Abused:     Physically Abused:     Sexually Abused:         Allergies   Allergen Reactions    Amoxicillin Other (See Comments)     stomatitis       Current Outpatient Medications   Medication Sig Dispense Refill    rosuvastatin (CRESTOR) 10 MG tablet TAKE 1 TABLET DAILY 90 tablet 3    omeprazole (PRILOSEC) 20 MG delayed release capsule TAKE 1 CAPSULE DAILY 90 capsule 3    tamsulosin (FLOMAX) 0.4 MG capsule TAKE 1 CAPSULE DAILY 90 capsule 3    finasteride (PROSCAR) 5 MG tablet TAKE 1 TABLET DAILY 90 tablet 3    metoprolol succinate (TOPROL XL) 25 MG extended release tablet TAKE 1 TABLET DAILY 90 tablet 3    carbidopa-levodopa-entacapone (STALEVO 100) -200 MG TABS 1 tablet in the morning and 1 tablet at 2 pm 180 tablet 3    memantine (NAMENDA) 10 MG tablet Take 1 tablet by mouth 2 times daily 180 tablet 3    dipyridamole (PERSANTINE) 75 MG tablet Take 1 tablet by mouth 3 times daily 270 tablet 3    clopidogrel (PLAVIX) 75 MG tablet Take 1 tablet by mouth daily 90 tablet 3    Handicap Placard MISC by Does not apply route Good for five years 1 each 0    nitroGLYCERIN (NITROSTAT) 0.4 MG SL tablet up to lb (76.2 kg)   SpO2 98%   BMI 24.11 kg/m²    Physical Exam   Constitutional: He appears healthy. No distress. HENT:   Nose: Nose normal.   Mouth/Throat: Dentition is normal. Oropharynx is clear. Eyes: Pupils are equal, round, and reactive to light. Conjunctivae are normal.   Neck: Thyroid normal.   Cardiovascular: Regular rhythm, S1 normal, S2 normal, normal heart sounds, intact distal pulses and normal pulses. PMI is not displaced. No murmur heard. Pulmonary/Chest: He has no wheezes. He has no rales. He exhibits no tenderness. Abdominal: Soft. Bowel sounds are normal. He exhibits no distension and no mass. There is no splenomegaly or hepatomegaly. There is no abdominal tenderness. No hernia. Musculoskeletal:      Cervical back: Normal range of motion and neck supple. Neurological: He is alert and oriented to person, place, and time. He has normal motor skills. Gait normal.   Skin: Skin is warm and dry. No cyanosis. No jaundice. Nails show no clubbing.            Patient Active Problem List   Diagnosis    Hyperlipidemia    Osteopenia    CAD (coronary artery disease)    Benign prostatic hyperplasia    Cholelithiasis    Foot drop, left    Degenerative arthritis of lumbar spine    History of hip replacement, total    Seborrheic keratosis    History of colon cancer    Disturbance of skin sensation    Degenerative joint disease of pelvic region    History of repair of hip joint    Sinus bradycardia on ECG    Lumbar radiculopathy    Gastroesophageal reflux disease with esophagitis    Actinic keratoses    Inflamed seborrheic keratosis    PETRA (obstructive sleep apnea)    TIA (transient ischemic attack)    Parkinson disease ([address]    Memory loss    Syncope and collapse    Mild cognitive impairment    Chest pain    Biliary colic    Stroke-like episode    Ataxic gait    Vascular dementia with behavior disturbance (HCC)    Shortness of breath    Onychomycosis    Pain in toes of both feet    Peripheral nerve disease    Peripheral vascular disease of foot (HCC)    Bradykinesia    Aphasia    Primary insomnia    Cerebral multi-infarct state           No orders of the defined types were placed in this encounter. No orders of the defined types were placed in this encounter. Assessment/Orders:       ICD-10-CM    1. Coronary artery disease involving native heart without angina pectoris, unspecified vessel or lesion type  I25.10    2. TIA (transient ischemic attack)  G45.9        No orders of the defined types were placed in this encounter. There are no discontinued medications. No orders of the defined types were placed in this encounter. Plan:    Stay on same medications.     See me in 3 months        Electronically signed by: Terry Koch MD  6/8/2021 3:25 PM

## 2021-07-29 NOTE — PROGRESS NOTES
Subjective:      Patient ID: Darius Galvan is a 77 y.o. male. HPI  this is a 68 yo white male with h/o CAD, OA, HTN, CVA x 2, Colon cancer, TIA, BPH with LUTs on Flomax and Proscar and elevated PSA's back in follow-up. Since last seen on 7/31/20, his wife reports that he has progressive vascular dementia and Parkinson's Dz. He reports a good fos and no urgency or UI. He still has intermittent episodes of frequency and uses Flomax daily for this problem. He has no hematuria or dysuria or pain. He has no abd or flank pain. He has a prior h/o post-op retention. He has no new surgical problems.  I reviewed his interval PSA result today. He has no SE from his BPH medications reported.       Prostate biopsies in 4/2008 and 11/2010: neg, PV 57.1cc  PCA3 1/2013: neg (2)      Past Medical History:   Diagnosis Date    Abnormal cardiovascular stress test 4/19/2016    Equivocal ST-T wave changes; Defect in the inferior wall consistent with prior infarction; LV EF 79%; TID ratio 1.1    Actinic keratoses     BPH (benign prostatic hypertrophy)     CAD (coronary artery disease)     Cancer (HCC)     COLON    Chest pressure 5/3/2016    Cholelithiasis     History of colon cancer     s/p partial colectomy in 2009    Hyperlipidemia     Inflamed seborrheic keratosis     Lumbar radiculopathy 10/26/2016    Mild cognitive impairment     Osteoarthritis     Parathyroid tumor     Parkinson disease ([facility] [address]     RA (retrograde amnesia)     Sinus bradycardia on ECG 4/19/2016    Done 4/5/16 with Dr. Pam Amaro Syncope and collapse     Vascular dementia [facility])      Past Surgical History:   Procedure Laterality Date    BACK SURGERY  1993    CHOLECYSTECTOMY, LAPAROSCOPIC N/A 5/27/2020    LAP BRAIN/ CHOLANGIOGRAMS performed by Gerry Fernando MD at [place] [address]  8/18/10,09/11/2012    DR Faye Apa    COLONOSCOPY  09/29/14    DR. PIERRE    JOINT REPLACEMENT Left 5/28/13    total    PARATHYROIDECTOMY Bilateral     2 of 4 glands removed    ME COLON CA SCRN NOT HI RSK IND N/A 9/15/2017    COLONOSCOPY performed by Sylvain Olson MD at [address] [facility] ENDOSCOPY  09    DR POLK     Social History     Socioeconomic History    Marital status:      Spouse name: None    Number of children: None    Years of education: None    Highest education level: None   Occupational History    None   Tobacco Use    Smoking status: Former Smoker     Packs/day: 1.00     Years: 1.00     Pack years: 1.00     Types: Cigarettes     Quit date: 1/15/1969     Years since quittin.5    Smokeless tobacco: Never Used    Tobacco comment: Quit smoking > 30 years ago   Vaping Use    Vaping Use: Never used   Substance and Sexual Activity    Alcohol use: No    Drug use: No    Sexual activity: Not Currently     Partners: Female   Other Topics Concern    None   Social History Narrative         Lives With: Spouse    Type of Home: [address] [place] [address] [place]: Two level(reports he can stay on 1st floor with bedroom and bathroom though currently he stays upstairs)    Home Access: (Pt unable to state)    Bathroom Shower/Tub: Walk-in shower    Bathroom Equipment: Shower chair, Grab bars in [facility]: Rolling walker    ADL Assistance: Independent    Homemaking Assistance: Needs assistance    Ambulation Assistance: Independent  (no AD)    Transfer Assistance: Independent    Active : No    Additional Comments: pt is questionable historian, he is unsure of what equipment he has at home or if he was using any     Social Determinants of Health     Financial Resource Strain:     Difficulty of Paying Living Expenses:    Food Insecurity:     Worried About [address] in the Last Year:     [address] in the Last Year:    Transportation Needs:     Lack of Transportation (Medical):    [facility] Lack of Transportation (Non-Medical):    Physical Activity:     Days of Exercise per Week:     Minutes of Exercise per Session:    Stress:     Feeling of Stress :    Social Connections:     Frequency of Communication with Friends and Family:     Frequency of Social Gatherings with Friends and Family:     Attends Religion Services:     Active Member of Clubs or Organizations:     Attends Club or Organization Meetings:     Marital Status:    Intimate Partner Violence:     Fear of Current or Ex-Partner:     Emotionally Abused:     Physically Abused:     Sexually Abused:      Family History   Problem Relation Age of Onset    Cancer Mother         OVARIAN    Heart Disease Father      Current Outpatient Medications   Medication Sig Dispense Refill    clopidogrel (PLAVIX) 75 MG tablet TAKE 1 TABLET DAILY 90 tablet 3    dipyridamole (PERSANTINE) 75 MG tablet TAKE 1 TABLET THREE TIMES A  tablet 3    rosuvastatin (CRESTOR) 10 MG tablet TAKE 1 TABLET DAILY 90 tablet 3    omeprazole (PRILOSEC) 20 MG delayed release capsule TAKE 1 CAPSULE DAILY 90 capsule 3    tamsulosin (FLOMAX) 0.4 MG capsule TAKE 1 CAPSULE DAILY 90 capsule 3    finasteride (PROSCAR) 5 MG tablet TAKE 1 TABLET DAILY 90 tablet 3    metoprolol succinate (TOPROL XL) 25 MG extended release tablet TAKE 1 TABLET DAILY 90 tablet 3    carbidopa-levodopa-entacapone (STALEVO 100) -200 MG TABS 1 tablet in the morning and 1 tablet at 2 pm 180 tablet 3    memantine (NAMENDA) 10 MG tablet Take 1 tablet by mouth 2 times daily 180 tablet 3    Handicap Placard MISC by Does not apply route Good for five years 1 each 0    nitroGLYCERIN (NITROSTAT) 0.4 MG SL tablet up to max of 3 total doses.  If no relief after 1 dose, call 911. 25 tablet 3    ondansetron (ZOFRAN-ODT) 4 MG disintegrating tablet Take 1 tablet by mouth 3 times daily as needed for Nausea or Vomiting 21 tablet 0    SUMAtriptan (IMITREX) 100 MG tablet       Psyllium (METAMUCIL by PVR today. Plan:      1.  F/U prn        Segun Mckinney MD

## 2021-07-30 PROBLEM — R41.82 AMS (ALTERED MENTAL STATUS): Status: ACTIVE | Noted: 2021-01-01

## 2021-07-30 NOTE — ED PROVIDER NOTES
[address] [facility] ED  eMERGENCY dEPARTMENT eNCOUnter      Pt Name: Darius Galvan  MRN: [id number]  [place] 1945  Date of evaluation: 7/30/2021  Provider: Marleen Montez PA-C    CHIEF COMPLAINT       Chief Complaint   Patient presents with    Altered Mental Status     Patient unresponsive per wife         HISTORY OF PRESENT ILLNESS   (Location/Symptom, Timing/Onset,Context/Setting, Quality, Duration, Modifying Factors, Severity)  Note limiting factors. Darius Galvan is a 77 y.o. male who presents to the emergency department with complaint of sudden period of unresponsiveness according to wife. Patient states that at 3:00 he was perfectly fine he was sitting in a chair there was a aide at the house taking care of him, around 400 he was still sitting in the same chair, they went to check on him to see if he needed a thing before they left, and she states at that time he was just staring off with his eyes open. States shortly thereafter he did close his eyes, did not respond to them since that time. Wife states he had a similar episode approximately a year ago he was seen in the emerge department evaluated \"and then just woke up\". Past medical history is significant for bradycardia, coronary artery disease, hyperlipidemia, osteoarthritis, consents, vascular dementia. Unable to complete NIH stroke scale as patient does not follow any commands. HPI    NursingNotes were reviewed. REVIEW OF SYSTEMS    (2-9 systems for level 4, 10 or more for level 5)     Review of Systems   Constitutional: Negative for activity change and appetite change. HENT: Negative for congestion, ear discharge, ear pain, nosebleeds, rhinorrhea and sore throat. Eyes: Negative for discharge. Respiratory: Negative for shortness of breath. Cardiovascular: Negative for chest pain, palpitations and leg swelling. Gastrointestinal: Negative for abdominal distention, abdominal pain and constipation. Genitourinary: Negative for difficulty urinating and dysuria. Musculoskeletal: Negative for arthralgias. Skin: Negative for color change. Neurological: Negative for dizziness, syncope, numbness and headaches. Nonresponsive   Psychiatric/Behavioral: Negative for agitation and confusion. Except as noted above the remainder of the review of systems was reviewed and negative. PAST MEDICAL HISTORY     Past Medical History:   Diagnosis Date    Abnormal cardiovascular stress test 4/19/2016    Equivocal ST-T wave changes; Defect in the inferior wall consistent with prior infarction; LV EF 79%; TID ratio 1.1    Actinic keratoses     BPH (benign prostatic hypertrophy)     CAD (coronary artery disease)     Cancer (HCC)     COLON    Chest pressure 5/3/2016    Cholelithiasis     History of colon cancer     s/p partial colectomy in 2009    Hyperlipidemia     Inflamed seborrheic keratosis     Lumbar radiculopathy 10/26/2016    Mild cognitive impairment     Osteoarthritis     Parathyroid tumor     Parkinson disease ([facility] [address]     RA (retrograde amnesia)     Sinus bradycardia on ECG 4/19/2016    Done 4/5/16 with Dr. Judy Saha Syncope and collapse     Vascular dementia [facility])          SURGICALHISTORY       Past Surgical History:   Procedure Laterality Date    BACK SURGERY  1993    CHOLECYSTECTOMY, LAPAROSCOPIC N/A 5/27/2020    LAP BRAIN/ CHOLANGIOGRAMS performed by Kay Christian MD at [place] [address]  8/18/10,09/11/2012    DR Byron Leon    COLONOSCOPY  09/29/14    DR. PIERRE    JOINT REPLACEMENT Left 5/28/13    total    PARATHYROIDECTOMY Bilateral 2011    2 of 4 glands removed    HI COLON CA SCRN NOT HI RSK IND N/A 9/15/2017    COLONOSCOPY performed by Guillermina Rivers MD at [address] [place] [address],[address] ENDOSCOPY  8/11/09    DR POLK         CURRENT MEDICATIONS       Previous Medications    ASCORBIC ACID (VITAMIN C) 500 MG TABLET    Take 500 mg by mouth daily. ASPIRIN 81 MG TABLET    Take 81 mg by mouth every evening     CALCIUM CARBONATE 600 MG TABS TABLET    Take 1 tablet by mouth daily     CARBIDOPA-LEVODOPA-ENTACAPONE (STALEVO 100) -200 MG TABS    1 tablet in the morning and 1 tablet at 2 pm    CHOLECALCIFEROL (VITAMIN D3) 1000 UNITS TABS    Take 1,900 Units by mouth daily     CLOPIDOGREL (PLAVIX) 75 MG TABLET    TAKE 1 TABLET DAILY    DIPYRIDAMOLE (PERSANTINE) 75 MG TABLET    TAKE 1 TABLET THREE TIMES A DAY    FINASTERIDE (PROSCAR) 5 MG TABLET    TAKE 1 TABLET DAILY    FISH OIL-OMEGA-3 FATTY ACIDS 1000 MG CAPSULE    Take 2 g by mouth 2 times daily. HANDICAP PLACARD MISC    by Does not apply route Good for five years    MAGNESIUM 250 MG TABS    Take by mouth daily Every Monday Wednesday Friday Sunday    MEMANTINE (NAMENDA) 10 MG TABLET    Take 1 tablet by mouth 2 times daily    METOPROLOL SUCCINATE (TOPROL XL) 25 MG EXTENDED RELEASE TABLET    TAKE 1 TABLET DAILY    MULTIPLE VITAMIN (MULTI-VITAMIN PO)    Take 1 tablet by mouth nightly     NITROGLYCERIN (NITROSTAT) 0.4 MG SL TABLET    up to max of 3 total doses. If no relief after 1 dose, call 911.     OMEPRAZOLE (PRILOSEC) 20 MG DELAYED RELEASE CAPSULE    TAKE 1 CAPSULE DAILY    ONDANSETRON (ZOFRAN-ODT) 4 MG DISINTEGRATING TABLET    Take 1 tablet by mouth 3 times daily as needed for Nausea or Vomiting    PSYLLIUM (METAMUCIL FIBER PO)    Take by mouth    ROSUVASTATIN (CRESTOR) 10 MG TABLET    TAKE 1 TABLET DAILY    SUMATRIPTAN (IMITREX) 100 MG TABLET        TAMSULOSIN (FLOMAX) 0.4 MG CAPSULE    TAKE 1 CAPSULE DAILY       ALLERGIES     Amoxicillin    FAMILY HISTORY       Family History   Problem Relation Age of Onset    Cancer Mother         OVARIAN    Heart Disease Father           SOCIAL HISTORY       Social History     Socioeconomic History    Marital status:      Spouse name: None    Number of children: None    Years of education: None    Highest education level: None   Occupational History    None   Tobacco Use    Smoking status: Former Smoker     Packs/day: 1.00     Years: 1.00     Pack years: 1.00     Types: Cigarettes     Quit date: 1/15/1969     Years since quittin.5    Smokeless tobacco: Never Used    Tobacco comment: Quit smoking > 30 years ago   Vaping Use    Vaping Use: Never used   Substance and Sexual Activity    Alcohol use: No    Drug use: No    Sexual activity: Not Currently     Partners: Female   Other Topics Concern    None   Social History Narrative         Lives With: Spouse    Type of Home: [address] [place] [address] [place]: Two level(reports he can stay on 1st floor with bedroom and bathroom though currently he stays upstairs)    Home Access: (Pt unable to state)    Bathroom Shower/Tub: Walk-in shower    Bathroom Equipment: Shower chair, Grab bars in shower    Home Equipment: Rolling walker    ADL Assistance: Independent    Homemaking Assistance: Needs assistance    Ambulation Assistance: Independent  (no AD)    Transfer Assistance: Independent    Active : No    Additional Comments: pt is questionable historian, he is unsure of what equipment he has at home or if he was using any     Social Determinants of Health     Financial Resource Strain:     Difficulty of Paying Living Expenses:    Food Insecurity:     Worried About Running Out of Food in the Last Year:     [address] in the Last Year:    Transportation Needs:     Lack of Transportation (Medical):      Lack of Transportation (Non-Medical):    Physical Activity:     Days of Exercise per Week:     Minutes of Exercise per Session:    Stress:     Feeling of Stress :    Social Connections:     Frequency of Communication with Friends and Family:     Frequency of Social Gatherings with Friends and Family:     Attends Christianity Services:     Active Member of Clubs or Organizations:     Attends Club or Organization Meetings:     Marital Status:    Intimate Partner Violence:     Fear of Current or Ex-Partner:     Emotionally Abused:     Physically Abused:     Sexually Abused:        SCREENINGS      @FLOW([id number])@      PHYSICAL EXAM    (up to 7 for level 4, 8 or more for level 5)     ED Triage Vitals [07/30/21 1649]   BP Temp Temp Source Pulse Resp SpO2 Height Weight   134/70 98.6 °F (37 °C) Oral 60 16 99 % 6' 1\" (1.854 m) 175 lb (79.4 kg)       Physical Exam  Vitals and nursing note reviewed. Constitutional:       General: He is not in acute distress. Appearance: He is well-developed. He is not ill-appearing, toxic-appearing or diaphoretic. HENT:      Head: Normocephalic. Nose: No congestion. Mouth/Throat:      Mouth: Mucous membranes are moist.      Pharynx: No oropharyngeal exudate or posterior oropharyngeal erythema. Eyes:      Extraocular Movements: Extraocular movements intact. Conjunctiva/sclera: Conjunctivae normal.      Pupils: Pupils are equal, round, and reactive to light. Neck:      Vascular: No JVD. Trachea: No tracheal deviation. Cardiovascular:      Rate and Rhythm: Normal rate. Pulses: Normal pulses. Heart sounds: Normal heart sounds. No murmur heard. No friction rub. No gallop. Pulmonary:      Effort: Pulmonary effort is normal. No tachypnea, accessory muscle usage, respiratory distress or retractions. Breath sounds: No stridor. No wheezing, rhonchi or rales. Chest:      Chest wall: No tenderness. Abdominal:      General: Abdomen is flat. Bowel sounds are normal. There is no distension or abdominal bruit. Palpations: There is no shifting dullness, fluid wave, hepatomegaly, splenomegaly, mass or pulsatile mass. Tenderness: There is no abdominal tenderness. There is no right CVA tenderness, left CVA tenderness, guarding or rebound. Negative signs include Duran's sign, Rovsing's sign and McBurney's sign.    Musculoskeletal: General: No deformity. Cervical back: Normal range of motion and neck supple. No rigidity. Skin:     General: Skin is warm and dry. Capillary Refill: Capillary refill takes less than 2 seconds. Coloration: Skin is not jaundiced. Neurological:      General: No focal deficit present. Cranial Nerves: No cranial nerve deficit. Sensory: No sensory deficit. Motor: No weakness. Coordination: Coordination normal.      Comments: Patient moving all extremities spontaneously. He does not follow commands, he will not open his eyes, he does not verbalize. When I raise his arms, he is does seem to have some resistance, but when let go they fall to the bed. Same thing with his lower extremities. He does pull away from painful stimuli. Psychiatric:         Mood and Affect: Mood normal.         DIAGNOSTIC RESULTS     EKG: All EKG's are interpreted by the Emergency Department Physician who either signs or Co-signsthis chart in the absence of a cardiologist.    EKG shows a sinus rhythm with a occasional premature atrial complexes 60 bpm no ventricular ectopy no acute ST segment abnormality QTC is 430 ms    RADIOLOGY:   Non-plain filmimages such as CT, Ultrasound and MRI are read by the radiologist. Plain radiographic images are visualized and preliminarily interpreted by the emergency physician with the below findings:    Chest x-ray shows no acute pulmonary process    Interpretation per the Radiologist below, if available at the time ofthis note:    CT Head WO Contrast   Final Result      NO ACUTE INTRACRANIAL PROCESS OR SIGNIFICANT CHANGE FROM 11/3/2020 IDENTIFIED.                   XR CHEST PORTABLE    (Results Pending)         ED BEDSIDE ULTRASOUND:   Performed by ED Physician - none    LABS:  Labs Reviewed   COMPREHENSIVE METABOLIC PANEL - Abnormal; Notable for the following components:       Result Value    Total Protein 6.2 (*)     All other components within normal limits   CBC WITH AUTO DIFFERENTIAL - Abnormal; Notable for the following components:    RBC 4.63 (*)     Hemoglobin 12.3 (*)     Hematocrit 38.6 (*)     MCH 26.6 (*)     MCHC 31.9 (*)     RDW 16.8 (*)     All other components within normal limits   POCT GLUCOSE - Normal   TROPONIN   URINE RT REFLEX TO CULTURE   CK   PROTIME-INR   APTT   POCT GLUCOSE       All other labs were within normal range or not returned as of this dictation. EMERGENCY DEPARTMENT COURSE and DIFFERENTIAL DIAGNOSIS/MDM:   Vitals:    Vitals:    07/30/21 1730 07/30/21 1800 07/30/21 1830 07/30/21 1900   BP: (!) 146/88 (!) 151/80 (!) 164/77 (!) 169/86   Pulse:       Resp:       Temp:       TempSrc:       SpO2: 99% 99%     Weight:       Height:                MDM  Number of Diagnoses or Management Options  Diagnosis management comments: 1740 p.m. patient opens his eyes, he is talking to wife as well as his nurse. Seems to moving everything well. This has occurred without any specific intervention. Patient presents as described he has dementia and is unable to provide history. He was unresponsive which is why he came to the emergency department in the emergency department he became responsive with no new motor or neurological deficits on my exam.  Per the wife he still seems to be altered but he does have a history of dementia. Patient's work-up is negative no signs of infection of UTI pneumonia he does not appear ill on exam.  No signs of bedsores or cellulitis either. CRITICAL CARE TIME   Total Critical Care time was  minutes, excluding separately reportableprocedures. There was a high probability of clinicallysignificant/life threatening deterioration in the patient's condition which required my urgent intervention. CONSULTS:  None    PROCEDURES:  Unless otherwise noted below, none     Procedures    FINAL IMPRESSION      1.  Altered mental status, unspecified altered mental status type          DISPOSITION/PLAN   DISPOSITION Admitted 07/30/2021 08:47:56 PM      PATIENT REFERRED TO:  No follow-up provider specified.     DISCHARGE MEDICATIONS:  New Prescriptions    No medications on file          (Please note that portions of this note were completed with a voice recognition program.  Efforts were made to edit the dictations but occasionally words are mis-transcribed.)    Deloris Dominguez PA-C (electronically signed)  Attending Emergency Physician         Deloris Dominguez PA-C  07/30/21 [id number]

## 2021-07-30 NOTE — ED TRIAGE NOTES
Patient presents to ED via EMS with C/O change in mental status per wife. Patient has hx of vascular dementia. Patients [facility] nurse was out this morning. When she was leaving, patient became unresponsive. Patient will not respond to verbal commands. Does withdraw from painful stimuli. Actively resists pupil checks, will not open eyes. Respirations even and unlabored. Skin p/w/d. No distress noted.

## 2021-07-30 NOTE — ED NOTES
Bed: 09  Expected date: 7/30/21  Expected time:   Means of arrival:   Comments: Altered mental status. Pt has  h/o dementia  126/71, 64, nsr. Eye flutter.  Pt will not allow ems to open eye     April L Ingrid Oppenheim, RN  07/30/21 1579

## 2021-07-31 NOTE — CARE COORDINATION
Spoke with the patient's wife. She has been working on getting the patient placed at [facility] prior to admission. Per wife, if the patient does need a skilled facility at discharge she would want him to go to [facility]. Patient would require a precert and will need to see if facility is in his network. Cm/Lsw to follow up Monday to initiate precert/if accepted.

## 2021-07-31 NOTE — PROGRESS NOTES
[facility] OCCUPATIONAL THERAPY EVALUATION - ACUTE     NAME: Christine Posada  : 1945 (67 y.o.)  MRN: [id number]  CODE STATUS: Full Code  Room: R684/Q111-73    Date of Service: 2021    Patient Diagnosis(es): AMS (altered mental status) [R41.82]   Chief Complaint   Patient presents with    Altered Mental Status     Patient unresponsive per wife     Patient Active Problem List    Diagnosis Date Noted    AMS (altered mental status) 2021    Bradykinesia 02/15/2021    Aphasia 02/15/2021    Primary insomnia 02/15/2021    Cerebral multi-infarct state 02/15/2021    Shortness of breath 2021    Vascular dementia with behavior disturbance ([facility] 2020    Onychomycosis 2020    Pain in toes of both feet 2020    Peripheral nerve disease 2020    Peripheral vascular disease of foot ([facility] [address] 2020    Ataxic gait 2020    Stroke-like episode     Biliary colic     Chest pain     Parkinson disease ([facility] [facility]     Memory loss     Syncope and collapse     Mild cognitive impairment     TIA (transient ischemic attack) 2019    PETRA (obstructive sleep apnea)     Actinic keratoses     Inflamed seborrheic keratosis     Gastroesophageal reflux disease with esophagitis 11/15/2016    Lumbar radiculopathy 10/26/2016    Sinus bradycardia on ECG 2016    Disturbance of skin sensation 2015    Seborrheic keratosis 2014    History of colon cancer 2014    History of repair of hip joint 10/21/2013    History of hip replacement, total 2013    Degenerative joint disease of pelvic region 2013    Degenerative arthritis of lumbar spine 2013    Foot drop, left 2013    CAD (coronary artery disease)     Benign prostatic hyperplasia     Cholelithiasis     Hyperlipidemia 2012    Osteopenia 2012        Past Medical History:   Diagnosis Date    Abnormal cardiovascular stress test 2016 Equivocal ST-T wave changes; Defect in the inferior wall consistent with prior infarction; LV EF 79%; TID ratio 1.1    Actinic keratoses     BPH (benign prostatic hypertrophy)     CAD (coronary artery disease)     Cancer (HCC)     COLON    Chest pressure 5/3/2016    Cholelithiasis     History of colon cancer     s/p partial colectomy in 2009    Hyperlipidemia     Inflamed seborrheic keratosis     Lumbar radiculopathy 10/26/2016    Mild cognitive impairment     Osteoarthritis     Parathyroid tumor     Parkinson disease ([address]     RA (retrograde amnesia)     Sinus bradycardia on ECG 4/19/2016    Done 4/5/16 with Dr. Errol Bal Syncope and collapse     Vascular dementia [facility])      Past Surgical History:   Procedure Laterality Date    BACK SURGERY  1993    CHOLECYSTECTOMY, LAPAROSCOPIC N/A 5/27/2020    LAP BRAIN/ CHOLANGIOGRAMS performed by Maria G Layton MD at [place] [address]  8/18/10,09/11/2012    DR Sylvia Arreguin    COLONOSCOPY  09/29/14    DR. PIERRE    JOINT REPLACEMENT Left 5/28/13    total    PARATHYROIDECTOMY Bilateral 2011    2 of 4 glands removed    [place] COLON CA SCRN NOT HI RSK IND N/A 9/15/2017    COLONOSCOPY performed by Sabina Schmitz MD at [address] [place] [address],[address] ENDOSCOPY  8/11/09    DR Sylvia Arreguin        Restrictions  Restrictions/Precautions: Fall Risk (high fall risk)     Safety Devices: Safety Devices  Safety Devices in place: Yes  Type of devices: All fall risk precautions in place        Subjective  Pre Treatment Pain Screening  Pain at present: 0  Scale Used:  Faces    Pain Reassessment:   Pain Assessment  Patient Currently in Pain: Denies       Prior Level of Function:  Social/Functional History  Additional Comments: pt poor historian    OBJECTIVE:     Orientation Status:  Orientation  Overall Orientation Status: Impaired (pt stated year 2021, VC \"fireworks\" for month)  Orientation Level: Oriented to person    Observation:  Observation/Palpation  Posture: Fair  Observation: pleasantly confused, variable re-directability, Avasys in room    Cognition Status:  Cognition  Cognition Comment: hx of vascular dementia, pt confused, inconsistent following one step commands    Perception Status:  Perception  Overall Perceptual Status:  (unable to formally assess)    Sensation Status:  Sensation  Overall Sensation Status: WFL    Vision and Hearing Status:  Vision  Vision: Impaired  Vision Exceptions: Wears glasses at all times  Hearing  Hearing: Exceptions to [facility]  Hearing Exceptions: Hard of hearing/hearing concerns, No hearing aid     ROM:   LUE AROM (degrees)  LUE AROM : WFL  Left Hand AROM (degrees)  Left Hand AROM: WFL  RUE AROM (degrees)  RUE AROM : WFL  Right Hand AROM (degrees)  Right Hand AROM: WFL    Strength:  LUE Strength  L Hand General: 3+/5  LUE Strength Comment: 3+/5  RUE Strength  R Hand General: 3+/5  RUE Strength Comment: 3+/5    Coordination, Tone, Quality of Movement: Tone RUE  RUE Tone: Normotonic  Tone LUE  LUE Tone: Normotonic  Coordination  Movements Are Fluid And Coordinated: No  Coordination and Movement description: Tremors, Decreased speed, Right UE, Left UE    Hand Dominance:  Hand Dominance  Hand Dominance: Right    ADL Status:  ADL  Feeding: Stand by assistance, Verbal cueing, Setup  Grooming: Minimal assistance, Increased time to complete, Verbal cueing, Setup  UE Bathing: Minimal assistance  LE Bathing: Maximum assistance  UE Dressing: Maximum assistance  LE Dressing: Dependent/Total  Toileting: Dependent/Total  Additional Comments: Pt inconsistent following commands to simulate ADL's.  Clinical judgment for ADL performance  Toilet Transfers  Toilet Transfer: Unable to assess  Toilet Transfers Comments: anticipated Min/Mod A       Therapy key for assistance levels    Independent = Pt. is able to perform task with no assistance but may require a device   Stand by assistance = Pt. does not perform task at an independent level but does not need physical assistance, requires verbal cues  Minimal, Moderate, Maximal Assistance = Pt. requires physical assistance (25%, 50%, 75% assist from helper) for task but is able to actively participate in task   Dependent = Pt. requires total assistance with task and is not able to actively participate with task completion     Functional Mobility:  Functional Mobility  Functional Mobility Comments: NT for safety  Transfers  Sit to stand: Moderate assistance  Stand to sit: Minimal assistance    Bed Mobility  Bed mobility  Supine to Sit: Moderate assistance  Sit to Supine: Moderate assistance  Comment: Tactile cues to initiate and follow through, pt with difficulty following instructions and commands    Seated and Standing Balance:  Balance  Sitting Balance: Stand by assistance  Standing Balance: Minimal assistance (+2 for safety)    Functional Endurance:  Activity Tolerance  Activity Tolerance: Treatment limited secondary to decreased cognition    D/C Recommendations:  OT D/C RECOMMENDATIONS  REQUIRES OT FOLLOW UP: Yes    Equipment Recommendations:  OT Equipment Recommendations  Other: Conitnue to assess    OT Education:   OT Education  OT Education: OT Role, Plan of Care  Barriers to Learning: decreased cognition    OT Follow Up:  OT D/C RECOMMENDATIONS  REQUIRES OT FOLLOW UP: Yes       Assessment/Discharge Disposition:  Assessment: Pt is a 77 y.o. male admitted for change in mental status. Pt with above deficits, but unable to determine PLOF. Pt may benefit from OT services to address deficits and maximize safety and function during ADL's.   Performance deficits / Impairments: Decreased functional mobility , Decreased ADL status, Decreased strength, Decreased safe awareness, Decreased cognition, Decreased endurance, Decreased fine motor control, Decreased coordination  Prognosis: Fair  Discharge Recommendations: Continue to assess pending progress  Decision Making: High Complexity  History: Multi comorb  Exam: 8 perf imp  Assistance / Modification: Max A    Six Click Score   How much help for putting on and taking off regular lower body clothing?: Total  How much help for Bathing?: A Lot  How much help for Toileting?: Total  How much help for putting on and taking off regular upper body clothing?: A Lot  How much help for taking care of personal grooming?: A Little  How much help for eating meals?: A Little  AM-PAC Inpatient Daily Activity Raw Score: 12  AM-PAC Inpatient ADL T-Scale Score : 30.6  ADL Inpatient CMS 0-100% Score: 66.57    Plan:  Plan  Times per week: 1-4  Plan weeks: LOS  Current Treatment Recommendations: Strengthening, Balance Training, Functional Mobility Training, Cognitive Reorientation, Self-Care / ADL, Equipment Evaluation, Education, & procurement, Patient/Caregiver Education & Training, Cognitive/Perceptual Training, Safety Education & Training    Goals:   Patient will:    - Be Min A in UB ADLs   - Be Mod A in LB ADLs  - Be CGA in ADL transfers without LOB  - Improve B UE strength and endurance to increase by 1/2 MMT grade in order to participate in self-care activities as projected. - Sequence self-care tasks with Mod verbal and tactile cues. Patient Goal:    Not stated    Discussed and agreed upon: No Comments: Pt with tangential speech. Therapy Time:   OT Individual Minutes  Time In: 1030  Time Out: 1045  Minutes: 15    Eval: 15 minutes     Electronically signed by:     Cristy Rust OTR/L, OTR/L  7/31/2021, 11:02 AM

## 2021-07-31 NOTE — PROGRESS NOTES
2137: Admission assessment complete, see flowsheet. Alert to self and place; disoriented to time and situation. Anxious but cooperative with encouragement. Tremors present in bilat arms. Hx of parkinson's. Lung sounds are clear/diminished all fields. Red, flaky spot on forehead. He is in bed, awake, needs provided. 0740: Hourly rounding provided. Q2H turns provided.

## 2021-07-31 NOTE — CONSULTS
Subjective:      Patient ID: Colleen Akins is a 77 y.o. male who presents today for delirium  Dr Manuel Caro  HPI 77 right-handed gentleman well-known to me for ongoing significant vascular dementia with fluctuating course with Parkinson's disease he did with increasing confusion. Patient also has sleep apnea but does not tolerate CPAP machine. He fluctuates on a day-to-day basis. This is arteriosclerotic parkinsonism. We since last seen I tried him on carbidopa levodopa although he has considerable issues with the medications. He had developed some urinary retention in the past with the same. Patient is neuropsychometric testing has been done in the past and was told he has vascular dementia for many years. We tried him on Namenda without any response. Patient's wife and nurse who takes care of him reports that patient would not open his eyes and was not following commands. Was quite unresponsive. Negative work-up was notable for infective process and patient was therefore admitted. Review of Systems   Unable to perform ROS: Mental status change       Past Medical History:   Diagnosis Date    Abnormal cardiovascular stress test 4/19/2016    Equivocal ST-T wave changes;  Defect in the inferior wall consistent with prior infarction; LV EF 79%; TID ratio 1.1    Actinic keratoses     BPH (benign prostatic hypertrophy)     CAD (coronary artery disease)     Cancer (HCC)     COLON    Chest pressure 5/3/2016    Cholelithiasis     History of colon cancer     s/p partial colectomy in 2009    Hyperlipidemia     Inflamed seborrheic keratosis     Lumbar radiculopathy 10/26/2016    Mild cognitive impairment     Osteoarthritis     Parathyroid tumor     Parkinson disease ([address]     RA (retrograde amnesia)     Sinus bradycardia on ECG 4/19/2016    Done 4/5/16 with Dr. Ana Hudson Syncope and collapse     Vascular dementia [facility])      Past Surgical History:   Procedure Laterality Date    BACK SURGERY  1993    CHOLECYSTECTOMY, LAPAROSCOPIC N/A 2020    LAP BRAIN/ CHOLANGIOGRAMS performed by Kerri Reza MD at [place] [address]  8/18/10,2012    DR Torres Serum    COLONOSCOPY  14    DR. PIERRE    JOINT REPLACEMENT Left 13    total    PARATHYROIDECTOMY Bilateral     2 of 4 glands removed    [place] COLON CA SCRN NOT HI RSK IND N/A 9/15/2017    COLONOSCOPY performed by Tu Harris MD at [facility] ENDOSCOPY  09    DR POLK     Social History     Socioeconomic History    Marital status:      Spouse name: Not on file    Number of children: Not on file    Years of education: Not on file    Highest education level: Not on file   Occupational History    Not on file   Tobacco Use    Smoking status: Former Smoker     Packs/day: 1.00     Years: 1.00     Pack years: 1.00     Types: Cigarettes     Quit date: 1/15/1969     Years since quittin.5    Smokeless tobacco: Never Used    Tobacco comment: Quit smoking > 30 years ago   Vaping Use    Vaping Use: Never used   Substance and Sexual Activity    Alcohol use: No    Drug use: No    Sexual activity: Not Currently     Partners: Female   Other Topics Concern    Not on file   Social History Narrative         Lives With: Spouse    Type of Home: [address] [place] [id number] [place]: Two level(reports he can stay on 1st floor with bedroom and bathroom though currently he stays upstairs)    Home Access: (Pt unable to state)    Bathroom Shower/Tub: Walk-in shower    Bathroom Equipment: Shower chair, Grab bars in [facility]: Rolling walker    ADL Assistance: Independent    Homemaking Assistance: Needs assistance    Ambulation Assistance: Independent  (no AD)    Transfer Assistance: Independent    Active : No    Additional Comments: pt is questionable historian, he is unsure of what equipment he has at home or if he was using any     Social Determinants of Health     Financial Resource Strain:     Difficulty of Paying Living Expenses:    Food Insecurity:     Worried About Running Out of Food in the Last Year:     [address] in the Last Year:    Transportation Needs:     Lack of Transportation (Medical):      Lack of Transportation (Non-Medical):    Physical Activity:     Days of Exercise per Week:     Minutes of Exercise per Session:    Stress:     Feeling of Stress :    Social Connections:     Frequency of Communication with Friends and Family:     Frequency of Social Gatherings with Friends and Family:     Attends Gnosticist Services:     Active Member of Clubs or Organizations:     Attends Club or Organization Meetings:     Marital Status:    Intimate Partner Violence:     Fear of Current or Ex-Partner:     Emotionally Abused:     Physically Abused:     Sexually Abused:      Family History   Problem Relation Age of Onset    Cancer Mother         OVARIAN    Heart Disease Father      Allergies   Allergen Reactions    Amoxicillin Other (See Comments)     stomatitis     Current Facility-Administered Medications   Medication Dose Route Frequency Provider Last Rate Last Admin    aspirin EC tablet 81 mg  81 mg Oral QPM Izaiah Pena MD        carbidopa-levodopa-entacapone (STALEVO 100) -200 MG per tablet 1 tablet  1 tablet Oral BID Izaiah Pena MD        clopidogrel (PLAVIX) tablet 75 mg  75 mg Oral Daily Izaiah Pena MD   75 mg at 07/31/21 0957    sodium chloride flush 0.9 % injection 5-40 mL  5-40 mL Intravenous 2 times per day Izaiah Pena MD   10 mL at 07/31/21 0957    sodium chloride flush 0.9 % injection 5-40 mL  5-40 mL Intravenous PRN Izaiah Pena MD        0.9 % sodium chloride infusion  25 mL Intravenous PRN Izaiah Pena MD        enoxaparin (LOVENOX) injection 40 mg  40 mg Subcutaneous Daily Izaiah Pena MD   40 mg at 07/31/21 0957    ondansetron (ZOFRAN-ODT) disintegrating tablet 4 mg  4 mg Oral Q8H PRN Hugo Razo MD        Or    [facility] injection 4 mg  4 mg Intravenous Q6H PRN Hugo Razo MD        polyethylene glycol (GLYCOLAX) packet 17 g  17 g Oral Daily PRN Hugo Razo MD        acetaminophen (TYLENOL) tablet 650 mg  650 mg Oral Q6H PRN Hugo Razo MD        Or   [place] acetaminophen (TYLENOL) suppository 650 mg  650 mg Rectal Q6H PRN Hugo Razo MD            Objective:   BP (!) 136/59   Pulse 58   Temp 98 °F (36.7 °C) (Oral)   Resp 17   Ht 6' 1\" (1.854 m)   Wt 175 lb (79.4 kg)   SpO2 98%   BMI 23.09 kg/m²     Physical Exam  Vitals reviewed. Eyes:      Pupils: Pupils are equal, round, and reactive to light. Cardiovascular:      Rate and Rhythm: Normal rate and regular rhythm. Heart sounds: No murmur heard. Pulmonary:      Effort: Pulmonary effort is normal.      Breath sounds: Normal breath sounds. Abdominal:      General: Bowel sounds are normal.   Musculoskeletal:         General: Normal range of motion. Cervical back: Normal range of motion. Skin:     General: Skin is warm. Neurological:      Mental Status: He is alert. He is disoriented. Cranial Nerves: No cranial nerve deficit. Sensory: No sensory deficit. Motor: No abnormal muscle tone. Coordination: Coordination normal.      Deep Tendon Reflexes: Reflexes are normal and symmetric. Babinski sign absent on the right side. Babinski sign absent on the left side. Psychiatric:         Mood and Affect: Mood normal.     No tremors are notable with decreased blink responses and is disoriented. There is mild asterixis. We did not attempt to walk him. CT Head WO Contrast    Result Date: 7/30/2021  CT HEAD WO CONTRAST : 7/30/2021 CLINICAL HISTORY:  unresponsive . COMPARISON: 11/3/2020. TECHNIQUE: Spiral unenhanced images were obtained of the head, with routine multiplanar reconstructions performed.  All CT scans Results   Component Value Date    TRIG 65 07/22/2020    HDL 66 07/22/2020    LDLCALC 45 07/22/2020     Lab Results   Component Value Date    LABAMPH Neg 11/03/2020    BARBSCNU Neg 11/03/2020    LABBENZ Neg 11/03/2020    LABMETH Neg 11/03/2020    OPIATESCREENURINE Neg 11/03/2020    PHENCYCLIDINESCREENURINE Neg 11/03/2020     No results found for: LITHIUM, DILFRTOT, VALPROATE    Assessment:   Arteriosclerotic Parkinson's disease as well as vascular dementia which is well-documented in our notes from previous neuropsychometric testing as well. Patient has fluctuating course and continues to show fluctuation at times and he does bounce back. We had tried him on multiple medications eventually it settled down distally 100 twice a day this is all he can tolerate. He was on Namenda in the past as well which did not help. Nonfocal examination notable and therefore this does not appear to suggest vascular disease at least at this time he has older findings from vascular dementia. He has gait issues from the same as well. At this time 1 would continue to provide supportive care and see how he does and in the event that we suspect anything further we may image him. he requires rehabilitation or placement    Dhruv R. Monico Nageotte, MD, Cristiano Kendall, American Board of Psychiatry & Neurology  Board Certified in Vascular Neurology  Board Certified in Neuromuscular Medicine  Certified in [facility]:

## 2021-07-31 NOTE — FLOWSHEET NOTE
0915: AM assessment completed. Vital signs obtained and documented on flow sheet. Patient is laying in bed. He is restless and picking at his gown and telemetry box wires. Patient is alert to himself, and at times he will remember he is in the hospital. Patient noted to ramble at times and does not make sense. Patient has an avasys tele-sitter in room due to confusion and increased falls risk . Patients bed alarm is engaged. Spoke to patients wife this morning to get his home medication list. Patients wife stated even prior to this admission she was meeting with [facility] living for her . 1100: Dr Monico Nageotte on floor to see patient. 1730: Patients wife at bedside. Patient is becoming increasingly restless and impulsive. Patients wife came to nurses station to ask for assistance with the patient as he was trying to climb out of bed. Patients wife stated that he does do this at home- \"sundowning\" per the wife. Attempted to assist patient with use of urinal, but he was unable to urinate. Bladder scanned patient for 81 ml. Patients wife will assist him with dinner.

## 2021-07-31 NOTE — PROGRESS NOTES
Physical Therapy Med Surg Initial Assessment  Facility/Department: [facility]  Room: N225/N225-       NAME: Colleen Akins  : 1945 (64 y.o.)  MRN: [id number]  CODE STATUS: Full Code    Date of Service: 2021    Patient Diagnosis(es): AMS (altered mental status) [R41.82]   Chief Complaint   Patient presents with    Altered Mental Status     Patient unresponsive per wife     Patient Active Problem List    Diagnosis Date Noted    AMS (altered mental status) 2021    Bradykinesia 02/15/2021    Aphasia 02/15/2021    Primary insomnia 02/15/2021    Cerebral multi-infarct state 02/15/2021    Shortness of breath 2021    Vascular dementia with behavior disturbance ([address] 2020    Onychomycosis 2020    Pain in toes of both feet 2020    Peripheral nerve disease 2020    Peripheral vascular disease of foot ([address] 2020    Ataxic gait 2020    Stroke-like episode     Biliary colic     Chest pain     Parkinson disease ([address]     Memory loss     Syncope and collapse     Mild cognitive impairment     TIA (transient ischemic attack) 2019    PETRA (obstructive sleep apnea)     Actinic keratoses     Inflamed seborrheic keratosis     Gastroesophageal reflux disease with esophagitis 11/15/2016    Lumbar radiculopathy 10/26/2016    Sinus bradycardia on ECG 2016    Disturbance of skin sensation 2015    Seborrheic keratosis 2014    History of colon cancer 2014    History of repair of hip joint 10/21/2013    History of hip replacement, total 2013    Degenerative joint disease of pelvic region 2013    Degenerative arthritis of lumbar spine 2013    Foot drop, left 2013    CAD (coronary artery disease)     Benign prostatic hyperplasia     Cholelithiasis     Hyperlipidemia 2012    Osteopenia 2012        Past Medical History:   Diagnosis Date    Abnormal cardiovascular stress test 4/19/2016    Equivocal ST-T wave changes; Defect in the inferior wall consistent with prior infarction; LV EF 79%; TID ratio 1.1    Actinic keratoses     BPH (benign prostatic hypertrophy)     CAD (coronary artery disease)     Cancer (HCC)     COLON    Chest pressure 5/3/2016    Cholelithiasis     History of colon cancer     s/p partial colectomy in 2009    Hyperlipidemia     Inflamed seborrheic keratosis     Lumbar radiculopathy 10/26/2016    Mild cognitive impairment     Osteoarthritis     Parathyroid tumor     Parkinson disease ([address]     RA (retrograde amnesia)     Sinus bradycardia on ECG 4/19/2016    Done 4/5/16 with Dr. Dov Keen Syncope and collapse     Vascular dementia [facility])      Past Surgical History:   Procedure Laterality Date    BACK SURGERY  1993    CHOLECYSTECTOMY, LAPAROSCOPIC N/A 5/27/2020    LAP BRAIN/ CHOLANGIOGRAMS performed by Jessica Yoder MD at [place] [address]  8/18/10,09/11/2012    DR Andrade Vogel    COLONOSCOPY  09/29/14    DR. PIERRE    JOINT REPLACEMENT Left 5/28/13    total    PARATHYROIDECTOMY Bilateral 2011    2 of 4 glands removed    SC COLON CA SCRN NOT HI RSK IND N/A 9/15/2017    COLONOSCOPY performed by Ayden Villeda MD at [facility] ENDOSCOPY  8/11/09    DR POLK       Chart Reviewed: Yes  Patient assessed for rehabilitation services?: Yes  Family / Caregiver Present: No  General Comment  Comments: Pt laying in bed agreeable to PT eval    Restrictions:  Restrictions/Precautions: Fall Risk (high fall risk)     SUBJECTIVE: Subjective: Pt visably confused, answers when name called. Denies pain.  oriented to person only despite re-orientation attempts during session    Pain  Pre Treatment Pain Screening  Pain at present: 0    Post Treatment Pain Screening:   Pain Screening  Patient Currently in Pain: No  Pain Assessment  Pain Level: 0    Prior Level of Function:  Social/Functional History  Additional Comments: Pt unable to provide PLOF or home set up d/t cognitive/memory deficits. Pt does state that he used a walker    OBJECTIVE:   Vision: Impaired  Vision Exceptions: Wears glasses at all times  Hearing: Exceptions to [facility]  Hearing Exceptions: Hard of hearing/hearing concerns; No hearing aid    Cognition:  Overall Orientation Status: Within Functional Limits  Follows Commands: Within Functional Limits    Observation/Palpation  Posture: Fair  Observation: pleasantly confused, variable re-directability    ROM:  RLE General PROM: impaired hip flexor, HS flexibility  RLE AROM: WFL  LLE General PROM: impaired hip flexor, HS flexibility  LLE AROM : WFL    Strength:  Strength RLE  Comment: functionally >/=3+/5  Strength LLE  Comment: functionally >/=3+/5    Neuro:  Balance  Posture: Good  Sitting - Static: Good;-  Sitting - Dynamic: Good;-  Standing - Static: Fair;-  Standing - Dynamic: Fair;- (Min A with 2ww)     Tone RLE  RLE Tone: Normotonic  Tone LLE  LLE Tone: Normotonic  Motor Control  Gross Motor?: WFL  Sensation  Overall Sensation Status: WFL    Bed mobility  Supine to Sit: Minimal assistance  Sit to Supine: Moderate assistance  Scootin Person assistance;Dependent/Total  Comment: difficulty wirh redirections, responds to 1 step  functional cues with repetition    Transfers  Sit to Stand: Moderate Assistance  Stand to sit: Minimal Assistance  Comment: poor safety awareness, pulls on 2ww to stand    Ambulation  Ambulation?: Yes  Ambulation 1  Surface: level tile  Device: Rolling Walker  Assistance: Moderate assistance  Quality of Gait: flexed posture  Gait Deviations: [place]; Increased SHANNA; Decreased step length;Decreased step height  Distance: 2ft  Comments: VC to amb to bedside chair, pt requires constant redirection, takes 5 steps forward unable to sequence of be redirected to continue.  Max VC for backward steps back to bed, increased time for performance    Activity Tolerance  Activity Tolerance: Patient Tolerated treatment well      PT Education  PT Education: Goals;PT Role;Plan of Care;Gait Training    ASSESSMENT:   Body structures, Functions, Activity limitations: Decreased functional mobility ; Decreased safe awareness;Decreased balance;Decreased ROM; Decreased strength; Increased pain;Decreased posture  Decision Making: Medium Complexity  History: med  Exam: med  Clinical Presentation: med    Prognosis: Good  Barriers to Learning: none    DISCHARGE RECOMMENDATIONS:  Discharge Recommendations: Continue to assess pending progress, Patient would benefit from continued therapy after discharge    Assessment: Pt demonstrates the above deficits and decline in functional mobility status placing them at increased risk for falls. Pt would benefit from physical therapy to address above deficits and allow for safe return home at highest level of function, decrease risk for falls, and improve QOL.     REQUIRES PT FOLLOW UP: Yes      PLAN OF CARE:  Plan  Times per week: 3-6  Current Treatment Recommendations: Strengthening, Functional Mobility Training, Wheelchair Mobility Training, Neuromuscular Re-education, Home Exercise Program, Equipment Evaluation, Education, & procurement, Safety Education & Training, Modalities, Gait Training, Transfer Training, Balance Training, Stair training, Patient/Caregiver Education & Training, Positioning  Safety Devices  Type of devices: Left in bed, Call light within reach, Bed alarm in place    Goals:  Patient goals : unable to states  Long term goals  Long term goal 1: Bed mobility with supervision  Long term goal 2: Functional transfers with supervision  Long term goal 3: Amb 50ft with 2ww and supervision  Long term goal 4: 5xSTS <15.0 seconds with use of UEs    [facility] (6 CLICK) [address] Mobility Raw Score : 13     Therapy Time:   Individual   Time In 0845   Time Out 0904   Minutes 19 Oc Mcmillan, PT, 07/31/21 at 9:23 AM         Definitions for assistance levels  Independent = pt does not require any physical supervision or assistance from another person for activity completion. Device may be needed.   Stand by assistance = pt requires verbal cues or instructions from another person, close to but not touching, to perform the activity  Minimal assistance= pt performs 75% or more of the activity; assistance is required to complete the activity  Moderate assistance= pt performs 50% of the activity; assistance is required to complete the activity  Maximal assistance = pt performs 25% of the activity; assistance is required to complete the activity  Dependent = pt requires total physical assistance to accomplish the task

## 2021-07-31 NOTE — ED NOTES
Report called to 2N. Tele pack applied. Transportation notified. Patient and family updated.      David Persaud RN  07/30/21 211

## 2021-07-31 NOTE — H&P
Hospitalist Group   History and Physical      CHIEF COMPLAINT: Altered mental status    History of Present Illness:  77 y.o. male with a history of dementia, Parkinson disease, CAD, hyperlipidemia, presents with altered mental status. Patient was sitting on a chair when he suddenly became unresponsive. He was having his eyes open but he was not responding. It lasted about 1 hour. Patient is poor historian but was able to remember the episode. He said that he was awake and hearing what was going on around him. He said he was able to see but unable to move. When asked more questions, patient started talking about random things. He was unable to answer most questions. He did know that he was in the hospital but did not know the year. REVIEW OF SYSTEMS:  no fevers, chills, cp, sob, n/v, ha, vision/hearing changes, wt changes, hot/cold flashes, other open skin lesions, diarrhea, constipation, dysuria/hematuria unless noted in HPI. Complete ROS performed with the patient and is otherwise negative. PMH:  Past Medical History:   Diagnosis Date    Abnormal cardiovascular stress test 4/19/2016    Equivocal ST-T wave changes;  Defect in the inferior wall consistent with prior infarction; LV EF 79%; TID ratio 1.1    Actinic keratoses     BPH (benign prostatic hypertrophy)     CAD (coronary artery disease)     Cancer (HCC)     COLON    Chest pressure 5/3/2016    Cholelithiasis     History of colon cancer     s/p partial colectomy in 2009    Hyperlipidemia     Inflamed seborrheic keratosis     Lumbar radiculopathy 10/26/2016    Mild cognitive impairment     Osteoarthritis     Parathyroid tumor     [facility] [address]     RA (retrograde amnesia)     Sinus bradycardia on ECG 4/19/2016    Done 4/5/16 with Dr. Renetta Dong Syncope and collapse     Vascular dementia [facility])        Surgical History:  Past Surgical History:   Procedure Laterality Date    BACK SURGERY  1993    CHOLECYSTECTOMY, LAPAROSCOPIC N/A 5/27/2020    LAP BRAIN/ CHOLANGIOGRAMS performed by Colin Russo MD at [place] [address]  8/18/10,09/11/2012    DR Elmer Truong    COLONOSCOPY  09/29/14    DR. PIERRE    JOINT REPLACEMENT Left 5/28/13    total    PARATHYROIDECTOMY Bilateral 2011    2 of 4 glands removed    IN COLON CA SCRN NOT HI RSK IND N/A 9/15/2017    COLONOSCOPY performed by Suleman Charlton MD at [address] [place] [address],[address] ENDOSCOPY  8/11/09    DR POLK       Medications Prior to Admission:    Prior to Admission medications    Medication Sig Start Date End Date Taking? Authorizing Provider   clopidogrel (PLAVIX) 75 MG tablet TAKE 1 TABLET DAILY 7/26/21   Chiki Winston MD   dipyridamole (PERSANTINE) 75 MG tablet TAKE 1 TABLET THREE TIMES A DAY 6/8/21   Chiki Winston MD   rosuvastatin (CRESTOR) 10 MG tablet TAKE 1 TABLET DAILY 6/1/21   Sadie Pruett MD   omeprazole (PRILOSEC) 20 MG delayed release capsule TAKE 1 CAPSULE DAILY 5/20/21   Sadie Pruett MD   tamsulosin (FLOMAX) 0.4 MG capsule TAKE 1 CAPSULE DAILY 5/20/21   Sadie Pruett MD   finasteride (PROSCAR) 5 MG tablet TAKE 1 TABLET DAILY 5/20/21   Sadie Pruett MD   metoprolol succinate (TOPROL XL) 25 MG extended release tablet TAKE 1 TABLET DAILY 4/19/21   Sadie Pruett MD   carbidopa-levodopa-entacapone (STALEVO 100) -200 MG TABS 1 tablet in the morning and 1 tablet at 2 pm 3/31/21   Chiki Winston MD   memantine (NAMENDA) 10 MG tablet Take 1 tablet by mouth 2 times daily 8/17/20   Chiki Winston MD   Handicap Placard MISC by Does not apply route Good for five years 8/4/20   Sadie Pruett MD   nitroGLYCERIN (NITROSTAT) 0.4 MG SL tablet up to max of 3 total doses.  If no relief after 1 dose, call 911. 5/2/20   Med J Holiday, DO   ondansetron (ZOFRAN-ODT) 4 MG disintegrating tablet Take 1 tablet by mouth 3 times daily as needed for Nausea or Vomiting 1/9/20   Cris ALICE Griffith   SUMAtriptan (IMITREX) 100 MG tablet  8/12/19   Historical Provider, MD   Psyllium (METAMUCIL FIBER PO) Take by mouth    Historical Provider, MD   Cholecalciferol (VITAMIN D3) 1000 UNITS TABS Take 1,900 Units by mouth daily     Historical Provider, MD   calcium carbonate 600 MG TABS tablet Take 1 tablet by mouth daily     Historical Provider, MD   fish oil-omega-3 fatty acids 1000 MG capsule Take 2 g by mouth 2 times daily. Historical Provider, MD   Multiple Vitamin (MULTI-VITAMIN PO) Take 1 tablet by mouth nightly     Historical Provider, MD   Magnesium 250 MG TABS Take by mouth daily Every Monday Wednesday Friday Sunday    Historical Provider, MD   Ascorbic Acid (VITAMIN C) 500 MG tablet Take 500 mg by mouth daily. Historical Provider, MD   aspirin 81 MG tablet Take 81 mg by mouth every evening     Historical Provider, MD       Allergies:    Amoxicillin    Social History:    reports that he quit smoking about 52 years ago. His smoking use included cigarettes. He has a 1.00 pack-year smoking history. He has never used smokeless tobacco. He reports that he does not drink alcohol and does not use drugs.     Family History:        Problem Relation Age of Onset    Cancer Mother         OVARIAN    Heart Disease Father        PHYSICAL EXAM:  Vitals:  BP (!) 169/86   Pulse 60   Temp 98.6 °F (37 °C) (Oral)   Resp 16   Ht 6' 1\" (1.854 m)   Wt 175 lb (79.4 kg)   SpO2 99%   BMI 23.09 kg/m²   General Appearance: alert and oriented to person, place but not time,   in no acute distress  Skin: warm and dry, no rash or erythema  Head: normocephalic and atraumatic  Eyes: pupils equal, round, and reactive to light, extraocular eye movements intact, conjunctivae normal  ENT: tympanic membrane, external ear and ear canal normal bilaterally, nose without deformity, nasal mucosa and turbinates normal without polyps  Neck: supple and non-tender without mass, no thyromegaly or thyroid nodules, no cervical lymphadenopathy  Pulmonary/Chest: clear to auscultation bilaterally- no wheezes, rales or rhonchi   Cardiovascular: normal rate, regular rhythm, normal S1 and S2, no murmurs   Abdomen: soft, non-tender, non-distended, normal bowel sounds, no masses or organomegaly  Extremities: no cyanosis, clubbing   Musculoskeletal: normal range of motion, no joint swelling, deformity or tenderness  Neurologic: reflexes normal and symmetric, no cranial nerve deficit, left lower extremity weaker than the right (chronic according to the patient)       LABS:  Recent Labs     07/30/21 1700 07/30/21 1701     --    K 4.1  --      --    CO2 25  --    BUN 12  --    CREATININE 0.94  --    GLUCOSE 99 101   CALCIUM 8.6  --        Recent Labs     07/30/21 1700   WBC 5.4   RBC 4.63*   HGB 12.3*   HCT 38.6*   MCV 83.3   MCH 26.6*   MCHC 31.9*   RDW 16.8*          Recent Labs     07/30/21  1655   POCGLU 101       CBC with Differential:    Lab Results   Component Value Date    WBC 5.4 07/30/2021    RBC 4.63 07/30/2021    HGB 12.3 07/30/2021    HCT 38.6 07/30/2021     07/30/2021    MCV 83.3 07/30/2021    MCH 26.6 07/30/2021    MCHC 31.9 07/30/2021    RDW 16.8 07/30/2021    LYMPHOPCT 21.0 07/30/2021    MONOPCT 11.3 07/30/2021    BASOPCT 0.5 07/30/2021    MONOSABS 0.6 07/30/2021    LYMPHSABS 1.1 07/30/2021    EOSABS 0.0 07/30/2021    BASOSABS 0.0 07/30/2021     CMP:    Lab Results   Component Value Date     07/30/2021    K 4.1 07/30/2021    K 4.2 05/25/2020     07/30/2021    CO2 25 07/30/2021    BUN 12 07/30/2021    CREATININE 0.94 07/30/2021    GFRAA >60.0 07/30/2021    LABGLOM >60.0 07/30/2021    GLUCOSE 101 07/30/2021    GLUCOSE 107 03/14/2012    PROT 6.2 07/30/2021    LABALBU 3.9 07/30/2021    LABALBU 4.1 03/14/2012    CALCIUM 8.6 07/30/2021    BILITOT 0.3 07/30/2021    ALKPHOS 46 07/30/2021    AST 14 07/30/2021    ALT 6 07/30/2021       Radiology: CT Head WO Contrast    Result Date: 7/30/2021  CT HEAD WO CONTRAST : 7/30/2021 CLINICAL HISTORY:  unresponsive . COMPARISON: 11/3/2020. TECHNIQUE: Spiral unenhanced images were obtained of the head, with routine multiplanar reconstructions performed. All CT scans at this facility use dose modulation, iterative reconstruction, and/or weight based dosing when appropriate to reduce radiation dose to as low as reasonably achievable. FINDINGS: There is no intracranial hemorrhage, mass effect, midline shift, extra-axial collection, evidence of hydrocephalus, recent ischemic infarct, or skull fracture identified. 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 The mastoid air cells and visualized paranasal sinuses are essentially clear. NO ACUTE INTRACRANIAL PROCESS OR SIGNIFICANT CHANGE FROM 11/3/2020 IDENTIFIED. EKG: Normal sinus rhythm with PACs    ASSESSMENT/ PLAN[de-identified]      Active Problems:    AMS (altered mental status)  Resolved Problems:    * No resolved hospital problems. *      1. Altered mental status/unresponsiveness   History of dementia and Parkinson disease   Per patient, he was unable to move but he was able to hear and see   Episode lasted about an hour   Back to baseline currently   No acute neurological deficithas left lower extremity weakness but that was chronic according to the patient   CT head negative   No sign of infection   Likely due to dementia/Parkinson disease versus seizure episode   Will consult neurology   PT/OT  2. Hypertension   Resume medication after verification    Code Status: Full  DVT prophylaxis: Lovenox      Electronically signed by Sarah Santiago MD on 7/30/2021 at 9:44 PM      NOTE: This report was transcribed using voice recognition software. Every effort was made to ensure accuracy; however, inadvertent computerized transcription errors may be present.

## 2021-08-01 NOTE — FLOWSHEET NOTE
Shift assessment completed. Patient is oriented to self. Patient does not follow commands or answer questions appropriately. Patient fidgeting and talking to self. Avasys in place for safety. Lung sounds are diminshed, HR NSR on tele and bowel sounds present. Bed alarm engaged, call light in reach. Will continue to monitor. 0330-  Patient incontinent of large amount of urine. Jessica care performed and completed linen change.

## 2021-08-01 NOTE — FLOWSHEET NOTE
0353: AM assessment completed. Vitals obtained and documented. Patient resting in bed. Patients wife at bedside. Patients wife assisted patient with eating breakfast. Patient is alert to self, but not to place, situation or time. Patient did express discomfort with his feet, but this nurse did not see any redness or open areas on his feet. Lower legs elevated. 1420: Secure message sent to Dr Varsha Santoyo. Patients wife requesting that we resume his home medication. 1600: Patient very restless in bed. Patients wife came out of room asking this nurse to help him use restroom. Upon entering room, patient was incontinent of a small amount of stool. This nurse along with help from Raman's, stood patient at bedside and pivot transferred him to the bedside commode. Patient had a large BM . Patient transferred back to bed where he is now resting. 1844: Secure message sent to  Dr Varsha Santoyo requesting that he again review and reorder patients home medication per the wife's request. Dr Varsha Santoyo acknowledged the request and stated he will take care of it right now. Patients wife made aware.

## 2021-08-01 NOTE — PROGRESS NOTES
Hospitalist Daily Progress Note  Name: Celestino Us  Age: 77 y.o. Gender: male  CodeStatus: Full Code  Allergies: Amoxicillin    Chief Complaint:Altered Mental Status (Patient unresponsive per wife)      Primary Care Provider: Sadie Pruett MD    InpatientTreatment Team: Treatment Team: Attending Provider: Fern Michael MD; Consulting Physician: Chiki Winston MD; Utilization Reviewer: Bob Gee RN; Registered Nurse: Orly Mckinney RN    Admission Date: 7/30/2021      Subjective: No chest pain, sob, nausea. Mental status fluctuating. Physical Exam  Vitals and nursing note reviewed. Constitutional:       Appearance: Normal appearance. Cardiovascular:      Rate and Rhythm: Normal rate and regular rhythm. Pulmonary:      Effort: Pulmonary effort is normal.      Breath sounds: Normal breath sounds. Abdominal:      General: Bowel sounds are normal.      Palpations: Abdomen is soft. Musculoskeletal:         General: Normal range of motion. Skin:     General: Skin is warm and dry. Neurological:      Mental Status: He is alert. Mental status is at baseline. He is disoriented.          Medications:  Reviewed    Infusion Medications:    sodium chloride       Scheduled Medications:    aspirin  81 mg Oral QPM    carbidopa-levodopa-entacapone  1 tablet Oral BID    clopidogrel  75 mg Oral Daily    sodium chloride flush  5-40 mL Intravenous 2 times per day    enoxaparin  40 mg Subcutaneous Daily     PRN Meds: sodium chloride flush, sodium chloride, ondansetron **OR** ondansetron, polyethylene glycol, acetaminophen **OR** acetaminophen    Labs:   Recent Labs     07/30/21  1700   WBC 5.4   HGB 12.3*   HCT 38.6*        Recent Labs     07/30/21  1700      K 4.1      CO2 25   BUN 12   CREATININE 0.94   CALCIUM 8.6     Recent Labs     07/30/21  1700   AST 14   ALT 6   BILITOT 0.3   ALKPHOS 46     Recent Labs     07/30/21  1700   INR 1.0     Recent Labs     07/30/21  1700 CKTOTAL 79   TROPONINI <0.010       Urinalysis:   Lab Results   Component Value Date    NITRU Negative 07/30/2021    WBCUA 3-5 05/31/2013    BACTERIA Moderate 05/31/2013    RBCUA None seen 05/31/2013    BLOODU Negative 07/30/2021    SPECGRAV 1.004 07/30/2021    GLUCOSEU Negative 07/30/2021       Radiology:   Most recent    Chest CT      WITH CONTRAST:No results found for this or any previous visit. WITHOUT CONTRAST: No results found for this or any previous visit. CXR      2-view: Results for orders placed during the hospital encounter of 08/01/19    XR CHEST STANDARD (2 VW)    Narrative  EXAMINATION: XR CHEST (2 VW)    CLINICAL HISTORY: Possible stroke    COMPARISONS: September 11, 2015    FINDINGS:    Two views of the chest are submitted. The cardiac silhouette is of normal size configuration. Pulmonary vascular unremarkable. Right sided trachea. No focal infiltrates. No effusions. No Pneumothoraces. Impression  NO ACUTE ACTIVE CARDIOPULMONARY PROCESS      Results for orders placed during the hospital encounter of 09/11/15    XR Chest Standard TWO VW    Narrative  EXAMINATION CHEST TWO VIEWS    CLINICAL HISTORY Followup surgery for colon cancer, metastatic workup    COMPARISONS 9/15/14    FINDINGS Heart, mediastinum, and hilum are unremarkable. Lungs are  clear. Pulmonary vasculature is normal.  No effusions are seen. Bones  are intact. IMPRESSION NO ACTIVE CHEST DISEASE. NO EVIDENCE FOR METASTASES. Renetta Morejon M.D. Released Shelbi Morejon M.D. Released Date Time- 09/11/15 9518  This document has been electronically signed.   ------------------------------------------------------------------------------       Portable: Results for orders placed during the hospital encounter of 07/30/21    XR CHEST PORTABLE    Narrative  EXAMINATION: XR CHEST PORTABLE    CLINICAL HISTORY: NOT RESPONSIVE    COMPARISONS: JANUARY 5, 2021    FINDINGS: Osseous structures are intact. Cardiopericardial silhouette is normal. Pulmonary vasculature is normal. Lungs are clear. Impression  NO ACUTE CARDIOPULMONARY DISEASE. Echo No results found for this or any previous visit. Assessment/Plan:    Active Hospital Problems    Diagnosis Date Noted    AMS (altered mental status) [R41.82] 07/30/2021     1. Altered mental status/unresponsiveness              History of dementia and Parkinson disease              Per patient, he was unable to move but he was able to hear and see              Episode lasted about an hour              Back to baseline currently              No acute neurological deficithas left lower extremity weakness but that was chronic according to the patient              CT head negative              No sign of infection              Likely due to dementia/Parkinson disease versus seizure episode              Will consult neurology              PT/OT    7/31 - d/w neurology. No focal changes, failing at home. Pt/ot for snf. Chronic vascular dementia. Waxing/waning episodes.   2. Hypertension              Resume medication after verification     Code Status: Full  DVT prophylaxis: Lovenox    Electronically signed by Enrico Carrington MD on 8/1/2021 at 12:45 AM

## 2021-08-01 NOTE — PROGRESS NOTES
Neurology Follow up    SUBJECTIVE: Patient somewhat more awake but confused. He recognizes wife. He is not any agitation. Current Facility-Administered Medications   Medication Dose Route Frequency Provider Last Rate Last Admin    aspirin EC tablet 81 mg  81 mg Oral QPM Juan Rai MD   81 mg at 07/31/21 1836    carbidopa-levodopa-entacapone (STALEVO 100) -200 MG per tablet 1 tablet  1 tablet Oral BID Juan Rai MD   1 tablet at 08/01/21 0917    clopidogrel (PLAVIX) tablet 75 mg  75 mg Oral Daily Juan Rai MD   75 mg at 08/01/21 0917    sodium chloride flush 0.9 % injection 5-40 mL  5-40 mL Intravenous 2 times per day Juan Rai MD   10 mL at 08/01/21 0917    sodium chloride flush 0.9 % injection 5-40 mL  5-40 mL Intravenous PRN Juan Rai MD        0.9 % sodium chloride infusion  25 mL Intravenous PRN Juan Rai MD        enoxaparin (LOVENOX) injection 40 mg  40 mg Subcutaneous Daily Juan Rai MD   40 mg at 08/01/21 0917    ondansetron (ZOFRAN-ODT) disintegrating tablet 4 mg  4 mg Oral Q8H PRN Juan Rai MD        Or    ondansetron [facility]) injection 4 mg  4 mg Intravenous Q6H PRN Juan Rai MD        polyethylene glycol (GLYCOLAX) packet 17 g  17 g Oral Daily PRN Juan Rai MD        acetaminophen (TYLENOL) tablet 650 mg  650 mg Oral Q6H PRN Juan Rai MD        Or   [facility] acetaminophen (TYLENOL) suppository 650 mg  650 mg Rectal Q6H PRN Juan Rai MD           PHYSICAL EXAM:    BP (!) 146/80   Pulse 68   Temp 98.2 °F (36.8 °C) (Oral)   Resp 14   Ht 6' 1\" (1.854 m)   Wt 175 lb (79.4 kg)   SpO2 99%   BMI 23.09 kg/m²    General Appearance:      Skin:  normal  CVS - Normal sounds, No murmurs , No carotid Bruits  RS -CTA  Abdomen Soft, BS present  Review of Systems   Constitutional: Negative for fever. HENT: Negative for ear pain, tinnitus and trouble swallowing. Eyes: Negative for photophobia and visual disturbance.    Respiratory: Negative for choking and shortness of breath. Cardiovascular: Negative for chest pain and palpitations. Gastrointestinal: Negative for nausea and vomiting. Musculoskeletal: Negative for back pain, gait problem, joint swelling, myalgias, neck pain and neck stiffness. Skin: Negative for color change. Allergic/Immunologic: Negative for food allergies. Neurological: Negative for dizziness, tremors, seizures, syncope, facial asymmetry, speech difficulty, weakness, light-headedness, numbness and headaches. Psychiatric/Behavioral: Negative for behavioral problems, confusion, hallucinations and sleep disturbance. Of systems is somewhat difficult with certain though he is able to nod and answer some of the questions.   Mental Status Exam:             Level of Alertness:   awake            Orientation:             Attention/Concentration:  normal            Language:  normal      Funduscopic Exam:     Cranial Nerves            Cranial nerve III           Pupils:  equal, round, reactive to light      Cranial nerves III, IV, VI           Extraocular Movements: intact          Cranial nerve VII           Facial strength: intact      Cranial nerve VIII           Hearing:  intact      Cranial nerve IX           Palate:  intact      Cranial nerve XI         Shoulder shrug:  intact      Cranial nerve XII          Tongue movement:  normal    Motor: Patient is somewhat fidgety with mild tremors  Drift:  absent  Motor exam is symmetrical 5 out of 5 all extremities bilaterally  Tone:  normal  Abnormal Movements:  absent            Sensory: Not quite reliable        Pinprick                      Vibration                         Touch            Proprioception                 Coordination: Able to perform          Finger/Nose   Right:  normal              Left:  normal          Heel-Knee-Shin                Right:  normal              Left:  normal          Rapid Alternating Movements              Right:  normal              Left: normal          Gait:                       Casual: Gait is deferred                       Romberg:          Reflexes:             Deep Tendon Reflexes:             Reflexes are 2 +             Plantar response:                Right:  downgoing               Left:  downgoing    Vascular:  Cardiac Exam:  normal         CT Head WO Contrast    Result Date: 7/30/2021  CT HEAD WO CONTRAST : 7/30/2021 CLINICAL HISTORY:  unresponsive . COMPARISON: 11/3/2020. TECHNIQUE: Spiral unenhanced images were obtained of the head, with routine multiplanar reconstructions performed. All CT scans at this facility use dose modulation, iterative reconstruction, and/or weight based dosing when appropriate to reduce radiation dose to as low as reasonably achievable. FINDINGS: There is no intracranial hemorrhage, mass effect, midline shift, extra-axial collection, evidence of hydrocephalus, recent ischemic infarct, or skull fracture identified. 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 The mastoid air cells and visualized paranasal sinuses are essentially clear. NO ACUTE INTRACRANIAL PROCESS OR SIGNIFICANT CHANGE FROM 11/3/2020 IDENTIFIED. XR CHEST PORTABLE    Result Date: 7/31/2021  EXAMINATION: XR CHEST PORTABLE CLINICAL HISTORY: NOT RESPONSIVE COMPARISONS: JANUARY 5, 2021 FINDINGS: Osseous structures are intact. Cardiopericardial silhouette is normal. Pulmonary vasculature is normal. Lungs are clear. NO ACUTE CARDIOPULMONARY DISEASE.       Recent Labs     07/30/21  1700   WBC 5.4   HGB 12.3*        Recent Labs     07/30/21  1700 07/30/21  1701     --    K 4.1  --      --    CO2 25  --    BUN 12  --    CREATININE 0.94  --    GLUCOSE 99 101     Recent Labs     07/30/21  1700   BILITOT 0.3   ALKPHOS 46   AST 14   ALT 6     Lab Results   Component Value Date    PROTIME 13.1 07/30/2021    INR 1.0 07/30/2021     No results found for: LITHIUM, DILFRTOT, VALPROATE    ASSESSMENT AND PLAN  Multi-infarct dementia with fluctuating course. Patient has had vasculature for many years and he is parkinsonism as well. Is on low-dose of Stalevo. He is not able to handle more medications than this. He does fluctuate in this is what is expected. He still has a nonfocal examination is somewhat better today he does bounce back at times. 1 may consider rehabilitation here in [facility] or he may require skilled nursing facility. I discussed this findings with his wife. Mika Abbott MD, Dmitri Arreaga, American Board of Psychiatry & Neurology  Board Certified in Vascular Neurology  Board Certified in Neuromuscular Medicine  Certified in . [address]

## 2021-08-02 NOTE — PROGRESS NOTES
Hospitalist Daily Progress Note  Name: Placido Solis  Age: 77 y.o. Gender: male  CodeStatus: Full Code  Allergies: Amoxicillin    Chief Complaint:Altered Mental Status (Patient unresponsive per wife)      Primary Care Provider: Marjorie Thurston MD    InpatientTreatment Team: Treatment Team: Attending Provider: Mindi Mcpherson MD; Consulting Physician: Noris Leary MD; Utilization Reviewer: Isma Simmons RN; Registered Nurse: Nataliia Ramachandran, RN; Registered Nurse: Janna Roberts RN    Admission Date: 7/30/2021      Subjective: No chest pain, sob, nausea. Mental status fluctuating. Physical Exam  Vitals and nursing note reviewed. Constitutional:       Appearance: Normal appearance. Cardiovascular:      Rate and Rhythm: Normal rate and regular rhythm. Pulmonary:      Effort: Pulmonary effort is normal.      Breath sounds: Normal breath sounds. Abdominal:      General: Bowel sounds are normal.      Palpations: Abdomen is soft. Musculoskeletal:         General: Normal range of motion. Skin:     General: Skin is warm and dry. Neurological:      Mental Status: He is alert. Mental status is at baseline. He is disoriented.          Medications:  Reviewed    Infusion Medications:    sodium chloride       Scheduled Medications:    dipyridamole  75 mg Oral TID    finasteride  5 mg Oral Daily    metoprolol succinate  25 mg Oral Daily    pantoprazole  40 mg Oral QAM AC    rosuvastatin  10 mg Oral Nightly    tamsulosin  0.4 mg Oral Daily    aspirin  81 mg Oral QPM    carbidopa-levodopa-entacapone  1 tablet Oral BID    clopidogrel  75 mg Oral Daily    sodium chloride flush  5-40 mL Intravenous 2 times per day    enoxaparin  40 mg Subcutaneous Daily     PRN Meds: nitroGLYCERIN, sodium chloride flush, sodium chloride, ondansetron **OR** ondansetron, polyethylene glycol, acetaminophen **OR** acetaminophen    Labs:   Recent Labs     07/30/21  1700   WBC 5.4   HGB 12.3*   HCT 38.6*    Recent Labs     07/30/21  1700      K 4.1      CO2 25   BUN 12   CREATININE 0.94   CALCIUM 8.6     Recent Labs     07/30/21  1700   AST 14   ALT 6   BILITOT 0.3   ALKPHOS 46     Recent Labs     07/30/21  1700   INR 1.0     Recent Labs     07/30/21  1700   CKTOTAL 79   TROPONINI <0.010       Urinalysis:   Lab Results   Component Value Date    NITRU Negative 07/30/2021    WBCUA 3-5 05/31/2013    BACTERIA Moderate 05/31/2013    RBCUA None seen 05/31/2013    BLOODU Negative 07/30/2021    SPECGRAV 1.004 07/30/2021    GLUCOSEU Negative 07/30/2021       Radiology:   Most recent    Chest CT      WITH CONTRAST:No results found for this or any previous visit. WITHOUT CONTRAST: No results found for this or any previous visit. CXR      2-view: Results for orders placed during the hospital encounter of 08/01/19    XR CHEST STANDARD (2 VW)    Narrative  EXAMINATION: XR CHEST (2 VW)    CLINICAL HISTORY: Possible stroke    COMPARISONS: September 11, 2015    FINDINGS:    Two views of the chest are submitted. The cardiac silhouette is of normal size configuration. Pulmonary vascular unremarkable. Right sided trachea. No focal infiltrates. No effusions. No Pneumothoraces. Impression  NO ACUTE ACTIVE CARDIOPULMONARY PROCESS      Results for orders placed during the hospital encounter of 09/11/15    XR Chest Standard TWO VW    Narrative  EXAMINATION CHEST TWO VIEWS    CLINICAL HISTORY Followup surgery for colon cancer, metastatic workup    COMPARISONS 9/15/14    FINDINGS Heart, mediastinum, and hilum are unremarkable. Lungs are  clear. Pulmonary vasculature is normal.  No effusions are seen. Bones  are intact. IMPRESSION NO ACTIVE CHEST DISEASE. NO EVIDENCE FOR METASTASES. Leopoldo Marion ByLudie Nian M.D. Released Juan Diego Cano M.D.   Released Date Time- 09/11/15 [id number]  This document has been electronically signed. ------------------------------------------------------------------------------       Portable: Results for orders placed during the hospital encounter of 07/30/21    XR CHEST PORTABLE    Narrative  EXAMINATION: XR CHEST PORTABLE    CLINICAL HISTORY: NOT RESPONSIVE    COMPARISONS: JANUARY 5, 2021    FINDINGS: Osseous structures are intact. Cardiopericardial silhouette is normal. Pulmonary vasculature is normal. Lungs are clear. Impression  NO ACUTE CARDIOPULMONARY DISEASE. Echo No results found for this or any previous visit. Assessment/Plan:    Active Hospital Problems    Diagnosis Date Noted    AMS (altered mental status) [R41.82] 07/30/2021    PD (Parkinson's disease) ([facility] [address] Amol Bound      1. Altered mental status/unresponsiveness              History of dementia and Parkinson disease              Per patient, he was unable to move but he was able to hear and see              Episode lasted about an hour              Back to baseline currently              No acute neurological deficithas left lower extremity weakness but that was chronic according to the patient              CT head negative              No sign of infection              Likely due to dementia/Parkinson disease versus seizure episode              Will consult neurology              PT/OT    7/31 - d/w neurology. No focal changes, failing at home. Pt/ot for snf. Chronic vascular dementia. Waxing/waning episodes. 8/1 - SNF planning as soon as able  2.  Hypertension              Resume medication after verification     Code Status: Full  DVT prophylaxis: Lovenox    Electronically signed by Jocelyn Plunkett MD on 8/2/2021 at 1:55 AM

## 2021-08-02 NOTE — PROGRESS NOTES
Assumed care at 2300 , no changes in assessment . Call light within reach , will continue to monitor.

## 2021-08-02 NOTE — PROGRESS NOTES
Neurology Follow up    SUBJECTIVE: Examined for neurology follow-up for vascular dementia with Parkinson's disease. Patient awake but confused. He recognizes wife. He is not exhibiting any agitation. Patient with some noted myoclonus today. Mainly of upper extremities. No seizure activity.     No new changes, fluctuating metal status change     Current Facility-Administered Medications   Medication Dose Route Frequency Provider Last Rate Last Admin    dipyridamole (PERSANTINE) tablet 75 mg  75 mg Oral TID Gilles Hernandez MD   75 mg at 08/02/21 1344    finasteride (PROSCAR) tablet 5 mg  5 mg Oral Daily Gilles Hernandez MD   5 mg at 08/01/21 2124    metoprolol succinate (TOPROL XL) extended release tablet 25 mg  25 mg Oral Daily Gilles Hernandez MD   25 mg at 08/02/21 0957    nitroGLYCERIN (NITROSTAT) SL tablet 0.4 mg  0.4 mg Sublingual Q5 Min PRN Gilles Hernandez MD        pantoprazole (PROTONIX) tablet 40 mg  40 mg Oral QAM AC Gilles Hernandez MD   40 mg at 08/02/21 0547    rosuvastatin (CRESTOR) tablet 10 mg  10 mg Oral Nightly Gilles Hernandez MD   10 mg at 08/01/21 2124    tamsulosin (FLOMAX) capsule 0.4 mg  0.4 mg Oral Daily Gilles Hernandez MD   0.4 mg at 08/01/21 2124    aspirin EC tablet 81 mg  81 mg Oral QPM Loren Allen MD   81 mg at 08/01/21 1745    carbidopa-levodopa-entacapone (STALEVO 100) -200 MG per tablet 1 tablet  1 tablet Oral BID Loren Allen MD   1 tablet at 08/02/21 5792    clopidogrel (PLAVIX) tablet 75 mg  75 mg Oral Daily Loren Allen MD   75 mg at 08/02/21 0958    sodium chloride flush 0.9 % injection 5-40 mL  5-40 mL Intravenous 2 times per day Loren Allen MD   10 mL at 08/02/21 0958    sodium chloride flush 0.9 % injection 5-40 mL  5-40 mL Intravenous PRN Loren Allen MD        0.9 % sodium chloride infusion  25 mL Intravenous PRN Loren Allen MD        enoxaparin (LOVENOX) injection 40 mg  40 mg Subcutaneous Daily Loren Allen MD   40 mg at 08/02/21 8850 ondansetron (ZOFRAN-ODT) disintegrating tablet 4 mg  4 mg Oral Q8H PRN Chiqui Patino MD        Or    ondansetron [facility]) injection 4 mg  4 mg Intravenous Q6H PRN Chiqiu Patino MD        polyethylene glycol (GLYCOLAX) packet 17 g  17 g Oral Daily PRN Chiqui Patino MD        acetaminophen (TYLENOL) tablet 650 mg  650 mg Oral Q6H PRN Chiqui Patino MD        Or    acetaminophen (TYLENOL) suppository 650 mg  650 mg Rectal Q6H PRN Chiqui Patino MD           PHYSICAL EXAM:    /83   Pulse 63   Temp 98.6 °F (37 °C) (Axillary)   Resp 15   Ht 6' 1\" (1.854 m)   Wt 175 lb (79.4 kg)   SpO2 98%   BMI 23.09 kg/m²    General Appearance:      Skin:  normal  CVS - Normal sounds, No murmurs , No carotid Bruits  RS -CTA  Abdomen Soft, BS present  Review of Systems   Unable to perform ROS: Mental status change   Constitutional: Negative for fever. HENT: Negative for trouble swallowing. Respiratory: Negative for cough, shortness of breath and wheezing. Gastrointestinal: Negative for vomiting. Skin: Negative for color change. Allergic/Immunologic: Negative for food allergies. Neurological: Positive for tremors and weakness. Negative for seizures, syncope and speech difficulty. Psychiatric/Behavioral: Positive for confusion. Negative for behavioral problems, hallucinations and sleep disturbance. Of systems is somewhat difficult with certain though he is able to nod and answer some of the questions.   Mental Status Exam:             Level of Alertness:   awake            Orientation:             Attention/Concentration:  normal            Language:  normal      Funduscopic Exam:     Cranial Nerves            Cranial nerve III           Pupils:  equal, round, reactive to light      Cranial nerves III, IV, VI           Extraocular Movements: intact          Cranial nerve VII           Facial strength: intact      Cranial nerve VIII           Hearing:  intact      Cranial nerve IX           Palate: intact      Cranial nerve XI         Shoulder shrug:  intact      Cranial nerve XII          Tongue movement:  normal    Motor: Patient is somewhat fidgety with mild tremors  Drift:  absent  Motor exam is symmetrical 5 out of 5 all extremities bilaterally  Tone:  normal  Abnormal Movements:  absent            Sensory: Not quite reliable        Pinprick                      Vibration                         Touch            Proprioception                 Coordination: Able to perform          Finger/Nose   Right:  normal              Left:  normal          Heel-Knee-Shin                Right:  normal              Left:  normal          Rapid Alternating Movements              Right:  normal              Left:  normal          Gait:                       Casual: Gait is deferred                       Romberg:          Reflexes:             Deep Tendon Reflexes:             Reflexes are 2 +             Plantar response:                Right:  downgoing               Left:  downgoing    Vascular:  Cardiac Exam:  normal         CT Head WO Contrast    Result Date: 7/30/2021  CT HEAD WO CONTRAST : 7/30/2021 CLINICAL HISTORY:  unresponsive . COMPARISON: 11/3/2020. TECHNIQUE: Spiral unenhanced images were obtained of the head, with routine multiplanar reconstructions performed. All CT scans at this facility use dose modulation, iterative reconstruction, and/or weight based dosing when appropriate to reduce radiation dose to as low as reasonably achievable. FINDINGS: There is no intracranial hemorrhage, mass effect, midline shift, extra-axial collection, evidence of hydrocephalus, recent ischemic infarct, or skull fracture identified. 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 The mastoid air cells and visualized paranasal sinuses are essentially clear.      NO ACUTE INTRACRANIAL PROCESS OR SIGNIFICANT CHANGE FROM 11/3/2020 IDENTIFIED. XR CHEST PORTABLE    Result Date: 7/31/2021  EXAMINATION: XR CHEST PORTABLE CLINICAL HISTORY: NOT RESPONSIVE COMPARISONS: JANUARY 5, 2021 FINDINGS: Osseous structures are intact. Cardiopericardial silhouette is normal. Pulmonary vasculature is normal. Lungs are clear. NO ACUTE CARDIOPULMONARY DISEASE. Recent Labs     07/30/21  1700   WBC 5.4   HGB 12.3*        Recent Labs     07/30/21  1700 07/30/21  1701     --    K 4.1  --      --    CO2 25  --    BUN 12  --    CREATININE 0.94  --    GLUCOSE 99 101     Recent Labs     07/30/21  1700   BILITOT 0.3   ALKPHOS 46   AST 14   ALT 6     Lab Results   Component Value Date    PROTIME 13.1 07/30/2021    INR 1.0 07/30/2021     No results found for: LITHIUM, DILFRTOT, VALPROATE    ASSESSMENT AND PLAN  Multi-infarct dementia with fluctuating course. Patient has had vasculature dementia for many years and he is parkinsonism as well. Is on low-dose of Stalevo. He is not able to handle more medications than this. He does fluctuate in this is what is expected. He still has a nonfocal examination is somewhat better today he does bounce back at times. 1 may consider rehabilitation here in [facility] or he may require skilled nursing facility. I discussed this findings with his wife. Patient with noted myoclonus today mainly of upper extremities. Will obtain thyroid studies, magnesium, CK. Will repeat CBC and CMP. There is no noted liver or renal dysfunction on admission. No overt seizure activity noted. Will obtain EEG. SNF precert pending    I have personally performed a face to face diagnostic evaluation on this patient, reviewed all data and investigations, and am the sole provider of all clinical decisions on the neurological status of this patient. no new changes , fluctuates in mentation      Mika Tompkins MD, Dionte Lock, American Board of Psychiatry & Neurology  Board Certified in Vascular Neurology  Board Certified in Neuromuscular Medicine  Certified in Neurorehabilitation

## 2021-08-02 NOTE — CARE COORDINATION
LSW attended quality rounds for pt. LSW contacted Arnold to place referral for SNF placement at [facility]. Facesheet provided.      [facility] can accept-Precert started

## 2021-08-02 NOTE — PROGRESS NOTES
Physical Therapy Missed Treatment   Facility/Department: [facility] MED SURG N225/N225-01    NAME: Teresia Opitz    : 1945 (33 y.o.)  MRN: [id number]    Account: [de-identified]  Gender: male    Chart reviewed, attempted PT at 16:20. Patient unavailable 2° to:    [] Hold per nsg request    [] Pt declined     [] Nsg notified   [] Other notified    [] Pt. . off floor for test/procedure. [x] Pt. Unavailable - Pt having in-room testing at this time. Will attempt PT treatment again at earliest convenience.       Electronically signed by Meredtih Arauz PTA on 21 at 4:21 PM EDT

## 2021-08-03 NOTE — PROGRESS NOTES
08/03/21  1037     --    K 4.1  --      --    CO2 24  --    BUN 15  --    CREATININE 0.85 1.0   CALCIUM 9.3  --      Recent Labs     08/02/21  1654   AST 17   ALT 9   BILITOT 0.4   ALKPHOS 53     No results for input(s): INR in the last 72 hours. Recent Labs     08/02/21  1654   CKTOTAL 68       Urinalysis:      Lab Results   Component Value Date    NITRU Negative 07/30/2021    WBCUA 3-5 05/31/2013    BACTERIA Moderate 05/31/2013    RBCUA None seen 05/31/2013    BLOODU Negative 07/30/2021    SPECGRAV 1.004 07/30/2021    GLUCOSEU Negative 07/30/2021       Radiology:  CT HEAD WO CONTRAST   Final Result      There is no acute intracranial process. XR CHEST PORTABLE   Final Result   NO ACUTE CARDIOPULMONARY DISEASE. CT Head WO Contrast   Final Result      NO ACUTE INTRACRANIAL PROCESS OR SIGNIFICANT CHANGE FROM 11/3/2020 IDENTIFIED. XR CHEST PORTABLE   Final Result   NO ACUTE CARDIOPULMONARY DISEASE.      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 I am managing a portion of pt care. Some medical issues are handled by other specialists. Additional work up and treatment should be done in out pt setting by pt PCP and other out pt providers. In addition to examining and evaluating pt, I spent additional time explaining care, normal and abnormal findings, and treatment plan. All of pt questions were answered. Counseling, diet and education were  provided. Case will be discussed with nursing staff when appropriate. Family will be updated if and when appropriate. Diet: ADULT DIET;  Regular    Code Status: Full Code    PT/OT Eval     Electronically signed by Juan Orozco MD on 8/3/2021 at 12:33 PM

## 2021-08-03 NOTE — PROGRESS NOTES
Physical Therapy Missed Treatment   Facility/Department: [facility] MED SURG N225/N225-01    NAME: Ramila Marcano    : 1945 (89 y.o.)  MRN: [id number]    Account: [de-identified]  Gender: male    Chart reviewed, attempted PT at 8:40. Patient unavailable 2° to:    [x] Hold - Pt not alert enough to safely participate with therapy. Wife present and stated she thinks he had a \"bad night. \"   13:33 - Pt still not alert. Family members present and said, \"He hasn't been up at all today. \"     [] Pt declined     [] Nsg notified   [] Other notified    [] Pt. . off floor for test/procedure. [] Pt. Unavailable       Will attempt PT treatment again at earliest convenience.       Electronically signed by Melvin Hunt PTA on 8/3/21 at 8:49 AM EDT

## 2021-08-03 NOTE — PROGRESS NOTES
[facility] Respiratory Therapy Evaluation   Current Order:  DUONEB TID PRN    CHEST VEST DAILY    Home Regimen: PRN       Ordering Physician: MIKEY  Re-evaluation Date:  EVAL DONE      Diagnosis: AMS       Patient Status: Stable / Unstable + Physician notified    The following MDI Criteria must be met in order to convert aerosol to MDI with spacer.  If unable to meet, MDI will be converted to aerosol:  []  Patient able to demonstrate the ability to use MDI effectively  []  Patient alert and cooperative  []  Patient able to take deep breath with 5-10 second hold  []  Medication(s) available in this delivery method   []  Peak flow greater than or equal to 200 ml/min            Current Order Substituted To  (same drug, same frequency)   Aerosol to MDI [] Albuterol Sulfate 0.083% unit dose by aerosol Albuterol Sulfate MDI 2 puffs by inhalation with spacer    [] Levalbuterol 1.25 mg unit dose by aerosol Levalbuterol MDI 2 puffs by inhalation with spacer    [] Levalbuterol 0.63 mg unit dose by aerosol Levalbuterol MDI 2 puffs by inhalation with spacer    [] Ipratropium Bromide 0.02% unit dose by aerosol Ipratropium Bromide MDI 2 puffs by inhalation with spacer    [] Duoneb (Ipratropium + Albuterol) unit dose by aerosol Ipratropium MDI + Albuterol MDI 2 puffs by inhalation w/spacer   MDI to Aerosol [] Albuterol Sulfate MDI Albuterol Sulfate 0.083% unit dose by aerosol    [] Levalbuterol MDI 2 puffs by inhalation Levalbuterol 1.25 mg unit dose by aerosol    [] Ipratropium Bromide MDI by inhalation Ipratropium Bromide 0.02% unit dose by aerosol    [] Combivent (Ipratropium + Albuterol) MDI by inhalation Duoneb (Ipratropium + Albuterol) unit dose by aerosol       Treatment Assessment [Frequency/Schedule]:  Change frequency to: _______DUONEB Q4 PRN CHEST VEST PRN  ___________________________________________per Protocol, P&T, MEC      Points 0 1 2 3 4   Pulmonary Status  Non-Smoker  []   Smoking history   < 20 pack years  [x] Smoking history  ?  20 pack years  []   Pulmonary Disorder  (acute or chronic)  []   Severe or Chronic w/ Exacerbation  []     Surgical Status No [x]   Surgeries     General []   Surgery Lower []   Abdominal Thoracic or []   Upper Abdominal Thoracic with  PulmonaryDisorder  []     Chest X-ray Clear/Not  Ordered     []  Chronic Changes  Results Pending  [x]  Infiltrates, atelectasis, pleural effusion, or edema  []  Infiltrates in more than one lobe []  Infiltrate + Atelectasis, &/or pleural effusion  []    Respiratory Pattern Regular,  RR = 12-20 [x]  Increased,  RR = 21-25 []  JACOBSON, irregular,  or RR = 26-30 []  Decreased FEV1  or RR = 31-35 []  Severe SOB, use  of accessory muscles, or RR ? 35  []    Mental Status Alert, oriented,  Cooperative [x]  Confused but Follows commands []  Lethargic or unable to follow commands []  Obtunded  []  Comatose  []    Breath Sounds Clear to  auscultation  []  Decreased unilaterally or  in bases only [x]  Decreased  bilaterally  []  Crackles or intermittent wheezes []  Wheezes []    Cough Strong, Spontan., & nonproductive [x]  Strong,  spontaneous, &  productive []  Weak,  Nonproductive []  Weak, productive or  with wheezes []  No spontaneous  cough or may require suctioning []    Level of Activity Ambulatory []  Ambulatory w/ Assist  [x]  Non-ambulatory []  Paraplegic []  Quadriplegic []    Total    Score:____4___     Triage Score:___5_____      Tri       Triage:     1. (>20) Freq: Q3    2. (16-20) Freq: Q4   3. (11-15) Freq: QID & Albuterol Q2 PRN    4. (6-10) Freq: TID & Albuterol Q2 PRN    5. (0-5) Freq Q4prn

## 2021-08-03 NOTE — PROGRESS NOTES
Asked to see patient regarding change in mental status  Patient attempts to open eyes to to sternal rub. Periodically moans. Not following commands  Sonorous respirations  Has myoclonic jerking movements. History parkinsons  Vitals:    08/02/21 2150 08/03/21 0117 08/03/21 0544 08/03/21 0910   BP: 103/79 108/84 (!) 148/72 120/60   Pulse: 67 76 71 68   Resp: 18 20 19 12   Temp: 99.3 °F (37.4 °C) 99.4 °F (37.4 °C) 99.7 °F (37.6 °C) 97.6 °F (36.4 °C)   TempSrc: Axillary Axillary Axillary Oral   SpO2: 97% 93% 96%    Weight:       Height:         Plan: Check glucose (131), STAT CTH, ABG, neuro following.  Check CK and prolactin levels

## 2021-08-03 NOTE — PROGRESS NOTES
Hospitalist Daily Progress Note  Name: Ramila Marcano  Age: 77 y.o. Gender: male  CodeStatus: Full Code  Allergies: Amoxicillin    Chief Complaint:Altered Mental Status (Patient unresponsive per wife)      Primary Care Provider: Mook Lees MD    InpatientTreatment Team: Treatment Team: Attending Provider: Agnes Vega MD; Consulting Physician: Gilles Carballo MD; Utilization Reviewer: Jessa Downing RN; Utilization Reviewer: Suzi Baca RN; Registered Nurse: Soraya Hernandez RN    Admission Date: 7/30/2021      Subjective: No chest pain, sob, nausea. Mental status fluctuating. Mild fasciculation noted bue    Physical Exam  Vitals and nursing note reviewed. Constitutional:       Appearance: Normal appearance. Cardiovascular:      Rate and Rhythm: Normal rate and regular rhythm. Pulmonary:      Effort: Pulmonary effort is normal.      Breath sounds: Normal breath sounds. Abdominal:      General: Bowel sounds are normal.      Palpations: Abdomen is soft. Musculoskeletal:         General: Normal range of motion. Skin:     General: Skin is warm and dry. Neurological:      Mental Status: He is alert. Mental status is at baseline. He is disoriented.          Medications:  Reviewed    Infusion Medications:    sodium chloride       Scheduled Medications:    furosemide  20 mg Intravenous Once    guaiFENesin  600 mg Oral BID    dipyridamole  75 mg Oral TID    finasteride  5 mg Oral Daily    metoprolol succinate  25 mg Oral Daily    pantoprazole  40 mg Oral QAM AC    rosuvastatin  10 mg Oral Nightly    tamsulosin  0.4 mg Oral Daily    aspirin  81 mg Oral QPM    carbidopa-levodopa-entacapone  1 tablet Oral BID    clopidogrel  75 mg Oral Daily    sodium chloride flush  5-40 mL Intravenous 2 times per day    enoxaparin  40 mg Subcutaneous Daily     PRN Meds: ipratropium-albuterol, nitroGLYCERIN, sodium chloride flush, sodium chloride, ondansetron **OR** ondansetron, polyethylene glycol, acetaminophen **OR** acetaminophen    Labs:   Recent Labs     08/02/21  1654   WBC 8.3   HGB 12.7*   HCT 38.7*        Recent Labs     08/02/21  1654      K 4.1      CO2 24   BUN 15   CREATININE 0.85   CALCIUM 9.3     Recent Labs     08/02/21  1654   AST 17   ALT 9   BILITOT 0.4   ALKPHOS 53     No results for input(s): INR in the last 72 hours. Recent Labs     08/02/21  1654   CKTOTAL 68       Urinalysis:   Lab Results   Component Value Date    NITRU Negative 07/30/2021    WBCUA 3-5 05/31/2013    BACTERIA Moderate 05/31/2013    RBCUA None seen 05/31/2013    BLOODU Negative 07/30/2021    SPECGRAV 1.004 07/30/2021    GLUCOSEU Negative 07/30/2021       Radiology:   Most recent    Chest CT      WITH CONTRAST:No results found for this or any previous visit. WITHOUT CONTRAST: No results found for this or any previous visit. CXR      2-view: Results for orders placed during the hospital encounter of 08/01/19    XR CHEST STANDARD (2 VW)    Narrative  EXAMINATION: XR CHEST (2 VW)    CLINICAL HISTORY: Possible stroke    COMPARISONS: September 11, 2015    FINDINGS:    Two views of the chest are submitted. The cardiac silhouette is of normal size configuration. Pulmonary vascular unremarkable. Right sided trachea. No focal infiltrates. No effusions. No Pneumothoraces. Impression  NO ACUTE ACTIVE CARDIOPULMONARY PROCESS      Results for orders placed during the hospital encounter of 09/11/15    XR Chest Standard TWO VW    Narrative  EXAMINATION CHEST TWO VIEWS    CLINICAL HISTORY Followup surgery for colon cancer, metastatic workup    COMPARISONS 9/15/14    FINDINGS Heart, mediastinum, and hilum are unremarkable. Lungs are  clear. Pulmonary vasculature is normal.  No effusions are seen. Bones  are intact. IMPRESSION NO ACTIVE CHEST DISEASE. NO EVIDENCE FOR METASTASES. Renettabertha Morejon M.D. Released Shelbi Morejon M.D.   Released Date Time- 09/11/15 8144  This document has been electronically signed. ------------------------------------------------------------------------------       Portable: Results for orders placed during the hospital encounter of 07/30/21    XR CHEST PORTABLE    Narrative  EXAMINATION: XR CHEST PORTABLE    CLINICAL HISTORY: NOT RESPONSIVE    COMPARISONS: JANUARY 5, 2021    FINDINGS: Osseous structures are intact. Cardiopericardial silhouette is normal. Pulmonary vasculature is normal. Lungs are clear. Impression  NO ACUTE CARDIOPULMONARY DISEASE. Echo No results found for this or any previous visit. Assessment/Plan:    Active Hospital Problems    Diagnosis Date Noted    AMS (altered mental status) [R41.82] 07/30/2021    Arteriosclerotic parkinsonism (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 Altered mental status/unresponsiveness              History of dementia and Parkinson disease              Per patient, he was unable to move but he was able to hear and see              Episode lasted about an hour              Back to baseline currently              No acute neurological deficithas left lower extremity weakness but that was chronic according to the patient              CT head negative              No sign of infection              Likely due to dementia/Parkinson disease versus seizure episode              Will consult neurology              PT/OT    7/31 - d/w neurology. No focal changes, failing at home. Pt/ot for snf. Chronic vascular dementia. Waxing/waning episodes. 8/1 - SNF planning as soon as able   8/2 - await pre-cert  2.  Hypertension              Resume medication after verification     Code Status: Full  DVT prophylaxis: Lovenox    Electronically signed by Sussy Perkins MD on 8/3/2021 at 2:07 AM

## 2021-08-03 NOTE — PROGRESS NOTES
Neurology Follow up    SUBJECTIVE: Examined for neurology follow-up for vascular dementia with Parkinson's disease. Patient awake but confused. He recognizes wife. He is not exhibiting any agitation. Patient with some noted myoclonus today. Mainly of upper extremities. No seizure activity. No new changes, fluctuating metal status change     I was called stat regarding patient status from the manager of the neuro unit for assessment. When I seen him he is quite lethargic causing some apneic spells. We further obtain a CT scan as well as an ABG and all his labs appear to be normal we had further recommend an MRI of the brain.   Patient's wife wants him to be transferred to a tertiary care which can be done whenever    Current Facility-Administered Medications   Medication Dose Route Frequency Provider Last Rate Last Admin    gu[facility]in [facility] [place]) extended release tablet 600 mg  600 mg Oral BID Dena Spry Sedar, DO   600 mg at 08/03/21 0220    ipratropium-albuterol (DUONEB) nebulizer solution 1 ampule  1 ampule Inhalation Q4H PRN Dena Spry Sedar, DO        LORazepam (ATIVAN) injection 1 mg  1 mg Intravenous Once PRN Destini Zapata MD        PN-Adult Premix 4.25/10 - Peripheral Line   Intravenous Continuous TPN Alisa Medrano MD        dipyridamole (PERSANTINE) tablet 75 mg  75 mg Oral TID Fer Saldivar MD   75 mg at 08/02/21 2052    finasteride (PROSCAR) tablet 5 mg  5 mg Oral Daily Fer Saldivar MD   5 mg at 08/02/21 2052    metoprolol succinate (TOPROL XL) extended release tablet 25 mg  25 mg Oral Daily Fer Saldivar MD   25 mg at 08/02/21 0957    nitroGLYCERIN (NITROSTAT) SL tablet 0.4 mg  0.4 mg Sublingual Q5 Min PRN Fer Saldivar MD        pantoprazole (PROTONIX) tablet 40 mg  40 mg Oral QAM AC Fer Saldivar MD   40 mg at 08/03/21 0540    rosuvastatin (CRESTOR) tablet 10 mg  10 mg Oral Nightly Fer Saldivar MD   10 mg at 08/02/21 2052    tamsulosin (FLOMAX) capsule 0.4 mg  0.4 mg Oral Daily Albaro Gomez MD   0.4 mg at 08/02/21 2052    aspirin EC tablet 81 mg  81 mg Oral QPM Tom Sheth MD   81 mg at 08/02/21 1652    carbidopa-levodopa-entacapone (STALEVO 100) -200 MG per tablet 1 tablet  1 tablet Oral BID Tom Sheth MD   1 tablet at 08/02/21 2052    clopidogrel (PLAVIX) tablet 75 mg  75 mg Oral Daily Tom Sheth MD   75 mg at 08/02/21 0958    sodium chloride flush 0.9 % injection 5-40 mL  5-40 mL Intravenous 2 times per day Tom Sheth MD   10 mL at 08/02/21 2107    sodium chloride flush 0.9 % injection 5-40 mL  5-40 mL Intravenous PRN Tom Sheth MD        0.9 % sodium chloride infusion  25 mL Intravenous PRN Tom Sheth MD        enoxaparin (LOVENOX) injection 40 mg  40 mg Subcutaneous Daily Tom Sheth MD   40 mg at 08/02/21 0957    ondansetron (ZOFRAN-ODT) disintegrating tablet 4 mg  4 mg Oral Q8H PRN Tom Sheth MD        Or    [facility]ron [facility] injection 4 mg  4 mg Intravenous Q6H PRN Tom Sheth MD        polyethylene glycol (GLYCOLAX) packet 17 g  17 g Oral Daily PRN Tom Sheth MD        acetaminophen (TYLENOL) tablet 650 mg  650 mg Oral Q6H PRN Tom Sheth MD        Or   Floydene Ada acetaminophen (TYLENOL) suppository 650 mg  650 mg Rectal Q6H PRN Tom Sheth MD           PHYSICAL EXAM:    /60   Pulse 68   Temp 97.6 °F (36.4 °C) (Oral)   Resp 12   Ht 6' 1\" (1.854 m)   Wt 175 lb (79.4 kg)   SpO2 95%   BMI 23.09 kg/m²     General Appearance:      Skin:  normal  CVS - Normal sounds, No murmurs , No carotid Bruits  RS -CTA  Abdomen Soft, BS present  Review of Systems   Unable to perform ROS: Mental status change   Constitutional: Negative for fever. HENT: Negative for trouble swallowing. Respiratory: Negative for cough, shortness of breath and wheezing. Gastrointestinal: Negative for vomiting. Skin: Negative for color change. Allergic/Immunologic: Negative for food allergies.    Neurological: Positive for tremors and weakness. Negative for seizures, syncope and speech difficulty. Psychiatric/Behavioral: Positive for confusion. Negative for behavioral problems, hallucinations and sleep disturbance. Of systems is somewhat difficult with certain though he is able to nod and answer some of the questions. Mental Status Exam:             Level of Alertness:   he is very lethargic and having apneic spells            Orientation:                 Funduscopic Exam:     Cranial Nerves            Cranial nerve III           Pupils:  equal, round, reactive to light      Cranial nerves III, IV, VI           Extraocular Movements: intact            Motor: Patient is somewhat fidgety with mild tremors and is very lethargic  Drift:  absent  Motor exam is symmetrical 5 out of 5 all extremities bilaterally  Tone:  normal  Abnormal Movements:  absent            Sensory: Not quite reliable        Pinprick                      Vibration                         Touch            Proprioception                 Coordination: Able to perform          Finger/Nose   Right:  normal              Left:  normal          Heel-Knee-Shin                Right:  normal              Left:  normal          Rapid Alternating Movements              Right:  normal              Left:  normal          Gait:                       Casual: Gait is deferred                       Romberg:          Reflexes:             Deep Tendon Reflexes:             Reflexes are 2 +             Plantar response:                Right:  downgoing               Left:  downgoing    Vascular:  Cardiac Exam:  normal         CT Head WO Contrast    Result Date: 7/30/2021  CT HEAD WO CONTRAST : 7/30/2021 CLINICAL HISTORY:  unresponsive . COMPARISON: 11/3/2020. TECHNIQUE: Spiral unenhanced images were obtained of the head, with routine multiplanar reconstructions performed.  All CT scans at this facility use dose modulation, iterative reconstruction, and/or weight based dosing when appropriate to reduce radiation dose to as low as reasonably achievable. FINDINGS: There is no intracranial hemorrhage, mass effect, midline shift, extra-axial collection, evidence of hydrocephalus, recent ischemic infarct, or skull fracture identified. 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 The mastoid air cells and visualized paranasal sinuses are essentially clear. NO ACUTE INTRACRANIAL PROCESS OR SIGNIFICANT CHANGE FROM 11/3/2020 IDENTIFIED. XR CHEST PORTABLE    Result Date: 7/31/2021  EXAMINATION: XR CHEST PORTABLE CLINICAL HISTORY: NOT RESPONSIVE COMPARISONS: JANUARY 5, 2021 FINDINGS: Osseous structures are intact. Cardiopericardial silhouette is normal. Pulmonary vasculature is normal. Lungs are clear. NO ACUTE CARDIOPULMONARY DISEASE. Recent Labs     08/02/21 1654 08/03/21  1037   WBC 8.3  --    HGB 12.7* 13.0*     --      Recent Labs     08/02/21 1654 08/03/21  1037     --    K 4.1  --      --    CO2 24  --    BUN 15  --    CREATININE 0.85 1.0   GLUCOSE 114*  --      Recent Labs     08/02/21 1654   BILITOT 0.4   ALKPHOS 53   AST 17   ALT 9     Lab Results   Component Value Date    PROTIME 13.1 07/30/2021    INR 1.0 07/30/2021     No results found for: LITHIUM, DILFRTOT, VALPROATE    ASSESSMENT AND PLAN  Multi-infarct dementia with fluctuating course. Patient has had vasculature dementia for many years and he is parkinsonism as well. Is on low-dose of Stalevo. He is not able to handle more medications than this. He does fluctuate in this is what is expected. He still has a nonfocal examination is somewhat better today he does bounce back at times. 1 may consider rehabilitation here in [facility] or he may require skilled nursing facility. I discussed this findings with his wife. Patient with noted myoclonus today mainly of upper extremities.   Will obtain thyroid studies, magnesium, CK. Will repeat CBC and CMP. There is no noted liver or renal dysfunction on admission. No overt seizure activity noted. Will obtain EEG. SNF precert pending    His clinical status changes he became more lethargic today and I was called stat to evaluate him when we have evaluated him his ABGs are normal CT scan of the head was normal this was normal.  We further obtain MRI which does not show any new strokes all his labs are normal.  This is a process of vascular dementia and we had explained to his wife that this is how it occurs and then he clears up. She wanted him to be transferred to a tertiary care center and this can be done at any point in time. Repeat ECG to make sure is not developed any suggestion of other abnormal findings. He has myoclonic jerks today. Mika Diez MD, Irena Olguin, American Board of Psychiatry & Neurology  Board Certified in Vascular Neurology  Board Certified in Neuromuscular Medicine  Certified in . [address]

## 2021-08-04 NOTE — DISCHARGE INSTR - COC
Continuity of Care Form    Patient Name: Colleen Akins   :  1945  MRN:  [id number]    Admit date:  2021  Discharge date:  2021    Code Status Order: Full Code   Advance Directives:      Admitting Physician: Danielle Olguin MD  PCP: Alley Monroe MD    Discharging Nurse: PD  [address] Unit/Room#: Y371/R877-80  Discharging Unit Phone Number: [phone]    Emergency Contact:   Extended Emergency Contact Information  Primary Emergency Contact: Deanna Flood   [address] Phone: [phone]  Relation: Spouse   needed? No    Past Surgical History:  Past Surgical History:   Procedure Laterality Date    BACK SURGERY      CHOLECYSTECTOMY, LAPAROSCOPIC N/A 2020    LAP BRAIN/ CHOLANGIOGRAMS performed by Vidya Baez MD at [address]  8/18/10,2012    DR POLK    COLONOSCOPY  14    DR. PIERRE    JOINT REPLACEMENT Left 13    total    PARATHYROIDECTOMY Bilateral     2 of 4 glands removed    NM COLON CA SCRN NOT HI RSK IND N/A 9/15/2017    COLONOSCOPY performed by Diana Saha MD at [address] [facility] ENDOSCOPY  09    DR Stephen Eisenberg       Immunization History:   Immunization History   Administered Date(s) Administered    COVID-19, Pfizer, PF, 30mcg/0.3mL 2021, 2021    Hepatitis A Adult (Havrix, Vaqta) 2004, 2004    Hepatitis B 2004, 2004, 2004    Hepatitis B Ped/Adol (Engerix-B, Recombivax HB) 2004, 2004, 2004    Influenza Nasal 10/30/2012    Influenza Vaccine, unspecified formulation 2004, 11/15/2016    Influenza Virus Vaccine 2004, 10/30/2012    Influenza, High Dose (Fluzone 65 yrs and older) 11/15/2016, 10/10/2017, 10/16/2018    Influenza, Quadv, adjuvanted, 65 yrs +, IM, PF (Fluad) 10/23/2020    Influenza, Triv, inactivated, subunit, adjuvanted, IM (Fluad 65 yrs and older) 11/26/2019    Meningococcal MPSV4 (Menomune) 02/04/2004, 03/24/2008    Pneumococcal Conjugate 13-valent (Vavyloe25) 05/05/2016    Pneumococcal Polysaccharide (Vlihakfwp42) 11/30/2012    Polio Virus Vaccine 01/20/2004    Td vaccine (adult) 01/20/2004    Tdap (Boostrix, Adacel) 01/10/2014    Typhoid Vi capsular polysaccharide (Typhim VI) 02/04/2004, 03/11/2008    Yellow Fever (YF-Vax) 02/04/2004, 01/10/2014       Active Problems:  Patient Active Problem List   Diagnosis Code    Hyperlipidemia E78.5    Osteopenia M85.80    CAD (coronary artery disease) I25.10    Benign prostatic hyperplasia N40.0    Cholelithiasis K80.20    Foot drop, left M21.372    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    History of hip replacement, total Z96.649    Seborrheic keratosis L82.1    History of colon cancer Z85.038    Disturbance of skin sensation R20.9    Degenerative joint disease of pelvic region M16.10    History of repair of hip joint Z98.890    Sinus bradycardia on ECG R00.1    Lumbar radiculopathy M54.16    Gastroesophageal reflux disease with esophagitis K21.00    Actinic keratoses L57.0    Inflamed seborrheic keratosis L82.0    PETRA (obstructive sleep apnea) G47.33    TIA (transient ischemic attack) G45.9    Arteriosclerotic parkinsonism (HCC) G21.4    Memory loss R41.3    Syncope and collapse R55    Mild cognitive impairment G31.84    Chest pain S01.8    Biliary colic Z12.07    Stroke-like episode R29.90    Ataxic gait R26.0    Vascular dementia with behavior disturbance (HCC) F01.51    Shortness of breath R06.02    Onychomycosis B35.1    Pain in toes of both feet M79.674, M79.675    Peripheral nerve disease G62.9    Peripheral vascular disease of foot (HCC) I73.9    Bradykinesia R25.8    Aphasia R47.01    Primary insomnia F51.01    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    AMS (altered mental status) R41.82       Isolation/Infection:   Isolation            No Isolation          Patient Infection Status       Infection Onset Added Last Indicated Last Indicated By Review Planned Expiration Resolved Resolved By    None active    Resolved    COVID-19 Rule Out 01/05/21 01/05/21 01/05/21 COVID-19 (Ordered)   01/05/21 Rule-Out Test Resulted            Nurse Assessment:  Last Vital Signs: BP (!) 117/93   Pulse 110   Temp 98.4 °F (36.9 °C)   Resp 20   Ht 6' 1\" (1.854 m)   Wt 175 lb (79.4 kg)   SpO2 100%   BMI 23.09 kg/m²     Last documented pain score (0-10 scale): Pain Level: 5  Last Weight:   Wt Readings from Last 1 Encounters:   07/30/21 175 lb (79.4 kg)     Mental Status:  disoriented and alert    IV Access:  - None    Nursing Mobility/ADLs:  Walking   Dependent  Transfer  Dependent  Bathing  Dependent  Dressing  Dependent  Toileting  Dependent  Feeding  Dependent  Med Admin  Dependent  Med Delivery   whole    Wound Care Documentation and Therapy:        Elimination:  Continence: Bowel: No  Bladder: No  Urinary Catheter: None   Colostomy/Ileostomy/Ileal Conduit: No       Date of Last BM: 8/4/2021    Intake/Output Summary (Last 24 hours) at 8/4/2021 1158  Last data filed at 8/4/2021 0749  Gross per 24 hour   Intake 978.75 ml   Output    Net 978.75 ml     No intake/output data recorded. Safety Concerns:     History of Falls (last 30 days), At Risk for Falls and Aspiration Risk    Impairments/Disabilities:      Speech, Vision and Hearing    Nutrition Therapy:  Current Nutrition Therapy:   - Oral Diet:  General    Routes of Feeding: Oral  Liquids: Thin Liquids  Daily Fluid Restriction: no  Last Modified Barium Swallow with Video (Video Swallowing Test): not done    Treatments at the Time of Hospital Discharge:   Respiratory Treatments:   Oxygen Therapy:  is not on home oxygen therapy.   Ventilator:    - No ventilator support    Rehab Therapies: Physical Therapy, Occupational Therapy and Speech/Language Therapy  Weight Bearing Status/Restrictions: No weight bearing restirctions  Other Medical Equipment (for information only, NOT a DME order):  wheelchair, walker, bath bench, bedside commode and hospital bed  Other Treatments:     Patient's personal belongings (please select all that are sent with patient):  Glasses    RN SIGNATURE:  Electronically signed by Jamel Anderson RN on 8/4/21 at 2:11 PM EDT    CASE MANAGEMENT/SOCIAL WORK SECTION    Inpatient Status Date: ***    Readmission Risk Assessment Score:  Readmission Risk              Risk of Unplanned Readmission:  13           Discharging to Facility/ Agency   Name: Zaria Huntley   Address:  [facility]:[phone]  Fax:    Dialysis Facility (if applicable)   Name:  Address:  Dialysis Schedule:  Phone:  Fax:    / signature: Electronically signed by Anisa Urbina RN on 8/4/21 at 11:58 AM EDT    PHYSICIAN SECTION    Prognosis: Fair    Condition at Discharge: Stable    Rehab Potential (if transferring to Rehab):     Recommended Labs or Other Treatments After Discharge: Close follow up with neuro and PCP; BMP in 3 days    Physician Certification: I certify the above information and transfer of Miguel Casas  is necessary for the continuing treatment of the diagnosis listed and that he requires [facility] for less 30 days.      Update Admission H&P: No change in H&P    PHYSICIAN SIGNATURE:  Electronically signed by Natan Avalos MD on 8/4/21 at 2:53 PM EDT

## 2021-08-04 NOTE — PROGRESS NOTES
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 Patient awake but confused. He recognizes wife. He is not exhibiting any agitation. Patient with some noted myoclonus today. Mainly of upper extremities. No seizure activity. Patient is more awake today.     Patient appears to be more awake and recognizes wife today he still has some myoclonic jerks  Current Facility-Administered Medications   Medication Dose Route Frequency Provider Last Rate Last Admin    guaiFENesin (MUCINEX) extended release tablet 600 mg  600 mg Oral BID Vincent La DO   600 mg at 08/04/21 0851    ipratropium-albuterol (DUONEB) nebulizer solution 1 ampule  1 ampule Inhalation Q4H PRN Tracy Rinne, DO        PN-Adult Premix 4.25/10 - Peripheral Line   Intravenous Continuous TPN Mel Rojas MD 75 mL/hr at 08/03/21 1846 New Bag at 08/03/21 1846    dipyridamole (PERSANTINE) tablet 75 mg  75 mg Oral TID Marium Vences MD   75 mg at 08/04/21 0849    finasteride (PROSCAR) tablet 5 mg  5 mg Oral Daily Marium Vences MD   5 mg at 08/02/21 2052    metoprolol succinate (TOPROL XL) extended release tablet 25 mg  25 mg Oral Daily Marium Vences MD   25 mg at 08/04/21 0849    nitroGLYCERIN (NITROSTAT) SL tablet 0.4 mg  0.4 mg Sublingual Q5 Min PRN Marium Vences MD        pantoprazole (PROTONIX) tablet 40 mg  40 mg Oral QAM AC Marium Vences MD   40 mg at 08/03/21 0540    rosuvastatin (CRESTOR) tablet 10 mg  10 mg Oral Nightly Marium Vences MD   10 mg at 08/02/21 2052    tamsulosin (FLOMAX) capsule 0.4 mg  0.4 mg Oral Daily Marium Vences MD   0.4 mg at 08/02/21 2052    aspirin EC tablet 81 mg  81 mg Oral QPM Neil Church MD   81 mg at 08/02/21 1652    carbidopa-levodopa-entacapone (STALEVO 100) -200 MG per tablet 1 tablet  1 tablet Oral BID Neil Church MD   1 tablet at 08/04/21 0849    clopidogrel (PLAVIX) tablet 75 mg  75 mg Oral Daily Ernestine Patino MD   75 mg at 08/04/21 0849    sodium chloride flush 0.9 % injection 5-40 mL  5-40 mL Intravenous 2 times per day Ernestine Patino MD   10 mL at 08/02/21 2107    sodium chloride flush 0.9 % injection 5-40 mL  5-40 mL Intravenous PRN Ernestine Patino MD        0.9 % sodium chloride infusion  25 mL Intravenous PRN Ernestine Patino MD        enoxaparin (LOVENOX) injection 40 mg  40 mg Subcutaneous Daily Ernestine Patino MD   40 mg at 08/04/21 0848    ondansetron (ZOFRAN-ODT) disintegrating tablet 4 mg  4 mg Oral Q8H PRN Ernestine Patino MD        Or    [facility] injection 4 mg  4 mg Intravenous Q6H PRN Ernestine Patino MD        polyethylene glycol (GLYCOLAX) packet 17 g  17 g Oral Daily PRN Ernestine Patino MD        acetaminophen (TYLENOL) tablet 650 mg  650 mg Oral Q6H PRN Ernestine Patino MD   650 mg at 08/04/21 7808    Or    acetaminophen (TYLENOL) suppository 650 mg  650 mg Rectal Q6H PRN Ernestine Patino MD           PHYSICAL EXAM:    BP (!) 117/93   Pulse 110   Temp 98.4 °F (36.9 °C)   Resp 20   Ht 6' 1\" (1.854 m)   Wt 175 lb (79.4 kg)   SpO2 100%   BMI 23.09 kg/m²     General Appearance:      Skin:  normal  CVS - Normal sounds, No murmurs , No carotid Bruits  RS -CTA  Abdomen Soft, BS present  Review of Systems   Unable to perform ROS: Mental status change   Constitutional: Negative for fever. HENT: Negative for trouble swallowing. Respiratory: Negative for cough, shortness of breath and wheezing. Gastrointestinal: Negative for vomiting. Skin: Negative for color change. Allergic/Immunologic: Negative for food allergies. Neurological: Positive for tremors and weakness. Negative for seizures, syncope and speech difficulty. Psychiatric/Behavioral: Positive for confusion. Negative for behavioral problems, hallucinations and sleep disturbance. Of systems is somewhat difficult with certain though he is able to nod and answer some of the questions.   Mental Status Exam: Level of Alertness:   he is very lethargic and having apneic spells            Orientation:                 Funduscopic Exam:     Cranial Nerves            Cranial nerve III           Pupils:  equal, round, reactive to light      Cranial nerves III, IV, VI           Extraocular Movements: intact            Motor: Patient is somewhat fidgety with mild tremors and is very lethargic  Drift:  absent  Motor exam is symmetrical 5 out of 5 all extremities bilaterally  Tone:  normal  Abnormal Movements:  absent            Sensory: Not quite reliable        Pinprick                      Vibration                         Touch            Proprioception                 Coordination: Able to perform          Finger/Nose   Right:  normal              Left:  normal          Heel-Knee-Shin                Right:  normal              Left:  normal          Rapid Alternating Movements              Right:  normal              Left:  normal          Gait:                       Casual: Gait is deferred                       Romberg:          Reflexes:             Deep Tendon Reflexes:             Reflexes are 2 +             Plantar response:                Right:  downgoing               Left:  downgoing    Vascular:  Cardiac Exam:  normal         CT Head WO Contrast    Result Date: 7/30/2021  CT HEAD WO CONTRAST : 7/30/2021 CLINICAL HISTORY:  unresponsive . COMPARISON: 11/3/2020. TECHNIQUE: Spiral unenhanced images were obtained of the head, with routine multiplanar reconstructions performed. All CT scans at this facility use dose modulation, iterative reconstruction, and/or weight based dosing when appropriate to reduce radiation dose to as low as reasonably achievable. FINDINGS: There is no intracranial hemorrhage, mass effect, midline shift, extra-axial collection, evidence of hydrocephalus, recent ischemic infarct, or skull fracture identified.   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 The mastoid air cells and visualized paranasal sinuses are essentially clear. NO ACUTE INTRACRANIAL PROCESS OR SIGNIFICANT CHANGE FROM 11/3/2020 IDENTIFIED. XR CHEST PORTABLE    Result Date: 7/31/2021  EXAMINATION: XR CHEST PORTABLE CLINICAL HISTORY: NOT RESPONSIVE COMPARISONS: JANUARY 5, 2021 FINDINGS: Osseous structures are intact. Cardiopericardial silhouette is normal. Pulmonary vasculature is normal. Lungs are clear. NO ACUTE CARDIOPULMONARY DISEASE. Recent Labs     08/02/21 1654 08/03/21  1037   WBC 8.3  --    HGB 12.7* 13.0*     --      Recent Labs     08/02/21 1654 08/03/21  1037     --    K 4.1  --      --    CO2 24  --    BUN 15  --    CREATININE 0.85 1.0   GLUCOSE 114*  --      Recent Labs     08/02/21 1654   BILITOT 0.4   ALKPHOS 53   AST 17   ALT 9     Lab Results   Component Value Date    PROTIME 13.1 07/30/2021    INR 1.0 07/30/2021     No results found for: LITHIUM, DILFRTOT, VALPROATE    ASSESSMENT AND PLAN  Multi-infarct dementia with fluctuating course. Patient has had vasculature dementia for many years and he is parkinsonism as well. Is on low-dose of Stalevo. He is not able to handle more medications than this. He does fluctuate in this is what is expected. He still has a nonfocal examination is somewhat better today he does bounce back at times. 1 may consider rehabilitation here in [facility] or he may require skilled nursing facility. I discussed this findings with his wife. Patient with noted myoclonus today mainly of upper extremities. Will obtain thyroid studies, magnesium, CK. Will repeat CBC and CMP. There is no noted liver or renal dysfunction on admission. No overt seizure activity noted. Will obtain EEG.   SNF precert pending  His clinical status changes he became more lethargic today and I was called stat to evaluate him when we have evaluated him his ABGs are normal CT scan of the head was normal this was normal.  We further obtain MRI which does not show any new strokes all his labs are normal.  This is a process of vascular dementia and we had explained to his wife that this is how it occurs and then he clears up. She wanted him to be transferred to a tertiary care center and this can be done at any point in time. Repeat ECG to make sure is not developed any suggestion of other abnormal findings. He has myoclonic jerks today. Repeat EEG completed with report pending  Palliative care now following  Patient with advanced dementia with multi-infarct state. He has parkinsonism as well. Remains on Stalevo. Nonfocal.  Intermittent myoclonus which can be related to underlying disease process or medications. Metabolic work-up has been thus far negative. EEG report pending. MRI of the brain was done yesterday that did not show any acute findings. At this point patient is okay to be discharged to skilled nursing facility and can follow-up on outpatient basis for further symptom management. I have personally performed a face to face diagnostic evaluation on this patient, reviewed all data and investigations, and am the sole provider of all clinical decisions on the neurological status of this patient. Since completely evaluation did not do anything significant. Patient has some myoclonic jerks and we may consider treating with Klonopin if needed.]  Any sedation and is very sensitive to dopamine agonist.  Of the brain was Leon Shone of vascular changes but no acute changes are noted. EEG does not reveal seizures and findings were discussed with his wife      Mika RLalita Burns MD, [address], American Board of Psychiatry & Neurology  Board Certified in Vascular Neurology  Board Certified in Neuromuscular Medicine  Certified in [place]

## 2021-08-04 NOTE — CONSULTS
Palliative Care Consult Note  Patient: Katina Luciano  Gender: male  YOB: 1945  Unit/Bed: N225/N225-01  CodeStatus: Full Code  Inpatient Treatment Team: Treatment Team: Attending Provider: David La MD; Consulting Physician: Latia Moses MD; Utilization Reviewer: Jillian Knox, RN; Utilization Reviewer: Leo Gill, RN; : Yoli Garg, RN; Registered Nurse: Jessica Quijano RN  Admit Date:  7/30/2021    Chief Complaint: Altered mental status    History of Presenting Illness:      Katina Luciano is a 77 y.o. male on hospital day 5 with a history of dementia, Parkinsonism, CAD, and HLD. Patient presented to the ER from home as his wife stated he suddenly became unresponsive. His eyes were open but he was not responding for about an hour. Patient was able to see and hear but was not able to hear. Patient was admitted for arteriosclerotic parkinsonism and alerted mental status. No other hospital admissions in the last 6 months. Palliative care consulted for goals of care, family support, and code status discussion. Patient currently sleeping in bed. Did attempt to wake him for assessment, but he fell right back to sleep. Wife is at bedside. She reports having been taking care of him at home. She has an aide that assists for 26 hours during the week but feels at this point he needs more therapy and will go to [facility]. Patient has developed a lot of weakness since being in the hospital. He has only been up in the chair so far. Prior to hospitalization, he was able to walk around. Patient does have baseline cognitive impairment and his memory is poor. She reports that he has had a poor appetite since being in the hospital and has probably lost weight since being here. However, at home, he was not having any issues with his appetite. Spouse denies any wounds to patient.  Prior to coming to the hospital, he was having some difficulty sleeping, but this was not a regular occurrence. He has not complained or appeared to be experiencing any discomfort per his wife. She states that he hasn't had any complaints. She does have some fears about palliative care. Review of Systems:       Review of Systems   Unable to perform ROS: Dementia       Physical Examination:       BP (!) 117/93   Pulse 110   Temp 98.4 °F (36.9 °C)   Resp 20   Ht 6' 1\" (1.854 m)   Wt 175 lb (79.4 kg)   SpO2 100%   BMI 23.09 kg/m²    Physical Exam  Constitutional:       General: He is sleeping. He is not in acute distress. HENT:      Head: Normocephalic and atraumatic. Nose: No rhinorrhea. Eyes:      General:         Right eye: No discharge. Left eye: No discharge. Conjunctiva/sclera: Conjunctivae normal.   Cardiovascular:      Rate and Rhythm: Normal rate and regular rhythm. Pulmonary:      Effort: Pulmonary effort is normal.      Breath sounds: Normal breath sounds. Abdominal:      General: Bowel sounds are normal. There is no distension. Palpations: Abdomen is soft. Tenderness: There is no abdominal tenderness. Musculoskeletal:      Cervical back: Neck supple. Right lower leg: No edema. Left lower leg: No edema. Skin:     General: Skin is warm and dry. Allergies:       Allergies   Allergen Reactions    Amoxicillin Other (See Comments)     stomatitis       Medications:      Current Facility-Administered Medications   Medication Dose Route Frequency Provider Last Rate Last Admin    guaiFENesin (MUCINEX) extended release tablet 600 mg  600 mg Oral BID Allison La, DO   600 mg at 08/04/21 0851    ipratropium-albuterol (DUONEB) nebulizer solution 1 ampule  1 ampule Inhalation Q4H PRN Lawrence Hair DO        PN-Adult Premix 4.25/10 - Peripheral Line   Intravenous Continuous TPN Mikie Allen MD 75 mL/hr at 08/03/21 1846 New Bag at 08/03/21 1846    dipyridamole (PERSANTINE) tablet 75 mg  75 mg Oral TID Dominique Gambino MD   75 mg at 08/04/21 0849    finasteride (PROSCAR) tablet 5 mg  5 mg Oral Daily Allen Rosales MD   5 mg at 08/02/21 2052    metoprolol succinate (TOPROL XL) extended release tablet 25 mg  25 mg Oral Daily Allen Rosales MD   25 mg at 08/04/21 0849    nitroGLYCERIN (NITROSTAT) SL tablet 0.4 mg  0.4 mg Sublingual Q5 Min PRN Allen Rosales MD        pantoprazole (PROTONIX) tablet 40 mg  40 mg Oral QAM AC Allen Rosales MD   40 mg at 08/03/21 0540    rosuvastatin (CRESTOR) tablet 10 mg  10 mg Oral Nightly Allen Rosales MD   10 mg at 08/02/21 2052    tamsulosin (FLOMAX) capsule 0.4 mg  0.4 mg Oral Daily Allen Rosales MD   0.4 mg at 08/02/21 2052    aspirin EC tablet 81 mg  81 mg Oral QPM Rosalio Lara MD   81 mg at 08/02/21 1652    carbidopa-levodopa-entacapone (STALEVO 100) -200 MG per tablet 1 tablet  1 tablet Oral BID Rosalio Lara MD   1 tablet at 08/04/21 0849    clopidogrel (PLAVIX) tablet 75 mg  75 mg Oral Daily Rosalio Lara MD   75 mg at 08/04/21 0849    sodium chloride flush 0.9 % injection 5-40 mL  5-40 mL Intravenous 2 times per day Rosalio Lara MD   10 mL at 08/02/21 2107    sodium chloride flush 0.9 % injection 5-40 mL  5-40 mL Intravenous PRN Rosalio Lara MD        0.9 % sodium chloride infusion  25 mL Intravenous PRN Rosalio Lara MD        enoxaparin (LOVENOX) injection 40 mg  40 mg Subcutaneous Daily Rosalio Lara MD   40 mg at 08/04/21 0848    ondansetron (ZOFRAN-ODT) disintegrating tablet 4 mg  4 mg Oral Q8H PRN Rosalio Lara MD        Or    ondansetron [facility]) injection 4 mg  4 mg Intravenous Q6H PRN Rosalio Lara MD        polyethylene glycol (GLYCOLAX) packet 17 g  17 g Oral Daily PRN Rosalio Lara MD        acetaminophen (TYLENOL) tablet 650 mg  650 mg Oral Q6H PRN Rosalio Lara MD   650 mg at 08/04/21 0849    Or    acetaminophen (TYLENOL) suppository 650 mg  650 mg Rectal Q6H PRN Rosalio Lara MD           History:       PMHx:  Past Medical History:   Diagnosis Date    Abnormal cardiovascular stress test 2016    Equivocal ST-T wave changes; Defect in the inferior wall consistent with prior infarction; LV EF 79%; TID ratio 1.1    Actinic keratoses     BPH (benign prostatic hypertrophy)     CAD (coronary artery disease)     Cancer (HCC)     COLON    Chest pressure 5/3/2016    Cholelithiasis     History of colon cancer     s/p partial colectomy in     Hyperlipidemia     Inflamed seborrheic keratosis     Lumbar radiculopathy 10/26/2016    Mild cognitive impairment     Osteoarthritis     Parathyroid tumor     Parkinson disease ([facility] [address]     RA (retrograde amnesia)     Sinus bradycardia on ECG 2016    Done 16 with Dr. Zulay Jiang Syncope and collapse     Vascular dementia [facility])        PSHx:  Past Surgical History:   Procedure Laterality Date    BACK SURGERY      CHOLECYSTECTOMY, LAPAROSCOPIC N/A 2020    LAP BRAIN/ CHOLANGIOGRAMS performed by Kerri Reza MD at [place] [address]  8/18/10,2012    DR Torres Serum    COLONOSCOPY  14    DR. PIERRE    JOINT REPLACEMENT Left 13    total    PARATHYROIDECTOMY Bilateral     2 of 4 glands removed    MA COLON CA SCRN NOT HI RSK IND N/A 9/15/2017    COLONOSCOPY performed by Tu Harris MD at [address] [place] [address],[address] ENDOSCOPY  09    DR POLK       Social Hx:  Social History     Socioeconomic History    Marital status:      Spouse name: None    Number of children: None    Years of education: None    Highest education level: None   Occupational History    None   Tobacco Use    Smoking status: Former Smoker     Packs/day: 1.00     Years: 1.00     Pack years: 1.00     Types: Cigarettes     Quit date: 1/15/1969     Years since quittin.5    Smokeless tobacco: Never Used    Tobacco comment: Quit smoking > 30 years ago   Vaping Use    Vaping Use: Never used   Substance and Sexual Activity    Alcohol use: No    Drug use: No    Sexual activity: Not Currently     Partners: Female   Other Topics Concern    None   Social History Narrative         Lives With: Spouse    Type of Home: [address] [place] [address] [place]: Two level(reports he can stay on 1st floor with bedroom and bathroom though currently he stays upstairs)    Home Access: (Pt unable to state)    Bathroom Shower/Tub: Walk-in shower    Bathroom Equipment: Shower chair, Grab bars in [facility]: Rolling walker    ADL Assistance: Independent    Homemaking Assistance: Needs assistance    Ambulation Assistance: Independent  (no AD)    Transfer Assistance: Independent    Active : No    Additional Comments: pt is questionable historian, he is unsure of what equipment he has at home or if he was using any     Social Determinants of Health     Financial Resource Strain:     Difficulty of Paying Living Expenses:    Food Insecurity:     Worried About [address] in the Last Year:     [address] [employer] in the Last Year:    Transportation Needs:     Lack of Transportation (Medical):      Lack of Transportation (Non-Medical):    Physical Activity:     Days of Exercise per Week:     Minutes of Exercise per Session:    Stress:     Feeling of Stress :    Social Connections:     Frequency of Communication with Friends and Family:     Frequency of Social Gatherings with Friends and Family:     Attends Jainism Services:     Active Member of Clubs or Organizations:     Attends Club or Organization Meetings:     Marital Status:    Intimate Partner Violence:     Fear of Current or Ex-Partner:     Emotionally Abused:     Physically Abused:     Sexually Abused:        Family Hx:  Family History   Problem Relation Age of Onset    Cancer Mother         OVARIAN    Heart Disease Father        LABS: Reviewed     CBC:  Lab Results   Component Value Date    WBC 8.3 08/02/2021    RBC 4.65 08/02/2021    HGB 13.0 08/03/2021    HCT 38.7 08/02/2021    MCV 83.2 08/02/2021    MCH 27.3 08/02/2021    MCHC 32.8 08/02/2021    RDW 16.4 08/02/2021     08/02/2021    MPV 10.9 07/15/2015     CBC with Differential:   Lab Results   Component Value Date    WBC 8.3 08/02/2021    RBC 4.65 08/02/2021    HGB 13.0 08/03/2021    HCT 38.7 08/02/2021     08/02/2021    MCV 83.2 08/02/2021    MCH 27.3 08/02/2021    MCHC 32.8 08/02/2021    RDW 16.4 08/02/2021    LYMPHOPCT 21.0 07/30/2021    MONOPCT 11.3 07/30/2021    BASOPCT 0.5 07/30/2021    MONOSABS 0.6 07/30/2021    LYMPHSABS 1.1 07/30/2021    EOSABS 0.0 07/30/2021    BASOSABS 0.0 07/30/2021     CMP:    Lab Results   Component Value Date     08/02/2021    K 4.1 08/02/2021    K 4.2 05/25/2020     08/02/2021    CO2 24 08/02/2021    BUN 15 08/02/2021    CREATININE 1.0 08/03/2021    CREATININE 0.85 08/02/2021    GFRAA >60 08/03/2021    LABGLOM >60 08/03/2021    GLUCOSE 114 08/02/2021    GLUCOSE 107 03/14/2012    PROT 6.3 08/02/2021    LABALBU 3.9 08/02/2021    LABALBU 4.1 03/14/2012    CALCIUM 9.3 08/02/2021    BILITOT 0.4 08/02/2021    ALKPHOS 53 08/02/2021    AST 17 08/02/2021    ALT 9 08/02/2021     BMP:    Lab Results   Component Value Date     08/02/2021    K 4.1 08/02/2021    K 4.2 05/25/2020     08/02/2021    CO2 24 08/02/2021    BUN 15 08/02/2021    LABALBU 3.9 08/02/2021    LABALBU 4.1 03/14/2012    CREATININE 1.0 08/03/2021    CREATININE 0.85 08/02/2021    CALCIUM 9.3 08/02/2021    GFRAA >60 08/03/2021    LABGLOM >60 08/03/2021    GLUCOSE 114 08/02/2021    GLUCOSE 107 03/14/2012     TSH:   Lab Results   Component Value Date    TSH 4.720 04/28/2016     Vitamin B12 and Folate: No components found for: FOLIC,  M93  Urinalysis:   Lab Results   Component Value Date    NITRU Negative 07/30/2021    WBCUA 3-5 05/31/2013    BACTERIA Moderate 05/31/2013    RBCUA None seen 05/31/2013    BLOODU Negative 07/30/2021 SPECGRAV 1.004 07/30/2021    GLUCOSEU Negative 07/30/2021           FUNCTIONAL ADL´S:     Independent: [  ] Eating, [   ] Dressing, [   ] Transferring, [   ] Carmen Clonts, [   ] Jordan Perch, [   ] Continence  Dependent   : [ X ] Eating, [ X ] Dressing, [ X ] Transferring, [ X ] Toileting, [ X ] Bathing, [ X ] Continence  W. assistant : [  ] Eating, [   ] Dressing, [   ] Transferring, [   ] Carmen Clonts, [   ] Bathing, [   ] Continence    Radiology: Reviewed      CT HEAD WO CONTRAST    Result Date: 8/3/2021  EXAMINATION: CT HEAD WO CONTRAST, 8/3/2021 10:16 AM CLINICAL HISTORY:  altered mental status COMPARISON: Brain CT from July 30, 2021 and November 3, 2020 TECHNIQUE:  Multiple contiguous axial images of the head were obtained from the skull base through the skull vertex without intravenous contrast. Sagittal and coronal reformats have been produced. All CT scans at this facility use dose modulation, iterative reconstruction, and/or weight based dosing when appropriate to reduce radiation dose to as low as reasonably achievable. BRAIN CT FINDINGS: Gray-white matter differentiation is maintained. No acute hemorrhage, mass, mass effect, or midline shift. There is no evidence of atrophy, ventricular morphology is within normal limits. The subcortical and periventricular white matter is within normal limits. The basal ganglia are within normal limits. There are no acute changes or space-occupying lesions in the posterior fossa. The visualized portions of the orbits are within normal limits. The globes are intact. The imaged portions of the paranasal sinuses are unremarkable. The calvarium is intact. There is no acute intracranial process. XR CHEST PORTABLE    Result Date: 8/3/2021  EXAMINATION: XR CHEST PORTABLE CLINICAL HISTORY: RALES COMPARISONS: AUGUST 30, 2021. FINDINGS: Osseous structures are intact. Cardiopericardial silhouette is normal. Pulmonary vasculature is normal. Lungs are clear.      NO ACUTE outpatient.    -Advance Care Planning  Discussed goals of care with patient's spouse. Explained in extensive detail nuances between full code, DNR CCA and DNR CC. Patient has made the decision to be FULL CODE. HPOA in place: Spouse Zohaib Ricardo is his HPOA    Spouse does believe that patient has a DNR in place and does not want to be intubated. She does not know which DNR. Explained the differences between the [facility] but she did not know which one he had. She also did not want to make any changes to his full code status. Explained that with a full code, he would still be intubated in the event of respiratory distress. -Goals of Care Discussion:  Disease process and goals of treatment were discussed in basic terms. Ariel's goal is to optimize available comfort care measures, continue with full treatment measures, and go to [facility] post-discharge for continued rehabilitation. We discussed the palliative care philosophy in light of those goals. We discussed all care options contingent on treatment response and QOL. Much active listening, presence, and emotional support were given. Patient is appropriate for palliative care services due to parkinsonism and vascular dementia and is at high risk for symptom burden. Patient is being treated for multiple medical conditions this admission includin. Vascular dementia  2. Multi-infarct dementia  3. Arteriosclerotic Parkinsonism  4. Acute encephalopathy  5.  HTN  6. AMS    Electronically signed by ELLIOT Owusu CNP on 2021 at 8:54 AM

## 2021-08-04 NOTE — ADT AUTH CERT
Utilization Reviews       Neurology [address] Day 5 (8/3/2021) by Klaus Escalante RN       Review Status Review Entered   Completed 8/4/2021 14:29      Criteria Review      Care Day: 5 Care Date: 8/3/2021 Level of Care: Inpatient Floor    Guideline Day 1    Clinical Status    ( ) * Clinical Indications met    8/4/2021 2:29 PM EDT by Juan Olsen presents with sudden period of unresponsiveness which lasted for 1 hour    Interventions    ( ) Inpatient interventions as needed    8/4/2021 2:29 PM EDT by Juan Olsen neuro checks due to change in mental status    * Milestone   Additional Notes   Care day 5   8/3/21         Vs   /60   Pulse 68   Temp 97.6 °F (36.4 °C) (Oral)   Resp 12   Ht 6' 1\" (1.854 m)   Wt 175 lb (79.4 kg)   SpO2 95% 2L nc  BMI 23.09 kg/m²       Exam   General appearance: No apparent distress,lethargic   Neuro: Not responding verbally    Mental Status Exam:              Level of Alertness:   he is very lethargic and having apneic spells            Labs   pH, Arterial 7.465    pO2, Arterial 86 mm Hg    Base Excess, Arterial 1   O2 Sat, Arterial 97 %    POC Hematocrit 38 %    Hemoglobin 13.0       POC Glucose 131          Meds   .  Lasix 20mg, intravenous, once   ·  guaiFENesin, 600 mg, Oral, BID   ·  dipyridamole, 75 mg, Oral, TID   ·  finasteride, 5 mg, Oral, Daily   ·  metoprolol succinate, 25 mg, Oral, Daily   ·  pantoprazole, 40 mg, Oral, QAM AC   ·  rosuvastatin, 10 mg, Oral, Nightly   ·  tamsulosin, 0.4 mg, Oral, Daily   ·  aspirin, 81 mg, Oral, QPM   ·  carbidopa-levodopa-entacapone, 1 tablet, Oral, BID   ·  clopidogrel, 75 mg, Oral, Daily   ·  sodium chloride flush, 5-40 mL, Intravenous, 2 times per day   ·  enoxaparin, 40 mg, Subcutaneous, Daily    .  PN-Adult Premix 4.25/10 - Peripheral Line Last Rate: 75 mL/hr at 08/03/21 4941            Attending impression   Patient opened his eyes; looked around but did not speak verbally      Assessment/Plan:       #Acute ·  tamsulosin, 0.4 mg, Oral, Daily   ·  aspirin, 81 mg, Oral, QPM   ·  carbidopa-levodopa-entacapone, 1 tablet, Oral, BID   ·  clopidogrel, 75 mg, Oral, Daily   ·  sodium chloride flush, 5-40 mL, Intravenous, 2 times per day   ·  enoxaparin, 40 mg, Subcutaneous, Daily             Attending impression   · AMS (altered mental status) [R41.82] 07/30/2021   · Arteriosclerotic parkinsonism ([facility] [address] [G21.4]         1. Altered mental status/unresponsiveness               History of dementia and Parkinson disease               Per patient, he was unable to move but he was able to hear and see               Episode lasted about an hour               Back to baseline currently               No acute neurological deficit-has left lower extremity weakness but that was chronic according to the patient               CT head negative               No sign of infection               Likely due to dementia/Parkinson disease versus seizure episode               Will consult neurology               PT/OT                7/31 - d/w neurology.  No focal changes, failing at home.  Pt/ot for snf.  Chronic vascular dementia.  Waxing/waning episodes.               8/1 - SNF planning as soon as able               2/1 - await pre-cert   2. Hypertension               Resume medication after verification       Code Status: Full   DVT prophylaxis: Lovenox       Electronically signed by Mary Paredes MD            Neuro impression    Examined for neurology follow-up for vascular dementia with Parkinson's disease.  Patient awake but confused. Pierre Waddell recognizes wife. Pierre Waddell is not exhibiting any agitation.  Patient with some noted myoclonus today.  Mainly of upper extremities.  No seizure activity. Patient with noted myoclonus today mainly of upper extremities.  Will obtain thyroid studies, magnesium, CK.  Will repeat CBC and CMP.  There is no noted liver or renal dysfunction on admission.  No overt seizure activity noted.  Will obtain EEG.    SNF precert pending                  Neurology [address] Care Day 3 (8/1/2021) by Pinky John RN       Review Status Review Entered   Completed 8/4/2021 14:10      Criteria Review      Care Day: 3 Care Date: 8/1/2021 Level of Care: Inpatient Floor    Guideline Day 1    Clinical Status    ( ) * Clinical Indications met    8/4/2021 2:10 PM EDT by Bernie Sparks presents with sudden period of unresponsiveness which lasted for 1 hour    * Milestone   Additional Notes   Care day 3   8/1/21         Vs   BP (!) 146/80   Pulse 68   Temp 98.2 °F (36.8 °C) (Oral)   Resp 14   Ht 6' 1\" (1.854 m)   Wt 175 lb (79.4 kg)   SpO2 99%   BMI 23.09 kg/m²        Exam   No chest pain, sob, nausea.  Mental status fluctuating.             Labs   None today         Meds      ·  dipyridamole, 75 mg, Oral, TID   ·  finasteride, 5 mg, Oral, Daily   ·  metoprolol succinate, 25 mg, Oral, Daily   ·  rosuvastatin, 10 mg, Oral, Nightly   ·  tamsulosin, 0.4 mg, Oral, Daily   ·  aspirin, 81 mg, Oral, QPM   ·  carbidopa-levodopa-entacapone, 1 tablet, Oral, BID   ·  clopidogrel, 75 mg, Oral, Daily   ·  sodium chloride flush, 5-40 mL, Intravenous, 2 times per day   ·  enoxaparin, 40 mg, Subcutaneous, Daily             Attending impression   No chest pain, sob, nausea.  Mental status fluctuating.       · AMS (altered mental status) [R41.82] 07/30/2021   · PD (Parkinson's disease) ([facility]) [G20]         1.  Altered mental status/unresponsiveness               History of dementia and Parkinson disease               Per patient, he was unable to move but he was able to hear and see               Episode lasted about an hour               Back to baseline currently               No acute neurological deficit-has left lower extremity weakness but that was chronic according to the patient               CT head negative               No sign of infection               Likely due to dementia/Parkinson disease versus seizure episode               Will consult neurology               PT/OT                7/31 - d/w neurology.  No focal changes, failing at home.  Pt/ot for snf.  Chronic vascular dementia.  Waxing/waning episodes.               8/1 - SNF planning as soon as able   2.  Hypertension               Resume medication after verification          Neuro impression   ASSESSMENT AND PLAN   Multi-infarct dementia with fluctuating course. Ed Renae has had vasculature for many years and he is parkinsonism as well.  Is on low-dose of Daiva Mickey is not able to handle more medications than this.  He does fluctuate in this is what is expected. [facility] still has a nonfocal examination is somewhat better today he does bounce back at times.  1 may consider rehabilitation here in [place] or he may require skilled nursing facility.  I discussed this findings with his wife.

## 2021-08-04 NOTE — CARE COORDINATION
Incoming call from Buffy/Sarah: Stephanie Cunningham3 approved for [employer].     Nursing and CM updated of approval to [employer] and DC time  [id number] complete  Transportation: [facility] 4:30

## 2021-08-04 NOTE — DISCHARGE SUMMARY
Hospital Medicine Discharge Summary    Karolina Roy  :  1945  MRN:  [id number]    Admit date:  2021  Discharge date:  2021    Admitting Physician: Sarah Santiago MD  Primary Care Physician:  Susanne Lehman MD    Discharge Diagnoses:    Vascular dementia    Chief Complaint   Patient presents with    Altered Mental Status     Patient unresponsive per wife     Hospital Course:   Patient presented with waxing and waning mental status. Evaluated by neurology and after their workup thought to be due to advanced dementia with multi-infarct state and ok to discharge. Exam on discharge:   BP (!) 117/93   Pulse 110   Temp 98.4 °F (36.9 °C)   Resp 20   Ht 6' 1\" (1.854 m)   Wt 175 lb (79.4 kg)   SpO2 100%   BMI 23.09 kg/m²   General appearance: No apparent distress,lethargic  HEENT:  Conjunctivae/corneas clear. Neck:  Trachea midline. Respiratory:  Normal respiratory effort. Clear to auscultation  Cardiovascular: Regular rate and rhythm   Abdomen: Soft, non-tender, non-distended with normal bowel sounds. Musculoskeletal: No clubbing, cyanosis or edema bilaterally. .  Neuro: Not responding verbally   Capillary Refill: Brisk,< 3 seconds   Peripheral Pulses: +2 palpable, equal bilaterally     Patient was seen by the following consultants while admitted to [facility]:   Consults:  [address] [place] [address]    Significant Diagnostic Studies:    Refer to chart     Please refer to chart if no studies are shown here    CT Head WO Contrast    Result Date: 2021  CT HEAD WO CONTRAST : 2021 CLINICAL HISTORY:  unresponsive . COMPARISON: 11/3/2020. TECHNIQUE: Spiral unenhanced images were obtained of the head, with routine multiplanar reconstructions performed. All CT scans at this facility use dose modulation, iterative reconstruction, and/or weight based dosing when appropriate to reduce radiation dose to as low as reasonably achievable. FINDINGS: There is no intracranial hemorrhage, mass effect, midline shift, extra-axial collection, evidence of hydrocephalus, recent ischemic infarct, or skull fracture identified. 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 The mastoid air cells and visualized paranasal sinuses are essentially clear. NO ACUTE INTRACRANIAL PROCESS OR SIGNIFICANT CHANGE FROM 11/3/2020 IDENTIFIED. XR CHEST PORTABLE    Result Date: 7/31/2021  EXAMINATION: XR CHEST PORTABLE CLINICAL HISTORY: NOT RESPONSIVE COMPARISONS: JANUARY 5, 2021 FINDINGS: Osseous structures are intact. Cardiopericardial silhouette is normal. Pulmonary vasculature is normal. Lungs are clear. NO ACUTE CARDIOPULMONARY DISEASE. Discharge Medications:       Delphine Hoover \"[place]\"   Home Medication Instructions [id number]    Printed on:08/04/21 [id number]   Medication Information                      aspirin 81 MG tablet  Take 81 mg by mouth every evening              carbidopa-levodopa-entacapone (STALEVO 100) -200 MG TABS  1 tablet in the morning and 1 tablet at 2 pm             clopidogrel (PLAVIX) 75 MG tablet  TAKE 1 TABLET DAILY             dipyridamole (PERSANTINE) 75 MG tablet  TAKE 1 TABLET THREE TIMES A DAY             finasteride (PROSCAR) 5 MG tablet  TAKE 1 TABLET DAILY             fish oil-omega-3 fatty acids 1000 MG capsule  Take 2 g by mouth 2 times daily. Handicap Placard MISC  by Does not apply route Good for five years             Magnesium 250 MG TABS  Take by mouth daily Every Monday Wednesday Friday Sunday             memantine (NAMENDA) 10 MG tablet  Take 1 tablet by mouth 2 times daily             metoprolol succinate (TOPROL XL) 25 MG extended release tablet  TAKE 1 TABLET DAILY             nitroGLYCERIN (NITROSTAT) 0.4 MG SL tablet  up to max of 3 total doses.  If no relief after 1 dose, call 911.             omeprazole (PRILOSEC) 20 MG delayed release capsule  TAKE 1 CAPSULE DAILY             Psyllium (METAMUCIL FIBER PO)  Take by mouth daily Metamucil cookies             rosuvastatin (CRESTOR) 10 MG tablet  TAKE 1 TABLET DAILY             tamsulosin (FLOMAX) 0.4 MG capsule  TAKE 1 CAPSULE DAILY                 Disposition:   If discharged to Home, Any [facility] AT [facility] needs that were indicated and/or required as been addressed and set up by Social Work. Condition at discharge: good     Activity: activity as tolerated    Total time taken for discharging this patient: 40 minutes. Greater than 70% of time was spent focused exclusively on this patient. Time was taken to review chart, discuss plans with consultants, reconciling medications, discussing plan answering questions with patient.      Signed:  Joey Arreguin MD  8/4/2021, 3:35 PM  ----------------------------------------------------------------------------------------------------------------------    Lior Garcias

## 2021-08-04 NOTE — CARE COORDINATION
TEAM ROUNDS COMPLETED. PRECERT APPROVED FROM Count includes the [facility]. PER RN, PT CAN LIKELY D/C TODAY. CANELO ALLEN TO SET UP TRANSPORT ONCE SIGN OFFS COMPLETE.

## 2021-08-05 NOTE — PROGRESS NOTES
Physical Therapy  Facility/Department: [facility] MED SURG [id number]/[id number]  Physical Therapy Discharge      NAME: Kayode Harrington    : 1945 (73 y.o.)  MRN: [id number]    Account: [de-identified]  Gender: male      Patient has been discharged from acute care hospital. DC patient from current PT program.      Electronically signed by Wilbur De Santiago PT on 21 at 12:42 PM EDT

## 2021-08-07 PROBLEM — J69.0 ASPIRATION PNEUMONIA OF LEFT LUNG (HCC): Status: ACTIVE | Noted: 2021-01-01

## 2021-08-07 NOTE — FLOWSHEET NOTE
Patient arrived to [facility] from the ER via cart. Patient is not awake. Simple mask on at 8 litres. , pulse ox is 99%. Pt incontinent of urine, pt bathed and depends applied. NSR on tele. BP is 123/54. Pts wife at bedside, along with pts son. Resp. therapist in to change pts oxygen to nasal cannula at 4 litres NC. Bed alarm on.

## 2021-08-07 NOTE — PROGRESS NOTES
Pharmacy Dose Adjustment Per Protocol    Matt Mazariegos is a 77 y.o. male. Recent Labs     08/07/21  1013   BUN 21   CREATININE 0.99   Estimated Creatinine Clearance: 64 mL/min (based on SCr of 0.99 mg/dL). Vitaly Banks Height: 6' 1\" (185.4 cm), Weight: 152 lb (68.9 kg), Body mass index is 20.05 kg/m².     Piperacillin/Tazobactam [Zosyn]      Medication Ordered:   Traditional Dosing Change to Extended Infusion:   CrCl ?20 ml/min [] Zosyn 2.25 gm q6-8 hr [x] Zosyn 3.375 gm IV q8h, infuse over 4 hr    [] Zosyn 3.375 gm q6-8 hr     [x] Zosyn 4.5 gm q6-8 hr    CrCl <20 ml/min or ESRD [] Zosyn 2.25 gm q6-8 hr [] Zosyn 3.375 gm IV q12h, infuse over 4 hr    [] Zosyn 3.375 gm q6-8 hr     [] Zosyn 4.5 gm q6-8 hr      Thank you,

## 2021-08-07 NOTE — ED TRIAGE NOTES
Pt arrives to ed via ems from LTC. Staff at [address] [place] [address] stated pt has increased SOB.

## 2021-08-07 NOTE — H&P
[place] [address] MEDICINE    HISTORY AND PHYSICAL EXAM    PATIENT NAME:  Irma Tate    MRN:  [id number]  SERVICE DATE:  8/7/2021   SERVICE TIME:  3:12 PM    Primary Care Physician: Rocio Adams MD     SUBJECTIVE  CHIEF COMPLAINT:  Shortness of breath    HPI:  Irma Tate is a 77 y.o., , male who  has a past medical history of Abnormal cardiovascular stress test, Actinic keratoses, BPH (benign prostatic hypertrophy), CAD (coronary artery disease), Cancer ([facility] [facility] Chest pressure, Cholelithiasis, History of colon cancer, Hyperlipidemia, Inflamed seborrheic keratosis, Lumbar radiculopathy, Mild cognitive impairment, Osteoarthritis, Parathyroid tumor, Parkinson disease ([facility] [address] [facility] RA (retrograde amnesia), Sinus bradycardia on ECG, Syncope and collapse, and Vascular dementia ([facility] [address] Presented to ED from NH with shortness of breath and hypoxia. Patient is unable to provide any history and no family is available. History obtained from chart. Discharge to NH on 8/4/21 after admission for waxing and waning mental status. Had workup with neurology and was thought to be due to advanced dementia with multi-infarct state. He was taken to NH and today was found with worsening shortness of breath over the past 24 hours and hypoxia that began today. Unknown how low pulse was but he arrived with NRB to ED. Has been weaned to nasal cannula. In the ED labs and imaging were completed and reviewed. Decision to admit under the hospitalist service with consultants. PAST MEDICAL HISTORY:    Past Medical History:   Diagnosis Date    Abnormal cardiovascular stress test 4/19/2016    Equivocal ST-T wave changes;  Defect in the inferior wall consistent with prior infarction; LV EF 79%; TID ratio 1.1    Actinic keratoses     BPH (benign prostatic hypertrophy)     CAD (coronary artery disease)     Cancer (HCC)     COLON    Chest pressure 5/3/2016    Cholelithiasis     History of colon cancer     s/p partial colectomy in     Hyperlipidemia     Inflamed seborrheic keratosis     Lumbar radiculopathy 10/26/2016    Mild cognitive impairment     Osteoarthritis     Parathyroid tumor     Parkinson disease ([address]     RA (retrograde amnesia)     Sinus bradycardia on ECG 2016    Done 16 with Dr. Porter To Syncope and collapse     Vascular dementia [facility])      PAST SURGICAL HISTORY:    Past Surgical History:   Procedure Laterality Date    BACK SURGERY      CHOLECYSTECTOMY, LAPAROSCOPIC N/A 2020    LAP BRAIN/ CHOLANGIOGRAMS performed by Aiden Burdick MD at [place] [address]  8/18/10,2012    DR Estee Chavez    COLONOSCOPY  14    DR. Dick Bell    JOINT REPLACEMENT Left 13    total    PARATHYROIDECTOMY Bilateral     2 of 4 glands removed    AR COLON CA SCRN NOT HI RSK IND N/A 9/15/2017    COLONOSCOPY performed by Nelda Cheng MD at [address] [place] [address],[address] ENDOSCOPY  09    DR POLK     FAMILY HISTORY:    Family History   Problem Relation Age of Onset    Cancer Mother         OVARIAN    Heart Disease Father      SOCIAL HISTORY:    Social History     Socioeconomic History    Marital status:      Spouse name: Not on file    Number of children: Not on file    Years of education: Not on file    Highest education level: Not on file   Occupational History    Not on file   Tobacco Use    Smoking status: Former Smoker     Packs/day: 1.00     Years: 1.00     Pack years: 1.00     Types: Cigarettes     Quit date: 1/15/1969     Years since quittin.5    Smokeless tobacco: Never Used    Tobacco comment: Quit smoking > 30 years ago   Vaping Use    Vaping Use: Never used   Substance and Sexual Activity    Alcohol use: No    Drug use: No    Sexual activity: Not Currently     Partners: Female   Other Topics Concern    Not on file   Social History Narrative         Lives With: Spouse    Type of Home: [address] [place] [address] [place]: Two level(reports he can stay on 1st floor with bedroom and bathroom though currently he stays upstairs)    Home Access: (Pt unable to state)    Bathroom Shower/Tub: Walk-in shower    Bathroom Equipment: Shower chair, Grab bars in shower    Home Equipment: Rolling walker    ADL Assistance: Independent    Homemaking Assistance: Needs assistance    Ambulation Assistance: Independent  (no AD)    Transfer Assistance: Independent    Active : No    Additional Comments: pt is questionable historian, he is unsure of what equipment he has at home or if he was using any     Social Determinants of Health     Financial Resource Strain:     Difficulty of Paying Living Expenses:    Food Insecurity:     Worried About [address] in the Last Year:     [address] [employer] in the Last Year:    Transportation Needs:     Lack of Transportation (Medical):  Lack of Transportation (Non-Medical):    Physical Activity:     Days of Exercise per Week:     Minutes of Exercise per Session:    Stress:     Feeling of Stress :    Social Connections:     Frequency of Communication with Friends and Family:     Frequency of Social Gatherings with Friends and Family:     Attends Baptism Services:     Active Member of Clubs or Organizations:     Attends Club or Organization Meetings:     Marital Status:    Intimate Partner Violence:     Fear of Current or Ex-Partner:     Emotionally Abused:     Physically Abused:     Sexually Abused:      MEDICATIONS:   Prior to Admission medications    Medication Sig Start Date End Date Taking?  Authorizing Provider   atorvastatin (LIPITOR) 20 MG tablet Take 20 mg by mouth nightly   Yes Historical Provider, MD   clopidogrel (PLAVIX) 75 MG tablet TAKE 1 TABLET DAILY 7/26/21  Yes Brittany Spence MD   dipyridamole (PERSANTINE) 75 MG tablet TAKE 1 TABLET THREE TIMES A DAY 6/8/21  Yes Brittany Spence MD   omeprazole (PRILOSEC) 20 MG delayed release capsule TAKE 1 CAPSULE DAILY  Patient taking differently: Take 20 mg by mouth Daily TAKE 1 CAPSULE DAILY 5/20/21  Yes Allyssa Tillman MD   tamsulosin (FLOMAX) 0.4 MG capsule TAKE 1 CAPSULE DAILY  Patient taking differently: Take 0.4 mg by mouth nightly TAKE 1 CAPSULE DAILY 5/20/21  Yes Allyssa Tillman MD   finasteride (PROSCAR) 5 MG tablet TAKE 1 TABLET DAILY 5/20/21  Yes Allyssa Tillman MD   metoprolol succinate (TOPROL XL) 25 MG extended release tablet TAKE 1 TABLET DAILY  Patient taking differently: Take 25 mg by mouth daily TAKE 1 TABLET DAILY 4/19/21  Yes Allyssa Tillman MD   carbidopa-levodopa-entacapone (STALEVO 100) -200 MG TABS 1 tablet in the morning and 1 tablet at 2 pm 3/31/21  Yes Dominique Diop MD   memantine (NAMENDA) 10 MG tablet Take 1 tablet by mouth 2 times daily 8/17/20  Yes Dominique Diop MD   Handicap Placard MISC by Does not apply route Good for five years 8/4/20  Yes Allyssa Tillman MD   nitroGLYCERIN (NITROSTAT) 0.4 MG SL tablet up to max of 3 total doses. If no relief after 1 dose, call 911. Patient taking differently: Place 0.4 mg under the tongue every 5 minutes as needed up to max of 3 total doses. If no relief after 1 dose, call 911. 5/2/20  Yes Med J Holiday, DO   Magnesium 250 MG TABS Take by mouth daily Every Monday Wednesday Friday Sunday   Yes Historical Provider, MD   aspirin 81 MG tablet Take 81 mg by mouth every evening    Yes Historical Provider, MD   Psyllium (METAMUCIL FIBER PO) Take 1 Wafer by mouth daily Metamucil cookies    Historical Provider, MD       ALLERGIES: Amoxicillin    REVIEW OF SYSTEM:   Review of Systems   Unable to perform ROS: Dementia        OBJECTIVE  PHYSICAL EXAM:   Physical Exam  Constitutional:       Appearance: He is not ill-appearing. Comments: Open eyes to name  Chronically ill appearing   HENT:      Head: Normocephalic and atraumatic. Mouth/Throat:      Mouth: Mucous membranes are dry.       Pharynx: Total Bilirubin 0.8 (H) 0.2 - 0.7 mg/dL    Alkaline Phosphatase 164 (H) 35 - 104 U/L     (H) 0 - 41 U/L     (H) 0 - 40 U/L    Globulin 3.3 2.3 - 3.5 g/dL   CBC Auto Differential    Collection Time: 08/07/21 10:13 AM   Result Value Ref Range    WBC 12.3 (H) 4.8 - 10.8 K/uL    RBC 5.13 4.70 - 6.10 M/uL    Hemoglobin 13.9 (L) 14.0 - 18.0 g/dL    Hematocrit 42.4 42.0 - 52.0 %    MCV 82.7 80.0 - 100.0 fL    MCH 27.0 27.0 - 31.3 pg    MCHC 32.7 (L) 33.0 - 37.0 %    RDW 16.4 (H) 11.5 - 14.5 %    Platelets 561 289 - 191 K/uL    Neutrophils % 91.4 %    Lymphocytes % 2.3 %    Monocytes % 6.2 %    Eosinophils % 0.0 %    Basophils % 0.1 %    Neutrophils Absolute 11.3 (H) 1.4 - 6.5 K/uL    Lymphocytes Absolute 0.3 (L) 1.0 - 4.8 K/uL    Monocytes Absolute 0.8 0.2 - 0.8 K/uL    Eosinophils Absolute 0.0 0.0 - 0.7 K/uL    Basophils Absolute 0.0 0.0 - 0.2 K/uL   Lactic Acid, Plasma    Collection Time: 08/07/21 10:13 AM   Result Value Ref Range    Lactic Acid 1.6 0.5 - 2.2 mmol/L   Troponin    Collection Time: 08/07/21 10:13 AM   Result Value Ref Range    Troponin <0.010 0.000 - 0.010 ng/mL   CK    Collection Time: 08/07/21 10:13 AM   Result Value Ref Range    Total  (H) 0 - 190 U/L   CKMB & RELATIVE PERCENT    Collection Time: 08/07/21 10:13 AM   Result Value Ref Range    CK-MB 2.2 0.0 - 6.7 ng/mL    CK-MB Index 0.8 0.0 - 3.5 %       IMAGING:  No results found. VTE Prophylaxis: already on    ASSESSMENT AND PLAN    Acute hypoxic respiratory failure secondary to aspiration pneumonia of left lung. Continue supplemental O2 with goal SPO2 of 92% or greater. Whenever wean down supplemental O2 to avoid lung injury due to O2 toxicity. Duonebs Q4hrs, Pulmonology consulted, IV antibiotics, sputum culture,  chest PT, acapella, incentive spirometry, If spikes fever of 100.4F or greater obtain 2 SETS of blood cultures in addition to PRN tylenol. Speech therapy consult for dysphagia.  Bedside swallow evaluation    Elevated liver enzymes: hold hepatoxic medications. Repeat labs in am. Check US abdomen. GI consult    Advanced dementia with multi-infarct state and parkinsonism: Had metabolic work-up previous admission that was negative. Waxes and wanes. Wife declined previously declined hospice. Undecided regarding outpatient palliative care. Appreciate pharmacy input    Left message for wife to provide update  [phone] Wife into visit. Updated on status. Wife does not want further invasive or heroic measures. She wants to move forward with hospice care at the hospice facility. States she has taken care of him for a year and he continues to decline and would not want to live like this. Ordered placed.  Will continue treatment for pna at this time pending hospice recommendations    Plan of care discussed with: patient    SIGNATURE: ELLIOT Jiang NP  DATE: August 7, 2021  TIME: 3:12 PM   Marshall Mcgarry MD  - supervising physician

## 2021-08-07 NOTE — ED PROVIDER NOTES
[facility] [facility] ED  eMERGENCY dEPARTMENT eNCOUnter      Pt Name: Christine Posada  MRN: [id number]  [place] 1945  Date of evaluation: 8/7/2021  Provider: Lexi Jean-Baptiste MD    CHIEF COMPLAINT       Chief Complaint   Patient presents with    Shortness of Breath         HISTORY OF PRESENT ILLNESS   (Location/Symptom, Timing/Onset,Context/Setting, Quality, Duration, Modifying Factors, Severity)  Note limiting factors. Christine Posada is a 77 y.o. male who presents to the emergency department complaint of increasing shortness of breath over the last 24 to 48 hours. Patient has significant history of Lewy body dementia and advancing Parkinson's disease. Over last several weeks the patient's condition has progressed rapidly to a largely nonverbal state. Patient has also some dysphagia and significant aspiration risk. Noted the patient has continued to be fed at times and the patient has had risk for aspiration. Reportedly, over the last 24 hours the patient's shortness of breath is progressed. There is an attempt to evaluate and treat within the skilled nursing facility, however the patient's hypoxia progressed. Reported level hypoxia is not available at this time. Patient does present on the nonrebreather however. There is been reported diaphoresis. There is been reported coughing. Significant other and spouse of multiple years, at bedside, states that the patient's CODE STATUS should be a DNR CCA. Further review systems HPI severely limited by patient degree of dementia. HPI    NursingNotes were reviewed. REVIEW OF SYSTEMS    (2-9 systems for level 4, 10 or more for level 5)     Review of Systems   Unable to perform ROS: Dementia       Except as noted above the remainder of the review of systems was reviewed and negative.        PAST MEDICAL HISTORY     Past Medical History:   Diagnosis Date    Abnormal cardiovascular stress test 4/19/2016    Equivocal ST-T wave changes; Defect in the inferior wall consistent with prior infarction; LV EF 79%; TID ratio 1.1    Actinic keratoses     BPH (benign prostatic hypertrophy)     CAD (coronary artery disease)     Cancer (HCC)     COLON    Chest pressure 5/3/2016    Cholelithiasis     History of colon cancer     s/p partial colectomy in 2009    Hyperlipidemia     Inflamed seborrheic keratosis     Lumbar radiculopathy 10/26/2016    Mild cognitive impairment     Osteoarthritis     Parathyroid tumor     Parkinson disease ([address]     RA (retrograde amnesia)     Sinus bradycardia on ECG 4/19/2016    Done 4/5/16 with Dr. Lorenzo Chowdary Syncope and collapse     Vascular dementia [facility])          SURGICALHISTORY       Past Surgical History:   Procedure Laterality Date    BACK SURGERY  1993    CHOLECYSTECTOMY, LAPAROSCOPIC N/A 5/27/2020    LAP BRAIN/ CHOLANGIOGRAMS performed by Yuli Barker MD at [place] [address]  8/18/10,09/11/2012    DR Brigida Awan    COLONOSCOPY  09/29/14    DR. PIERRE    JOINT REPLACEMENT Left 5/28/13    total    PARATHYROIDECTOMY Bilateral 2011    2 of 4 glands removed    MT COLON CA SCRN NOT HI RSK IND N/A 9/15/2017    COLONOSCOPY performed by Madison Meredith MD at [address] [place] [address],[address] ENDOSCOPY  8/11/09    DR POLK         CURRENT MEDICATIONS       Previous Medications    ASPIRIN 81 MG TABLET    Take 81 mg by mouth every evening     ATORVASTATIN (LIPITOR) 20 MG TABLET    Take 20 mg by mouth nightly    CARBIDOPA-LEVODOPA-ENTACAPONE (STALEVO 100) -200 MG TABS    1 tablet in the morning and 1 tablet at 2 pm    CLOPIDOGREL (PLAVIX) 75 MG TABLET    TAKE 1 TABLET DAILY    DIPYRIDAMOLE (PERSANTINE) 75 MG TABLET    TAKE 1 TABLET THREE TIMES A DAY    FINASTERIDE (PROSCAR) 5 MG TABLET    TAKE 1 TABLET DAILY    FISH OIL-OMEGA-3 FATTY ACIDS 1000 MG CAPSULE    Take 2 g by mouth 2 times daily     HANDICAP PLACARD MISC    by Does not apply route Good for five years    MAGNESIUM 250 MG TABS    Take by mouth daily Every     MEMANTINE (NAMENDA) 10 MG TABLET    Take 1 tablet by mouth 2 times daily    METOPROLOL SUCCINATE (TOPROL XL) 25 MG EXTENDED RELEASE TABLET    TAKE 1 TABLET DAILY    NITROGLYCERIN (NITROSTAT) 0.4 MG SL TABLET    up to max of 3 total doses. If no relief after 1 dose, call 911.     OMEPRAZOLE (PRILOSEC) 20 MG DELAYED RELEASE CAPSULE    TAKE 1 CAPSULE DAILY    PSYLLIUM (METAMUCIL FIBER PO)    Take 1 Wafer by mouth daily Metamucil cookies    TAMSULOSIN (FLOMAX) 0.4 MG CAPSULE    TAKE 1 CAPSULE DAILY       ALLERGIES     Amoxicillin    FAMILY HISTORY       Family History   Problem Relation Age of Onset    Cancer Mother         OVARIAN    Heart Disease Father           SOCIAL HISTORY       Social History     Socioeconomic History    Marital status:      Spouse name: Not on file    Number of children: Not on file    Years of education: Not on file    Highest education level: Not on file   Occupational History    Not on file   Tobacco Use    Smoking status: Former Smoker     Packs/day: 1.00     Years: 1.00     Pack years: 1.00     Types: Cigarettes     Quit date: 1/15/1969     Years since quittin.5    Smokeless tobacco: Never Used    Tobacco comment: Quit smoking > 30 years ago   Vaping Use    Vaping Use: Never used   Substance and Sexual Activity    Alcohol use: No    Drug use: No    Sexual activity: Not Currently     Partners: Female   Other Topics Concern    Not on file   Social History Narrative         Lives With: Spouse    Type of Home: [address] [place] [address] [place]: Two level(reports he can stay on 1st floor with bedroom and bathroom though currently he stays upstairs)    Home Access: (Pt unable to state)    Bathroom Shower/Tub: Walk-in shower    Bathroom Equipment: Shower chair, Grab bars in [facility]: Rolling walker    ADL Assistance: Independent    Homemaking Assistance: Needs assistance    Ambulation Assistance: Independent  (no AD)    Transfer Assistance: Independent    Active : No    Additional Comments: pt is questionable historian, he is unsure of what equipment he has at home or if he was using any     Social Determinants of Health     Financial Resource Strain:     Difficulty of Paying Living Expenses:    Food Insecurity:     Worried About Running Out of Food in the Last Year:     [address] in the Last Year:    Transportation Needs:     Lack of Transportation (Medical):  Lack of Transportation (Non-Medical):    Physical Activity:     Days of Exercise per Week:     Minutes of Exercise per Session:    Stress:     Feeling of Stress :    Social Connections:     Frequency of Communication with Friends and Family:     Frequency of Social Gatherings with Friends and Family:     Attends Yarsanism Services:     Active Member of Clubs or Organizations:     Attends Club or Organization Meetings:     Marital Status:    Intimate Partner Violence:     Fear of Current or Ex-Partner:     Emotionally Abused:     Physically Abused:     Sexually Abused:        SCREENINGS             PHYSICAL EXAM    (up to 7 for level 4, 8 or more for level 5)     ED Triage Vitals [08/07/21 0939]   BP Temp Temp src Pulse Resp SpO2 Height Weight   132/78 97.9 °F (36.6 °C) -- 77 25 95 % 6' 1\" (1.854 m) 176 lb 3.2 oz (79.9 kg)       Physical Exam  Vitals and nursing note reviewed. Constitutional:       Appearance: He is ill-appearing, toxic-appearing and diaphoretic. HENT:      Head: Normocephalic. Mouth/Throat:      Mouth: Mucous membranes are moist.   Eyes:      Comments: Patient will not allow his eyes to be open. Cardiovascular:      Rate and Rhythm: Normal rate and regular rhythm. Heart sounds: Murmur heard. No friction rub. No gallop.     Pulmonary:      Effort: Tachypnea, accessory muscle usage and respiratory distress present. Breath sounds: Decreased breath sounds, rhonchi and rales present. Chest:      Chest wall: No mass or tenderness. Abdominal:      Palpations: Abdomen is soft. There is no hepatomegaly or splenomegaly. Musculoskeletal:      Right lower leg: No edema. Left lower leg: No edema. Skin:     General: Skin is warm. Capillary Refill: Capillary refill takes less than 2 seconds. Neurological:      Comments: Patient is unable to perform neurologic compliance or psychiatric evaluation. DIAGNOSTIC RESULTS     EKG: All EKG's are interpreted by the Emergency Department Physician who either signs or Co-signsthis chart in the absence of a cardiologist.    EKG demonstrates sinus rhythm. There are PACs. There are nonspecific ST segments. There is evidence of LVH. Rate is 77. This is an abnormal overall EKG. RADIOLOGY:   Non-plain filmimages such as CT, Ultrasound and MRI are read by the radiologist. Plain radiographic images are visualized and preliminarily interpreted by the emergency physician with the below findings:    Single view chest x-ray is interpreted by ER physician, demonstrates unremarkable mediastinum. However there is significant pulmonary trait process left lung base. Remainder of lung fields largely clear. Bony structure unremarkable. This an abnormal single view chest x-ray.     Interpretation per the Radiologist below, if available at the time ofthis note:    XR CHEST PORTABLE    (Results Pending)         ED BEDSIDE ULTRASOUND:   Performed by ED Physician - none    LABS:  Labs Reviewed   COMPREHENSIVE METABOLIC PANEL - Abnormal; Notable for the following components:       Result Value    Glucose 145 (*)     Total Bilirubin 0.8 (*)     Alkaline Phosphatase 164 (*)      (*)      (*)     All other components within normal limits   CBC WITH AUTO DIFFERENTIAL - Abnormal; Notable for the following components:    WBC 12.3 (*)     Hemoglobin 13.9 (*)     [facility] that portions of this note were completed with a voice recognitionprogram.  Efforts were made to edit the dictations but occasionally words are mis-transcribed.)    Kelvin Connelly MD (electronically signed)  Attending Emergency Physician          Kelvin Connelly MD  08/07/21 3360

## 2021-08-07 NOTE — CONSULTS
Medications that may effect or cause issues with Liver Disease. Zofran:    Constipation  Ondansetron may commonly cause constipation (Ref). Rare cases of intestinal obstruction have also been reported (Ref). Mechanism: Likely due to blockade of 5-HT3 gut receptors which results in decreased motility (Ref). May also occur as a result of decreased colonic transit time (Ref) and inhibition of postprandial increase in tone (Ref). Onset: Varied; one reported case of constipation occurred the same day as oral ondansetron administration; intestinal obstruction resulted approximately 2 weeks after continuous twice daily dosing (Ref). Risk factors:     Preexisting liver disease (Ref)    LIPITOR:    Contraindicated in active liver disease or in patients with unexplained persistent elevations of serum transaminases. PROSCAR:    There are no dosage adjustments provided in the s labeling. Use with caution (finasteride is metabolized extensively in the liver)   Hepatic impairment: Use with caution in patients with hepatic impairment; finasteride is extensively metabolized in the liver. Omega-3 Fatty Acids:     Monitor liver function studies, LDL-C, and TG prior to and as clinically indicated during therapy. Metabolism: EPA and DHA are mainly oxidized in the liver similar to fatty acids derived from dietary sources. EPA: minor via CYP-450. SABINA Hutchison Ph.  8/7/2021  1:57 PM

## 2021-08-07 NOTE — CARE COORDINATION
ER CM met with patient's spouse at bedside. Patient was unable to participate in the conversation as he was somnolent and nonverbal at time of meeting. Patient's spouse reports that patient had transferred to [facility] at Wednesday 8/4 and was sent to ER from the SNF this morning. Prior to that SNF admission, she had been caring for the patient at home (patient has both Parkinson's Disease and dementia). Spouse reports that she is unable to provide the level of care that patient now requires and he will need facility-based care moving forward. CM provided and discussed multiple options for care. Verbal and written information/quality data provided for nursing homes and hospice agencies in the area. Education provided on palliative vs hospice care and what each care type would entail in terms of treatments for the patient and services for the family. Patient's spouse will review all information and ask inpatient CM/SW to arrange meetings/services once her choices for care are made.

## 2021-08-08 NOTE — FLOWSHEET NOTE
Patient  Resting in bed, pt has his eyes open. I asked patient if his name is Benson Del Rosario and he replied yes. I asked patient if he knows where he is at and I got no verbal response.

## 2021-08-08 NOTE — CONSULTS
Pulmonary and Critical Care Medicine  Consult Note  Encounter Date: 2021 12:03 PM    Mr. Teresia Opitz is a 77 y.o. male  : 1945  Requesting Provider: Malick Stewart    Reason for request: PNA          HISTORY OF PRESENT ILLNESS:    Patient is 77 y.o. presents to the emergency department from nursing feels silly yesterday after being found hypoxic. There is also reports of shortness of breath. All history obtained from medical records as there is no family present at the room. The patient is not able to provide adequate history at this time. He was discharged from the hospital on  to the nursing facility after having an admission for altered mental status. Work-up at that time did include neurology and the patient was felt to have advanced dementia with multi-infarct state. On the day of presentation the patient was reported to have increasing shortness of breath over the last 24 hours. His oxygen saturations were also noted to be low. The patient was placed on a nonrebreather and transferred to the emergency department. Work-up in the emergency department did include imaging which demonstrated evidence of a left base lung infiltrate. He was subsequently admitted to the hospitalist service. Pulmonary was consulted secondary to pneumonia. I did attempt to see the patient yesterday, however he was off the floor for ultrasound. When I saw the patient today his wife was not present in the room at the time of my examination. The patient was sleeping and did not appear to be in any distress. When he was awoken he did have some confusion but again was not in any distress. Past Medical History:        Diagnosis Date    Abnormal cardiovascular stress test 2016    Equivocal ST-T wave changes;  Defect in the inferior wall consistent with prior infarction; LV EF 79%; TID ratio 1.1    Actinic keratoses     BPH (benign prostatic hypertrophy)     CAD (coronary artery disease)  Cancer [facility])     COLON    Chest pressure 5/3/2016    Cholelithiasis     History of colon cancer     s/p partial colectomy in 2009    Hyperlipidemia     Inflamed seborrheic keratosis     Lumbar radiculopathy 10/26/2016    Mild cognitive impairment     Osteoarthritis     Parathyroid tumor     Parkinson disease ([address]     RA (retrograde amnesia)     Sinus bradycardia on ECG 4/19/2016    Done 4/5/16 with Dr. Dov Keen Syncope and collapse     Vascular dementia [facility])        Past Surgical History:        Procedure Laterality Date    BACK SURGERY  1993    CHOLECYSTECTOMY, LAPAROSCOPIC N/A 5/27/2020    LAP BRAIN/ CHOLANGIOGRAMS performed by Jessica Yoder MD at [place] [address]  8/18/10,09/11/2012    DR Andrade Vogel    COLONOSCOPY  09/29/14    DR. PIERRE    JOINT REPLACEMENT Left 5/28/13    total    PARATHYROIDECTOMY Bilateral 2011    2 of 4 glands removed    UT COLON CA SCRN NOT HI RSK IND N/A 9/15/2017    COLONOSCOPY performed by Ayden Villeda MD at [address] [place] [address],[address] ENDOSCOPY  8/11/09    DR POLK       Social History:     reports that he quit smoking about 52 years ago. His smoking use included cigarettes. He has a 1.00 pack-year smoking history. He has never used smokeless tobacco. He reports that he does not drink alcohol and does not use drugs.     Family History:       Problem Relation Age of Onset    Cancer Mother         OVARIAN    Heart Disease Father        Allergies:  Amoxicillin        MEDICATIONS during current hospitalization:    Continuous Infusions:   sodium chloride      sodium chloride 50 mL/hr at 08/07/21 1827       Scheduled Meds:   levetiracetam  500 mg Intravenous Q12H    sodium chloride flush  5-40 mL Intravenous 2 times per day    enoxaparin  40 mg Subcutaneous Daily    piperacillin-tazobactam  3,375 mg Intravenous Q8H    aspirin  81 mg Oral QPM    carbidopa-levodopa-entacapone  1 tablet Oral BID    clopidogrel  75 mg Oral Daily    dipyridamole  75 mg Oral TID    metoprolol succinate  25 mg Oral Daily    pantoprazole  40 mg Oral QAM AC    tamsulosin  0.4 mg Oral Nightly       PRN Meds:sodium chloride flush, sodium chloride, ondansetron **OR** ondansetron, polyethylene glycol, [Held by provider] acetaminophen **OR** [Held by provider] acetaminophen        REVIEW OF SYSTEMS: As stated above, otherwise a complete 10 point review of systems cannot be completed the patient does have a history of dementia. PHYSICAL EXAM:    Vitals:  BP (!) 147/99   Pulse 82   Temp 99.5 °F (37.5 °C) (Axillary)   Resp 20   Ht 6' 1\" (1.854 m)   Wt 154 lb (69.9 kg)   SpO2 96%   BMI 20.32 kg/m²     General: No acute distress, resting comfortably in bed, was sleeping when entered the room  HEENT: Normocephalic, atraumatic, mucous membranes dry  Chest : Rhonchi on the left greater than the right, no respiratory distress at rest  Heart[de-identified] Regular rate  ABD: Soft, positive bowel sounds, nontender to palpation  Extremities : Warm, dry  Neuro: Initially sleeping, when awoken he was alert and looking around, did answer some questions but not all appropriately  Skin: No rashes    Data Review  Recent Labs     08/07/21  1013 08/08/21  0655   WBC 12.3* 11.8*   HGB 13.9* 12.0*   HCT 42.4 36.8*    238      Recent Labs     08/07/21  1013 08/08/21  0655    150*   K 3.8 4.0    114*   CO2 24 24   BUN 21 20   CREATININE 0.99 0.81   GLUCOSE 145* 121*       ABGs: No results for input(s): PHART, ALP6KDA, PO2ART, OWW4IGW, BEART, U6XTILKV, XVI0PJH in the last 72 hours. O2 Device: Nasal cannula  O2 Flow Rate (L/min): 4 L/min  Lab Results   Component Value Date    LACTA 1.6 08/07/2021       Radiology:    XR CHEST PORTABLE    Result Date: 8/7/2021  Exam: XR CHEST PORTABLE History: Hypoxia. Aspiration. Technique: AP portable view of the chest obtained.  Comparison: Portable chest radiograph from August 3, 2021 Findings: The cardiomediastinal silhouette is within normal limits. No development of patchy left lung base opacities. No pneumothorax or pleural effusion. No acute osseous normality. Left lung base infiltrate. US ABDOMEN LIMITED    Result Date: 8/8/2021  US ABDOMEN LIMITED CLINICAL HISTORY: elevated liver enzymes COMPARISONS: NONE FINDINGS: TECHNIQUE: TRANSABDOMINAL ULTRASOUND OF THE RIGHT UPPER QUADRANT WAS PERFORMED. The liver is heterogeneous in echotexture. This a nonspecific finding. Shows no focal parenchymal abnormalities. No intrahepatic biliary dilatation. The gallbladder shows no pericholecystic fluid. The gallbladder surgically absent. Common bile duct measures 3 millimeters. THE LIVER IS HETEROGENEOUS IN ECHOTEXTURE. THIS IS A NONSPECIFIC FINDING. Assessment/Plan:       1. Pneumonia, likely aspiration--patient was placed on antibiotic coverage including Zosyn. Given that the patient was recently hospitalized this would be appropriate coverage in someone's aspiration given his recent hospitalization as well as a stay at the nursing facility. He is not requiring significant amount of oxygen. This has been weaned down since yesterday. He previously was on 8 L nasal cannula and his currently at 4. His blood cultures remain negative at this time. Speech therapy has been consulted at this time. They did attempt to see him today, however the patient was too lethargic for evaluation. 2. Encephalopathy--upon review of neurology's notes it does appear the patient generally has improvement of his mental status after he has been in the hospital for a few days. They do feel that it is prudent to give the patient a day or 2 to recover to his normal state. If he is unable to demonstrate improvement then palliative medicine and/or hospice will be consulted at that point.   He does also have a known history of Lewy body dementia and Parkinson's disease with significant vascular dementia.     Thank you for consultation    Electronically signed by Teresita Mcburney, DO, on 8/8/2021 at 12:03 PM

## 2021-08-08 NOTE — PROGRESS NOTES
[facility]   Facility/Department: [facility]  Speech Language Pathology    Celestino Us  1945  [id number]    Date: 8/8/2021      Speech Therapy attempted to see Celestino Us on this date for a/an:    Clinical Bedside Swallow Evaluation    Pt was unable to be seen due to:   Patient is too lethargic to participate - pt would not rouse for RN, MD, or Beny Price and Dr. Shahida Pearson present in room for att.          Electronically signed by JIM Haynes on 8/8/21 at 10:31 AM EDT

## 2021-08-08 NOTE — FLOWSHEET NOTE
Patient incontinent of urine. Pt bathed and repositioned. Patient is not arousable, except  When staff repositioning him. Patient opens his eyes . Noticeable tremors bilat arms.

## 2021-08-08 NOTE — PROGRESS NOTES
Physical Therapy Med Surg Initial Assessment  Facility/Department: [address] [facility]  Room: [place] [id number]       NAME: Melodie Zuniga  : 1945 (77 y.o.)  MRN: [id number]  CODE STATUS: DNR-CC    Date of Service: 2021    Patient Diagnosis(es): Hypoxia [R09.02]  Lewy body dementia without behavioral disturbance ([address] [G31.83, F02.80]  Pneumonia of left lower lobe due to infectious organism [J18.9]  Aspiration pneumonia of left lung, unspecified aspiration pneumonia type, unspecified part of lung ([address] [J69.0]   Chief Complaint   Patient presents with    Shortness of Breath     Patient Active Problem List    Diagnosis Date Noted    Aspiration pneumonia of left lung ([facility] 2021    AMS (altered mental status) 2021    Bradykinesia 02/15/2021    Aphasia 02/15/2021    Primary insomnia 02/15/2021    Cerebral multi-infarct state 02/15/2021    Shortness of breath 2021    Vascular dementia with behavior disturbance ([address] 2020    Onychomycosis 2020    Pain in toes of both feet 2020    Peripheral nerve disease 2020    Peripheral vascular disease of foot ([address] 2020    Ataxic gait 2020    Stroke-like episode     Biliary colic     Chest pain     PD (Parkinson's disease) ([address]     Memory loss     Syncope and collapse     Mild cognitive impairment     TIA (transient ischemic attack) 2019    PETRA (obstructive sleep apnea)     Actinic keratoses     Inflamed seborrheic keratosis     Gastroesophageal reflux disease with esophagitis 11/15/2016    Lumbar radiculopathy 10/26/2016    Sinus bradycardia on ECG 2016    Disturbance of skin sensation 2015    Seborrheic keratosis 2014    History of colon cancer 2014    History of repair of hip joint 10/21/2013    History of hip replacement, total 2013    Degenerative joint disease of pelvic region 2013    Degenerative arthritis of lumbar spine 02/13/2013    Foot drop, left 01/14/2013    CAD (coronary artery disease)     Benign prostatic hyperplasia     Cholelithiasis     Hyperlipidemia 01/18/2012    Osteopenia 01/18/2012        Past Medical History:   Diagnosis Date    Abnormal cardiovascular stress test 4/19/2016    Equivocal ST-T wave changes; Defect in the inferior wall consistent with prior infarction; LV EF 79%; TID ratio 1.1    Actinic keratoses     BPH (benign prostatic hypertrophy)     CAD (coronary artery disease)     Cancer (HCC)     COLON    Chest pressure 5/3/2016    Cholelithiasis     History of colon cancer     s/p partial colectomy in 2009    Hyperlipidemia     Inflamed seborrheic keratosis     Lumbar radiculopathy 10/26/2016    Mild cognitive impairment     Osteoarthritis     Parathyroid tumor     Parkinson disease ([facility]     RA (retrograde amnesia)     Sinus bradycardia on ECG 4/19/2016    Done 4/5/16 with Dr. Christiano Luo Syncope and collapse     Vascular dementia [facility])      Past Surgical History:   Procedure Laterality Date    BACK SURGERY  1993    CHOLECYSTECTOMY, LAPAROSCOPIC N/A 5/27/2020    LAP BRAIN/ CHOLANGIOGRAMS performed by John Zamora MD at [place] [address]  8/18/10,09/11/2012    DR Noble Hansen    COLONOSCOPY  09/29/14    DR. PIERRE    JOINT REPLACEMENT Left 5/28/13    total    PARATHYROIDECTOMY Bilateral 2011    2 of 4 glands removed    DE COLON CA SCRN NOT HI RSK IND N/A 9/15/2017    COLONOSCOPY performed by Alyx Barnett MD at [address] [place] [address],[address] ENDOSCOPY  8/11/09    DR POLK       Chart Reviewed: Yes  Patient assessed for rehabilitation services?: Yes  Family / Caregiver Present: No  General Comment  Comments: perseverating on Yarsanism    Restrictions:  Restrictions/Precautions: Fall Risk (high burton score)     SUBJECTIVE:      Pain  Pre Treatment Pain Screening  Pain at present: 0  Intervention List: Patient able to continue with treatment    Post Treatment Pain Screening:   Pain Screening  Patient Currently in Pain: Other (comment) (pt did not make facial grimaces in pain during passive movement)    Prior Level of Function:  Social/Functional History  Additional Comments: per chart- pt unable to proved social functional information due to dementia; pt previously at [facility]; prior to NH- pt lived with wife however wife with decreasing ability to care for her     OBJECTIVE:   Vision:  (unable to assess)  Hearing:  (unable to assess)    Cognition:  Overall Orientation Status: Impaired  Orientation Level: Oriented to person, Disoriented to place, Disoriented to time, Disoriented to situation  Follows Commands: Impaired (unable to follow 1 step instructions)    Observation/Palpation  Observation: pt would temporarily open eyes to his name but kept eyes closed most of eval; pt resistive to movement intermittently and allowed passive movement intermittently; pt thin    ROM:  RLE PROM: WFL  RLE AROM:  (unable to assess due to pt unable to follow 1 step instructions)  LLE PROM: WFL  LLE AROM :  (unable to assess due to pt unable to follow 1 step instructions)    Strength:  Strength RLE  Strength RLE:  (unable to assess due to pt unable to follow 1 step instructions; unable to assess functionally due to minimal active movement during functional mobility tasks)  Strength LLE  Strength LLE:  (unable to assess due to pt unable to follow 1 step instructions; unable to assess functionally due to minimal active movement during functional mobility tasks)    Neuro:  Balance  Sitting - Static: Poor;- (max A edge of bed with posterior lean)  Sitting - Dynamic:  (unsafe to assess)  Standing - Static:  (unsafe to assess)  Standing - Dynamic:  (unsafe to assess)     Tone RLE  RLE Tone: Hypertonic  Tone LLE  LLE Tone: Hypertonic  Sensation  Overall Sensation Status:  (unable to assess)    Bed mobility  Rolling to Left: Dependent/Total  Rolling to Right: Dependent/Total  Supine to Sit: Dependent/Total  Sit to Supine: Dependent/Total  Comment: allowed passive movement; unable to sit edge of bed without max A- posterior lean    Transfers  Comment: unsafe to assess due to high level of assistance required for bed mobility    Ambulation  Ambulation?: No    Stairs/Curb  Stairs?: No         Activity Tolerance  Activity Tolerance: Patient limited by cognitive status          PT Education  PT Education: Goals;PT Role;Plan of Care; Functional Mobility Training    ASSESSMENT:   Body structures, Functions, Activity limitations: Decreased functional mobility ; Decreased safe awareness;Decreased balance;Decreased cognition;Decreased ROM; Decreased endurance;Decreased strength  Decision Making: High Complexity  History: high  Exam: high  Clinical Presentation: high    Prognosis: Poor    DISCHARGE RECOMMENDATIONS:  Discharge Recommendations: Continue to assess pending progress    Assessment: Pt severely limited in functional mobility due to dementia and poor cognitive status. Per chart pt possibly going hospice. Pt with inability to follow instructions and fluctuating between resistive to movement and allowing passive participation. Continued PT to assess for pt appropriateness and for placement to facility.   REQUIRES PT FOLLOW UP: Yes      PLAN OF CARE:  Plan  Times per week: 1-2 visits  Current Treatment Recommendations: Strengthening, Transfer Training, Endurance Training, Neuromuscular Re-education, Cognitive Reorientation, Patient/Caregiver Education & Training, ROM, Wheelchair Mobility Training, Manual Therapy - Soft Tissue Mobilization, Pain Management, Equipment Evaluation, Education, & procurement, Balance Training, Functional Mobility Training, Safety Education & Training, Positioning  Safety Devices  Type of devices: Bed alarm in place, Call light within reach, Left in bed    Goals:  Long term goals  Long term goal 1: Pt will demonstrate bed mobility min A. Long term goal 2: Pt will sit edge of bed X 5 min with CGA  Long term goal 3: Pt will demosntrate transfers mod A    AMPAC (6 CLICK) BASIC MOBILITY  AM-PAC Inpatient Mobility Raw Score : 6     Therapy Time:   Individual   Time In 1517   Time Out 1528   Minutes [address], [place], 08/08/21 at 3:37 PM         Definitions for assistance levels  Independent = pt does not require any physical supervision or assistance from another person for activity completion. Device may be needed.   Stand by assistance = pt requires verbal cues or instructions from another person, close to but not touching, to perform the activity  Minimal assistance= pt performs 75% or more of the activity; assistance is required to complete the activity  Moderate assistance= pt performs 50% of the activity; assistance is required to complete the activity  Maximal assistance = pt performs 25% of the activity; assistance is required to complete the activity  Dependent = pt requires total physical assistance to accomplish the task

## 2021-08-08 NOTE — CONSULTS
Subjective:      Patient ID: Ashish Mcdonald is a 77 y.o. male who presents today for encephalopathy  Dr Lenin Chon  HPI 77 right-handed gentleman just discharged from the hospital after being admitted here for encephalopathy. Patient has underlying Lewy body dementia Parkinson's disease with significant vascular dementia with fluctuating course patient recently admitted complete evaluation with negative work-ups and he did bounce back which he usually does. He now returns back with a single encephalopathy but now this time he has hepatic encephalopathy. He is having some asterixis and myoclonic jerks. And is significantly encephalopathic which he usually is when he is sick    Review of Systems   Unable to perform ROS: Mental status change       Past Medical History:   Diagnosis Date    Abnormal cardiovascular stress test 4/19/2016    Equivocal ST-T wave changes; Defect in the inferior wall consistent with prior infarction; LV EF 79%; TID ratio 1.1    Actinic keratoses     BPH (benign prostatic hypertrophy)     CAD (coronary artery disease)     Cancer (HCC)     COLON    Chest pressure 5/3/2016    Cholelithiasis     History of colon cancer     s/p partial colectomy in 2009    Hyperlipidemia     Inflamed seborrheic keratosis     Lumbar radiculopathy 10/26/2016    Mild cognitive impairment     Osteoarthritis     Parathyroid tumor     Parkinson disease ([address]     RA (retrograde amnesia)     Sinus bradycardia on ECG 4/19/2016    Done 4/5/16 with Dr. Tran Hopper Syncope and collapse     Vascular dementia [facility])      Past Surgical History:   Procedure Laterality Date    BACK SURGERY  1993    CHOLECYSTECTOMY, LAPAROSCOPIC N/A 5/27/2020    LAP BRAIN/ CHOLANGIOGRAMS performed by Ruthann Gauthier MD at [place] [address]  8/18/10,09/11/2012    DR Skyler Olmedo    COLONOSCOPY  09/29/14    DR. PIERRE    JOINT REPLACEMENT Left 5/28/13    total    PARATHYROIDECTOMY Bilateral 2011 2 of 4 glands removed    [place] COLON CA SCRN NOT HI RSK IND N/A 9/15/2017    COLONOSCOPY performed by Erin Burkett MD at [address] [facility],[address] ENDOSCOPY  09    DR POLK     Social History     Socioeconomic History    Marital status:      Spouse name: Not on file    Number of children: Not on file    Years of education: Not on file    Highest education level: Not on file   Occupational History    Not on file   Tobacco Use    Smoking status: Former Smoker     Packs/day: 1.00     Years: 1.00     Pack years: 1.00     Types: Cigarettes     Quit date: 1/15/1969     Years since quittin.5    Smokeless tobacco: Never Used    Tobacco comment: Quit smoking > 30 years ago   Vaping Use    Vaping Use: Never used   Substance and Sexual Activity    Alcohol use: No    Drug use: No    Sexual activity: Not Currently     Partners: Female   Other Topics Concern    Not on file   Social History Narrative         Lives With: Spouse    Type of Home: [address] [place] [address] [place]: Two level(reports he can stay on 1st floor with bedroom and bathroom though currently he stays upstairs)    Home Access: (Pt unable to state)    Bathroom Shower/Tub: Walk-in shower    Bathroom Equipment: Shower chair, Grab bars in [facility]: Rolling walker    ADL Assistance: Independent    Homemaking Assistance: Needs assistance    Ambulation Assistance: Independent  (no AD)    Transfer Assistance: Independent    Active : No    Additional Comments: pt is questionable historian, he is unsure of what equipment he has at home or if he was using any     Social Determinants of Health     Financial Resource Strain:     Difficulty of Paying Living Expenses:    Food Insecurity:     Worried About Running Out of Food in the Last Year:     [address] in the Last Year:    Transportation Needs:     Lack of Transportation (Medical):      Lack of Transportation (Non-Medical):    Physical Activity:     Days of Exercise per Week:     Minutes of Exercise per Session:    Stress:     Feeling of Stress :    Social Connections:     Frequency of Communication with Friends and Family:     Frequency of Social Gatherings with Friends and Family:     Attends Zoroastrianism Services:     Active Member of Clubs or Organizations:     Attends Club or Organization Meetings:     Marital Status:    Intimate Partner Violence:     Fear of Current or Ex-Partner:     Emotionally Abused:     Physically Abused:     Sexually Abused:      Family History   Problem Relation Age of Onset    Cancer Mother         OVARIAN    Heart Disease Father      Allergies   Allergen Reactions    Amoxicillin Other (See Comments)     stomatitis     Current Facility-Administered Medications   Medication Dose Route Frequency Provider Last Rate Last Admin    levETIRAcetam (KEPPRA) 500 mg in sodium chloride 0.9 % 100 mL IVPB  500 mg Intravenous Q12H Millie Peralta MD        sodium chloride flush 0.9 % injection 5-40 mL  5-40 mL Intravenous 2 times per day Autumn iSerra MD   5 mL at 08/08/21 0947    sodium chloride flush 0.9 % injection 5-40 mL  5-40 mL Intravenous PRN Autumn Sierra MD        0.9 % sodium chloride infusion  25 mL Intravenous PRN Autumn Sierra MD        enoxaparin (LOVENOX) injection 40 mg  40 mg Subcutaneous Daily Autumn Sierra MD   40 mg at 08/08/21 0946    ondansetron (ZOFRAN-ODT) disintegrating tablet 4 mg  4 mg Oral Q8H PRN Autumn Sierra MD        Or    ondansetron (ZOFRAN) injection 4 mg  4 mg Intravenous Q6H PRN Autumn Sierra MD        polyethylene glycol (GLYCOLAX) packet 17 g  17 g Oral Daily PRN Autumn Sierra MD        [Held by provider] acetaminophen (TYLENOL) tablet 650 mg  650 mg Oral Q6H PRN Autumn Sierra MD        Or    [Held by provider] acetaminophen (TYLENOL) suppository 650 mg  650 mg Rectal Q6H PRN Naomy A Jean Marie Donaldson MD        piperacillin-tazobactam (ZOSYN) 3,375 mg in dextrose 5 % 50 mL IVPB (mini-bag)  3,375 mg Intravenous Q8H Devi Lipps, APRN -  mL/hr at 08/08/21 0946 3,375 mg at 08/08/21 0946    aspirin EC tablet 81 mg  81 mg Oral QPM Devi Lipps, APRN - NP        carbidopa-levodopa-entacapone (STALEVO 100) -200 MG per tablet 1 tablet  1 tablet Oral BID Devi Lipps, APRN - NP        clopidogrel (PLAVIX) tablet 75 mg  75 mg Oral Daily Devi Lipps, APRN - NP        dipyridamole (PERSANTINE) tablet 75 mg  75 mg Oral TID Devi Lipps, APRN - NP        memantine [facility]) tablet 10 mg  10 mg Oral BID Devi Lipps, APRN - NP        metoprolol succinate (TOPROL XL) extended release tablet 25 mg  25 mg Oral Daily Devi Lipps, APRN - NP        pantoprazole (PROTONIX) tablet 40 mg  40 mg Oral QAM AC Devi Lipps, APRN - NP        tamsulosin (FLOMAX) capsule 0.4 mg  0.4 mg Oral Nightly Devi Lipps, APRN - NP        0.9 % sodium chloride infusion   Intravenous Continuous Devi Lipps, APRN - NP 50 mL/hr at 08/07/21 1827 New Bag at 08/07/21 1827        Objective:   BP (!) 147/99   Pulse 82   Temp 99.5 °F (37.5 °C) (Axillary)   Resp 20   Ht 6' 1\" (1.854 m)   Wt 154 lb (69.9 kg)   SpO2 96%   BMI 20.32 kg/m²     Physical Exam examination limited due to patient's mental status changes in condition. Patient has mild jerking movements which may be asterixis or myoclonic jerks this have responded to carbidopa levodopa though he was not able to take the medication due to his mental status change. Pupils equal react arms are conjugate neck is supple withdraws all his extremities to good strength in fact spontaneously moves himself. Reflexes are 1+ gait is deferred cardiovascular system S1 and S2 are normal no murmurs appreciated no carotid wheeze    XR CHEST PORTABLE    Result Date: 8/7/2021  Exam: XR CHEST PORTABLE History: Hypoxia. Aspiration.  Technique: AP portable view of the chest obtained. Comparison: Portable chest radiograph from August 3, 2021 Findings: The cardiomediastinal silhouette is within normal limits. No development of patchy left lung base opacities. No pneumothorax or pleural effusion. No acute osseous normality. Left lung base infiltrate. Lab Results   Component Value Date    WBC 11.8 08/08/2021    RBC 4.39 08/08/2021    HGB 12.0 08/08/2021    HCT 36.8 08/08/2021    MCV 83.8 08/08/2021    MCH 27.4 08/08/2021    MCHC 32.7 08/08/2021    RDW 16.7 08/08/2021     08/08/2021    MPV 10.9 07/15/2015     Lab Results   Component Value Date     08/08/2021    K 4.0 08/08/2021     08/08/2021    CO2 24 08/08/2021    BUN 20 08/08/2021    CREATININE 0.81 08/08/2021    GFRAA >60.0 08/08/2021    LABGLOM >60.0 08/08/2021    GLUCOSE 121 08/08/2021    GLUCOSE 107 03/14/2012    PROT 6.2 08/08/2021    LABALBU 3.1 08/08/2021    LABALBU 4.1 03/14/2012    CALCIUM 8.7 08/08/2021    BILITOT 0.6 08/08/2021    ALKPHOS 121 08/08/2021    AST 48 08/08/2021    ALT 77 08/08/2021     Lab Results   Component Value Date    PROTIME 13.7 08/07/2021    INR 1.1 08/07/2021     Lab Results   Component Value Date    TSH 4.720 04/28/2016    NMRVJBVH44 619 05/08/2017    FOLATE >20.0 05/08/2017     Lab Results   Component Value Date    TRIG 65 07/22/2020    HDL 66 07/22/2020    LDLCALC 45 07/22/2020     Lab Results   Component Value Date    LABAMPH Neg 11/03/2020    BARBSCNU Neg 11/03/2020    LABBENZ Neg 11/03/2020    LABMETH Neg 11/03/2020    OPIATESCREENURINE Neg 11/03/2020    PHENCYCLIDINESCREENURINE Neg 11/03/2020     No results found for: LITHIUM, DILFRTOT, VALPROATE    Assessment:   Encephalopathy superimposed upon a patient with Lewy body dementia with vascular dementia which is extensive. Patient was recently admitted to hospital and had a complete evaluation with no notable new stroke and he continued to improve.   He has these episodes where he becomes quite lethargic over time and this may be significant down-regulation of dopamine in a patient with vascular dementia. He has not been able to tolerate higher doses of dopamine agonist due to the vascular dementia. Superadded is now a hepatic encephalopathy likely related to underlying omega-3 as he is not on any other medication that would do that. The only other new medication was Namenda which I will discontinue for now though we have not seen liver toxicities with this. He has considerable asterixis with myoclonus and this may respond to a low-dose of Keppra for just a few days. Patient's overall condition appears to be guarded given that he has fluctuating course going on for a few years and we continue to discuss hospice care with his wife as well. We though we will give him 1 or 2 days to wake up as he generally does. GI is on consult regarding his abnormal liver function tests  Patient has been on a combination of Plavix and Persantine for years without any recurrent cute strokes. He has considerable accumulation of all small vessel ischemic changes. These are nonreversible. Mika Diez MD, Irena Olguin, American Board of Psychiatry & Neurology  Board Certified in Vascular Neurology  Board Certified in Neuromuscular Medicine  Certified in [facility]:

## 2021-08-08 NOTE — PROGRESS NOTES
Hospitalist Progress Note      Date of Admission: 8/7/2021  Chief Complaint:    Chief Complaint   Patient presents with    Shortness of Breath     Subjective:  Resting comfortably, arousable    Medications:    Infusion Medications    sodium chloride      sodium chloride 50 mL/hr at 08/07/21 1827     Scheduled Medications    levetiracetam  500 mg Intravenous Q12H    sodium chloride flush  5-40 mL Intravenous 2 times per day    enoxaparin  40 mg Subcutaneous Daily    piperacillin-tazobactam  3,375 mg Intravenous Q8H    aspirin  81 mg Oral QPM    carbidopa-levodopa-entacapone  1 tablet Oral BID    clopidogrel  75 mg Oral Daily    dipyridamole  75 mg Oral TID    metoprolol succinate  25 mg Oral Daily    pantoprazole  40 mg Oral QAM AC    tamsulosin  0.4 mg Oral Nightly     PRN Meds: sodium chloride flush, sodium chloride, ondansetron **OR** ondansetron, polyethylene glycol, [Held by provider] acetaminophen **OR** [Held by provider] acetaminophen  No intake or output data in the 24 hours ending 08/08/21 1701  Exam:  BP (!) 147/99   Pulse 82   Temp 99.5 °F (37.5 °C) (Axillary)   Resp 20   Ht 6' 1\" (1.854 m)   Wt 154 lb (69.9 kg)   SpO2 96%   BMI 20.32 kg/m²   Head: Normocephalic, atraumatic  Sclera clear  Neck JVD flat  Lungs: normal effort of breathing    Labs:   Recent Labs     08/07/21  1013 08/08/21  0655   WBC 12.3* 11.8*   HGB 13.9* 12.0*   HCT 42.4 36.8*    238     Recent Labs     08/07/21  1013 08/08/21  0655    150*   K 3.8 4.0    114*   CO2 24 24   BUN 21 20   CREATININE 0.99 0.81   CALCIUM 9.3 8.7   * 48*   * 77*   BILIDIR  --  <0.2   BILITOT 0.8* 0.6   ALKPHOS 164* 121*     Recent Labs     08/07/21  1013   INR 1.1     Recent Labs     08/07/21  1013   CKTOTAL 277*   TROPONINI <0.010     Radiology:  US ABDOMEN LIMITED   Final Result      THE LIVER IS HETEROGENEOUS IN ECHOTEXTURE. THIS IS A NONSPECIFIC FINDING.       XR CHEST PORTABLE   Final Result      Left lung base infiltrate. Assessment/Plan:    Patient admitted to the hospital with possible aspiration pneumonia in the background of vascular dementia. Given prognosis, comfort care and transitioning to hospice. Appreciate neurology input.     25 minutes total care time, >1/2 in unit/floor time and care coordination     Jessica Garibay MD

## 2021-08-09 NOTE — FLOWSHEET NOTE
Pt oriented to self during shift. 26 - Dr. Neil Fish notified of pt's NPO status and requested him to change PO medications to IV medications if possible. Pt incontinent of urine multiple times during shift, attempted [place] catheter on pt. Pt NPO during entire night. Pt did sleep most of the shift as well.

## 2021-08-09 NOTE — PROGRESS NOTES
Physical Therapy Med Surg Daily Treatment Note  Facility/Department: [facility]  [id number]       NAME: Colleen Akins  : 1945 (77 y.o.)  MRN: [id number]  CODE STATUS: DNR-CC    Date of Service: 2021    Patient Diagnosis(es): Hypoxia [R09.02]  Lewy body dementia without behavioral disturbance ([address] [G31.83, F02.80]  Pneumonia of left lower lobe due to infectious organism [J18.9]  Aspiration pneumonia of left lung, unspecified aspiration pneumonia type, unspecified part of lung ([address] [J69.0]   Chief Complaint   Patient presents with    Shortness of Breath     Patient Active Problem List    Diagnosis Date Noted    Aspiration pneumonia of left lung [facility] 2021    AMS (altered mental status) 2021    Bradykinesia 02/15/2021    Aphasia 02/15/2021    Primary insomnia 02/15/2021    Cerebral multi-infarct state 02/15/2021    Shortness of breath 2021    Vascular dementia with behavior disturbance ([address] 2020    Onychomycosis 2020    Pain in toes of both feet 2020    Peripheral nerve disease 2020    Peripheral vascular disease of foot ([address] 2020    Ataxic gait 2020    Stroke-like episode     Biliary colic     Chest pain     PD (Parkinson's disease) ([address]     Memory loss     Syncope and collapse     Mild cognitive impairment     TIA (transient ischemic attack) 2019    PETRA (obstructive sleep apnea)     Actinic keratoses     Inflamed seborrheic keratosis     Gastroesophageal reflux disease with esophagitis 11/15/2016    Lumbar radiculopathy 10/26/2016    Sinus bradycardia on ECG 2016    Disturbance of skin sensation 2015    Seborrheic keratosis 2014    History of colon cancer 2014    History of repair of hip joint 10/21/2013    History of hip replacement, total 2013    Degenerative joint disease of pelvic region 2013    Degenerative arthritis of lumbar spine 02/13/2013    Foot drop, left 01/14/2013    CAD (coronary artery disease)     Benign prostatic hyperplasia     Cholelithiasis     Hyperlipidemia 01/18/2012    Osteopenia 01/18/2012        Past Medical History:   Diagnosis Date    Abnormal cardiovascular stress test 4/19/2016    Equivocal ST-T wave changes; Defect in the inferior wall consistent with prior infarction; LV EF 79%; TID ratio 1.1    Actinic keratoses     BPH (benign prostatic hypertrophy)     CAD (coronary artery disease)     Cancer (HCC)     COLON    Chest pressure 5/3/2016    Cholelithiasis     History of colon cancer     s/p partial colectomy in 2009    Hyperlipidemia     Inflamed seborrheic keratosis     Lumbar radiculopathy 10/26/2016    Mild cognitive impairment     Osteoarthritis     Parathyroid tumor     Parkinson disease ([address]     RA (retrograde amnesia)     Sinus bradycardia on ECG 4/19/2016    Done 4/5/16 with Dr. Ddaa Mattson Syncope and collapse     Vascular dementia [facility])      Past Surgical History:   Procedure Laterality Date    BACK SURGERY  1993    CHOLECYSTECTOMY, LAPAROSCOPIC N/A 5/27/2020    LAP BRAIN/ CHOLANGIOGRAMS performed by Nury Jennings MD at [place] [address]  8/18/10,09/11/2012    DR Camron Cardenas    COLONOSCOPY  09/29/14    DR. PIERRE    JOINT REPLACEMENT Left 5/28/13    total    PARATHYROIDECTOMY Bilateral 2011    2 of 4 glands removed    WI COLON CA SCRN NOT HI RSK IND N/A 9/15/2017    COLONOSCOPY performed by Durga Cool MD at [address] [place] [address],[address] ENDOSCOPY  8/11/09    DR Camron Cardenas       Restrictions  Restrictions/Precautions: Fall Risk (high burton score)    SUBJECTIVE   General  Chart Reviewed: Yes  Family / Caregiver Present: Yes (wife)  Subjective  Subjective: okay okay  General Comment  Comments: active movement by pt initally limited, but once sitting EOB pt was slightly more alert and requires verbal cues or instructions from another person, close to but not touching, to perform the activity  Minimal assistance= pt performs 75% or more of the activity; assistance is required to complete the activity  Moderate assistance= pt performs 50% of the activity; assistance is required to complete the activity  Maximal assistance = pt performs 25% of the activity; assistance is required to complete the activity  Dependent = pt requires total physical assistance to accomplish the task

## 2021-08-09 NOTE — FLOWSHEET NOTE
200- Am assessment complete. Pt lethargic, pt will wake up but falls back to sleep quickly. Wife at bedside    36- Speech at bedside for swallow eval. Wife also at bedside    [address]- Dr Vanita Heimlich at bedside and wants to keep patient npo since he is lethargic and keeps falling asleep    1223- Gave pt Keppra and Provigil crushed in applesauce. Pt struggled taking pills from spoon, not knowing what to do with it, once he swallowed them he began coughing    1500- Dr Vanita Heimlich in to speak with wife    [phone]- RAFA Saldana, NP in to speak with wife    1654- Left arm beginning to swell. IV DC'd and arm elevated on pillow. New IV placed in right wrist    1835- Pt resting in bed quietly.  IV antibiotic hung Electronically signed by Jacey Nicholson RN on 8/9/2021 at 7:27 PM

## 2021-08-09 NOTE — CARE COORDINATION
SPOKE TO JEFF AT [address] [place] [address], UPDATED AFTER DR MEDINA SPOKE WITH PT AND WIFE. DR MEDINA WOULD LIKE PT TO CONTINUE [facility] AND NOT DC TIL TOMORROW. WIFE TO TOUR [facility] AT 1PM FOR POSSIBLE DC THERE  WITH HOSPICE CARE. [facility] TO SPEAK WITH WIFE AFTERWARDS. WIFE TO MAKE DECISION AFTER MEETING TODAY. ALSO UPDATED SHANNAN AT [facility], PT WOULD NEED TO PRIVATE PAY FOR ROOM AND BOARD AT [facility].     1120  DISCUSSED CASE WITH DR Hattie Carcamo, PT IS UNSAFE TO SWALLOW AND NOW NPO, UNABLE TO TAKE MEDS. WILL DISCUSS WITH WIFE ONCE SHE RETURNS.      65  [address] [place] [address] TO FOLLOW UP WITH WIFE TOMORROW ON DECISION, PT NOW NPO, POSSIBLE [address]  [facility].

## 2021-08-09 NOTE — PROGRESS NOTES
[facility]   [facility]ech Language Pathology  Clinical Bedside Swallow Evaluation    NAME:Ariel Patterson  : 1945 (77 y.o.)   MRN: [id number]  ROOM: [place] [address]  ADMISSION DATE: 2021  PATIENT DIAGNOSIS(ES): Hypoxia [R09.02]  Lewy body dementia without behavioral disturbance (HCC) [G31.83, F02.80]  Pneumonia of left lower lobe due to infectious organism [J18.9]  Aspiration pneumonia of left lung, unspecified aspiration pneumonia type, unspecified part of lung ([address] [J69.0]  Chief Complaint   Patient presents with    Shortness of Breath     Patient Active Problem List    Diagnosis Date Noted    Aspiration pneumonia of left lung ([facility] 2021    AMS (altered mental status) 2021    Bradykinesia 02/15/2021    Aphasia 02/15/2021    Primary insomnia 02/15/2021    Cerebral multi-infarct state 02/15/2021    Shortness of breath 2021    Vascular dementia with behavior disturbance ([facility] 2020    Onychomycosis 2020    Pain in toes of both feet 2020    Peripheral nerve disease 2020    Peripheral vascular disease of foot ([address] 2020    Ataxic gait 2020    Stroke-like episode     Biliary colic     Chest pain     PD (Parkinson's disease) ([address]     Memory loss     Syncope and collapse     Mild cognitive impairment     TIA (transient ischemic attack) 2019    PETRA (obstructive sleep apnea)     Actinic keratoses     Inflamed seborrheic keratosis     Gastroesophageal reflux disease with esophagitis 11/15/2016    Lumbar radiculopathy 10/26/2016    Sinus bradycardia on ECG 2016    Disturbance of skin sensation 2015    Seborrheic keratosis 2014    History of colon cancer 2014    History of repair of hip joint 10/21/2013    History of hip replacement, total 2013    Degenerative joint disease of pelvic region 2013    Degenerative arthritis of lumbar spine 02/13/2013    Foot drop, left 01/14/2013    CAD (coronary artery disease)     Benign prostatic hyperplasia     Cholelithiasis     Hyperlipidemia 01/18/2012    Osteopenia 01/18/2012     Past Medical History:   Diagnosis Date    Abnormal cardiovascular stress test 4/19/2016    Equivocal ST-T wave changes; Defect in the inferior wall consistent with prior infarction; LV EF 79%; TID ratio 1.1    Actinic keratoses     BPH (benign prostatic hypertrophy)     CAD (coronary artery disease)     Cancer (HCC)     COLON    Chest pressure 5/3/2016    Cholelithiasis     History of colon cancer     s/p partial colectomy in 2009    Hyperlipidemia     Inflamed seborrheic keratosis     Lumbar radiculopathy 10/26/2016    Mild cognitive impairment     Osteoarthritis     Parathyroid tumor     Parkinson disease ([address]     RA (retrograde amnesia)     Sinus bradycardia on ECG 4/19/2016    Done 4/5/16 with Dr. Dada Mattson Syncope and collapse     Vascular dementia [facility])      Past Surgical History:   Procedure Laterality Date    BACK SURGERY  1993    CHOLECYSTECTOMY, LAPAROSCOPIC N/A 5/27/2020    LAP BRAIN/ CHOLANGIOGRAMS performed by Nury Jennings MD at [place] [address]  8/18/10,09/11/2012    DR Lyon Pole    COLONOSCOPY  09/29/14    DR. PIERRE    JOINT REPLACEMENT Left 5/28/13    total    PARATHYROIDECTOMY Bilateral 2011    2 of 4 glands removed    ME COLON CA SCRN NOT HI RSK IND N/A 9/15/2017    COLONOSCOPY performed by Durga Cool MD at [address] [place] [address],[address] ENDOSCOPY  8/11/09    DR POLK     Allergies   Allergen Reactions    Amoxicillin Other (See Comments)     stomatitis       DATE ONSET: 8/7/2021    Date of Evaluation: 8/9/2021   Evaluating Therapist: Thomas Najera.  Nikolay Griffin, SLP    Recommended Diet and Intervention  Diet Solids Recommendation: Dysphagia Pureed (Dysphagia I)  Liquid Consistency Recommendation: Moderately Thick (Honey)  Recommended Form of Meds: Crushed in puree as able  Recommendations: Dysphagia treatment  Therapeutic Interventions: Patient/Family education, Therapeutic PO trials with SLP    Compensatory Swallowing Strategies  Compensatory Swallowing Strategies: Small bites/sips; Total feed;Upright as possible for all oral intake (liquids from straw)    Reason for Referral  Yessi Holley was referred for a bedside swallow evaluation to assess the efficiency of his swallow function, identify signs and symptoms of aspiration and make recommendations regarding safe dietary consistencies, effective compensatory strategies, and safe eating environment. General  Chart Reviewed: Yes  Behavior/Cognition: Lethargic;Doesn't follow directions; Cooperative  Respiratory Status: O2 via nasual cannula  Communication Observation: Aphasia; Dysarthria (making non-sensical confusing statements; intelligible 50%x)  Follows Directions: None  Dentition:  (unable to fully assess; decreased mouth opening)  Patient Positioning: Upright in bed  Baseline Vocal Quality: Weak  Volitional Cough: Weak  Prior Dysphagia History: Per spouse, pt was eating regular diet with thin liquids without difficulty  Consistencies Administered: Dysphagia Pureed (Dysphagia I); Nectar - straw; Ice Chips; Thin - teaspoon;Honey - straw    Current Diet level:  Current Diet : NPO  Current Liquid Diet : NPO    Oral Motor Deficits  Oral/Motor  Oral Motor: Exceptions to [facility] (unable to follow directions to complete a full exam)  Labial ROM: Reduced left; Reduced right  Labial Coordination: Reduced  Lingual ROM: Reduced left; Reduced right  Lingual Strength: Reduced  Lingual Coordination: Reduced    Oral Phase Dysfunction  Oral Phase  Oral Phase: Exceptions  Oral Phase Dysfunction  Decreased Anterior to Posterior Transit: All  Suspected Premature Bolus Loss: Nectar - straw; Thin - teaspoon; Ice chips  Oral Phase  Oral Phase - Comment: Moderate recommendations: RN;Family member (Joann To RN)  Family member consulted: Spouse    Education  Patient Education: Educated spouse on diet and aspiration risk. Patient Education Response: Verbalizes understanding  Safety Devices in place: Yes  Type of devices: Bed alarm in place;Call light within reach    Pain Assessment:  Pre-Treatment  Pain assessment: 0-10  Pain level: unable to rate  Intervention:  Patient demonstrated no s/s of pain. Post-Treatment  Pain assessment: 0-10  Pain level: unable to rate  Intervention:  Patient demonstrated no s/s of pain. Therapy Time  SLP Individual Minutes  Time In: 0915  Time Out: 0930  Minutes: 15              Signature: Electronically signed by Gene Vasquez.  JIM Ahumada on 8/9/2021 at 9:51 AM

## 2021-08-09 NOTE — PROGRESS NOTES
INPATIENT PROGRESS NOTES    PATIENT NAME: Ashish Mcdonald  MRN: [id number]  SERVICE DATE:  August 9, 2021   SERVICE TIME:  3:48 PM      PRIMARY SERVICE: Pulmonary Disease    CHIEF COMPLAIN: Pneumonia      INTERVAL HPI: Patient seen and examined at bedside, Interval Notes, orders reviewed. Nursing notes noted  Patient sleepy but arousable and he\states that he is feeling better. He is on 2 L O2 via nasal cannula O2 saturation 97%. He is not having any cough or sputum production. No vomiting or diarrhea. He has low-grade fever with T-max 99.8 he has advanced dementia and unable to get much review of system from patient. He is DNR CC    OBJECTIVE    Body mass index is 20.46 kg/m². PHYSICAL EXAM:  Vitals:  BP (!) 141/94   Pulse 60   Temp 99.8 °F (37.7 °C) (Axillary)   Resp 18   Ht 6' 1\" (1.854 m)   Wt 155 lb 1.6 oz (70.4 kg)   SpO2 97%   BMI 20.46 kg/m²   General: Alert, awake . comfortable in bed, No distress. Head: Atraumatic , Normocephalic   Eyes: PERRL. No sclera icterus. No conjunctival injection. No discharge   ENT: No nasal  discharge. Pharynx clear. Neck:  Trachea midline. No thyromegaly, no JVD, No cervical adenopathy. Chest : Bilaterally symmetrical ,Normal effort,  No accessory muscle use  Lung : . Fair BS bilateral, decreased BS at bases. No Rales. No wheezing. No rhonchi. Heart[de-identified] Normal  rate. Regular rhythm. No mumur ,  Rub or gallop  ABD: Non-tender. Non-distended. No masses. No organmegaly. Normal bowel sounds. No hernia.   Ext : No Pitting both leg , No Cyanosis No clubbing  Neuro: no focal weakness          DATA:   Recent Labs     08/07/21  1013 08/08/21  0655   WBC 12.3* 11.8*   HGB 13.9* 12.0*   HCT 42.4 36.8*   MCV 82.7 83.8    238     Recent Labs     08/07/21  1013 08/07/21  1013 08/08/21  0655     --  150*   K 3.8  --  4.0     --  114*   CO2 24  --  24   BUN 21  --  20   CREATININE 0.99  --  0.81   GLUCOSE 145*  --  121*   CALCIUM 9.3  --  8.7   PROT 7.1 mass, mass effect, or midline shift. There is no evidence of atrophy, ventricular morphology is within normal limits. The subcortical and periventricular white matter is within normal limits. The basal ganglia are within normal limits. There are no acute changes or space-occupying lesions in the posterior fossa. The visualized portions of the orbits are within normal limits. The globes are intact. The imaged portions of the paranasal sinuses are unremarkable. The calvarium is intact. There is no acute intracranial process. CT Head WO Contrast    Result Date: 7/30/2021  CT HEAD WO CONTRAST : 7/30/2021 CLINICAL HISTORY:  unresponsive . COMPARISON: 11/3/2020. TECHNIQUE: Spiral unenhanced images were obtained of the head, with routine multiplanar reconstructions performed. All CT scans at this facility use dose modulation, iterative reconstruction, and/or weight based dosing when appropriate to reduce radiation dose to as low as reasonably achievable. FINDINGS: There is no intracranial hemorrhage, mass effect, midline shift, extra-axial collection, evidence of hydrocephalus, recent ischemic infarct, or skull fracture identified. 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 The mastoid air cells and visualized paranasal sinuses are essentially clear. NO ACUTE INTRACRANIAL PROCESS OR SIGNIFICANT CHANGE FROM 11/3/2020 IDENTIFIED. XR CHEST PORTABLE    Result Date: 8/7/2021  Exam: XR CHEST PORTABLE History: Hypoxia. Aspiration. Technique: AP portable view of the chest obtained. Comparison: Portable chest radiograph from August 3, 2021 Findings: The cardiomediastinal silhouette is within normal limits. No development of patchy left lung base opacities. No pneumothorax or pleural effusion. No acute osseous normality. Left lung base infiltrate.      XR CHEST PORTABLE    Result Date: 8/3/2021  EXAMINATION: XR CHEST PORTABLE CLINICAL HISTORY: RALES COMPARISONS: AUGUST 30, 2021. FINDINGS: Osseous structures are intact. Cardiopericardial silhouette is normal. Pulmonary vasculature is normal. Lungs are clear. NO ACUTE CARDIOPULMONARY DISEASE. XR CHEST PORTABLE    Result Date: 7/31/2021  EXAMINATION: XR CHEST PORTABLE CLINICAL HISTORY: NOT RESPONSIVE COMPARISONS: JANUARY 5, 2021 FINDINGS: Osseous structures are intact. Cardiopericardial silhouette is normal. Pulmonary vasculature is normal. Lungs are clear. NO ACUTE CARDIOPULMONARY DISEASE.    US ABDOMEN LIMITED    Result Date: 8/8/2021  US ABDOMEN LIMITED CLINICAL HISTORY: elevated liver enzymes COMPARISONS: NONE FINDINGS: TECHNIQUE: TRANSABDOMINAL ULTRASOUND OF THE RIGHT UPPER QUADRANT WAS PERFORMED. The liver is heterogeneous in echotexture. This a nonspecific finding. Shows no focal parenchymal abnormalities. No intrahepatic biliary dilatation. The gallbladder shows no pericholecystic fluid. The gallbladder surgically absent. Common bile duct measures 3 millimeters. THE LIVER IS HETEROGENEOUS IN ECHOTEXTURE. THIS IS A NONSPECIFIC FINDING. MRI BRAIN WO CONTRAST    Result Date: 8/3/2021  EXAMINATION: MRI BRAIN WO CONTRAST CLINICAL HISTORY:  stroke COMPARISONS: NONE AVAILABLE TECHNIQUE: Multiplanar multisequence images of the brain were obtained without contrast. Diffusion perfusion imaging was obtained. FINDINGS:  There are no extra-axial collections. There is no evidence of hemorrhage. There are no areas of perfusion diffusion signal abnormality to suggest ischemia. The susceptibility images do not demonstrate evidence of hemosiderin deposition within the brain parenchyma or the leptomeninges. There is preservation of the gray-white matter differentiation. There are a few scattered foci of T2/FLAIR increased signal in the subcortical and periventricular white matter without associated edema or mass effect.  There is mild prominence of the sulci  and ventricles indicating chronic involutional changes and mild global cerebral atrophy. The midline structures are intact, the corpus callosum is within normal limits. The region of the pineal gland and the sella turcica are unremarkable. There are no space-occupying lesions in the posterior fossa. The basilar cisterns are patent. The craniocervical junction is unremarkable. The visualized portions of the orbits are within normal limits, the globes are intact. The visualized portions of the paranasal sinuses are within normal limits. The calvarium and soft tissues are unremarkable. There are no acute intracranial changes, no evidence of ischemia or hemorrhage. There are no regions of signal abnormality. IMPRESSION AND SUGGESTION:  1. Pneumonia possible aspiration  2. Hypoxia on O2  3. Encephalopathy    Continue O2 to keep saturation 90% above. Continue IV antibiotic with Zosyn. Min Confer DVT GI prophylaxis. Chest x-ray showing left lower lobe infiltration possible aspiration. Recommend aspiration precaution. Continue present treatment plan      NOTE: This report was transcribed using voice recognition software. Every effort was made to ensure accuracy; however, inadvertent computerized transcription errors may be present.       Electronically signed by Tyler Stover MD, [facility] on 8/9/2021 at 3:48 PM

## 2021-08-09 NOTE — CONSULTS
1043: Communication received that [facility] was trying to get in touch with this nurse. Call back placed and spoke with Duy Martinez who informed this nurse that Dr. Kourtney Davalos did speak with the wife this morning. This nurse plans to follow up with wife Benjamin Dinero to determine if she would like hospice services and if so where she would like patients care to take place after discharge. 1048: Call place to Benjamin Dinero, went straight to , message left to call back [address].     1052: Update given to Alexis. 1055: Call back received from Benjamin Dinero, patients wife. Benjamin Dinero informed this nurse that she did speak with Dr. Kourtney Davalos and that his recommendation is a skilled nursing facility. This nurse inquired if [address] [place] [address] had reviewed with her insurance coverage for patients under hospice services. Benjamin Dinero informed this nurse that yes she was aware. This nurse explained the patient would be discharged to a skilled facility but that he would be there for LTC and wife Benjamin Bar verbalized understanding. This nurse also reviewed that hospice services would be covered under the patients Medicare but that his room and board fee for his LTC would have to be paid out of pocket and wife Benjamin Dinero verbalized understanding. Benjamin Dinero informed this nurse that she would be [address] this afternoon and would inform Alexis BEE or myself if she would like to move forward with having her  placed there. This nurse let Benjamin Dinero know that once patient is discharged to a facility, that [address] would open her  on hospice services. Please call [address] [place] [address] with any further updates. 1100: Margarita BEE updated on plan of care.

## 2021-08-09 NOTE — PROGRESS NOTES
[id number] [facility] Neurology Daily Progress Note  Name: Yessi Holley  Age: 77 y.o. Gender: male  CodeStatus: DNR-CC  Allergies: Amoxicillin    Chief Complaint:Shortness of Breath    Primary Care Provider: Suzanne Cummins MD  InpatientTreatment Team: Treatment Team: Attending Provider: Rayne Aggarwal DO; Consulting Physician: Jean Litten, DO; Consulting Physician: Hafsa Sullivan MD; Utilization Reviewer: Cindy Silverman RN; : Rafael Milligan RN; Registered Nurse: Maki Faulkner RN  Admission Date: 8/7/2021      HPI   Pt seen and examined for neuro follow up for Lewy body dementia, Parkinson's disease, vascular dementia. Patient currently awakens to verbal stimuli. Very confused. Unable to follow commands. No obvious focal deficits or seizure activity. Currently being treated for pneumonia that is likely aspiration. He has dysphagia that is significant and is currently n.p.o. Wife at bedside. Patient continues to fluctuate. Vitals:    08/09/21 0732   BP: (!) 141/94   Pulse: 60   Resp: 18   Temp: 99.8 °F (37.7 °C)   SpO2: 97%      Review of Systems   Unable to perform ROS: Dementia   Constitutional: Negative for fever. HENT: Negative for trouble swallowing. Respiratory: Negative for cough and wheezing. Gastrointestinal: Negative for nausea and vomiting. Skin: Negative for color change and rash. Neurological: Positive for tremors (  myoclonus), speech difficulty and weakness (  Generalized). Negative for dizziness, seizures and syncope. Psychiatric/Behavioral: Positive for confusion and hallucinations. Negative for agitation. The patient is not nervous/anxious. Physical Exam  Vitals and nursing note reviewed. Constitutional:       General: He is not in acute distress. Appearance: He is ill-appearing. He is not diaphoretic. HENT:      Head: Normocephalic and atraumatic. Eyes:      Pupils: Pupils are equal, round, and reactive to light.    Cardiovascular:      Rate and Rhythm: Normal rate and regular rhythm. Pulmonary:      Effort: Pulmonary effort is normal. No respiratory distress. Breath sounds: Normal breath sounds. Abdominal:      General: Bowel sounds are normal.      Palpations: Abdomen is soft. Skin:     General: Skin is warm and dry. Neurological:      Mental Status: He is alert. He is disoriented. Motor: Tremor (  Myoclonus) present. No seizure activity. Comments: Neuro exam limited due to patient's confusion and inability to follow commands. No obvious focal deficits.                Medications:  Reviewed    Infusion Medications:    sodium chloride      sodium chloride 50 mL/hr at 08/09/21 0839     Scheduled Medications:    modafinil  100 mg Oral Daily    levETIRAcetam  500 mg Oral BID    pantoprazole  40 mg Intravenous Daily    And    sodium chloride (PF)  10 mL Intravenous Daily    metoprolol  2.5 mg Intravenous Q6H    enoxaparin  40 mg Subcutaneous Daily    piperacillin-tazobactam  3,375 mg Intravenous Q8H    aspirin  81 mg Oral QPM    carbidopa-levodopa-entacapone  1 tablet Oral BID    clopidogrel  75 mg Oral Daily    dipyridamole  75 mg Oral TID    tamsulosin  0.4 mg Oral Nightly     PRN Meds: sodium chloride flush, sodium chloride, ondansetron **OR** ondansetron, polyethylene glycol, [Held by provider] acetaminophen **OR** [Held by provider] acetaminophen    Labs:   Recent Labs     08/07/21  1013 08/08/21  0655   WBC 12.3* 11.8*   HGB 13.9* 12.0*   HCT 42.4 36.8*    238     Recent Labs     08/07/21  1013 08/08/21  0655    150*   K 3.8 4.0    114*   CO2 24 24   BUN 21 20   CREATININE 0.99 0.81   CALCIUM 9.3 8.7     Recent Labs     08/07/21  1013 08/08/21  0655   * 48*   * 77*   BILIDIR  --  <0.2   BILITOT 0.8* 0.6   ALKPHOS 164* 121*     Recent Labs     08/07/21  1013   INR 1.1     Recent Labs     08/07/21  1013   CKTOTAL 277*   TROPONINI <0.010       Urinalysis:   Lab Results   Component Value Date NITRU Negative 07/30/2021    WBCUA 3-5 05/31/2013    BACTERIA Moderate 05/31/2013    RBCUA None seen 05/31/2013    BLOODU Negative 07/30/2021    SPECGRAV 1.004 07/30/2021    GLUCOSEU Negative 07/30/2021       Radiology:   Most recent    EEG No valid procedures specified. MRI of Brain Results for orders placed during the hospital encounter of 08/01/19    MRI brain with and without contrast    Narrative  MRI BRAIN W WO CONTRAST, MRA HEAD WO CONTRAST : 8/1/2019    CLINICAL HISTORY:  STROKE . Headache, dysarthria, diaphoresis, and history of previous CVA    COMPARISON: Head MRI 4/20/2018 and head CT from earlier 8/1/2019. TECHNIQUE: Multiplanar MR imaging of the head was performed before and after intravenous administration of approximately 15 mL of ProHance gadolinium contrast.    Three-dimensional MRA of the intracranial arterial circulation was performed, with routine and volume rendered images obtained on a 3-dimensional workstation. HEAD MRI FINDINGS:    Small areas of encephalomalacia within the superior central right cerebellar hemisphere and superior left occipital lobe appear unchanged, consistent with old ischemic infarcts. There is no recent or interval infarct, intracranial hemorrhage, mass effect, midline shift, extra-axial collection, hydrocephalus, or abnormal enhancement. Mild generalized cerebral volume loss and mild supratentorial white matter changes appear unchanged. HEAD MRA FINDINGS:    There is no significant stenosis, branch occlusion, aneurysm, or developmental vascular variations of concern identified. 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 SMALL OLD INFARCTS OF THE RIGHT CEREBELLAR HEMISPHERE AND LEFT OCCIPITAL LOBE, UNCHANGED. NEGATIVE HEAD MRA.   Results for orders placed during the hospital encounter of 07/30/21    MRI BRAIN WO CONTRAST    Narrative  EXAMINATION: MRI BRAIN WO CONTRAST    CLINICAL HISTORY:  stroke    COMPARISONS: NONE AVAILABLE    TECHNIQUE:    Multiplanar multisequence images of the brain were obtained without contrast. Diffusion perfusion imaging was obtained. FINDINGS:    There are no extra-axial collections. There is no evidence of hemorrhage. There are no areas of perfusion diffusion signal abnormality to suggest ischemia. The susceptibility images do not demonstrate evidence of hemosiderin deposition within the brain  parenchyma or the leptomeninges. There is preservation of the gray-white matter differentiation. There are a few scattered foci of T2/FLAIR increased signal in the subcortical and periventricular white matter without associated edema or mass effect. There is mild prominence of the sulci  and ventricles indicating chronic involutional changes and mild global cerebral atrophy. The midline structures are intact, the corpus callosum is within normal limits. The region of the pineal gland and the sella turcica are unremarkable. There are no space-occupying lesions in the posterior fossa. The basilar cisterns are patent. The craniocervical junction is unremarkable. The visualized portions of the orbits are within normal limits, the globes are intact. The visualized portions of the paranasal sinuses are within normal limits. The calvarium and soft tissues are unremarkable. Impression  There are no acute intracranial changes, no evidence of ischemia or hemorrhage. There are no regions of signal abnormality. MRA of the Head and Neck: No results found for this or any previous visit. No results found for this or any previous visit. Results for orders placed during the hospital encounter of 07/21/20    MRA head without contrast    Narrative  EXAM:    MRI of the brain without contrast    MRA of the brain without contrast    History: Stroke. Confusion. Weakness. Dementia. Tremors. Technique: Multiplanar multisequence MRI of the brain was performed without contrast. Axial 3-D time-of-flight images of the brain were obtained without contrast. 3-D volume rendered images were performed for evaluation of the cerebral vasculature. Comparison: CT brain from July 21, 2020 and MRI brain from March 11, 2020    Findings:    MRI brain:    Bilateral supratentorial deep white matter and subcortical white matter areas of hyperintense T2/FLAIR signal are nonspecific but most compatible with chronic small vessel ischemic changes in a patient of this age. There are tiny foci of encephalomalacia  within the posterior right and left cerebellar hemispheres. Prominence of the sulci and ventricles compatible with moderate generalized parenchymal volume loss. No acute hemorrhage, mass, mass effect, midline shift, or abnormal extra-axial fluid collection. Midline structures are within normal limits. There is no diffusion restriction. No suspicious susceptibility artifact is identified on the gradient echo sequence. Cranial nerves 7/8 complexes appear grossly unremarkable. The visualized paranasal sinuses and bilateral mastoid air cells are clear. MRA brain:    The bilateral distal cervical internal carotid arteries through the skull base are patent. The bilateral middle cerebral arteries through the trifurcation and opercular branches are patent. The vertebrobasilar system including the superior cerebellar and posterior cerebral arteries are patent. The right posterior communicating artery is patent. The left posterior communicating artery is not identified. The bilateral anterior cerebral arteries and anterior communicating artery are patent. No aneurysm or high-grade stenosis of the visualized cerebral vasculature. Impression  MRI brain:    No acute ischemia or acute intracranial process.     Generalized parenchymal volume loss and nonspecific white matter diaphoresis, and previous CVA. COMPARISON: 6/19/2018. Grayscale, color and waveform Doppler analysis of the cervical carotid and vertebral arteries was performed. Validated velocity measurements with angiographic measurements, velocity criteria are extrapolated from diameter data as defined by the Society of Radiologist in [address] [place] [address] Drive Radiology 2003; [id number]. FINDINGS:    There is mild atherosclerotic plaquing of the carotid bulbs without significantly elevated velocities, spectral waveform broadening, or incidental findings of concern identified. Maximum systolic velocity within the right internal and mid common carotid arteries are 65 and 113 cm/s, with an ICA/CCA ratio of approximately 0.6 , which indicates less than 50% by velocity criteria. Maximum systolic velocity within the left internal and mid common carotid arteries are 69 and 124 cm/s, with an ICA/CCA ratio of approximately 0.5, which indicates less than 50% by velocity criteria. Maximum velocities within the right and left external carotid arteries are 91 and 97 cm/sec respectively. There is antegrade flow in both vertebral arteries. Impression  MILD ATHEROSCLEROTIC PLAQUING OF THE CAROTID BULBS WITHOUT EVIDENCE OF A FLOW-LIMITING STENOSIS. Echo No results found for this or any previous visit. Assessment/Plan:    Encephalopathy superimposed upon a patient with Lewy body dementia with vascular dementia which is extensive. Patient was recently admitted to hospital and had a complete evaluation with no notable new stroke and he continued to improve. He has these episodes where he becomes quite lethargic over time and this may be significant down-regulation of dopamine in a patient with vascular dementia. He has not been able to tolerate higher doses of dopamine agonist due to the vascular dementia.   Superadded is now a hepatic encephalopathy likely related to underlying omega-3 as he is not on any other medication that would do that. The only other new medication was Namenda which I will discontinue for now though we have not seen liver toxicities with this. He has considerable asterixis with myoclonus and this may respond to a low-dose of Keppra for just a few days. Patient's overall condition appears to be guarded given that he has fluctuating course going on for a few years and we continue to discuss hospice care with his wife as well. We though we will give him 1 or 2 days to wake up as he generally does. GI is on consult regarding his abnormal liver function tests  Patient has been on a combination of Plavix and Persantine for years without any recurrent cute strokes. He has considerable accumulation of all small vessel ischemic changes. These are nonreversible. Patient continues to have significant dysphagia. Currently n.p.o. Hospice appropriate. He has been started on Keppra for myoclonus. This will have to be changed to IV. Provigil started due to excessive somnolence although given current status with significant dysphagia he would not be able to take this. Will discuss with Dr. Subha Abbott if he wants patient to be on Neupro patch rather than Stalevo given his current dysphagia. Family to decide on hospice tomorrow. Did have long discussion with patient's wife regarding disease process, symptoms and prognosis. I have personally performed a face to face diagnostic evaluation on this patient, reviewed all data and investigations, and am the sole provider of all clinical decisions on the neurological status of this patient. She continues to fluctuate. We have started him on Provigil to see if it helps. We will need to decide hospice if he remains somnolent      Mika Abbott MD, Dmitri Arreaga, American Board of Psychiatry & Neurology  Board Certified in Vascular Neurology  Board Certified in Neuromuscular Medicine  Certified in [place] Cam  Collaborating physicians: Dr Sun Camacho    Electronically signed by ELLIOT Mann CNP on 8/9/2021 at 3:47 PM

## 2021-08-10 NOTE — PROGRESS NOTES
Occupational Therapy  [facility] OCCUPATIONAL THERAPY EVALUATION - ACUTE     NAME: Katina Luciano  : 1945 (77 y.o.)  MRN: [id number]  CODE STATUS: DNR-CC  Room: C198/N410-55    Date of Service: 8/10/2021    Patient Diagnosis(es): Hypoxia [R09.02]  Lewy body dementia without behavioral disturbance ([address] [G31.83, F02.80]  Pneumonia of left lower lobe due to infectious organism [J18.9]  Aspiration pneumonia of left lung, unspecified aspiration pneumonia type, unspecified part of lung ([address] [J69.0]   Chief Complaint   Patient presents with    Shortness of Breath     Patient Active Problem List    Diagnosis Date Noted    Lewy body dementia without behavioral disturbance ([address]     Myoclonus     Hepatic encephalopathy ([address]     Aspiration pneumonia of left lung ([address] 2021    AMS (altered mental status) 2021    Bradykinesia 02/15/2021    Aphasia 02/15/2021    Primary insomnia 02/15/2021    Cerebral multi-infarct state 02/15/2021    Shortness of breath 2021    Vascular dementia with behavior disturbance ([address] 2020    Onychomycosis 2020    Pain in toes of both feet 2020    Peripheral nerve disease 2020    Peripheral vascular disease of foot ([address] 2020    Ataxic gait 2020    Stroke-like episode 7843    Biliary colic     Chest pain     PD (Parkinson's disease) ([address]     Memory loss     Syncope and collapse     Mild cognitive impairment     TIA (transient ischemic attack) 2019    PETRA (obstructive sleep apnea)     Actinic keratoses     Inflamed seborrheic keratosis     Gastroesophageal reflux disease with esophagitis 11/15/2016    Lumbar radiculopathy 10/26/2016    Sinus bradycardia on ECG 2016    Disturbance of skin sensation 2015    Seborrheic keratosis 2014    History of colon cancer 2014    History of repair of hip joint 10/21/2013    History of hip replacement, total 07/02/2013    Degenerative joint disease of pelvic region 05/06/2013    Degenerative arthritis of lumbar spine 02/13/2013    Foot drop, left 01/14/2013    CAD (coronary artery disease)     Benign prostatic hyperplasia     Cholelithiasis     Hyperlipidemia 01/18/2012    Osteopenia 01/18/2012        Past Medical History:   Diagnosis Date    Abnormal cardiovascular stress test 4/19/2016    Equivocal ST-T wave changes; Defect in the inferior wall consistent with prior infarction; LV EF 79%; TID ratio 1.1    Actinic keratoses     BPH (benign prostatic hypertrophy)     CAD (coronary artery disease)     Cancer (HCC)     COLON    Chest pressure 5/3/2016    Cholelithiasis     History of colon cancer     s/p partial colectomy in 2009    Hyperlipidemia     Inflamed seborrheic keratosis     Lumbar radiculopathy 10/26/2016    Mild cognitive impairment     Osteoarthritis     Parathyroid tumor     Parkinson disease ([address]     RA (retrograde amnesia)     Sinus bradycardia on ECG 4/19/2016    Done 4/5/16 with Dr. Aparna Rahman Syncope and collapse     Vascular dementia [facility])      Past Surgical History:   Procedure Laterality Date    BACK SURGERY  1993    CHOLECYSTECTOMY, LAPAROSCOPIC N/A 5/27/2020    LAP BRAIN/ CHOLANGIOGRAMS performed by Erin Monaco MD at [place] [address]  8/18/10,09/11/2012    DR Jd Del Real    COLONOSCOPY  09/29/14    DR. PIERRE    JOINT REPLACEMENT Left 5/28/13    total    PARATHYROIDECTOMY Bilateral 2011    2 of 4 glands removed    MI COLON CA SCRN NOT HI RSK IND N/A 9/15/2017    COLONOSCOPY performed by Nirali Davis MD at [address] [place] [address],[address] ENDOSCOPY  8/11/09    DR Jd Del Real        Restrictions  Restrictions/Precautions: Fall Risk     Safety Devices: Safety Devices  Safety Devices in place: Yes  Type of devices:  All fall risk precautions in place   Initially in place: No    Subjective  Pre Treatment Pain Screening  Pain at present: 0  Scale Used:  Faces  Intervention List: Patient able to continue with treatment    Pain Reassessment:   Pain Assessment  Patient Currently in Pain: No  Pain Assessment: Faces  Pain Level: 0       Prior Level of Function:  Social/Functional History  Lives With: Spouse  Type of Home: House  Home Layout: One level  Home Access: Stairs to enter with rails  Entrance Stairs - Number of Steps: 3  Bathroom Shower/Tub: Walk-in shower  Bathroom Equipment: Grab bars in shower  Home Equipment: 4 wheeled walker (rollator)  Receives Help From: Family  ADL Assistance: Needs assistance  Homemaking Assistance: Needs assistance  Homemaking Responsibilities: No  Ambulation Assistance: Independent (rollator)  Transfer Assistance: Independent  Active : No  Occupation: Retired  Type of occupation:   Additional Comments: per chart- pt unable to proved social functional information due to dementia; pt previously at [employer]; prior to NH- pt lived with wife however wife with decreasing ability to care for her     OBJECTIVE:     Orientation Status:  Orientation  Overall Orientation Status:  (unable to obtain, pt mainly non verbal and did not open eyes much during eval)    Observation:  Observation/Palpation  Observation: pt occasionally opens eyes, mumbles words at times, unable to follow commands    Cognition Status:  Cognition  Overall Cognitive Status: Exceptions  Arousal/Alertness: Inconsistent responses to stimuli  Following Commands: Does not follow commands  Attention Span: Unable to maintain attention  Memory: Decreased recall of recent events, Decreased short term memory, Decreased long term memory, Decreased recall of precautions, Decreased recall of biographical Information  Safety Judgement: Decreased awareness of need for assistance, Decreased awareness of need for safety  Problem Solving: Decreased awareness of errors  Insights: Decreased awareness of deficits  Initiation: Requires cues for all  Sequencing: Requires cues for all    Perception Status:  Perception  Overall Perceptual Status:  (unable to assess)    Sensation Status:  Sensation  Overall Sensation Status:  (unable to assess)    Vision and Hearing Status:  Vision  Vision: Impaired  Vision Exceptions: Wears glasses at all times  Hearing  Hearing:  (unable to assess)     ROM:   LUE AROM (degrees)  LUE General AROM: unable to assess  Left Hand AROM (degrees)  Left Hand General AROM: unable to assess  RUE AROM (degrees)  RUE General AROM: unable to assess  Right Hand AROM (degrees)  Right Hand General AROM: unable to assess    Strength:  LUE Strength  Gross LUE Strength: Exceptions to [facility] PEMBROLonely Sock  L Hand General: 3-/5  LUE Strength Comment: unable to assess gross UE strength  RUE Strength  Gross RUE Strength: Exceptions to [facility] PEMBRO  R Hand General: 3-/5  RUE Strength Comment: unable to assess gross UE strength    Coordination, Tone, Quality of Movement:    Tone RUE  RUE Tone: Normotonic  Tone LUE  LUE Tone: Normotonic  Coordination  Movements Are Fluid And Coordinated: No  Coordination and Movement description: Fine motor impairments, Decreased speed, Decreased accuracy, Tremors, Right UE, Left UE, Gross motor impairments    Hand Dominance:  Hand Dominance  Hand Dominance: Right    ADL Status:  ADL  Feeding: Dependent/Total  Grooming: Dependent/Total  UE Bathing: Dependent/Total  LE Bathing: Dependent/Total  UE Dressing: Dependent/Total  LE Dressing: Dependent/Total  Toileting: Dependent/Total  Additional Comments: Simulated all ADLs as above  Toilet Transfers  Toilet Transfer: Unable to assess  Toilet Transfers Comments: Anticipate dependent       Therapy key for assistance levels -   Independent = Pt. is able to perform task with no assistance but may require a device   Stand by assistance = Pt. does not perform task at an independent level but does not need physical assistance, requires verbal cues  Minimal, Moderate, Maximal Assistance = Pt. requires physical assistance (25%, 50%, 75% assist from helper) for task but is able to actively participate in task   Dependent = Pt. requires total assistance with task and is not able to actively participate with task completion     Functional Mobility:  Functional Mobility  Functional Mobility Comments: NT d/t safety concerns  Transfers  Sit to stand: Unable to assess  Stand to sit: Unable to assess  Transfer Comments: NT d/t safety concerns    Bed Mobility  Bed mobility  Comment: NT secondary to pt spouse requesting pt to not be moved at this time    Seated and Standing Balance:  Balance  Sitting Balance: Unable to assess(comment) (NT d/t safety concerns)  Standing Balance: Unable to assess(comment) (NT d/t safety concerns)    Functional Endurance:  Activity Tolerance  Activity Tolerance: Treatment limited secondary to decreased cognition, Patient limited by fatigue    D/C Recommendations:  OT D/C RECOMMENDATIONS  REQUIRES OT FOLLOW UP: Yes    Equipment Recommendations:  OT Equipment Recommendations  Other: continue to assess    OT Education:   OT Education  OT Education: OT Role, Plan of Care    OT Follow Up:  OT D/C RECOMMENDATIONS  REQUIRES OT FOLLOW UP: Yes       Assessment/Discharge Disposition:  Assessment: Pt is a 78 y/o male from home with spouse who presents to [facility] with the above deficits wich impact his independence and safety during ADLs. Pt would benefit from OT services at this time to improve strength and endurance during ADLs.   Performance deficits / Impairments: Decreased functional mobility , Decreased balance, Decreased cognition, Decreased ADL status, Decreased endurance, Decreased posture, Decreased fine motor control, Decreased strength  Prognosis: Fair  Discharge Recommendations: Continue to assess pending progress  Decision Making: Medium Complexity  History: Pt's medical history is complex  Exam: 8 performance deficits  Assistance / Modification: Dependent    Six Click Score   How much help for putting on and taking off regular lower body clothing?: Total  How much help for Bathing?: Total  How much help for Toileting?: Total  How much help for putting on and taking off regular upper body clothing?: Total  How much help for taking care of personal grooming?: Total  How much help for eating meals?: Total  AM-PAC Inpatient Daily Activity Raw Score: 6  AM-PAC Inpatient ADL T-Scale Score : 17.07  ADL Inpatient CMS 0-100% Score: 100    Plan:  Plan  Times per week: 1-3x  Plan weeks: length of acute stay  Current Treatment Recommendations: Strengthening, Endurance Training, Neuromuscular Re-education, Self-Care / ADL, Equipment Evaluation, Education, & procurement, Cognitive/Perceptual Training, Balance Training, Safety Education & Training, Functional Mobility Training    Goals:   Patient will:    - Improve functional endurance to tolerate/complete 30 mins of ADL's  - Be Mod A in UB ADLs   - Be Mod A in LB ADLs  - Be Mod A in ADL transfers without LOB  - Be Mod A in toileting tasks  - Improve B hand fine motor coordination to [facility] in order to manage clothing fasteners/self-care containers in a timely manner  - Improve B UE strength and endurance to [facility] in order to participate in self-care activities as projected. - Access appropriate D/C site with as few architectural barriers as possible.   - Sequence self-care tasks with no verbal cues    Patient Goal: Patient goals : not stated at this time     Discussed and agreed upon: Yes Comments:     Therapy Time:   OT Individual Minutes  Time In: 1441  Time Out: 1452  Minutes: 11    Eval: 11 minutes     Electronically signed by:    Natalie Gonzalez OT, OTR/L  8/10/2021, 3:16 PM

## 2021-08-10 NOTE — PROGRESS NOTES
Recent Labs     08/07/21  1013 08/08/21  0655   WBC 12.3* 11.8*   HGB 13.9* 12.0*   HCT 42.4 36.8*    238     Recent Labs     08/07/21  1013 08/08/21  0655    150*   K 3.8 4.0    114*   CO2 24 24   BUN 21 20   CREATININE 0.99 0.81   CALCIUM 9.3 8.7   * 48*   * 77*   BILIDIR  --  <0.2   BILITOT 0.8* 0.6   ALKPHOS 164* 121*     Recent Labs     08/07/21  1013   INR 1.1     Recent Labs     08/07/21  [facility] <0.010     Radiology:  US ABDOMEN LIMITED   Final Result      THE LIVER IS HETEROGENEOUS IN ECHOTEXTURE. THIS IS A NONSPECIFIC FINDING. XR CHEST PORTABLE   Final Result      Left lung base infiltrate. Assessment/Plan:    Summary  Patient admitted to the hospital with possible aspiration pneumonia in the background of vascular dementia. Given prognosis, comfort care and transitioning to hospice. Appreciate neurology input. Active problems:  Encephalopathy in setting of Lewy Body, Vascular dementia, Parkinson's disease, and hepatic dysfunction  Possible aspiration pneumonia  Hypoxia requiring supplemental O2    Plan:  -Not safe to eat. Too somnolent to participate safely.    -Hypoxia improving, down to 2L by NC this AM.  -Neurology: trial of Provigil to see if mentation/arousal improves. -Family considering hospice. Opposed to idea of PEG tube for medications/nutrition  -Continue other interventions with no significant changes at this time.       Ayala Hawk, DO

## 2021-08-10 NOTE — PROGRESS NOTES
INPATIENT PROGRESS NOTES    PATIENT NAME: Viktoria Servin  MRN: [id number]  SERVICE DATE:  August 10, 2021   SERVICE TIME:  12:58 PM      PRIMARY SERVICE: Pulmonary Disease    CHIEF COMPLAIN: Pneumonia      INTERVAL HPI: Patient seen and examined at bedside, Interval Notes, orders reviewed. Nursing notes noted    Patient is very sleepy today. He is on 2 L O2 via nasal cannula O2 saturation 97%. Wife at bedside. She is not sure about skilled nursing facility versus hospice. Discussed with RN he is not taking any medication. He is not having any cough or sputum production. No vomiting or diarrhea. He has low-grade fever with T-max 99.7 C    OBJECTIVE    Body mass index is 20.46 kg/m². PHYSICAL EXAM:  Vitals:  BP (!) 136/59   Pulse 57   Temp 99.7 °F (37.6 °C) (Oral)   Resp 18   Ht 6' 1\" (1.854 m)   Wt 155 lb 1.6 oz (70.4 kg)   SpO2 97%   BMI 20.46 kg/m²   General: Very sleepy today. Comfortable in bed, No distress. Head: Atraumatic , Normocephalic   Eyes: PERRL. No sclera icterus. No conjunctival injection. No discharge   ENT: No nasal  discharge. Pharynx clear. Neck:  Trachea midline. No thyromegaly, no JVD, No cervical adenopathy. Chest : Bilaterally symmetrical ,Normal effort,  No accessory muscle use  Lung : . Fair BS bilateral, decreased BS at bases. No Rales. No wheezing. No rhonchi. Heart[de-identified] Normal  rate. Regular rhythm. No mumur ,  Rub or gallop  ABD: Non-tender. Non-distended. No masses. No organmegaly. Normal bowel sounds. No hernia.   Ext : No Pitting both leg , No Cyanosis No clubbing  Neuro: no focal weakness          DATA:   Recent Labs     08/08/21  0655   WBC 11.8*   HGB 12.0*   HCT 36.8*   MCV 83.8        Recent Labs     08/08/21  0655   *   K 4.0   *   CO2 24   BUN 20   CREATININE 0.81   GLUCOSE 121*   CALCIUM 8.7   PROT 6.2*   LABALBU 3.1*   BILITOT 0.6   ALKPHOS 121*   AST 48*   ALT 77*   LABGLOM >60.0   GFRAA >60.0       MV Settings:          No results for input(s): PHART, GXI5ZQZ, PO2ART, IPX0MUT, BEART, G2JLEQHI in the last 72 hours. O2 Device: Nasal cannula  O2 Flow Rate (L/min): 2 L/min    Diet NPO     MEDICATIONS during current hospitalization:    Continuous Infusions:   sodium chloride      sodium chloride 50 mL/hr at 08/09/21 6557       Scheduled Meds:   modafinil  100 mg Oral Daily    levetiracetam  500 mg Intravenous Q12H    pantoprazole  40 mg Intravenous Daily    And    sodium chloride (PF)  10 mL Intravenous Daily    metoprolol  2.5 mg Intravenous Q6H    enoxaparin  40 mg Subcutaneous Daily    piperacillin-tazobactam  3,375 mg Intravenous Q8H    aspirin  81 mg Oral QPM    carbidopa-levodopa-entacapone  1 tablet Oral BID    clopidogrel  75 mg Oral Daily    dipyridamole  75 mg Oral TID    tamsulosin  0.4 mg Oral Nightly       PRN Meds:sodium chloride flush, sodium chloride, ondansetron **OR** ondansetron, polyethylene glycol, [Held by provider] acetaminophen **OR** [Held by provider] acetaminophen    Radiology  CT HEAD WO CONTRAST    Result Date: 8/3/2021  EXAMINATION: CT HEAD WO CONTRAST, 8/3/2021 10:16 AM CLINICAL HISTORY:  altered mental status COMPARISON: Brain CT from July 30, 2021 and November 3, 2020 TECHNIQUE:  Multiple contiguous axial images of the head were obtained from the skull base through the skull vertex without intravenous contrast. Sagittal and coronal reformats have been produced. All CT scans at this facility use dose modulation, iterative reconstruction, and/or weight based dosing when appropriate to reduce radiation dose to as low as reasonably achievable. BRAIN CT FINDINGS: Gray-white matter differentiation is maintained. No acute hemorrhage, mass, mass effect, or midline shift. There is no evidence of atrophy, ventricular morphology is within normal limits. The subcortical and periventricular white matter is within normal limits. The basal ganglia are within normal limits.  There are no acute changes or space-occupying lesions in the posterior fossa. The visualized portions of the orbits are within normal limits. The globes are intact. The imaged portions of the paranasal sinuses are unremarkable. The calvarium is intact. There is no acute intracranial process. CT Head WO Contrast    Result Date: 7/30/2021  CT HEAD WO CONTRAST : 7/30/2021 CLINICAL HISTORY:  unresponsive . COMPARISON: 11/3/2020. TECHNIQUE: Spiral unenhanced images were obtained of the head, with routine multiplanar reconstructions performed. All CT scans at this facility use dose modulation, iterative reconstruction, and/or weight based dosing when appropriate to reduce radiation dose to as low as reasonably achievable. FINDINGS: There is no intracranial hemorrhage, mass effect, midline shift, extra-axial collection, evidence of hydrocephalus, recent ischemic infarct, or skull fracture identified. 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 The mastoid air cells and visualized paranasal sinuses are essentially clear. NO ACUTE INTRACRANIAL PROCESS OR SIGNIFICANT CHANGE FROM 11/3/2020 IDENTIFIED. XR CHEST PORTABLE    Result Date: 8/7/2021  Exam: XR CHEST PORTABLE History: Hypoxia. Aspiration. Technique: AP portable view of the chest obtained. Comparison: Portable chest radiograph from August 3, 2021 Findings: The cardiomediastinal silhouette is within normal limits. No development of patchy left lung base opacities. No pneumothorax or pleural effusion. No acute osseous normality. Left lung base infiltrate. XR CHEST PORTABLE    Result Date: 8/3/2021  EXAMINATION: XR CHEST PORTABLE CLINICAL HISTORY: RALES COMPARISONS: AUGUST 30, 2021. FINDINGS: Osseous structures are intact. Cardiopericardial silhouette is normal. Pulmonary vasculature is normal. Lungs are clear. NO ACUTE CARDIOPULMONARY DISEASE.     XR CHEST PORTABLE    Result Date: 7/31/2021  EXAMINATION: XR CHEST PORTABLE CLINICAL HISTORY: NOT RESPONSIVE COMPARISONS: JANUARY 5, 2021 FINDINGS: Osseous structures are intact. Cardiopericardial silhouette is normal. Pulmonary vasculature is normal. Lungs are clear. NO ACUTE CARDIOPULMONARY DISEASE.    US ABDOMEN LIMITED    Result Date: 8/8/2021  US ABDOMEN LIMITED CLINICAL HISTORY: elevated liver enzymes COMPARISONS: NONE FINDINGS: TECHNIQUE: TRANSABDOMINAL ULTRASOUND OF THE RIGHT UPPER QUADRANT WAS PERFORMED. The liver is heterogeneous in echotexture. This a nonspecific finding. Shows no focal parenchymal abnormalities. No intrahepatic biliary dilatation. The gallbladder shows no pericholecystic fluid. The gallbladder surgically absent. Common bile duct measures 3 millimeters. THE LIVER IS HETEROGENEOUS IN ECHOTEXTURE. THIS IS A NONSPECIFIC FINDING. MRI BRAIN WO CONTRAST    Result Date: 8/3/2021  EXAMINATION: MRI BRAIN WO CONTRAST CLINICAL HISTORY:  stroke COMPARISONS: NONE AVAILABLE TECHNIQUE: Multiplanar multisequence images of the brain were obtained without contrast. Diffusion perfusion imaging was obtained. FINDINGS:  There are no extra-axial collections. There is no evidence of hemorrhage. There are no areas of perfusion diffusion signal abnormality to suggest ischemia. The susceptibility images do not demonstrate evidence of hemosiderin deposition within the brain parenchyma or the leptomeninges. There is preservation of the gray-white matter differentiation. There are a few scattered foci of T2/FLAIR increased signal in the subcortical and periventricular white matter without associated edema or mass effect. There is mild prominence of the sulci  and ventricles indicating chronic involutional changes and mild global cerebral atrophy. The midline structures are intact, the corpus callosum is within normal limits. The region of the pineal gland and the sella turcica are unremarkable.  There are no space-occupying lesions in the posterior fossa. The basilar cisterns are patent. The craniocervical junction is unremarkable. The visualized portions of the orbits are within normal limits, the globes are intact. The visualized portions of the paranasal sinuses are within normal limits. The calvarium and soft tissues are unremarkable. There are no acute intracranial changes, no evidence of ischemia or hemorrhage. There are no regions of signal abnormality. IMPRESSION AND SUGGESTION:  1. Pneumonia possible aspiration  2. Hypoxia on O2  3. Encephalopathy  4. Hypernatremia    Continue O2 to keep saturation 90% above. Continue IV antibiotic with Zosyn. Jessica Dye DVT GI prophylaxis. Chest x-ray showing left lower lobe infiltration possible aspiration. Recommend aspiration precaution. Wife to decide about skilled nursing facility versus hospice care. Continue present treatment plan      NOTE: This report was transcribed using voice recognition software. Every effort was made to ensure accuracy; however, inadvertent computerized transcription errors may be present.       Electronically signed by Jenn Dunbar MD, FCCP on 8/10/2021 at 12:58 PM

## 2021-08-10 NOTE — PROGRESS NOTES
lumbar spine 02/13/2013    Foot drop, left 01/14/2013    CAD (coronary artery disease)     Benign prostatic hyperplasia     Cholelithiasis     Hyperlipidemia 01/18/2012    Osteopenia 01/18/2012        Past Medical History:   Diagnosis Date    Abnormal cardiovascular stress test 4/19/2016    Equivocal ST-T wave changes; Defect in the inferior wall consistent with prior infarction; LV EF 79%; TID ratio 1.1    Actinic keratoses     BPH (benign prostatic hypertrophy)     CAD (coronary artery disease)     Cancer (HCC)     COLON    Chest pressure 5/3/2016    Cholelithiasis     History of colon cancer     s/p partial colectomy in 2009    Hyperlipidemia     Inflamed seborrheic keratosis     Lumbar radiculopathy 10/26/2016    Mild cognitive impairment     Osteoarthritis     Parathyroid tumor     Parkinson disease ([address]     RA (retrograde amnesia)     Sinus bradycardia on ECG 4/19/2016    Done 4/5/16 with Dr. Yogesh Shen Syncope and collapse     Vascular dementia [facility])      Past Surgical History:   Procedure Laterality Date    BACK SURGERY  1993    CHOLECYSTECTOMY, LAPAROSCOPIC N/A 5/27/2020    LAP BRAIN/ CHOLANGIOGRAMS performed by Magalys Mclain MD at [place] [address]  8/18/10,09/11/2012    DR Gabriela Loera    COLONOSCOPY  09/29/14    DR. PIERRE    JOINT REPLACEMENT Left 5/28/13    total    PARATHYROIDECTOMY Bilateral 2011    2 of 4 glands removed    MT COLON CA SCRN NOT HI RSK IND N/A 9/15/2017    COLONOSCOPY performed by Bisi Sanchez MD at [address] [place] [address],[address] ENDOSCOPY  8/11/09    DR Gabriela Loera          Restrictions  Restrictions/Precautions: Fall Risk (high burton score)    SUBJECTIVE   Subjective  Subjective: Pt talking non-sensicle. Wife present and is agreeable to tx. General Comment  Comments: Hand over hand for all treatment.     Pre-Session Pain Report  Pre Treatment Pain Screening  Pain at present: 0  Intervention List: Patient able to continue with treatment  Comments / Details: Pt unable to state or rate pain but show no S&S of pain. Pt is stiff. (trunk and BLE)          Post-Session Pain Report  Pain Assessment  Pain Level: 0         OBJECTIVE        Bed mobility  Rolling to Left: Dependent/Total  Rolling to Right: Dependent/Total  Supine to Sit: Dependent/Total;2 Person assistance  Sit to Supine: Dependent/Total;2 Person assistance  Comment: hand over hand assist for all sequencing and movements; Pt sat EOB, initially with Max assist however with time and movement improved to SBA. Neuromuscular Education  PNF: HS stretching 3x30\"  Neuromuscular Comments: A/P and lateral trunk rocking for improved SHANNA; trunk rotation and small perturbations given. Pt with increased tone during movement. Activity Tolerance  Activity Tolerance: Patient limited by cognitive status          ASSESSMENT   Assessment: Pt severely limited in functional mobility due to dementia and poor cognitive status. Family still deciding on going hospice. Pt with inability to follow instructions and fluctuating between resistive to movement and allowing passive participation. Discharge Recommendations:  Continue to assess pending progress    Goals  Long term goals  Long term goal 1: Pt will demonstrate bed mobility min A. Long term goal 2: Pt will sit edge of bed X 5 min with CGA  Long term goal 3: Pt will demosntrate transfers mod A    PLAN    Safety Devices  Type of devices: Bed alarm in place;Call light within reach; Left in bed; All fall risk precautions in place     AMPAC (6 CLICK) BASIC MOBILITY  AM-PAC Inpatient Mobility Raw Score : 6      Therapy Time   Individual   Time In 0958   Time Out 1028   Minutes 30      bm - 10 mins  NMR - 20 mins       José Narvaez PTA, 08/10/21 at 10:35 AM       Definitions for assistance levels  Independent = pt does not require any physical supervision or assistance from another person for activity completion. Device may be needed.   Stand by assistance = pt requires verbal cues or instructions from another person, close to but not touching, to perform the activity  Minimal assistance= pt performs 75% or more of the activity; assistance is required to complete the activity  Moderate assistance= pt performs 50% of the activity; assistance is required to complete the activity  Maximal assistance = pt performs 25% of the activity; assistance is required to complete the activity  Dependent = pt requires total physical assistance to accomplish the task

## 2021-08-10 NOTE — PROGRESS NOTES
[facility]   [facility]  Speech Language Pathology    Bisi Lopes  1945  [id number]    Date: 8/10/2021      Speech Therapy attempted to see Bisi Lopes on this date for a/an:    Treatment    Pt was unable to be seen due to:   Nursing deferred: Pt too lethargic to participate per RN Schuyler Closs. RN reported that doctor and nursing did not agree with BSE from previous date, and made pt NPO. SLP to re-assess pt when able.       Electronically signed by JIM Hernandez on 8/10/21 at 4:17 PM EDT

## 2021-08-10 NOTE — PROGRESS NOTES
Hospitalist Progress Note      Date of Admission: 8/7/2021  Chief Complaint:    Chief Complaint   Patient presents with    Shortness of Breath     Subjective:  No acute events overnight. Less arousable, more somnolent and quickly falls back asleep overnight, per nursing. ROS:  Unable to complete full ROS due to somnolence. Awake long enough to deny any acute pain or dyspnea. Medications:    Infusion Medications    sodium chloride      sodium chloride 50 mL/hr at 08/09/21 0839     Scheduled Medications    modafinil  100 mg Oral Daily    levetiracetam  500 mg Intravenous Q12H    pantoprazole  40 mg Intravenous Daily    And    sodium chloride (PF)  10 mL Intravenous Daily    metoprolol  2.5 mg Intravenous Q6H    enoxaparin  40 mg Subcutaneous Daily    piperacillin-tazobactam  3,375 mg Intravenous Q8H    aspirin  81 mg Oral QPM    carbidopa-levodopa-entacapone  1 tablet Oral BID    clopidogrel  75 mg Oral Daily    dipyridamole  75 mg Oral TID    tamsulosin  0.4 mg Oral Nightly     PRN Meds: sodium chloride flush, sodium chloride, ondansetron **OR** ondansetron, polyethylene glycol, [Held by provider] acetaminophen **OR** [Held by provider] acetaminophen    Intake/Output Summary (Last 24 hours) at 8/10/2021 0913  Last data filed at 8/10/2021 0857  Gross per 24 hour   Intake 10 ml   Output 800 ml   Net -790 ml     Exam:  BP (!) 136/59   Pulse 57   Temp 99.7 °F (37.6 °C) (Oral)   Resp 18   Ht 6' 1\" (1.854 m)   Wt 155 lb 1.6 oz (70.4 kg)   SpO2 97%   BMI 20.46 kg/m²   Constitutional: Somnolent. Slightly diaphoretic. No acute distress. Wife present at bedside. Head: Normocephalic, atraumatic  Eyes: Sclera clear, eyes mostly closed. Neck: Trachea midline. Phonation mumbling, difficult to understand  Cardiac: RRR with occ PVCs, borderline bradycardic at time of exam.  Lungs: O2 by NC. No coughing. No apparent dyspnea. Neurologic: RASS -2. Follows no commands.   Answers less questions intelligibly. More somnolent than previous day. Psych: Not agitated. Labs:   Recent Labs     08/07/21  1013 08/08/21  0655   WBC 12.3* 11.8*   HGB 13.9* 12.0*   HCT 42.4 36.8*    238     Recent Labs     08/07/21  1013 08/08/21  0655    150*   K 3.8 4.0    114*   CO2 24 24   BUN 21 20   CREATININE 0.99 0.81   CALCIUM 9.3 8.7   * 48*   * 77*   BILIDIR  --  <0.2   BILITOT 0.8* 0.6   ALKPHOS 164* 121*     Recent Labs     08/07/21  1013   INR 1.1     Recent Labs     08/07/21  1013   CKTOTAL 277*   TROPONINI <0.010     Radiology:  US ABDOMEN LIMITED   Final Result      THE LIVER IS HETEROGENEOUS IN ECHOTEXTURE. THIS IS A NONSPECIFIC FINDING. XR CHEST PORTABLE   Final Result      Left lung base infiltrate. Assessment/Plan:    Summary  Patient admitted to the hospital with possible aspiration pneumonia in the background of vascular dementia. Given prognosis, comfort care and transitioning to hospice. Appreciate neurology input. Active problems:  Encephalopathy in setting of Lewy Body, Vascular dementia, Parkinson's disease, and hepatic dysfunction  Possible aspiration pneumonia  Hypoxia requiring supplemental O2    Plan:  -Family to decision on hospice today  -Not safe to eat. Too somnolent to participate safely.    -Continues on 2L by NC this AM.  -Neurology: trial of Provigil to see if mentation/arousal improves. -Family considering hospice. Opposed to idea of PEG tube for medications/nutrition  -Aspiration precautions per pulmonology recommendations  -Continue other interventions with no significant changes at this time.       Layla Gonzáles, DO

## 2021-08-10 NOTE — PROGRESS NOTES
Normocephalic and atraumatic. Eyes:      Pupils: Pupils are equal, round, and reactive to light. Cardiovascular:      Rate and Rhythm: Normal rate and regular rhythm. Pulmonary:      Effort: Pulmonary effort is normal. No respiratory distress. Breath sounds: Normal breath sounds. Abdominal:      General: Bowel sounds are normal.      Palpations: Abdomen is soft. Skin:     General: Skin is warm and dry. Neurological:      Mental Status: He is alert. He is disoriented. Motor: Tremor (  Myoclonus) present. No seizure activity. Comments: Neuro exam limited due to patient's confusion and inability to follow commands. No obvious focal deficits. Medications:  Reviewed    Infusion Medications:    sodium chloride      sodium chloride 50 mL/hr at 08/09/21 0839     Scheduled Medications:    modafinil  100 mg Oral Daily    levetiracetam  500 mg Intravenous Q12H    pantoprazole  40 mg Intravenous Daily    And    sodium chloride (PF)  10 mL Intravenous Daily    metoprolol  2.5 mg Intravenous Q6H    enoxaparin  40 mg Subcutaneous Daily    piperacillin-tazobactam  3,375 mg Intravenous Q8H    aspirin  81 mg Oral QPM    carbidopa-levodopa-entacapone  1 tablet Oral BID    clopidogrel  75 mg Oral Daily    dipyridamole  75 mg Oral TID    tamsulosin  0.4 mg Oral Nightly     PRN Meds: sodium chloride flush, sodium chloride, ondansetron **OR** ondansetron, polyethylene glycol, [Held by provider] acetaminophen **OR** [Held by provider] acetaminophen    Labs:   Recent Labs     08/08/21  0655   WBC 11.8*   HGB 12.0*   HCT 36.8*        Recent Labs     08/08/21  0655   *   K 4.0   *   CO2 24   BUN 20   CREATININE 0.81   CALCIUM 8.7     Recent Labs     08/08/21  0655   AST 48*   ALT 77*   BILIDIR <0.2   BILITOT 0.6   ALKPHOS 121*     No results for input(s): INR in the last 72 hours. No results for input(s): Rodney Clap in the last 72 hours.     Urinalysis:   Lab Results Component Value Date    NITRU Negative 07/30/2021    WBCUA 3-5 05/31/2013    BACTERIA Moderate 05/31/2013    RBCUA None seen 05/31/2013    BLOODU Negative 07/30/2021    SPECGRAV 1.004 07/30/2021    GLUCOSEU Negative 07/30/2021       Radiology:   Most recent    EEG No valid procedures specified. MRI of Brain Results for orders placed during the hospital encounter of 08/01/19    MRI brain with and without contrast    Narrative  MRI BRAIN W WO CONTRAST, MRA HEAD WO CONTRAST : 8/1/2019    CLINICAL HISTORY:  STROKE . Headache, dysarthria, diaphoresis, and history of previous CVA    COMPARISON: Head MRI 4/20/2018 and head CT from earlier 8/1/2019. TECHNIQUE: Multiplanar MR imaging of the head was performed before and after intravenous administration of approximately 15 mL of ProHance gadolinium contrast.    Three-dimensional MRA of the intracranial arterial circulation was performed, with routine and volume rendered images obtained on a 3-dimensional workstation. HEAD MRI FINDINGS:    Small areas of encephalomalacia within the superior central right cerebellar hemisphere and superior left occipital lobe appear unchanged, consistent with old ischemic infarcts. There is no recent or interval infarct, intracranial hemorrhage, mass effect, midline shift, extra-axial collection, hydrocephalus, or abnormal enhancement. Mild generalized cerebral volume loss and mild supratentorial white matter changes appear unchanged. HEAD MRA FINDINGS:    There is no significant stenosis, branch occlusion, aneurysm, or developmental vascular variations of concern identified. 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 SMALL OLD INFARCTS OF THE RIGHT CEREBELLAR HEMISPHERE AND LEFT OCCIPITAL LOBE, UNCHANGED. NEGATIVE HEAD MRA.   Results for orders placed during the hospital encounter of 07/30/21    MRI BRAIN WO CONTRAST    Narrative  EXAMINATION: MRI BRAIN WO CONTRAST    CLINICAL HISTORY:  stroke    COMPARISONS: NONE AVAILABLE    TECHNIQUE:    Multiplanar multisequence images of the brain were obtained without contrast. Diffusion perfusion imaging was obtained. FINDINGS:    There are no extra-axial collections. There is no evidence of hemorrhage. There are no areas of perfusion diffusion signal abnormality to suggest ischemia. The susceptibility images do not demonstrate evidence of hemosiderin deposition within the brain  parenchyma or the leptomeninges. There is preservation of the gray-white matter differentiation. There are a few scattered foci of T2/FLAIR increased signal in the subcortical and periventricular white matter without associated edema or mass effect. There is mild prominence of the sulci  and ventricles indicating chronic involutional changes and mild global cerebral atrophy. The midline structures are intact, the corpus callosum is within normal limits. The region of the pineal gland and the sella turcica are unremarkable. There are no space-occupying lesions in the posterior fossa. The basilar cisterns are patent. The craniocervical junction is unremarkable. The visualized portions of the orbits are within normal limits, the globes are intact. The visualized portions of the paranasal sinuses are within normal limits. The calvarium and soft tissues are unremarkable. Impression  There are no acute intracranial changes, no evidence of ischemia or hemorrhage. There are no regions of signal abnormality. MRA of the Head and Neck: No results found for this or any previous visit. No results found for this or any previous visit. Results for orders placed during the hospital encounter of 07/21/20    MRA head without contrast    Narrative  EXAM:    MRI of the brain without contrast    MRA of the brain without contrast    History: Stroke. Confusion. Weakness. Dementia. Tremors. Technique: Multiplanar multisequence MRI of the brain was performed without contrast. Axial 3-D time-of-flight images of the brain were obtained without contrast. 3-D volume rendered images were performed for evaluation of the cerebral vasculature. Comparison: CT brain from July 21, 2020 and MRI brain from March 11, 2020    Findings:    MRI brain:    Bilateral supratentorial deep white matter and subcortical white matter areas of hyperintense T2/FLAIR signal are nonspecific but most compatible with chronic small vessel ischemic changes in a patient of this age. There are tiny foci of encephalomalacia  within the posterior right and left cerebellar hemispheres. Prominence of the sulci and ventricles compatible with moderate generalized parenchymal volume loss. No acute hemorrhage, mass, mass effect, midline shift, or abnormal extra-axial fluid collection. Midline structures are within normal limits. There is no diffusion restriction. No suspicious susceptibility artifact is identified on the gradient echo sequence. Cranial nerves 7/8 complexes appear grossly unremarkable. The visualized paranasal sinuses and bilateral mastoid air cells are clear. MRA brain:    The bilateral distal cervical internal carotid arteries through the skull base are patent. The bilateral middle cerebral arteries through the trifurcation and opercular branches are patent. The vertebrobasilar system including the superior cerebellar and posterior cerebral arteries are patent. The right posterior communicating artery is patent. The left posterior communicating artery is not identified. The bilateral anterior cerebral arteries and anterior communicating artery are patent. No aneurysm or high-grade stenosis of the visualized cerebral vasculature. Impression  MRI brain:    No acute ischemia or acute intracranial process.     Generalized parenchymal volume loss and nonspecific white matter findings most compatible with chronic small vessel ischemic changes in a patient of this age. MRA brain:    No aneurysm or high-grade stenosis of the visualized cerebral vasculature. CT of the Head: Results for orders placed during the hospital encounter of 07/30/21    CT HEAD WO CONTRAST    Narrative  EXAMINATION: CT HEAD WO CONTRAST, 8/3/2021 10:16 AM    CLINICAL HISTORY:  altered mental status    COMPARISON: Brain CT from July 30, 2021 and November 3, 2020    TECHNIQUE:  Multiple contiguous axial images of the head were obtained from the skull base through the skull vertex without intravenous contrast. Sagittal and coronal reformats have been produced. All CT scans at this facility use dose modulation, iterative reconstruction, and/or weight based dosing when appropriate to reduce radiation dose to as low as reasonably achievable. BRAIN CT FINDINGS:    Gray-white matter differentiation is maintained. No acute hemorrhage, mass, mass effect, or midline shift. There is no evidence of atrophy, ventricular morphology is within normal limits. The subcortical and periventricular white matter is within normal limits. The basal ganglia are within normal limits. There are no acute changes or space-occupying lesions in the posterior fossa. The visualized portions of the orbits are within normal limits. The globes are intact. The imaged portions of the paranasal sinuses are unremarkable. The calvarium is intact. Impression  There is no acute intracranial process. No results found for this or any previous visit. No results found for this or any previous visit. Carotid duplex: No results found for this or any previous visit. No results found for this or any previous visit.   Results for orders placed during the hospital encounter of 08/01/19    US CAROTID ARTERY BILATERAL    Narrative  US CAROTID ARTERY BILATERAL: 8/1/2019    CLINICAL HISTORY: STROKE . Headache, dysarthria, diaphoresis, and previous CVA. COMPARISON: 6/19/2018. Grayscale, color and waveform Doppler analysis of the cervical carotid and vertebral arteries was performed. Validated velocity measurements with angiographic measurements, velocity criteria are extrapolated from diameter data as defined by the Society of Radiologist in [address] [place] [address] Drive Radiology 2003; [phone]. FINDINGS:    There is mild atherosclerotic plaquing of the carotid bulbs without significantly elevated velocities, spectral waveform broadening, or incidental findings of concern identified. Maximum systolic velocity within the right internal and mid common carotid arteries are 65 and 113 cm/s, with an ICA/CCA ratio of approximately 0.6 , which indicates less than 50% by velocity criteria. Maximum systolic velocity within the left internal and mid common carotid arteries are 69 and 124 cm/s, with an ICA/CCA ratio of approximately 0.5, which indicates less than 50% by velocity criteria. Maximum velocities within the right and left external carotid arteries are 91 and 97 cm/sec respectively. There is antegrade flow in both vertebral arteries. Impression  MILD ATHEROSCLEROTIC PLAQUING OF THE CAROTID BULBS WITHOUT EVIDENCE OF A FLOW-LIMITING STENOSIS. Echo No results found for this or any previous visit. Assessment/Plan:    Encephalopathy superimposed upon a patient with Lewy body dementia with vascular dementia which is extensive. Patient was recently admitted to hospital and had a complete evaluation with no notable new stroke and he continued to improve. He has these episodes where he becomes quite lethargic over time and this may be significant down-regulation of dopamine in a patient with vascular dementia. He has not been able to tolerate higher doses of dopamine agonist due to the vascular dementia.   Superadded is now a hepatic encephalopathy likely related to underlying omega-3 as he is not on any other medication that would do that. The only other new medication was Namenda which I will discontinue for now though we have not seen liver toxicities with this. He has considerable asterixis with myoclonus and this may respond to a low-dose of Keppra for just a few days. Patient's overall condition appears to be guarded given that he has fluctuating course going on for a few years and we continue to discuss hospice care with his wife as well. We though we will give him 1 or 2 days to wake up as he generally does. GI is on consult regarding his abnormal liver function tests  Patient has been on a combination of Plavix and Persantine for years without any recurrent cute strokes. He has considerable accumulation of all small vessel ischemic changes. These are nonreversible. Patient continues to have significant dysphagia. Currently n.p.o. Hospice appropriate. He has been started on Keppra for myoclonus. This will have to be changed to IV. Provigil started due to excessive somnolence although given current status with significant dysphagia he would not be able to take this. Will discuss with Dr. Feliz Velasquez if he wants patient to be on Neupro patch rather than Stalevo given his current dysphagia. Family to decide on hospice tomorrow. Did have long discussion with patient's wife regarding disease process, symptoms and prognosis. he continues to fluctuate. We have started him on Provigil to see if it helps. We will need to decide hospice if he remains somnolent    Condition remains the same. Still lethargic.  NPO. Not able to take Provigil or carbidopa levodopa. Family met with hospice today but is yet to decide pending further conversation with Dr. Feliz Velasquez. I have personally performed a face to face diagnostic evaluation on this patient, reviewed all data and investigations, and am the sole provider of all clinical decisions on the neurological status of this patient. will reeval tomorrow to discuss      Mika Xiong MD, [address], American Board of Psychiatry & Neurology  Board Certified in Vascular Neurology  Board Certified in Neuromuscular Medicine  Certified in Neurorehabilitation           Collaborating physicians: Dr Rox Xiong    Electronically signed by ELLIOT Berg CNP on 8/10/2021 at 3:23 PM

## 2021-08-10 NOTE — PROCEDURES
[address]                      [facility], [address] [facility]                          ELECTROENCEPHALOGRAM REPORT    PATIENT NAME: Misti Kuo              :        1945  MED REC NO:   [id number]                            ROOM:       [id number]  ACCOUNT NO:   [de-identified]                           ADMIT DATE: 2021  PROVIDER:     Dominique Diop MD    DATE OF EE2021    MEDICATIONS:  Mucinex, Persantine, Proscar, Protonix, Crestor, Flomax,  aspirin, Plavix. EEG FINDINGS:  This is a spontaneous 21-channel recording for this  patient with encephalopathy. The background rhythm of this EEG shows  4-6 theta slowing seen throughout the EEG recording. Hyperventilation  could not be completed. Photic stimulation does not reveal photic  responses. No definite sleep patterns are seen. There is no  tremulousness. IMPRESSION:  This is a moderately abnormal EEG recording by the virtue  of diffuse slowing consistent with a diffuse cerebral dysfunction seen  in toxic metabolic or degenerative etiology. Clinical correlation is  recommended.         Abril Spencer MD    D: 08/10/2021 12:20:50       T: 08/10/2021 12:25:38     [email or web address]  Job#: [id number]     Doc#: [id number]    CC:

## 2021-08-10 NOTE — CARE COORDINATION
LSW placed call to [address] Kilo Suresh will come to the hospital to visit pt's spouse while rounding.

## 2021-08-11 NOTE — FLOWSHEET NOTE
3884  Message sent to Dr Kenisha Goddard re: pt temp 102. 3.        0855  AM assessment complete. Pt resting in bed with eyes closed. Does not respond to verbal stimuli. Lungs clear, upper airway congestion noted, moist cough, attempted to suction. Pt does bite down on suction catheter when placed to attempt suctioning. Resp unlabored at rest, resp do increase with stimulation and with repositioning. Continuous pulse ox on, oxygen sat 90-93%. No signs of pain,  Medicated with 650mg rectal tylenol for temp of 102. 3. Pt repositioned in bed for comfort, new external catheter placed and cool washcloth placed on forehead. Pt wife at bedside and states she is ready to speak with hospice. Care coordinator and  notified. Will continue to monitor. 1245  Pt repositioned in bed for comfort. Continues to rest quietly with eyes closed, withdrawal from painful stimuli. Bed alarm on for safety. Oxygen sat remains 93-95%. Will continue to monitor. 1500 Pt resting quietly in bed. Wife at bedside. She is aware of hospice meeting at   Hospice here to meet with patient wife. [facility] sent for patient transfer to hospice center tonight. 1820  Pt medicated with 650mg rectal tylenol for temp 101.8. Pt cleaned up and repositioned for comfort. Tele d/c'd as pt is being discharged to hospice at 2000.

## 2021-08-11 NOTE — PROGRESS NOTES
Re meet with wife Tresa Lee, reviewed Hospice philosophy and levels of care. Tresa Lee wishes to move forward with Hospice services at [facility].. Consents signed. Updated Denia PATTERSON and [place] requested. Last completed in January.

## 2021-08-11 NOTE — FLOWSHEET NOTE
2100-HS assessment completed. Pt not responsive to voice. Appears lethargic. No obvious signs of distress noted. Falls precautions in place. Team to continue to monitor closely. Electronically signed by Nathalia Tran RN on 8/11/2021 at 12:55 AM     0200-Pt SpO2 88% on 2L. Now required 4L NC to stay above 90%. Electronically signed by Nathalia Tran RN on 8/11/2021 at 2:28 AM     0340-Pt now requiring 5L NC to stay above 90% spO2.  Electronically signed by Nathalia Tran RN on 8/11/2021 at 3:43 AM

## 2021-08-11 NOTE — PROCEDURES
[address]                      [facility], [address] [facility]                          ELECTROENCEPHALOGRAM REPORT    PATIENT NAME: Susannah Epps              :        1945  MED REC NO:   [id number]                            ROOM:       P343  ACCOUNT NO:   [de-identified]                           ADMIT DATE: 2021  PROVIDER:     Yury Bojorquez MD    DATE OF EE2021    MEDICATIONS:  Aspirin, Toprol, Stalevo, Plavix, Lovenox. EEG FINDINGS:  This is a spontaneous 21-channel recording for this  patient with encephalopathy. The background rhythm of this EEG shows surface muscle artifacts. In  between this, there appears to be slowing which is generalized slowing  with 6-7 Hz posterior dominant rhythms. There are no other asymmetries. No sharp wave activity. EKG is monitored, this is regular. Record  remains slow throughout the EEG recording. There is no suggestion of  subclinical seizures. IMPRESSION:  This is a mildly abnormal EEG recording by the virtue of  diffuse slowing consistent with diffuse cerebral dysfunction seen in  toxic metabolic etiology. Clinical correlation is recommended.         Tesha Og MD    D: 2021 11:01:00       T: 2021 11:05:26     [email or web address]  Job#: [id number]     Doc#: [id number]    CC:

## 2021-08-11 NOTE — PROGRESS NOTES
[facility] Neurology Daily Progress Note  Name: Ramila Marcano  Age: 77 y.o. Gender: male  CodeStatus: DNR-CC  Allergies: Amoxicillin    Chief Complaint:Shortness of Breath    Primary Care Provider: Mook Lees MD  InpatientTreatment Team: Treatment Team: Attending Provider: Miguel Alfaro DO; Consulting Physician: Belen Pedro DO; Consulting Physician: Gilles Carballo MD; Utilization Reviewer: Suzi Baca RN; Utilization Reviewer: Preston Guzman RN; Registered Nurse: Mynor Fournier RN; : Erna Hsieh RN  Admission Date: 8/7/2021         Pt seen and examined for neuro follow up for Lewy body dementia, Parkinson's disease, vascular dementia. Patient currently lethargic. Unable to follow commands. Fever noted today. Currently being treated for pneumonia that is likely aspiration. He has dysphagia that is significant and is currently n.p.o. Wife at bedside. Elevated LFTs with normal ammonia level. CK mildly elevated. Very much is now lethargic and febrile and not doing very well. I met with wife phone and discussed findings and recommended hospice          Vitals:    08/11/21 0342   BP:    Pulse:    Resp:    Temp:    SpO2: 90%      Review of Systems   Unable to perform ROS: Dementia   Constitutional: Negative for fever. HENT: Negative for trouble swallowing. Respiratory: Positive for shortness of breath. Negative for cough and wheezing. Gastrointestinal: Negative for nausea and vomiting. Skin: Negative for color change and rash. Neurological: Positive for tremors (  myoclonus), speech difficulty and weakness (  Generalized). Negative for dizziness, seizures and syncope. Psychiatric/Behavioral: Positive for confusion and hallucinations. Negative for agitation. The patient is not nervous/anxious. Physical Exam  Vitals and nursing note reviewed. Constitutional:       General: He is not in acute distress. Appearance: He is ill-appearing. He is not diaphoretic. HENT:      Head: Normocephalic and atraumatic. Eyes:      Pupils: Pupils are equal, round, and reactive to light. Cardiovascular:      Rate and Rhythm: Normal rate and regular rhythm. Pulmonary:      Effort: Pulmonary effort is normal. No respiratory distress. Breath sounds: Normal breath sounds. Abdominal:      General: Bowel sounds are normal.      Palpations: Abdomen is soft. Skin:     General: Skin is warm and dry. Neurological:      Mental Status: He is alert. He is disoriented. Motor: Tremor (  Myoclonus) present. No seizure activity. Comments: Neuro exam limited due to patient's confusion and inability to follow commands. No obvious focal deficits. Vents noted and patient is becoming more lethargic. Medications:  Reviewed    Infusion Medications:    sodium chloride      sodium chloride 50 mL/hr at 08/11/21 6056     Scheduled Medications:    modafinil  100 mg Oral Daily    levetiracetam  500 mg Intravenous Q12H    pantoprazole  40 mg Intravenous Daily    And    sodium chloride (PF)  10 mL Intravenous Daily    metoprolol  2.5 mg Intravenous Q6H    enoxaparin  40 mg Subcutaneous Daily    piperacillin-tazobactam  3,375 mg Intravenous Q8H    aspirin  81 mg Oral QPM    carbidopa-levodopa-entacapone  1 tablet Oral BID    clopidogrel  75 mg Oral Daily    dipyridamole  75 mg Oral TID    tamsulosin  0.4 mg Oral Nightly     PRN Meds: sodium chloride flush, sodium chloride, ondansetron **OR** ondansetron, polyethylene glycol, acetaminophen **OR** acetaminophen    Labs:   No results for input(s): WBC, HGB, HCT, PLT in the last 72 hours. No results for input(s): NA, K, CL, CO2, BUN, CREATININE, CALCIUM, PHOS in the last 72 hours. Invalid input(s): MAGNES  No results for input(s): AST, ALT, BILIDIR, BILITOT, ALKPHOS in the last 72 hours. No results for input(s): INR in the last 72 hours. No results for input(s): Jadene Moh in the last 72 hours.     Urinalysis:   Lab Results   Component Value Date    NITRU Negative 07/30/2021    WBCUA 3-5 05/31/2013    BACTERIA Moderate 05/31/2013    RBCUA None seen 05/31/2013    BLOODU Negative 07/30/2021    SPECGRAV 1.004 07/30/2021    GLUCOSEU Negative 07/30/2021       Radiology:   Most recent    EEG No valid procedures specified. MRI of Brain Results for orders placed during the hospital encounter of 08/01/19    MRI brain with and without contrast    Narrative  MRI BRAIN W WO CONTRAST, MRA HEAD WO CONTRAST : 8/1/2019    CLINICAL HISTORY:  STROKE . Headache, dysarthria, diaphoresis, and history of previous CVA    COMPARISON: Head MRI 4/20/2018 and head CT from earlier 8/1/2019. TECHNIQUE: Multiplanar MR imaging of the head was performed before and after intravenous administration of approximately 15 mL of ProHance gadolinium contrast.    Three-dimensional MRA of the intracranial arterial circulation was performed, with routine and volume rendered images obtained on a 3-dimensional workstation. HEAD MRI FINDINGS:    Small areas of encephalomalacia within the superior central right cerebellar hemisphere and superior left occipital lobe appear unchanged, consistent with old ischemic infarcts. There is no recent or interval infarct, intracranial hemorrhage, mass effect, midline shift, extra-axial collection, hydrocephalus, or abnormal enhancement. Mild generalized cerebral volume loss and mild supratentorial white matter changes appear unchanged. HEAD MRA FINDINGS:    There is no significant stenosis, branch occlusion, aneurysm, or developmental vascular variations of concern identified. 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 SMALL OLD INFARCTS OF THE RIGHT CEREBELLAR HEMISPHERE AND LEFT OCCIPITAL LOBE, UNCHANGED. NEGATIVE HEAD MRA.   Results for orders placed during the hospital encounter of 07/30/21    MRI BRAIN WO CONTRAST    Narrative  EXAMINATION: MRI BRAIN WO CONTRAST    CLINICAL HISTORY:  stroke    COMPARISONS: NONE AVAILABLE    TECHNIQUE:    Multiplanar multisequence images of the brain were obtained without contrast. Diffusion perfusion imaging was obtained. FINDINGS:    There are no extra-axial collections. There is no evidence of hemorrhage. There are no areas of perfusion diffusion signal abnormality to suggest ischemia. The susceptibility images do not demonstrate evidence of hemosiderin deposition within the brain  parenchyma or the leptomeninges. There is preservation of the gray-white matter differentiation. There are a few scattered foci of T2/FLAIR increased signal in the subcortical and periventricular white matter without associated edema or mass effect. There is mild prominence of the sulci  and ventricles indicating chronic involutional changes and mild global cerebral atrophy. The midline structures are intact, the corpus callosum is within normal limits. The region of the pineal gland and the sella turcica are unremarkable. There are no space-occupying lesions in the posterior fossa. The basilar cisterns are patent. The craniocervical junction is unremarkable. The visualized portions of the orbits are within normal limits, the globes are intact. The visualized portions of the paranasal sinuses are within normal limits. The calvarium and soft tissues are unremarkable. Impression  There are no acute intracranial changes, no evidence of ischemia or hemorrhage. There are no regions of signal abnormality. MRA of the Head and Neck: No results found for this or any previous visit. No results found for this or any previous visit.   Results for orders placed during the hospital encounter of 07/21/20    MRA head without contrast    Narrative  EXAM:    MRI of the brain without contrast    MRA of the brain without contrast    History: Stroke. Confusion. Weakness. Dementia. Tremors. Technique: Multiplanar multisequence MRI of the brain was performed without contrast. Axial 3-D time-of-flight images of the brain were obtained without contrast. 3-D volume rendered images were performed for evaluation of the cerebral vasculature. Comparison: CT brain from July 21, 2020 and MRI brain from March 11, 2020    Findings:    MRI brain:    Bilateral supratentorial deep white matter and subcortical white matter areas of hyperintense T2/FLAIR signal are nonspecific but most compatible with chronic small vessel ischemic changes in a patient of this age. There are tiny foci of encephalomalacia  within the posterior right and left cerebellar hemispheres. Prominence of the sulci and ventricles compatible with moderate generalized parenchymal volume loss. No acute hemorrhage, mass, mass effect, midline shift, or abnormal extra-axial fluid collection. Midline structures are within normal limits. There is no diffusion restriction. No suspicious susceptibility artifact is identified on the gradient echo sequence. Cranial nerves 7/8 complexes appear grossly unremarkable. The visualized paranasal sinuses and bilateral mastoid air cells are clear. MRA brain:    The bilateral distal cervical internal carotid arteries through the skull base are patent. The bilateral middle cerebral arteries through the trifurcation and opercular branches are patent. The vertebrobasilar system including the superior cerebellar and posterior cerebral arteries are patent. The right posterior communicating artery is patent. The left posterior communicating artery is not identified. The bilateral anterior cerebral arteries and anterior communicating artery are patent. No aneurysm or high-grade stenosis of the visualized cerebral vasculature. Impression  MRI brain:    No acute ischemia or acute intracranial process.     Generalized parenchymal volume loss and nonspecific white matter findings most compatible with chronic small vessel ischemic changes in a patient of this age. MRA brain:    No aneurysm or high-grade stenosis of the visualized cerebral vasculature. CT of the Head: Results for orders placed during the hospital encounter of 07/30/21    CT HEAD WO CONTRAST    Narrative  EXAMINATION: CT HEAD WO CONTRAST, 8/3/2021 10:16 AM    CLINICAL HISTORY:  altered mental status    COMPARISON: Brain CT from July 30, 2021 and November 3, 2020    TECHNIQUE:  Multiple contiguous axial images of the head were obtained from the skull base through the skull vertex without intravenous contrast. Sagittal and coronal reformats have been produced. All CT scans at this facility use dose modulation, iterative reconstruction, and/or weight based dosing when appropriate to reduce radiation dose to as low as reasonably achievable. BRAIN CT FINDINGS:    Gray-white matter differentiation is maintained. No acute hemorrhage, mass, mass effect, or midline shift. There is no evidence of atrophy, ventricular morphology is within normal limits. The subcortical and periventricular white matter is within normal limits. The basal ganglia are within normal limits. There are no acute changes or space-occupying lesions in the posterior fossa. The visualized portions of the orbits are within normal limits. The globes are intact. The imaged portions of the paranasal sinuses are unremarkable. The calvarium is intact. Impression  There is no acute intracranial process. No results found for this or any previous visit. No results found for this or any previous visit. Carotid duplex: No results found for this or any previous visit. No results found for this or any previous visit.   Results for orders placed during the hospital encounter of 08/01/19    US CAROTID ARTERY BILATERAL    Narrative  US CAROTID ARTERY BILATERAL: 8/1/2019    CLINICAL HISTORY:  STROKE . Headache, dysarthria, diaphoresis, and previous CVA. COMPARISON: 6/19/2018. Grayscale, color and waveform Doppler analysis of the cervical carotid and vertebral arteries was performed. Validated velocity measurements with angiographic measurements, velocity criteria are extrapolated from diameter data as defined by the Society of Radiologist in [address] [place] [address] Drive Radiology 2003; [id number]. FINDINGS:    There is mild atherosclerotic plaquing of the carotid bulbs without significantly elevated velocities, spectral waveform broadening, or incidental findings of concern identified. Maximum systolic velocity within the right internal and mid common carotid arteries are 65 and 113 cm/s, with an ICA/CCA ratio of approximately 0.6 , which indicates less than 50% by velocity criteria. Maximum systolic velocity within the left internal and mid common carotid arteries are 69 and 124 cm/s, with an ICA/CCA ratio of approximately 0.5, which indicates less than 50% by velocity criteria. Maximum velocities within the right and left external carotid arteries are 91 and 97 cm/sec respectively. There is antegrade flow in both vertebral arteries. Impression  MILD ATHEROSCLEROTIC PLAQUING OF THE CAROTID BULBS WITHOUT EVIDENCE OF A FLOW-LIMITING STENOSIS. Echo No results found for this or any previous visit. Assessment/Plan:    Encephalopathy superimposed upon a patient with Lewy body dementia with vascular dementia which is extensive. Patient was recently admitted to hospital and had a complete evaluation with no notable new stroke and he continued to improve. He has these episodes where he becomes quite lethargic over time and this may be significant down-regulation of dopamine in a patient with vascular dementia. He has not been able to tolerate higher doses of dopamine agonist due to the vascular dementia.   Superadded is now a hepatic encephalopathy likely related to underlying omega-3 as he is not on any other medication that would do that. The only other new medication was Namenda which I will discontinue for now though we have not seen liver toxicities with this. He has considerable asterixis with myoclonus and this may respond to a low-dose of Keppra for just a few days. Patient's overall condition appears to be guarded given that he has fluctuating course going on for a few years and we continue to discuss hospice care with his wife as well. We though we will give him 1 or 2 days to wake up as he generally does. GI is on consult regarding his abnormal liver function tests  Patient has been on a combination of Plavix and Persantine for years without any recurrent cute strokes. He has considerable accumulation of all small vessel ischemic changes. These are nonreversible. Patient continues to have significant dysphagia. Currently n.p.o. Hospice appropriate. He has been started on Keppra for myoclonus. This will have to be changed to IV. Provigil started due to excessive somnolence although given current status with significant dysphagia he would not be able to take this. Will discuss with Dr. Jessie Wong if he wants patient to be on Neupro patch rather than Stalevo given his current dysphagia. Family to decide on hospice tomorrow. Did have long discussion with patient's wife regarding disease process, symptoms and prognosis. he continues to fluctuate. We have started him on Provigil to see if it helps. We will need to decide hospice if he remains somnolent    Condition remains the same. Still lethargic.  NPO. Not able to take Provigil or carbidopa levodopa. Family met with hospice today but is yet to decide pending further conversation with Dr. Jessie Wong. I have personally performed a face to face diagnostic evaluation on this patient, reviewed all data and investigations, and am the sole provider of all clinical decisions on the neurological status of this patient. will reeval tomorrow to discuss    Patient without clinical improvement today. Now with fever. Remains n.p.o. Wife to meet with hospice today. I have personally performed a face to face diagnostic evaluation on this patient, reviewed all data and investigations, and am the sole provider of all clinical decisions on the neurological status of this patient. Events noted and discussed with wife on the phone and recommended that he be transferred to hospice as he is becoming more lethargic and febrile and not able to eat now downhill course is seen      Mika Davalos MD, Jose Yañez, American Board of Psychiatry & Neurology  Board Certified in Vascular Neurology  Board Certified in Neuromuscular Medicine  Certified in Neurorehabilitation         Collaborating physicians: Dr Kourtney Davalos    Electronically signed by ELLIOT Bello CNP on 8/11/2021 at 4:18 PM

## 2021-08-11 NOTE — PROGRESS NOTES
for input(s): WBC, HGB, HCT, PLT in the last 72 hours. No results for input(s): NA, K, CL, CO2, BUN, CREATININE, CALCIUM, PHOS, AST, ALT, BILIDIR, BILITOT, ALKPHOS in the last 72 hours. Invalid input(s): MAGNES  No results for input(s): INR in the last 72 hours. No results for input(s): Wagner Seed in the last 72 hours. Radiology:  US ABDOMEN LIMITED   Final Result      THE LIVER IS HETEROGENEOUS IN ECHOTEXTURE. THIS IS A NONSPECIFIC FINDING. XR CHEST PORTABLE   Final Result      Left lung base infiltrate. Assessment/Plan:    Summary  Patient admitted to the hospital with possible aspiration pneumonia in the background of vascular dementia. Given prognosis, comfort care and transitioning to hospice. Appreciate neurology input. Active problems:  Encephalopathy in setting of Lewy Body, Vascular dementia, Parkinson's disease, and hepatic dysfunction  Possible aspiration pneumonia  Hypoxia requiring supplemental O2    Plan:  -Family decision to proceed with hospice. Hospice service meeting approximately 4 PM today.  -New fever to 102 °F overnight, for which MI Tylenol was given. -Continues on Zosyn therapy at present.  -Encephalopathy continues to worsen.  -Not safe to eat. Too somnolent to participate safely.    -Continue other interventions with no significant changes at this time.       Chris Gonzalez,

## 2021-08-11 NOTE — PROGRESS NOTES
INPATIENT PROGRESS NOTES    PATIENT NAME: Kayode Harrington  MRN: [id number]  SERVICE DATE:  August 11, 2021   SERVICE TIME:  9:15 AM      PRIMARY SERVICE: Pulmonary Disease    CHIEF COMPLAIN: Pneumonia      INTERVAL HPI: Patient seen and examined at bedside, Interval Notes, orders reviewed. Nursing notes noted    Patient is very sleepy today. He is having fever today with T-max up to 102. Discussed with the patient wife and she said she will go for for hospice. Oxygen increase up to 5 L and O2 sat is 92%. Discussed with RN he is not taking any medication. He is not having any cough or sputum production. No vomiting or diarrhea. OBJECTIVE    Body mass index is 20.71 kg/m². PHYSICAL EXAM:  Vitals:  BP (!) 155/109   Pulse 78   Temp 99.7 °F (37.6 °C) (Axillary)   Resp 18   Ht 6' 1\" (1.854 m)   Wt 157 lb (71.2 kg)   SpO2 90%   BMI 20.71 kg/m²   General: Very sleepy today. Comfortable in bed, No distress. Head: Atraumatic , Normocephalic   Eyes: PERRL. No sclera icterus. No conjunctival injection. No discharge   ENT: No nasal  discharge. Pharynx clear. Neck:  Trachea midline. No thyromegaly, no JVD, No cervical adenopathy. Chest : Bilaterally symmetrical ,Normal effort,  No accessory muscle use  Lung : . Fair BS bilateral, decreased BS at bases. No Rales. Few scattered wheezing. Heart[de-identified] Normal  rate. Regular rhythm. No mumur ,  Rub or gallop  ABD: Non-tender. Non-distended. No masses. No organmegaly. Normal bowel sounds. No hernia. Ext : No Pitting both leg , No Cyanosis No clubbing  Neuro: Sleepy unable to examine    DATA:   No results for input(s): WBC, HGB, HCT, MCV, PLT in the last 72 hours. No results for input(s): NA, K, CL, CO2, BUN, CREATININE, GLUCOSE, CALCIUM, PROT, LABALBU, BILITOT, ALKPHOS, AST, ALT, LABGLOM, GFRAA, AGRATIO, GLOB in the last 72 hours. MV Settings:          No results for input(s): PHART, RAS7ZEU, PO2ART, ELF5DDZ, BEART, S1SKVHMX in the last 72 hours.     O2 Device: Nasal cannula  O2 Flow Rate (L/min): 5 L/min    Diet NPO     MEDICATIONS during current hospitalization:    Continuous Infusions:   sodium chloride      sodium chloride 50 mL/hr at 08/11/21 1045       Scheduled Meds:   modafinil  100 mg Oral Daily    levetiracetam  500 mg Intravenous Q12H    pantoprazole  40 mg Intravenous Daily    And    sodium chloride (PF)  10 mL Intravenous Daily    metoprolol  2.5 mg Intravenous Q6H    enoxaparin  40 mg Subcutaneous Daily    piperacillin-tazobactam  3,375 mg Intravenous Q8H    aspirin  81 mg Oral QPM    carbidopa-levodopa-entacapone  1 tablet Oral BID    clopidogrel  75 mg Oral Daily    dipyridamole  75 mg Oral TID    tamsulosin  0.4 mg Oral Nightly       PRN Meds:sodium chloride flush, sodium chloride, ondansetron **OR** ondansetron, polyethylene glycol, acetaminophen **OR** acetaminophen    Radiology  CT HEAD WO CONTRAST    Result Date: 8/3/2021  EXAMINATION: CT HEAD WO CONTRAST, 8/3/2021 10:16 AM CLINICAL HISTORY:  altered mental status COMPARISON: Brain CT from July 30, 2021 and November 3, 2020 TECHNIQUE:  Multiple contiguous axial images of the head were obtained from the skull base through the skull vertex without intravenous contrast. Sagittal and coronal reformats have been produced. All CT scans at this facility use dose modulation, iterative reconstruction, and/or weight based dosing when appropriate to reduce radiation dose to as low as reasonably achievable. BRAIN CT FINDINGS: Gray-white matter differentiation is maintained. No acute hemorrhage, mass, mass effect, or midline shift. There is no evidence of atrophy, ventricular morphology is within normal limits. The subcortical and periventricular white matter is within normal limits. The basal ganglia are within normal limits. There are no acute changes or space-occupying lesions in the posterior fossa. The visualized portions of the orbits are within normal limits. The globes are intact.  The imaged portions of the paranasal sinuses are unremarkable. The calvarium is intact. There is no acute intracranial process. CT Head WO Contrast    Result Date: 7/30/2021  CT HEAD WO CONTRAST : 7/30/2021 CLINICAL HISTORY:  unresponsive . COMPARISON: 11/3/2020. TECHNIQUE: Spiral unenhanced images were obtained of the head, with routine multiplanar reconstructions performed. All CT scans at this facility use dose modulation, iterative reconstruction, and/or weight based dosing when appropriate to reduce radiation dose to as low as reasonably achievable. FINDINGS: There is no intracranial hemorrhage, mass effect, midline shift, extra-axial collection, evidence of hydrocephalus, recent ischemic infarct, or skull fracture identified. 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 The mastoid air cells and visualized paranasal sinuses are essentially clear. NO ACUTE INTRACRANIAL PROCESS OR SIGNIFICANT CHANGE FROM 11/3/2020 IDENTIFIED. XR CHEST PORTABLE    Result Date: 8/7/2021  Exam: XR CHEST PORTABLE History: Hypoxia. Aspiration. Technique: AP portable view of the chest obtained. Comparison: Portable chest radiograph from August 3, 2021 Findings: The cardiomediastinal silhouette is within normal limits. No development of patchy left lung base opacities. No pneumothorax or pleural effusion. No acute osseous normality. Left lung base infiltrate. XR CHEST PORTABLE    Result Date: 8/3/2021  EXAMINATION: XR CHEST PORTABLE CLINICAL HISTORY: RALES COMPARISONS: AUGUST 30, 2021. FINDINGS: Osseous structures are intact. Cardiopericardial silhouette is normal. Pulmonary vasculature is normal. Lungs are clear. NO ACUTE CARDIOPULMONARY DISEASE.     XR CHEST PORTABLE    Result Date: 7/31/2021  EXAMINATION: XR CHEST PORTABLE CLINICAL HISTORY: NOT RESPONSIVE COMPARISONS: JANUARY 5, 2021 FINDINGS: Osseous structures are intact. Cardiopericardial silhouette is normal. Pulmonary vasculature is normal. Lungs are clear. NO ACUTE CARDIOPULMONARY DISEASE.    US ABDOMEN LIMITED    Result Date: 8/8/2021  US ABDOMEN LIMITED CLINICAL HISTORY: elevated liver enzymes COMPARISONS: NONE FINDINGS: TECHNIQUE: TRANSABDOMINAL ULTRASOUND OF THE RIGHT UPPER QUADRANT WAS PERFORMED. The liver is heterogeneous in echotexture. This a nonspecific finding. Shows no focal parenchymal abnormalities. No intrahepatic biliary dilatation. The gallbladder shows no pericholecystic fluid. The gallbladder surgically absent. Common bile duct measures 3 millimeters. THE LIVER IS HETEROGENEOUS IN ECHOTEXTURE. THIS IS A NONSPECIFIC FINDING. MRI BRAIN WO CONTRAST    Result Date: 8/3/2021  EXAMINATION: MRI BRAIN WO CONTRAST CLINICAL HISTORY:  stroke COMPARISONS: NONE AVAILABLE TECHNIQUE: Multiplanar multisequence images of the brain were obtained without contrast. Diffusion perfusion imaging was obtained. FINDINGS:  There are no extra-axial collections. There is no evidence of hemorrhage. There are no areas of perfusion diffusion signal abnormality to suggest ischemia. The susceptibility images do not demonstrate evidence of hemosiderin deposition within the brain parenchyma or the leptomeninges. There is preservation of the gray-white matter differentiation. There are a few scattered foci of T2/FLAIR increased signal in the subcortical and periventricular white matter without associated edema or mass effect. There is mild prominence of the sulci  and ventricles indicating chronic involutional changes and mild global cerebral atrophy. The midline structures are intact, the corpus callosum is within normal limits. The region of the pineal gland and the sella turcica are unremarkable. There are no space-occupying lesions in the posterior fossa. The basilar cisterns are patent. The craniocervical junction is unremarkable.  The visualized portions of the orbits are within normal limits, the globes are intact. The visualized portions of the paranasal sinuses are within normal limits. The calvarium and soft tissues are unremarkable. There are no acute intracranial changes, no evidence of ischemia or hemorrhage. There are no regions of signal abnormality. IMPRESSION AND SUGGESTION:  1. Pneumonia, possible aspiration  2. Hypoxia on O2  3. Encephalopathy  4. Hypernatremia    Continue O2 to keep saturation 90% above. Now on 5 L O2. Continue IV antibiotic with Zosyn. Jordan Loving DVT GI prophylaxis. Chest x-ray showing left lower lobe infiltration possible aspiration. Wife states she will go for hospice. Continue present treatment plan until decision made after seen by hospice      NOTE: This report was transcribed using voice recognition software. Every effort was made to ensure accuracy; however, inadvertent computerized transcription errors may be present.       Electronically signed by Bobby Lagunas MD, FCCP on 8/11/2021 at 9:15 AM

## 2021-08-11 NOTE — CARE COORDINATION
LSW met with pt's spouse to discuss contacting hospice to initiate services. Pt's wife would like LSW to contact hospice. LSW contacted [facility] hospice who will reach out to pt's spouse today. LSW confirmed with pt's spouse to expect a call from [facility]. Spouse voiced understanding and approval.     2:45-Incoming call from /[facility] providing update. [facility] will hold a hospice meeting today with Mrs. Crytsal Gonzáles @ 4:15 today 8/11 at [facility] notified nursing.

## 2021-08-11 NOTE — DISCHARGE INSTR - COC
Continuity of Care Form    Patient Name: Teresia Opitz   :  1945  MRN:  [id number]    Admit date:  2021  Discharge date:  ***    Code Status Order: DNR-CC   Advance Directives:     Admitting Physician:  Ami Pratt MD  PCP: Kaur Momin MD    Discharging Nurse: [facility] Unit/Room#: O252/N313-95  Discharging Unit Phone Number: ***    Emergency Contact:   Extended Emergency Contact Information  Primary Emergency Contact: Deanna Flood   [address] Phone: [phone]  Relation: Spouse   needed? No    Past Surgical History:  Past Surgical History:   Procedure Laterality Date    BACK SURGERY      CHOLECYSTECTOMY, LAPAROSCOPIC N/A 2020    LAP BRAIN/ CHOLANGIOGRAMS performed by Kaye Desai MD at [place] [address]  8/18/10,2012    DR Alissa Velasco    COLONOSCOPY  14    DR. PIERRE    JOINT REPLACEMENT Left 13    total    PARATHYROIDECTOMY Bilateral     2 of 4 glands removed    RI COLON CA SCRN NOT HI RSK IND N/A 9/15/2017    COLONOSCOPY performed by Rasheeda Hayden MD at [address] [place] [address],[address] ENDOSCOPY  09    DR Alissa Velasco       Immunization History:   Immunization History   Administered Date(s) Administered    COVID-19, Pfizer, PF, 30mcg/0.3mL 2021, 2021    Hepatitis A Adult (Havrix, Vaqta) 2004, 2004    Hepatitis B 2004, 2004, 2004    Hepatitis B Ped/Adol (Engerix-B, Recombivax HB) 2004, 2004, 2004    Influenza Nasal 10/30/2012    Influenza Vaccine, unspecified formulation 2004, 11/15/2016    Influenza Virus Vaccine 2004, 10/30/2012    Influenza, High Dose (Fluzone 65 yrs and older) 11/15/2016, 10/10/2017, 10/16/2018    Influenza, Quadv, adjuvanted, 65 yrs +, IM, PF (Fluad) 10/23/2020    Influenza, Triv, inactivated, subunit, adjuvanted, IM (Fluad 65 yrs and older) 11/26/2019    Meningococcal MPSV4 (Menomune) 02/04/2004, 03/24/2008    Pneumococcal Conjugate 13-valent ([id number]) 05/05/2016    Pneumococcal Polysaccharide (Veutipder10) 11/30/2012    Polio Virus Vaccine 01/20/2004    Td vaccine (adult) 01/20/2004    Tdap (Boostrix, Adacel) 01/10/2014    Typhoid Vi capsular polysaccharide (Typhim VI) 02/04/2004, 03/11/2008    Yellow Fever (YF-Vax) 02/04/2004, 01/10/2014       Active Problems:  Patient Active Problem List   Diagnosis Code    Hyperlipidemia E78.5    Osteopenia M85.80    CAD (coronary artery disease) I25.10    Benign prostatic hyperplasia N40.0    Cholelithiasis K80.20    Foot drop, left M21.372    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    History of hip replacement, total Z96.649    Seborrheic keratosis L82.1    History of colon cancer Z85.038    Disturbance of skin sensation R20.9    Degenerative joint disease of pelvic region M16.10    History of repair of hip joint Z98.890    Sinus bradycardia on ECG R00.1    Lumbar radiculopathy M54.16    Gastroesophageal reflux disease with esophagitis K21.00    Actinic keratoses L57.0    Inflamed seborrheic keratosis L82.0    PETRA (obstructive sleep apnea) G47.33    TIA (transient ischemic attack) G45.9    PD (Parkinson's disease) ([facility] [address] G20    Memory loss R41.3    Syncope and collapse R55    Mild cognitive impairment G31.84    Chest pain Z29.6    Biliary colic R68.55    Stroke-like episode R29.90    Ataxic gait R26.0    Vascular dementia with behavior disturbance (HCC) F01.51    Shortness of breath R06.02    Onychomycosis B35.1    Pain in toes of both feet M79.674, M79.675    Peripheral nerve disease G62.9    Peripheral vascular disease of foot (HCC) I73.9    Bradykinesia R25.8    Aphasia R47.01    Primary insomnia F51.01    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    AMS (altered mental status) R41.82    Aspiration pneumonia of left lung ([facility] [address] J69.0  Lewy body dementia without behavioral disturbance (HCC) G31.83, F02.80    Myoclonus G25.3    Hepatic encephalopathy (HCC) K72.90       Isolation/Infection:   Isolation          No Isolation        Patient Infection Status     Infection Onset Added Last Indicated Last Indicated By Review Planned Expiration Resolved Resolved By    None active    Resolved    COVID-19 Rule Out 01/05/21 01/05/21 01/05/21 COVID-19 (Ordered)   01/05/21 Rule-Out Test Resulted          Nurse Assessment:  Last Vital Signs: BP (!) 155/109   Pulse 78   Temp 99.7 °F (37.6 °C) (Axillary)   Resp 18   Ht 6' 1\" (1.854 m)   Wt 157 lb (71.2 kg)   SpO2 90%   BMI 20.71 kg/m²     Last documented pain score (0-10 scale): Pain Level: 0  Last Weight:   Wt Readings from Last 1 Encounters:   08/11/21 157 lb (71.2 kg)     Mental Status:  {IP PT MENTAL STATUS:20030}    IV Access:  { [facility] IV ACCESS:446366695}    Nursing Mobility/ADLs:  Walking   {[facility] DME MTUU:249336963}  Transfer  {[facility] DME HBEM:060200413}  Bathing  {[facility] DME IYBR:581321790}  Dressing  {[facility] DME ATBI:090729522}  Toileting  {[facility] DME MSER:101142959}  Feeding  {[facility] DME NNWR:309835725}  Med Admin  {[facility] DME TRBN:412991237}  Med Delivery   { [facility] MED Delivery:499840764}    Wound Care Documentation and Therapy:        Elimination:  Continence:   · Bowel: {YES / JE:23735}  · Bladder: {YES / JA:31526}  Urinary Catheter: {Urinary Catheter:523161827}   Colostomy/Ileostomy/Ileal Conduit: {YES / JJ:81211}       Date of Last BM: ***    Intake/Output Summary (Last 24 hours) at 8/11/2021 1507  Last data filed at 8/11/2021 0527  Gross per 24 hour   Intake --   Output 800 ml   Net -800 ml     I/O last 3 completed shifts:  In: -   Out: 800 [Urine:800]    Safety Concerns:     [address] [facility] Safety Concerns:955439570}    Impairments/Disabilities:      [address] [place] Impairments/Disabilities:725150714}    Nutrition Therapy:  Current Nutrition Therapy:   [address] [place] Diet List:890530719}    Routes of Feeding: {CHP NANCY Other Feedings:885503257}  Liquids: {Slp liquid thickness:30832}  Daily Fluid Restriction: {CHP DME Yes amt example:133246278}  Last Modified Barium Swallow with Video (Video Swallowing Test): {Done Not Done NOCK:952124342}    Treatments at the Time of Hospital Discharge:   Respiratory Treatments: ***  Oxygen Therapy:  {Therapy; copd oxygen:08096}  Ventilator:    {[facility] Vent XQMV:948341680}    Rehab Therapies: {THERAPEUTIC INTERVENTION:8191022363}  Weight Bearing Status/Restrictions: {[facility] Weight Bearin}  Other Medical Equipment (for information only, NOT a DME order):  {EQUIPMENT:560414703}  Other Treatments: ***    Patient's personal belongings (please select all that are sent with patient):  {CHP DME Belongings:620577603}    RN SIGNATURE:  {Esignature:707013025}    CASE MANAGEMENT/SOCIAL WORK SECTION    Inpatient Status Date: ***    Readmission Risk Assessment Score:  Readmission Risk              Risk of Unplanned Readmission:  15           Discharging to Facility/ Agency   · Name: [facility]  · Address:  · Phone:[phone]  · Fax:    Dialysis Facility (if applicable)   · Name:  · Addre-ss:  · Dialysis Schedule:  · Phone:  · Fax:    / signature: Electronically signed by NOVA Montiel LSW on 21 at 3:10 PM EDT    PHYSICIAN SECTION    Prognosis: {Prognosis:1443413356}    Condition at Discharge: [facility] Patient Condition:670008198}    Rehab Potential (if transferring to Rehab): {Prognosis:6474020495}    Recommended Labs or Other Treatments After Discharge: ***    Physician Certification: I certify the above information and transfer of Christine Fill  is necessary for the continuing treatment of the diagnosis listed and that he requires {Admit to Appropriate Level of Care:37183} for {GREATER/LESS:590698481} 30 days.      Update Admission H&P: {CHP DME Changes in OTSRR:253264810}    PHYSICIAN SIGNATURE:  {Esignature:703401535}

## 2021-08-12 NOTE — DISCHARGE SUMMARY
Discharge Summary    Date: 8/12/2021  Patient Name: Johan Hernandez YOB: 1945 Age: 77 y.o. Admit Date: 8/7/2021  Discharge Date: 8/12/2021  Discharge Condition:    Admission Diagnosis  Hypoxia (R09.02); Lewy body dementia without behavioral disturbance (HCC) (G31.83, F02.80); Pneumonia of left lower lobe due to infectious organism (J18.9); Aspiration pneumonia of left lung, unspecified aspiration pneumonia type, unspecified part of lung ([facility] [facility] (J69.0)     Discharge Diagnosis  Active Problems: Aspiration pneumonia of left lung ([facility] [address] Lewy body dementia without behavioral disturbance (HCC) Myoclonus Hepatic encephalopathy (HCC)Resolved Problems: * No resolved hospital problems. [facility] Stay  Narrative of Hospital Course:  Patient admitted to the hospital with aspiration pneumonia in the background of vascular and Lewy body dementia. Mentation poor while admitted, and persistent hypoxia,not improving over time. Given prognosis status and prognosis, family opted to transition to hospice.      Consultants:  IP CONSULT TO PHARMACYIP CONSULT TO [place] CONSULT TO PULMONOLOGYIP CONSULT TO GII CONSULT TO HOSPICEIP CONSULT TO NEUROLOGY    Surgeries/procedures Performed:       Treatments:    Antibiotics, Analgesia, Respiratory Therapy, Therapies and IV Hydration        Discharge Plan/Disposition:  Home    Hospital/Incidental Findings Requiring Follow Up:    Patient Instructions:    Diet:    Activity:  For number of days (if applicable): Other Instructions:    Provider Follow-Up:   No follow-ups on file. Significant Diagnostic Studies:    Recent Labs:  Admission on 08/07/2021, Discharged on 08/11/2021Blood Culture, Routine                        Date: 08/07/2021Value: No Growth to date. Any change in status will be *                     Status: PreliminaryCulture, Blood 2                              Date: 08/07/2021Value: No Growth to date.   Any change in status will be * Status: PreliminarySodium                                        Date: 08/07/2021Value: 143         Ref range: 135 - 144 mEq/L    Status: FinalPotassium                                     Date: 08/07/2021Value: 3.8         Ref range: 3.4 - 4.9 mEq/L    Status: FinalChloride                                      Date: 08/07/2021Value: 106         Ref range: 95 - 107 mEq/L     Status: FinalCO2                                           Date: 08/07/2021Value: 24          Ref range: 20 - 31 mEq/L      Status: FinalAnion Gap                                     Date: 08/07/2021Value: 13          Ref range: 9 - 15 mEq/L       Status: FinalGlucose                                       Date: 08/07/2021Value: 145*        Ref range: 70 - 99 mg/dL      Status: FinalBUN                                           Date: 08/07/2021Value: 21          Ref range: 8 - 23 mg/dL       Status: FinalCREATININE                                    Date: 08/07/2021Value: 0.99        Ref range: 0.70 - 1.20 mg/dL  Status: FinalGFR Non-                      Date: 08/07/2021Value: >60.0       Ref range: >60                Status: Final              Comment: >60 mL/min/1.73m2 EGFR, calc. for ages 25 and older using theMDRD formula (not corrected for weight), is valid for stablerenal function. GFR                           Date: 08/07/2021Value: >60.0       Ref range: >60                Status: Final              Comment: >60 mL/min/1.73m2 EGFR, calc. for ages 25 and older using theMDRD formula (not corrected for weight), is valid for stablerenal function. Calcium                                       Date: 08/07/2021Value: 9.3         Ref range: 8.5 - 9.9 mg/dL    Status: FinalTotal Protein                                 Date: 08/07/2021Value: 7.1         Ref range: 6.3 - 8.0 g/dL     Status: FinalAlbumin                                       Date: 08/07/2021Value: 3.8         Ref range: 3.5 - 4.6 g/dL Status: FinalTotal Bilirubin                               Date: 08/07/2021Value: 0.8*        Ref range: 0.2 - 0.7 mg/dL    Status: FinalAlkaline Phosphatase                          Date: 08/07/2021Value: 164*        Ref range: 35 - 104 U/L       Status: FinalALT                                           Date: 08/07/2021Value: 129*        Ref range: 0 - 41 U/L         Status: FinalAST                                           Date: 08/07/2021Value: 173*        Ref range: 0 - 40 U/L         Status: FinalGlobulin                                      Date: 08/07/2021Value: 3.3         Ref range: 2.3 - 3.5 g/dL     Status: [address]                                           Date: 08/07/2021Value: 12.3*       Ref range: 4.8 - 10.8 K/uL    Status: FinalRBC                                           Date: 08/07/2021Value: 5.13        Ref range: 4.70 - 6.10 M/uL   Status: FinalHemoglobin                                    Date: 08/07/2021Value: 13.9*       Ref range: 14.0 - 18.0 g/dL   Status: FinalHematocrit                                    Date: 08/07/2021Value: 42.4        Ref range: 42.0 - 52.0 %      Status: FinalMCV                                           Date: 08/07/2021Value: 82.7        Ref range: 80.0 - 100.0 fL    Status: [address]                                           Date: 08/07/2021Value: 27.0        Ref range: 27.0 - 31.3 pg     Status: [address]                                          Date: 08/07/2021Value: 32.7*       Ref range: 33.0 - 37.0 %      Status: FinalRDW                                           Date: 08/07/2021Value: 16.4*       Ref range: 11.5 - 14.5 %      Status: FinalPlatelets                                     Date: 08/07/2021Value: 290         Ref range: 130 - 400 K/uL     Status: FinalNeutrophils %                                 Date: 08/07/2021Value: 91.4        Ref range: %                  Status: FinalLymphocytes %                                 Date: 08/07/2021Value: 2.3 Ref range: %                  Status: FinalMonocytes %                                   Date: 08/07/2021Value: 6.2         Ref range: %                  Status: FinalEosinophils %                                 Date: 08/07/2021Value: 0.0         Ref range: %                  Status: FinalBasophils %                                   Date: 08/07/2021Value: 0.1         Ref range: %                  Status: FinalNeutrophils Absolute                          Date: 08/07/2021Value: 11.3*       Ref range: 1.4 - 6.5 K/uL     Status: FinalLymphocytes Absolute                          Date: 08/07/2021Value: 0.3*        Ref range: 1.0 - 4.8 K/uL     Status: FinalMonocytes Absolute                            Date: 08/07/2021Value: 0.8         Ref range: 0.2 - 0.8 K/uL     Status: FinalEosinophils Absolute                          Date: 08/07/2021Value: 0.0         Ref range: 0.0 - 0.7 K/uL     Status: FinalBasophils Absolute                            Date: 08/07/2021Value: 0.0         Ref range: 0.0 - 0.2 K/uL     Status: FinalLactic Acid                                   Date: 08/07/2021Value: 1.6         Ref range: 0.5 - 2.2 mmol/L   Status: FinalTroponin                                      Date: 08/07/2021Value: <0.010      Ref range: 0.000 - 0.010 ng*  Status: Final              Comment: Methodology by Troponin T. Ventricular Rate                              Date: 08/07/2021Value: 77          Ref range: BPM                Status: FinalAtrial Rate                                   Date: 08/07/2021Value: 77          Ref range: BPM                Status: FinalP-R Interval                                  Date: 08/07/2021Value: 122         Ref range: ms                 Status: FinalQRS Duration                                  Date: 08/07/2021Value: 82          Ref range: ms                 Status: FinalQ-T Interval                                  Date: 08/07/2021Value: 386         Ref range: ms Status: FinalQTc Calculation (Bazett)                      Date: 08/07/2021Value: 436         Ref range: ms                 Status: FinalP Axis                                        Date: 08/07/2021Value: 73          Ref range: degrees            Status: FinalR Axis                                        Date: 08/07/2021Value: 42          Ref range: degrees            Status: FinalT Axis                                        Date: 08/07/2021Value: 76          Ref range: degrees            Status: FinalaPTT                                          Date: 08/07/2021Value: 38.7*       Ref range: 24.4 - 36.8 sec    Status: Final              Comment: Effective 11/4/2020:Heparin Therapeutic Range: 64.0 - 98.0 seconds. Protime                                       Date: 08/07/2021Value: 13.7        Ref range: 12.3 - 14.9 sec    Status: FinalINR                                           Date: 08/07/2021Value: 1.1           Status: FinalTotal CK                                      Date: 08/07/2021Value: 277*        Ref range: 0 - 190 U/L        Status: FinalCK-MB                                         Date: 08/07/2021Value: 2.2         Ref range: 0.0 - 6.7 ng/mL    Status: FinalCK-MB Index                                   Date: 08/07/2021Value: 0.8         Ref range: 0.0 - 3.5 %        Status: FinalHep A IgM                                     Date: 08/07/2021Value: Non-reactive                     Status: FinalHep B Core Ab, IgM                            Date: 08/07/2021Value: Non-reactive                     Status: FinalHep B S Ag Interp                             Date: 08/07/2021Value: Non-reactive                     Status: FinalHep C Ab Interp                               Date: 08/07/2021Value: Non-reactive                     Status: FinalHepatitis Interpretation:                     Date: 08/07/2021Value: see below     Status: Final              Comment: The acute hepatitis panel is negative.  There is no evidence ofacute hepatitis A, B, C. Sodium                                        Date: 08/08/2021Value: 150*        Ref range: 135 - 144 mEq/L    Status: FinalPotassium reflex Magnesium                    Date: 08/08/2021Value: 4.0         Ref range: 3.4 - 4.9 mEq/L    Status: FinalChloride                                      Date: 08/08/2021Value: 114*        Ref range: 95 - 107 mEq/L     Status: FinalCO2                                           Date: 08/08/2021Value: 24          Ref range: 20 - 31 mEq/L      Status: FinalAnion Gap                                     Date: 08/08/2021Value: 12          Ref range: 9 - 15 mEq/L       Status: FinalGlucose                                       Date: 08/08/2021Value: 121*        Ref range: 70 - 99 mg/dL      Status: FinalBUN                                           Date: 08/08/2021Value: 20          Ref range: 8 - 23 mg/dL       Status: FinalCREATININE                                    Date: 08/08/2021Value: 0.81        Ref range: 0.70 - 1.20 mg/dL  Status: FinalGFR Non-                      Date: 08/08/2021Value: >60.0       Ref range: >60                Status: Final              Comment: >60 mL/min/1.73m2 EGFR, calc. for ages 25 and older using theMDRD formula (not corrected for weight), is valid for stablerenal function. GFR                           Date: 08/08/2021Value: >60.0       Ref range: >60                Status: Final              Comment: >60 mL/min/1.73m2 EGFR, calc. for ages 25 and older using theMDRD formula (not corrected for weight), is valid for stablerenal function. Calcium                                       Date: 08/08/2021Value: 8.7         Ref range: 8.5 - 9.9 mg/dL    Status: [address] [facility]                                           Date: 08/08/2021Value: 11.8*       Ref range: 4.8 - 10.8 K/uL    Status: FinalRBC                                           Date: 08/08/2021Value: 4.39*       Ref range: 4.70 - 6.10 M/uL Status: FinalHemoglobin                                    Date: 08/08/2021Value: 12.0*       Ref range: 14.0 - 18.0 g/dL   Status: FinalHematocrit                                    Date: 08/08/2021Value: 36.8*       Ref range: 42.0 - 52.0 %      Status: FinalMCV                                           Date: 08/08/2021Value: 83.8        Ref range: 80.0 - 100.0 fL    Status: [address] [place]                                           Date: 08/08/2021Value: 27.4        Ref range: 27.0 - 31.3 pg     Status: [address]                                          Date: 08/08/2021Value: 32.7*       Ref range: 33.0 - 37.0 %      Status: FinalRDW                                           Date: 08/08/2021Value: 16.7*       Ref range: 11.5 - 14.5 %      Status: FinalPlatelets                                     Date: 08/08/2021Value: 238         Ref range: 130 - 400 K/uL     Status: FinalTotal Protein                                 Date: 08/08/2021Value: 6.2*        Ref range: 6.3 - 8.0 g/dL     Status: FinalAlbumin                                       Date: 08/08/2021Value: 3.1*        Ref range: 3.5 - 4.6 g/dL     Status: FinalAlkaline Phosphatase                          Date: 08/08/2021Value: 121*        Ref range: 35 - 104 U/L       Status: FinalALT                                           Date: 08/08/2021Value: 77*         Ref range: 0 - 41 U/L         Status: FinalAST                                           Date: 08/08/2021Value: 48*         Ref range: 0 - 40 U/L         Status: FinalTotal Bilirubin                               Date: 08/08/2021Value: 0.6         Ref range: 0.2 - 0.7 mg/dL    Status: FinalBilirubin, Direct                             Date: 08/08/2021Value: <0.2        Ref range: 0.0 - 0.4 mg/dL    Status: FinalBilirubin, Indirect                           Date: 08/08/2021Value: see below   Ref range: 0.0 - 0.6 mg/dL    Status: Final              Comment: Indirect Bilirubin cannot be calculated since Total Bilirubinand/or Direct Bilirubin is below measurable range. Ammonia                                       Date: 08/08/2021Value: 36          Ref range: 16 - 60 umol/L     Status: [id number]-6, NAAT                              Date: 08/11/2021Value: Not Detected                   Ref range: Not Detected       Status: Final              Comment: Rapid NAAT:   Negative results should be treated as presumptive and,if inconsistent with clinical signs and symptoms or necessary forpatient management, should be tested with an alternative molecularassay. Negative results do not preclude SARS-CoV-2 infection andshould not be used as the sole basis for patient management decisions. This test has been authorized by the FDA under an Emergency UseAuthorization (EUA) for use by authorized laboratories. Fact sheet for [employer] sheet for Patients: Johnny.dk: Isothermal Nucleic Acid Amplification------------    Radiology last 7 days:  XR CHEST PORTABLEResult Date: 8/7/2021Left lung base infiltrate. [address] [facility] Date: 8/8/2021THE LIVER IS HETEROGENEOUS IN ECHOTEXTURE. THIS IS A NONSPECIFIC FINDING.      Pending Labs   Order Current Status  Culture, Blood 1 Preliminary result  Culture, Blood 2 Preliminary result      Discharge Medications    Discharge Medication List as of 8/11/2021 11:06 PM    Discharge Medication List as of 8/11/2021 11:06 PMCONTINUE these medications which have CHANGEDmemantine (NAMENDA) 10 MG tabletTAKE 1 TABLET TWICE A DAY, Disp-180 tablet, R-3Normal    Discharge Medication List as of 8/11/2021 11:06 PMCONTINUE these medications which have NOT CHANGEDatorvastatin (LIPITOR) 20 MG tabletTake 20 mg by mouth nightlyHistorical Medclopidogrel (PLAVIX) 75 MG tabletTAKE 1 TABLET DAILY, Disp-90 tablet, R-3Normaldipyridamole (PERSANTINE) 75 MG tabletTAKE 1 TABLET THREE TIMES A DAY, Disp-270 tablet, R-3Normalomeprazole (PRILOSEC) 20 MG delayed release capsuleTAKE 1 CAPSULE DAILY, Disp-90 capsule, R-3Normaltamsulosin (FLOMAX) 0.4 MG capsuleTAKE 1 CAPSULE DAILY, Disp-90 capsule, R-3Normalfinasteride (PROSCAR) 5 MG tabletTAKE 1 TABLET DAILY, Disp-90 tablet, R-3Normalmetoprolol succinate (TOPROL XL) 25 MG extended release tabletTAKE 1 TABLET DAILY, Disp-90 tablet, R-3Normalcarbidopa-levodopa-entacapone (STALEVO 100) -200 MG TABS1 tablet in the morning and 1 tablet at 2 pm, Disp-180 tablet, R-3NormalHandicap Placard MISCStarting Tue 8/4/2020, Disp-1 each,R-0, PrintGood for five yearsnitroGLYCERIN (NITROSTAT) 0.4 MG SL tabletup to max of 3 total doses. If no relief after 1 dose, call 911., Disp-25 tablet, R-3NormalMagnesium 250 MG TABSTake by mouth daily Every Monday Wednesday Friday SundayHistorical Medaspirin 81 MG tabletTake 81 mg by mouth every evening Historical MedPsyllium (METAMUCIL FIBER PO)Take 1 Wafer by mouth daily Metamucil cookiesButler Hospitaltorical Med    Discharge Medication List as of 8/11/2021 11:06 PMSTOP taking these medicationsfish oil-omega-3 fatty acids 1000 MG capsuleComments:Reason for Stopping:    Time Spent on Discharge:3E] minutes were spent in patient examination, evaluation, counseling as well as medication reconciliation, prescriptions for required medications, discharge plan, and follow up.     Electronically signed by Matilda Martinez DO on 8/12/21 at 8:04 AM EDT

## 2022-08-16 NOTE — PROGRESS NOTES
Physical Therapy Med Surg Daily Treatment Note  Facility/Department: [address] NEURO  Room: 25/[place] [address]       NAME: Placido Solis  : 1945 (78 y.o.)  MRN: [id number]  CODE STATUS: Full Code    Date of Service: 2021    Patient Diagnosis(es): AMS (altered mental status) [R41.82]   Chief Complaint   Patient presents with    Altered Mental Status     Patient unresponsive per wife     Patient Active Problem List    Diagnosis Date Noted    AMS (altered mental status) 2021    Bradykinesia 02/15/2021    Aphasia 02/15/2021    Primary insomnia 02/15/2021    Cerebral multi-infarct state 02/15/2021    Shortness of breath 2021    Vascular dementia with behavior disturbance ([address] 2020    Onychomycosis 2020    Pain in toes of both feet 2020    Peripheral nerve disease 2020    Peripheral vascular disease of foot ([address] 2020    Ataxic gait 2020    Stroke-like episode     Biliary colic     Chest pain     Arteriosclerotic parkinsonism ([address]     Memory loss     Syncope and collapse     Mild cognitive impairment     TIA (transient ischemic attack) 2019    PETRA (obstructive sleep apnea)     Actinic keratoses     Inflamed seborrheic keratosis     Gastroesophageal reflux disease with esophagitis 11/15/2016    Lumbar radiculopathy 10/26/2016    Sinus bradycardia on ECG 2016    Disturbance of skin sensation 2015    Seborrheic keratosis 2014    History of colon cancer 2014    History of repair of hip joint 10/21/2013    History of hip replacement, total 2013    Degenerative joint disease of pelvic region 2013    Degenerative arthritis of lumbar spine 2013    Foot drop, left 2013    CAD (coronary artery disease)     Benign prostatic hyperplasia     Cholelithiasis     Hyperlipidemia 2012    Osteopenia 2012        Past Medical History:   Diagnosis Date    Abnormal cardiovascular stress test 4/19/2016    Equivocal ST-T wave changes; Defect in the inferior wall consistent with prior infarction; LV EF 79%; TID ratio 1.1    Actinic keratoses     BPH (benign prostatic hypertrophy)     CAD (coronary artery disease)     Cancer (HCC)     COLON    Chest pressure 5/3/2016    Cholelithiasis     History of colon cancer     s/p partial colectomy in 2009    Hyperlipidemia     Inflamed seborrheic keratosis     Lumbar radiculopathy 10/26/2016    Mild cognitive impairment     Osteoarthritis     Parathyroid tumor     Parkinson disease ([address]     RA (retrograde amnesia)     Sinus bradycardia on ECG 4/19/2016    Done 4/5/16 with Dr. Lazara Hughes Syncope and collapse     Vascular dementia [facility])      Past Surgical History:   Procedure Laterality Date    BACK SURGERY  1993    CHOLECYSTECTOMY, LAPAROSCOPIC N/A 5/27/2020    LAP BRAIN/ CHOLANGIOGRAMS performed by More Diggs MD at [place] [address]  8/18/10,09/11/2012    DR Rios Ron    COLONOSCOPY  09/29/14    DR. PIERRE    JOINT REPLACEMENT Left 5/28/13    total    PARATHYROIDECTOMY Bilateral 2011    2 of 4 glands removed    MI COLON CA SCRN NOT HI RSK IND N/A 9/15/2017    COLONOSCOPY performed by Renita Oro MD at [address] [facility] ENDOSCOPY  8/11/09    DR Rios Ron       Restrictions  Restrictions/Precautions: Fall Risk (high fall risk)    SUBJECTIVE   General  Chart Reviewed: Yes  Family / Caregiver Present: Yes  Subjective  Subjective: Pt visably confused, answers when name called. Denies pain.  oriented to person only despite re-orientation attempts during session    Pre-Session Pain Report  Pre Treatment Pain Screening  Pain at present: 5  Scale Used: Numeric Score  Intervention List: Patient able to continue with treatment  Pain Screening  Patient Currently in Pain: Yes       Post-Session Pain Report  Pain Assessment  Pain Assessment: 0-10  Pain Level: 5  Pain Type: Acute pain  Pain Location: Arm  Pain Orientation: Right         OBJECTIVE        Bed mobility  Supine to Sit: Maximum assistance  Sit to Supine:  (DNT pt in chair after tx.)  Comment: max repetition of cues, pt with difficulty following commands and initiating movements d/t confusion. Transfers  Sit to Stand: Maximum Assistance  Stand to sit: Maximum Assistance  Bed to Chair: Maximum assistance  Stand Pivot Transfers: Maximum Assistance  Comment: poor safety awareness, face to face transfer, pt needing Max tactile cues for anterior weight shifting and to initiate STS, Stand pivot x3 completed. much increased time to complete. Ambulation  Ambulation?: No (not safe d/t poor direction following.)                     Activity Tolerance  Activity Tolerance: Patient limited by cognitive status          ASSESSMENT   Assessment: pt requires much increased time d/t confusion, Max A face to face transfers needed this date. Discharge Recommendations:  Continue to assess pending progress, Patient would benefit from continued therapy after discharge    Goals  Long term goals  Long term goal 1: Bed mobility with supervision  Long term goal 2: Functional transfers with supervision  Long term goal 3: Amb 50ft with 2ww and supervision  Long term goal 4: 5xSTS <15.0 seconds with use of UEs  Patient Goals   Patient goals : unable to states    PLAN    Times per week: 3-6  Safety Devices  Type of devices:  All fall risk precautions in place, Call light within reach, Chair alarm in place, Left in chair     [facility] (6 CLICK) [address] Raw Score : 12      Therapy Time   Individual   Time In 1003   Time Out 1046   Minutes 43      BM/Trsf: [address] Jose Lowe PTA, 08/04/21 at 2:39 PM         Definitions for assistance levels  Independent = pt does not require any physical supervision or assistance from another person for activity completion. Device may be needed.   Stand by assistance = pt requires verbal cues or instructions from another person, close to but not touching, to perform the activity  Minimal assistance= pt performs 75% or more of the activity; assistance is required to complete the activity  Moderate assistance= pt performs 50% of the activity; assistance is required to complete the activity  Maximal assistance = pt performs 25% of the activity; assistance is required to complete the activity  Dependent = pt requires total physical assistance to accomplish the task Cantharidin Pregnancy And Lactation Text: The use of this medication during pregnancy or lactation is not recommended as there is insufficient data.

## 2025-04-29 NOTE — ED NOTES
Blood labeled and sent to lab. XRay done at bedside.      Clara Phillip RN  07/09/20 9427 In order to meet Medicare requirements, the clinical documentation must support the information cited in the admission order.  Please be sure to provide detailed and clear documentation about the following in the admitting note/history and physical:

## (undated) DEVICE — PACK,SET UP,DRAPE: Brand: MEDLINE

## (undated) DEVICE — OPEN-END URETERAL CATHETER: Brand: COOK

## (undated) DEVICE — LABEL MED MINI W/ MARKER

## (undated) DEVICE — TOWEL,OR,DSP,ST,BLUE,STD,4/PK,20PK/CS: Brand: MEDLINE

## (undated) DEVICE — BAG SPEC REM 224ML W4XL6IN DIA10MM 1 HND GYN DISP ENDOPCH

## (undated) DEVICE — Device

## (undated) DEVICE — CATHETER IV 12GA L76MM EXTN DIA2.8MM FEP POLYMER THRM

## (undated) DEVICE — 3M™ STERI-STRIP™ REINFORCED ADHESIVE SKIN CLOSURES, R1547, 1/2 IN X 4 IN (12 MM X 100 MM), 6 STRIPS/ENVELOPE: Brand: 3M™ STERI-STRIP™

## (undated) DEVICE — BANDAGE ADH W2XL4IN NITRL FAB STRP CURAD

## (undated) DEVICE — TROCAR: Brand: KII® SLEEVE

## (undated) DEVICE — BANDAGE WND ADH LF FLX CURAD 3/4X3IN

## (undated) DEVICE — ELECTRODE PT RET AD L9FT HI MOIST COND ADH HYDRGEL CORDED

## (undated) DEVICE — GOWN,AURORA,NONRNF,XL,30/CS: Brand: MEDLINE

## (undated) DEVICE — DRAPE,LAP,CHOLE,W/TROUGHS,STERILE: Brand: MEDLINE

## (undated) DEVICE — PENCIL SMK EVAC 10 FT BLADE ELECTRD ROCKER FOR TELSCP

## (undated) DEVICE — KIT,ANTI FOG,W/SPONGE & FLUID,SOFT PACK: Brand: MEDLINE

## (undated) DEVICE — SYRINGE MED 30ML STD CLR PLAS LUERLOCK TIP N CTRL DISP

## (undated) DEVICE — LAPAROSCOPIC TROCAR SLEEVE/SINGLE USE: Brand: KII® OPTICAL ACCESS SYSTEM

## (undated) DEVICE — DRAPE EQUIP TRNSPRT CONTAINMENT FOR BK TAB

## (undated) DEVICE — 4-PORT MANIFOLD: Brand: NEPTUNE 2

## (undated) DEVICE — GLOVE ORANGE PI 7 1/2   MSG9075

## (undated) DEVICE — APPLIER CLP M L L11.4IN DIA10MM ENDOSCP ROT MULT FOR LIG

## (undated) DEVICE — COUNTER NDL 40 COUNT HLD 70 FOAM BLK ADH W/ MAG

## (undated) DEVICE — SUTURE SZ 0 27IN 5/8 CIR UR-6  TAPER PT VIOLET ABSRB VICRYL J603H

## (undated) DEVICE — COTTON BALL ST

## (undated) DEVICE — CHLORAPREP 26ML ORANGE

## (undated) DEVICE — ELECTRODE ES L45CM DIA5MM HK MPLR L WIRE E Z CLN MEGADYNE

## (undated) DEVICE — GOWN,AURORA,NONREINFORCED,LARGE: Brand: MEDLINE

## (undated) DEVICE — SUTURE MCRYL SZ 4-0 L27IN ABSRB UD L19MM PS-2 1/2 CIR PRIM Y426H

## (undated) DEVICE — GAUZE,SPONGE,4"X4",16PLY,XRAY,STRL,LF: Brand: MEDLINE

## (undated) DEVICE — TUBING INSUF 03UM FLTR W LUERLOCK CPC CONN

## (undated) DEVICE — INTENDED FOR TISSUE SEPARATION, AND OTHER PROCEDURES THAT REQUIRE A SHARP SURGICAL BLADE TO PUNCTURE OR CUT.: Brand: BARD-PARKER ® CARBON RIB-BACK BLADES

## (undated) DEVICE — WARMER SCP 2 ANTIFOG LAP DISP

## (undated) DEVICE — Device: Brand: MEDEX

## (undated) DEVICE — TROCAR: Brand: KII FIOS FIRST ENTRY

## (undated) DEVICE — DRAPE SURG W41XL74IN CLR FULL SZ C ARM 3 ADH POLY STRP E